# Patient Record
Sex: MALE | Race: WHITE | NOT HISPANIC OR LATINO | Employment: FULL TIME | URBAN - NONMETROPOLITAN AREA
[De-identification: names, ages, dates, MRNs, and addresses within clinical notes are randomized per-mention and may not be internally consistent; named-entity substitution may affect disease eponyms.]

---

## 2017-03-07 ENCOUNTER — OFFICE VISIT (OUTPATIENT)
Dept: PULMONOLOGY | Facility: CLINIC | Age: 57
End: 2017-03-07

## 2017-03-07 ENCOUNTER — APPOINTMENT (OUTPATIENT)
Dept: LAB | Facility: HOSPITAL | Age: 57
End: 2017-03-07

## 2017-03-07 ENCOUNTER — APPOINTMENT (OUTPATIENT)
Dept: CT IMAGING | Facility: HOSPITAL | Age: 57
End: 2017-03-07

## 2017-03-07 ENCOUNTER — HOSPITAL ENCOUNTER (INPATIENT)
Facility: HOSPITAL | Age: 57
LOS: 3 days | Discharge: HOME | End: 2017-03-10
Attending: FAMILY MEDICINE | Admitting: FAMILY MEDICINE

## 2017-03-07 VITALS
WEIGHT: 133.8 LBS | HEART RATE: 87 BPM | OXYGEN SATURATION: 97 % | HEIGHT: 68 IN | DIASTOLIC BLOOD PRESSURE: 66 MMHG | SYSTOLIC BLOOD PRESSURE: 120 MMHG | BODY MASS INDEX: 20.28 KG/M2

## 2017-03-07 DIAGNOSIS — D64.9 SEVERE ANEMIA: ICD-10-CM

## 2017-03-07 DIAGNOSIS — R63.4 WEIGHT LOSS: ICD-10-CM

## 2017-03-07 DIAGNOSIS — R53.83 FATIGUE, UNSPECIFIED TYPE: ICD-10-CM

## 2017-03-07 DIAGNOSIS — F17.200 TOBACCO USE DISORDER: ICD-10-CM

## 2017-03-07 DIAGNOSIS — J18.9 PNEUMONIA OF LEFT UPPER LOBE DUE TO INFECTIOUS ORGANISM: ICD-10-CM

## 2017-03-07 DIAGNOSIS — IMO0002 ALCOHOL USE DISORDER: ICD-10-CM

## 2017-03-07 DIAGNOSIS — D64.9 SEVERE ANEMIA: Primary | ICD-10-CM

## 2017-03-07 DIAGNOSIS — J98.4 CAVITARY LESION OF LUNG: ICD-10-CM

## 2017-03-07 DIAGNOSIS — R07.89 OTHER CHEST PAIN: ICD-10-CM

## 2017-03-07 DIAGNOSIS — B44.81 BRONCHOPULMONARY ASPERGILLOSIS (HCC): ICD-10-CM

## 2017-03-07 DIAGNOSIS — A15.0: ICD-10-CM

## 2017-03-07 DIAGNOSIS — R93.89 ABNORMAL CXR: Primary | ICD-10-CM

## 2017-03-07 PROBLEM — E87.6 HYPOKALEMIA: Status: ACTIVE | Noted: 2017-03-07

## 2017-03-07 LAB
ABO GROUP BLD: NORMAL
BLD GP AB SCN SERPL QL: NEGATIVE
CRP SERPL-MCNC: 5.7 MG/DL (ref 0–1)
HAV IGM SERPL QL IA: NEGATIVE
HBV CORE IGM SERPL QL IA: NEGATIVE
HBV SURFACE AG SERPL QL IA: NEGATIVE
HCV AB SER DONR QL: NEGATIVE
HIV1+2 AB SER QL: NEGATIVE
HOLD SPECIMEN: NORMAL
HOLD SPECIMEN: NORMAL
INR PPP: 1.05 (ref 0.8–1.2)
IRON 24H UR-MRATE: <10 MCG/DL (ref 49–181)
IRON SATN MFR SERPL: ABNORMAL % (ref 20–55)
Lab: NORMAL
PROTHROMBIN TIME: 13.7 SECONDS (ref 11.1–15.3)
RH BLD: POSITIVE
TIBC SERPL-MCNC: 364 MCG/DL (ref 261–462)
WHOLE BLOOD HOLD SPECIMEN: NORMAL
WHOLE BLOOD HOLD SPECIMEN: NORMAL

## 2017-03-07 PROCEDURE — 25010000002 LEVOFLOXACIN PER 250 MG: Performed by: FAMILY MEDICINE

## 2017-03-07 PROCEDURE — 82607 VITAMIN B-12: CPT | Performed by: FAMILY MEDICINE

## 2017-03-07 PROCEDURE — 82728 ASSAY OF FERRITIN: CPT | Performed by: FAMILY MEDICINE

## 2017-03-07 PROCEDURE — 83540 ASSAY OF IRON: CPT | Performed by: FAMILY MEDICINE

## 2017-03-07 PROCEDURE — 86923 COMPATIBILITY TEST ELECTRIC: CPT

## 2017-03-07 PROCEDURE — 86900 BLOOD TYPING SEROLOGIC ABO: CPT | Performed by: FAMILY MEDICINE

## 2017-03-07 PROCEDURE — 36415 COLL VENOUS BLD VENIPUNCTURE: CPT | Performed by: INTERNAL MEDICINE

## 2017-03-07 PROCEDURE — 25010000002 PIPERACILLIN SOD-TAZOBACTAM PER 1 G: Performed by: FAMILY MEDICINE

## 2017-03-07 PROCEDURE — 80053 COMPREHEN METABOLIC PANEL: CPT | Performed by: NURSE PRACTITIONER

## 2017-03-07 PROCEDURE — 86850 RBC ANTIBODY SCREEN: CPT | Performed by: FAMILY MEDICINE

## 2017-03-07 PROCEDURE — 36430 TRANSFUSION BLD/BLD COMPNT: CPT

## 2017-03-07 PROCEDURE — 86900 BLOOD TYPING SEROLOGIC ABO: CPT

## 2017-03-07 PROCEDURE — 99205 OFFICE O/P NEW HI 60 MIN: CPT | Performed by: INTERNAL MEDICINE

## 2017-03-07 PROCEDURE — 0 IOPAMIDOL 61 % SOLUTION: Performed by: FAMILY MEDICINE

## 2017-03-07 PROCEDURE — 25010000002 CEFTRIAXONE: Performed by: FAMILY MEDICINE

## 2017-03-07 PROCEDURE — P9016 RBC LEUKOCYTES REDUCED: HCPCS

## 2017-03-07 PROCEDURE — 25010000002 ONDANSETRON PER 1 MG: Performed by: FAMILY MEDICINE

## 2017-03-07 PROCEDURE — 87040 BLOOD CULTURE FOR BACTERIA: CPT | Performed by: FAMILY MEDICINE

## 2017-03-07 PROCEDURE — 86901 BLOOD TYPING SEROLOGIC RH(D): CPT

## 2017-03-07 PROCEDURE — 85007 BL SMEAR W/DIFF WBC COUNT: CPT | Performed by: NURSE PRACTITIONER

## 2017-03-07 PROCEDURE — 94799 UNLISTED PULMONARY SVC/PX: CPT

## 2017-03-07 PROCEDURE — G0432 EIA HIV-1/HIV-2 SCREEN: HCPCS | Performed by: FAMILY MEDICINE

## 2017-03-07 PROCEDURE — 86901 BLOOD TYPING SEROLOGIC RH(D): CPT | Performed by: FAMILY MEDICINE

## 2017-03-07 PROCEDURE — 71260 CT THORAX DX C+: CPT

## 2017-03-07 PROCEDURE — 82150 ASSAY OF AMYLASE: CPT | Performed by: NURSE PRACTITIONER

## 2017-03-07 PROCEDURE — 25010000002 VORICONAZOLE PER 10 MG: Performed by: FAMILY MEDICINE

## 2017-03-07 PROCEDURE — 85025 COMPLETE CBC W/AUTO DIFF WBC: CPT | Performed by: NURSE PRACTITIONER

## 2017-03-07 PROCEDURE — 83550 IRON BINDING TEST: CPT | Performed by: FAMILY MEDICINE

## 2017-03-07 PROCEDURE — 82746 ASSAY OF FOLIC ACID SERUM: CPT | Performed by: FAMILY MEDICINE

## 2017-03-07 PROCEDURE — 86140 C-REACTIVE PROTEIN: CPT | Performed by: FAMILY MEDICINE

## 2017-03-07 PROCEDURE — 99222 1ST HOSP IP/OBS MODERATE 55: CPT | Performed by: FAMILY MEDICINE

## 2017-03-07 PROCEDURE — 80074 ACUTE HEPATITIS PANEL: CPT | Performed by: FAMILY MEDICINE

## 2017-03-07 PROCEDURE — 85610 PROTHROMBIN TIME: CPT | Performed by: INTERNAL MEDICINE

## 2017-03-07 PROCEDURE — 99285 EMERGENCY DEPT VISIT HI MDM: CPT

## 2017-03-07 RX ORDER — SODIUM CHLORIDE 0.9 % (FLUSH) 0.9 %
1-10 SYRINGE (ML) INJECTION AS NEEDED
Status: DISCONTINUED | OUTPATIENT
Start: 2017-03-07 | End: 2017-03-10 | Stop reason: HOSPADM

## 2017-03-07 RX ORDER — LORAZEPAM 2 MG/1
4 TABLET ORAL
Status: DISCONTINUED | OUTPATIENT
Start: 2017-03-07 | End: 2017-03-10 | Stop reason: HOSPADM

## 2017-03-07 RX ORDER — ISONIAZID 300 MG/1
300 TABLET ORAL
Status: DISCONTINUED | OUTPATIENT
Start: 2017-03-08 | End: 2017-03-08

## 2017-03-07 RX ORDER — NICOTINE 21 MG/24HR
1 PATCH, TRANSDERMAL 24 HOURS TRANSDERMAL EVERY 24 HOURS
Status: DISCONTINUED | OUTPATIENT
Start: 2017-03-07 | End: 2017-03-10 | Stop reason: HOSPADM

## 2017-03-07 RX ORDER — PYRAZINAMIDE TABLET 500 MG/1
1500 TABLET ORAL
Status: DISCONTINUED | OUTPATIENT
Start: 2017-03-08 | End: 2017-03-08

## 2017-03-07 RX ORDER — LEVOFLOXACIN 5 MG/ML
750 INJECTION, SOLUTION INTRAVENOUS ONCE
Status: COMPLETED | OUTPATIENT
Start: 2017-03-07 | End: 2017-03-07

## 2017-03-07 RX ORDER — ETHAMBUTOL HYDROCHLORIDE 400 MG/1
800 TABLET, FILM COATED ORAL
Status: DISCONTINUED | OUTPATIENT
Start: 2017-03-08 | End: 2017-03-08

## 2017-03-07 RX ORDER — LORAZEPAM 2 MG/1
2 TABLET ORAL
Status: DISCONTINUED | OUTPATIENT
Start: 2017-03-07 | End: 2017-03-10 | Stop reason: HOSPADM

## 2017-03-07 RX ORDER — LORAZEPAM 1 MG/1
1 TABLET ORAL EVERY 8 HOURS
Status: COMPLETED | OUTPATIENT
Start: 2017-03-08 | End: 2017-03-09

## 2017-03-07 RX ORDER — SODIUM CHLORIDE 0.9 % (FLUSH) 0.9 %
10 SYRINGE (ML) INJECTION AS NEEDED
Status: DISCONTINUED | OUTPATIENT
Start: 2017-03-07 | End: 2017-03-07

## 2017-03-07 RX ORDER — POTASSIUM CHLORIDE 20 MEQ/1
40 TABLET, EXTENDED RELEASE ORAL ONCE
Status: COMPLETED | OUTPATIENT
Start: 2017-03-07 | End: 2017-03-07

## 2017-03-07 RX ORDER — ALBUTEROL SULFATE 2.5 MG/3ML
2.5 SOLUTION RESPIRATORY (INHALATION) EVERY 4 HOURS PRN
Status: DISCONTINUED | OUTPATIENT
Start: 2017-03-07 | End: 2017-03-10 | Stop reason: HOSPADM

## 2017-03-07 RX ORDER — LORAZEPAM 1 MG/1
1 TABLET ORAL
Status: DISCONTINUED | OUTPATIENT
Start: 2017-03-07 | End: 2017-03-10 | Stop reason: HOSPADM

## 2017-03-07 RX ORDER — LORAZEPAM 2 MG/ML
2 INJECTION INTRAMUSCULAR
Status: DISCONTINUED | OUTPATIENT
Start: 2017-03-07 | End: 2017-03-10 | Stop reason: HOSPADM

## 2017-03-07 RX ORDER — FAMOTIDINE 20 MG/1
20 TABLET, FILM COATED ORAL 2 TIMES DAILY
Status: DISCONTINUED | OUTPATIENT
Start: 2017-03-08 | End: 2017-03-10 | Stop reason: HOSPADM

## 2017-03-07 RX ORDER — LORAZEPAM 2 MG/ML
4 INJECTION INTRAMUSCULAR
Status: DISCONTINUED | OUTPATIENT
Start: 2017-03-07 | End: 2017-03-10 | Stop reason: HOSPADM

## 2017-03-07 RX ORDER — ONDANSETRON 2 MG/ML
4 INJECTION INTRAMUSCULAR; INTRAVENOUS EVERY 6 HOURS PRN
Status: DISCONTINUED | OUTPATIENT
Start: 2017-03-07 | End: 2017-03-10 | Stop reason: HOSPADM

## 2017-03-07 RX ORDER — LORAZEPAM 2 MG/ML
1 INJECTION INTRAMUSCULAR
Status: DISCONTINUED | OUTPATIENT
Start: 2017-03-07 | End: 2017-03-10 | Stop reason: HOSPADM

## 2017-03-07 RX ORDER — SODIUM CHLORIDE 9 MG/ML
100 INJECTION, SOLUTION INTRAVENOUS CONTINUOUS
Status: DISCONTINUED | OUTPATIENT
Start: 2017-03-07 | End: 2017-03-10 | Stop reason: HOSPADM

## 2017-03-07 RX ORDER — FUROSEMIDE 10 MG/ML
20 INJECTION INTRAMUSCULAR; INTRAVENOUS AS NEEDED
Status: DISPENSED | OUTPATIENT
Start: 2017-03-07 | End: 2017-03-08

## 2017-03-07 RX ORDER — LORAZEPAM 1 MG/1
1 TABLET ORAL EVERY 6 HOURS
Status: DISPENSED | OUTPATIENT
Start: 2017-03-07 | End: 2017-03-08

## 2017-03-07 RX ADMIN — LORAZEPAM 1 MG: 1 TABLET ORAL at 22:51

## 2017-03-07 RX ADMIN — Medication 10 ML: at 22:57

## 2017-03-07 RX ADMIN — ONDANSETRON 4 MG: 2 INJECTION, SOLUTION INTRAMUSCULAR; INTRAVENOUS at 23:12

## 2017-03-07 RX ADMIN — NICOTINE 1 PATCH: 21 PATCH TRANSDERMAL at 23:13

## 2017-03-07 RX ADMIN — DEXTROSE MONOHYDRATE 360 MG: 50 INJECTION, SOLUTION INTRAVENOUS at 23:46

## 2017-03-07 RX ADMIN — TAZOBACTAM SODIUM AND PIPERACILLIN SODIUM 3.38 G: 375; 3 INJECTION, SOLUTION INTRAVENOUS at 23:15

## 2017-03-07 RX ADMIN — CEFTRIAXONE 1 G: 1 INJECTION, POWDER, FOR SOLUTION INTRAMUSCULAR; INTRAVENOUS at 17:34

## 2017-03-07 RX ADMIN — POTASSIUM CHLORIDE 40 MEQ: 20 TABLET, EXTENDED RELEASE ORAL at 21:37

## 2017-03-07 RX ADMIN — LEVOFLOXACIN 750 MG: 5 INJECTION, SOLUTION INTRAVENOUS at 16:48

## 2017-03-07 RX ADMIN — SODIUM CHLORIDE 100 ML/HR: 9 INJECTION, SOLUTION INTRAVENOUS at 22:51

## 2017-03-07 RX ADMIN — IOPAMIDOL 75 ML: 612 INJECTION, SOLUTION INTRAVENOUS at 15:56

## 2017-03-07 NOTE — ED NOTES
Assisted with blood administration check off at bedside.      Danielle Aguirre RN  03/07/17 3313

## 2017-03-07 NOTE — PROGRESS NOTES
Pulmonary Consultation    Subjective     Chief Complaint   Patient presents with   • Abnormal Chest X-ray     ref urgent care        History of Present Illness  Tino Peres is a 56 y.o. male with a PMH significant for tobacco and alcohol use who presents as an urgent referral from Urgent Care for an abnormal CXR. Pt states he went to  today at the urging of his father (retired physician) and son, for several weeks of progressive dyspnea, fatigue, and wt loss. He states 3 months ago he was in his usual state of health. His father reports he has been losing wt for the past 6 months (15lbs). He admits to decreased appetite for several years. Pt fevers, chills, night sweats, or illness at symptom onset. He admits to constant left sided chest pain which is worse with lying down and rolling over onto his left side. Pt admits to cough intermittently productive of clear sputum, and he reports hemoptysis 3wks ago. He did not seek medical attention at that time as he was hoping it would stop. He reports he had some fungal type infection in his lungs ~10yrs ago, but he does not know if it was formerly diagnosed. Pt smokes 1-1.5ppd since age 12yo. He drinks up to a 6pk of beer a day, but denies binging. Pt has worked in factories and as a surgical tech. He denies known exposures to asbestos or TB. There is no lung disease or cancer in the family.    Review of Systems: History obtained from chart review and the patient.  Review of Systems   Constitutional: Positive for fatigue and unexpected weight change.   Respiratory: Positive for cough and shortness of breath.    Gastrointestinal:        No hematochezia or melena   Neurological: Positive for dizziness.     As described in the HPI. Otherwise, remainder of ROS (14 systems) were negative.    Patient Active Problem List   Diagnosis   • Tobacco use disorder   • Abnormal CXR   • Alcohol use disorder       No current outpatient prescriptions on file.    Past Medical History  "  Diagnosis Date   • Chronic bronchitis      Past Surgical History   Procedure Laterality Date   • Hernia repair       Social History     Social History   • Marital status: Single     Spouse name: N/A   • Number of children: N/A   • Years of education: N/A     Social History Main Topics   • Smoking status: Current Every Day Smoker   • Smokeless tobacco: Current User   • Alcohol use No   • Drug use: No   • Sexual activity: Not Asked     Other Topics Concern   • None     Social History Narrative     Family History   Problem Relation Age of Onset   • Asthma Father    • Cancer Father    • Diabetes Father    • Hypertension Father           Objective     Blood pressure 120/66, pulse 87, height 68\" (172.7 cm), weight 133 lb 12.8 oz (60.7 kg), SpO2 97 %.  Physical Exam   Constitutional: He is oriented to person, place, and time. Vital signs are normal. He appears well-developed and well-nourished. He appears cachectic.   HENT:   Head: Normocephalic and atraumatic.   Nose: Nose normal.   Mouth/Throat: Uvula is midline, oropharynx is clear and moist and mucous membranes are normal. Dental caries present.   Mallampati 3   Eyes: Conjunctivae, EOM and lids are normal. Pupils are equal, round, and reactive to light.   Pale sclera   Neck: Trachea normal and normal range of motion. No tracheal tenderness present. No thyroid mass present.   Cardiovascular: Regular rhythm and normal heart sounds.  Tachycardia present.  Exam reveals no gallop.    No murmur heard.  Prominent S2   Pulmonary/Chest: Effort normal. No accessory muscle usage. No respiratory distress. He has decreased breath sounds in the left upper field. He has no wheezes. He has no rhonchi. Chest wall is not dull to percussion. He exhibits no tenderness.   Abdominal: Soft. Normal appearance and bowel sounds are normal. There is no tenderness.       Vascular Status -  His exam exhibits no right foot edema. His exam exhibits no left foot edema.  Lymphadenopathy:        Head " (right side): No submandibular adenopathy present.        Head (left side): No submandibular adenopathy present.     He has no cervical adenopathy.        Right: No supraclavicular adenopathy present.        Left: No supraclavicular adenopathy present.   Neurological: He is alert and oriented to person, place, and time.   Skin: Skin is warm and dry. No cyanosis. There is pallor. Nails show no clubbing.   Psychiatric: He has a normal mood and affect. His speech is normal and behavior is normal. Judgment normal.   Nursing note and vitals reviewed.        Radiology (independently reviewed and interpreted by me): CXR 3/7/17 showed SILVANA consolidation with probable large mycetoma.       Assessment/Plan     Tino was seen today for abnormal chest x-ray.    Diagnoses and all orders for this visit:    Abnormal CXR  -     Cancel: CT Chest With Contrast; Future  -     Aspergillus Antibodies; Future  -     Aspergillus Galactomannan Antigen; Future  -     Protime-INR  -     CT Chest With Contrast; Future    Tobacco use disorder    Severe anemia    Fatigue, unspecified type    Other chest pain    Weight loss    Alcohol use disorder         Discussion/ Recommendations:   CXR concerning for mycetoma but cannot exclude underlying malignancy or other indolent infectious cause, such as TB.  He does report history of an atypical indolent infection several years ago which was never formally diagnosed or treated.  Importantly, he was found to be severely anemic (Hgb 4.1, repeat 4.4) on a CBC ordered at urgent care which was resulted to me.  He does not have any symptoms of active bleeding, but he is symptomatic from his anemia.    -Sending pt to ED for severe anemia. Anticipate admission for transfusion and anemia work-up. Discussed with Dr. Newman in the ED.  -CT chest with contrast to evaluate SILVANA process and help distinguish mediastinal structures. Probable bronch for BAL +/- biopsy vs referral to CTS for resection, but will likely wait  "until more stable and perform as an outpatient.   -Needs tobacco and alcohol cessation.  -Needs to establish with PCP.     Patient was very resistant to going to ED, stating he wanted to \"check in tomorrow\". I spent 20mins reviewing the risks of him not presenting for urgent treatment, including death. His father and son were present and encouraged the patient to heed my advice. Pt was agreeable to going directly to the ED. I will plan on seeing him in the morning to review his CT results.              No Follow-up on file.      Thank you for allowing me to participate in the care of Tino Peres. Please do not hesitate to contact me with any questions.         This document has been electronically signed by Candis Lr MD on March 7, 2017 2:24 PM      EMR Dragon/Transcription disclaimer:     Much of this encounter note is an electronic transcription/translation of spoken language to printed text. The electronic translation of spoken language may permit erroneous, or at times, nonsensical words or phrases to be inadvertently transcribed; Although I have reviewed the note for such errors, some may still exist.     "

## 2017-03-08 ENCOUNTER — APPOINTMENT (OUTPATIENT)
Dept: CARDIOLOGY | Facility: HOSPITAL | Age: 57
End: 2017-03-08
Attending: FAMILY MEDICINE

## 2017-03-08 LAB
ALBUMIN SERPL-MCNC: 3.2 G/DL (ref 3.4–4.8)
ALBUMIN/GLOB SERPL: 1 G/DL (ref 1.1–1.8)
ALP SERPL-CCNC: 75 U/L (ref 38–126)
ALT SERPL W P-5'-P-CCNC: 50 U/L (ref 21–72)
ANION GAP SERPL CALCULATED.3IONS-SCNC: 14 MMOL/L (ref 5–15)
AST SERPL-CCNC: 25 U/L (ref 17–59)
BASOPHILS # BLD AUTO: 0.03 10*3/MM3 (ref 0–0.2)
BASOPHILS NFR BLD AUTO: 0.3 % (ref 0–2)
BILIRUB SERPL-MCNC: 0.7 MG/DL (ref 0.2–1.3)
BUN BLD-MCNC: 10 MG/DL (ref 7–21)
BUN/CREAT SERPL: 20.4 (ref 7–25)
CALCIUM SPEC-SCNC: 7.9 MG/DL (ref 8.4–10.2)
CHLORIDE SERPL-SCNC: 108 MMOL/L (ref 95–110)
CO2 SERPL-SCNC: 20 MMOL/L (ref 22–31)
CREAT BLD-MCNC: 0.49 MG/DL (ref 0.7–1.3)
DEPRECATED RDW RBC AUTO: 56.5 FL (ref 35.1–43.9)
EOSINOPHIL # BLD AUTO: 0.16 10*3/MM3 (ref 0–0.7)
EOSINOPHIL NFR BLD AUTO: 1.8 % (ref 0–7)
ERYTHROCYTE [DISTWIDTH] IN BLOOD BY AUTOMATED COUNT: 21 % (ref 11.5–14.5)
FERRITIN SERPL-MCNC: 7.8 NG/ML (ref 17.9–464)
FOLATE SERPL-MCNC: 9.62 NG/ML (ref 2.76–21)
GFR SERPL CREATININE-BSD FRML MDRD: 177 ML/MIN/1.73 (ref 56–130)
GLOBULIN UR ELPH-MCNC: 3.3 GM/DL (ref 2.3–3.5)
GLUCOSE BLD-MCNC: 100 MG/DL (ref 60–100)
HCT VFR BLD AUTO: 23 % (ref 39–49)
HGB BLD-MCNC: 7.3 G/DL (ref 13.7–17.3)
HOLD SPECIMEN: NORMAL
HOLD SPECIMEN: NORMAL
IMM GRANULOCYTES # BLD: 0.03 10*3/MM3 (ref 0–0.02)
IMM GRANULOCYTES NFR BLD: 0.3 % (ref 0–0.5)
KOH PREP NAIL: NORMAL
L PNEUMO1 AG UR QL IA: NEGATIVE
LYMPHOCYTES # BLD AUTO: 1.31 10*3/MM3 (ref 0.6–4.2)
LYMPHOCYTES NFR BLD AUTO: 14.3 % (ref 10–50)
MCH RBC QN AUTO: 23.2 PG (ref 26.5–34)
MCHC RBC AUTO-ENTMCNC: 31.7 G/DL (ref 31.5–36.3)
MCV RBC AUTO: 73.2 FL (ref 80–98)
MONOCYTES # BLD AUTO: 1.34 10*3/MM3 (ref 0–0.9)
MONOCYTES NFR BLD AUTO: 14.7 % (ref 0–12)
MYCOPLASMAE PNEUMONIAE BY PCR: NEGATIVE
NEUTROPHILS # BLD AUTO: 6.26 10*3/MM3 (ref 2–8.6)
NEUTROPHILS NFR BLD AUTO: 68.6 % (ref 37–80)
PLATELET # BLD AUTO: 598 10*3/MM3 (ref 150–450)
PMV BLD AUTO: 8.2 FL (ref 8–12)
POTASSIUM BLD-SCNC: 3.5 MMOL/L (ref 3.5–5.1)
PROT SERPL-MCNC: 6.5 G/DL (ref 6.3–8.6)
RBC # BLD AUTO: 3.14 10*6/MM3 (ref 4.37–5.74)
S PNEUM AG SPEC QL LA: NEGATIVE
SODIUM BLD-SCNC: 142 MMOL/L (ref 137–145)
VIT B12 BLD-MCNC: 433 PG/ML (ref 239–931)
WBC NRBC COR # BLD: 9.13 10*3/MM3 (ref 3.2–9.8)
WHOLE BLOOD HOLD SPECIMEN: NORMAL

## 2017-03-08 PROCEDURE — 87102 FUNGUS ISOLATION CULTURE: CPT | Performed by: INTERNAL MEDICINE

## 2017-03-08 PROCEDURE — 87040 BLOOD CULTURE FOR BACTERIA: CPT | Performed by: FAMILY MEDICINE

## 2017-03-08 PROCEDURE — 85025 COMPLETE CBC W/AUTO DIFF WBC: CPT | Performed by: FAMILY MEDICINE

## 2017-03-08 PROCEDURE — 25010000002 FENTANYL CITRATE (PF) 100 MCG/2ML SOLUTION: Performed by: INTERNAL MEDICINE

## 2017-03-08 PROCEDURE — 87305 ASPERGILLUS AG IA: CPT | Performed by: FAMILY MEDICINE

## 2017-03-08 PROCEDURE — 93306 TTE W/DOPPLER COMPLETE: CPT

## 2017-03-08 PROCEDURE — 25010000002 PIPERACILLIN SOD-TAZOBACTAM PER 1 G: Performed by: FAMILY MEDICINE

## 2017-03-08 PROCEDURE — 87086 URINE CULTURE/COLONY COUNT: CPT | Performed by: FAMILY MEDICINE

## 2017-03-08 PROCEDURE — 87116 MYCOBACTERIA CULTURE: CPT | Performed by: INTERNAL MEDICINE

## 2017-03-08 PROCEDURE — 87449 NOS EACH ORGANISM AG IA: CPT | Performed by: FAMILY MEDICINE

## 2017-03-08 PROCEDURE — 86606 ASPERGILLUS ANTIBODY: CPT | Performed by: FAMILY MEDICINE

## 2017-03-08 PROCEDURE — 99232 SBSQ HOSP IP/OBS MODERATE 35: CPT | Performed by: INTERNAL MEDICINE

## 2017-03-08 PROCEDURE — 86900 BLOOD TYPING SEROLOGIC ABO: CPT

## 2017-03-08 PROCEDURE — 31624 DX BRONCHOSCOPE/LAVAGE: CPT | Performed by: INTERNAL MEDICINE

## 2017-03-08 PROCEDURE — 86480 TB TEST CELL IMMUN MEASURE: CPT | Performed by: FAMILY MEDICINE

## 2017-03-08 PROCEDURE — 25010000002 VORICONAZOLE PER 10 MG: Performed by: FAMILY MEDICINE

## 2017-03-08 PROCEDURE — 87070 CULTURE OTHR SPECIMN AEROBIC: CPT | Performed by: FAMILY MEDICINE

## 2017-03-08 PROCEDURE — 87220 TISSUE EXAM FOR FUNGI: CPT | Performed by: INTERNAL MEDICINE

## 2017-03-08 PROCEDURE — 25010000002 FUROSEMIDE PER 20 MG: Performed by: FAMILY MEDICINE

## 2017-03-08 PROCEDURE — 25010000002 MAGNESIUM SULFATE PER 500 MG OF MAGNESIUM: Performed by: FAMILY MEDICINE

## 2017-03-08 PROCEDURE — 87581 M.PNEUMON DNA AMP PROBE: CPT | Performed by: FAMILY MEDICINE

## 2017-03-08 PROCEDURE — 87070 CULTURE OTHR SPECIMN AEROBIC: CPT | Performed by: INTERNAL MEDICINE

## 2017-03-08 PROCEDURE — 87205 SMEAR GRAM STAIN: CPT | Performed by: FAMILY MEDICINE

## 2017-03-08 PROCEDURE — 80053 COMPREHEN METABOLIC PANEL: CPT | Performed by: FAMILY MEDICINE

## 2017-03-08 PROCEDURE — 25010000002 THIAMINE PER 100 MG: Performed by: FAMILY MEDICINE

## 2017-03-08 PROCEDURE — 87899 AGENT NOS ASSAY W/OPTIC: CPT | Performed by: FAMILY MEDICINE

## 2017-03-08 PROCEDURE — 87305 ASPERGILLUS AG IA: CPT | Performed by: INTERNAL MEDICINE

## 2017-03-08 PROCEDURE — 25010000002 LORAZEPAM PER 2 MG: Performed by: FAMILY MEDICINE

## 2017-03-08 PROCEDURE — 88104 CYTOPATH FL NONGYN SMEARS: CPT | Performed by: PATHOLOGY

## 2017-03-08 PROCEDURE — 25010000002 MIDAZOLAM PER 1 MG: Performed by: INTERNAL MEDICINE

## 2017-03-08 PROCEDURE — 0B9G8ZX DRAINAGE OF LEFT UPPER LUNG LOBE, VIA NATURAL OR ARTIFICIAL OPENING ENDOSCOPIC, DIAGNOSTIC: ICD-10-PCS | Performed by: INTERNAL MEDICINE

## 2017-03-08 PROCEDURE — 88112 CYTOPATH CELL ENHANCE TECH: CPT | Performed by: PATHOLOGY

## 2017-03-08 PROCEDURE — 87205 SMEAR GRAM STAIN: CPT | Performed by: INTERNAL MEDICINE

## 2017-03-08 PROCEDURE — 88112 CYTOPATH CELL ENHANCE TECH: CPT | Performed by: INTERNAL MEDICINE

## 2017-03-08 PROCEDURE — 87206 SMEAR FLUORESCENT/ACID STAI: CPT | Performed by: INTERNAL MEDICINE

## 2017-03-08 PROCEDURE — 87116 MYCOBACTERIA CULTURE: CPT | Performed by: FAMILY MEDICINE

## 2017-03-08 PROCEDURE — 36430 TRANSFUSION BLD/BLD COMPNT: CPT

## 2017-03-08 PROCEDURE — 88305 TISSUE EXAM BY PATHOLOGIST: CPT | Performed by: PATHOLOGY

## 2017-03-08 PROCEDURE — 99233 SBSQ HOSP IP/OBS HIGH 50: CPT | Performed by: FAMILY MEDICINE

## 2017-03-08 PROCEDURE — 25010000002 RIFAMPIN 600 MG RECONSTITUTED SOLUTION 1 EACH VIAL: Performed by: FAMILY MEDICINE

## 2017-03-08 PROCEDURE — 87206 SMEAR FLUORESCENT/ACID STAI: CPT | Performed by: FAMILY MEDICINE

## 2017-03-08 PROCEDURE — P9016 RBC LEUKOCYTES REDUCED: HCPCS

## 2017-03-08 PROCEDURE — 94799 UNLISTED PULMONARY SVC/PX: CPT

## 2017-03-08 RX ORDER — SODIUM CHLORIDE FOR INHALATION 3 %
VIAL, NEBULIZER (ML) INHALATION
Status: DISCONTINUED
Start: 2017-03-08 | End: 2017-03-08 | Stop reason: WASHOUT

## 2017-03-08 RX ORDER — TRAZODONE HYDROCHLORIDE 50 MG/1
50 TABLET ORAL NIGHTLY
Status: DISCONTINUED | OUTPATIENT
Start: 2017-03-08 | End: 2017-03-10 | Stop reason: HOSPADM

## 2017-03-08 RX ORDER — MIDAZOLAM HYDROCHLORIDE 5 MG/ML
5 INJECTION INTRAMUSCULAR; INTRAVENOUS
Status: COMPLETED | OUTPATIENT
Start: 2017-03-08 | End: 2017-03-08

## 2017-03-08 RX ORDER — SODIUM CHLORIDE 9 MG/ML
75 INJECTION, SOLUTION INTRAVENOUS CONTINUOUS
Status: DISCONTINUED | OUTPATIENT
Start: 2017-03-08 | End: 2017-03-10 | Stop reason: HOSPADM

## 2017-03-08 RX ORDER — LIDOCAINE HYDROCHLORIDE 10 MG/ML
0.1 INJECTION, SOLUTION EPIDURAL; INFILTRATION; INTRACAUDAL; PERINEURAL ONCE AS NEEDED
Status: DISCONTINUED | OUTPATIENT
Start: 2017-03-08 | End: 2017-03-10 | Stop reason: HOSPADM

## 2017-03-08 RX ORDER — FENTANYL CITRATE 50 UG/ML
100 INJECTION, SOLUTION INTRAMUSCULAR; INTRAVENOUS
Status: COMPLETED | OUTPATIENT
Start: 2017-03-08 | End: 2017-03-08

## 2017-03-08 RX ORDER — FERROUS SULFATE TAB EC 324 MG (65 MG FE EQUIVALENT) 324 (65 FE) MG
324 TABLET DELAYED RESPONSE ORAL
Status: DISCONTINUED | OUTPATIENT
Start: 2017-03-08 | End: 2017-03-10 | Stop reason: HOSPADM

## 2017-03-08 RX ORDER — LIDOCAINE HYDROCHLORIDE 20 MG/ML
INJECTION, SOLUTION EPIDURAL; INFILTRATION; INTRACAUDAL; PERINEURAL AS NEEDED
Status: DISCONTINUED | OUTPATIENT
Start: 2017-03-08 | End: 2017-03-08 | Stop reason: HOSPADM

## 2017-03-08 RX ADMIN — FAMOTIDINE 20 MG: 20 TABLET ORAL at 10:15

## 2017-03-08 RX ADMIN — TRAZODONE HYDROCHLORIDE 50 MG: 50 TABLET ORAL at 21:44

## 2017-03-08 RX ADMIN — Medication 10 ML: at 06:19

## 2017-03-08 RX ADMIN — DEXTROSE MONOHYDRATE 360 MG: 50 INJECTION, SOLUTION INTRAVENOUS at 13:06

## 2017-03-08 RX ADMIN — NICOTINE 1 PATCH: 21 PATCH TRANSDERMAL at 22:00

## 2017-03-08 RX ADMIN — FERROUS SULFATE TAB EC 324 MG (65 MG FE EQUIVALENT) 324 MG: 324 (65 FE) TABLET DELAYED RESPONSE at 10:15

## 2017-03-08 RX ADMIN — LORAZEPAM 1 MG: 1 TABLET ORAL at 22:01

## 2017-03-08 RX ADMIN — MAGNESIUM SULFATE HEPTAHYDRATE 100 ML/HR: 500 INJECTION, SOLUTION INTRAMUSCULAR; INTRAVENOUS at 10:13

## 2017-03-08 RX ADMIN — LORAZEPAM 2 MG: 2 INJECTION INTRAMUSCULAR; INTRAVENOUS at 13:04

## 2017-03-08 RX ADMIN — TRAZODONE HYDROCHLORIDE 50 MG: 50 TABLET ORAL at 02:16

## 2017-03-08 RX ADMIN — MIDAZOLAM HYDROCHLORIDE 5 MG: 5 INJECTION, SOLUTION INTRAMUSCULAR; INTRAVENOUS at 13:05

## 2017-03-08 RX ADMIN — FUROSEMIDE 20 MG: 10 INJECTION, SOLUTION INTRAVENOUS at 02:11

## 2017-03-08 RX ADMIN — DEXTROSE MONOHYDRATE 240 MG: 50 INJECTION, SOLUTION INTRAVENOUS at 20:21

## 2017-03-08 RX ADMIN — LORAZEPAM 1 MG: 1 TABLET ORAL at 04:15

## 2017-03-08 RX ADMIN — TAZOBACTAM SODIUM AND PIPERACILLIN SODIUM 3.38 G: 375; 3 INJECTION, SOLUTION INTRAVENOUS at 04:15

## 2017-03-08 RX ADMIN — SODIUM CHLORIDE 600 MG: 9 INJECTION, SOLUTION INTRAVENOUS at 02:28

## 2017-03-08 RX ADMIN — FENTANYL CITRATE 50 MCG: 50 INJECTION, SOLUTION INTRAMUSCULAR; INTRAVENOUS at 13:02

## 2017-03-08 NOTE — ED PROVIDER NOTES
Subjective   HPI Comments: 56 year old presents for worsening short of breath and dizziness and fatigue and weight loss.   He had outpt lab work which showed hb of 4 and sent here for further care.       Patient is a 56 y.o. male presenting with shortness of breath.   History provided by:  Patient  Shortness of Breath   Severity:  Moderate  Onset quality:  Gradual  Duration:  1 week  Timing:  Constant  Progression:  Worsening  Chronicity:  New  Context: activity and smoke exposure    Relieved by:  Rest  Worsened by:  Exertion  Ineffective treatments:  Sitting up  Associated symptoms: cough and diaphoresis    Associated symptoms: no abdominal pain, no chest pain, no claudication, no ear pain, no fever, no headaches, no hemoptysis, no neck pain and no PND    Risk factors: alcohol use and tobacco use    Risk factors: no family hx of DVT and no hx of cancer        Review of Systems   Constitutional: Positive for diaphoresis. Negative for fever.   HENT: Negative for ear pain.    Respiratory: Positive for cough and shortness of breath. Negative for hemoptysis.    Cardiovascular: Negative for chest pain, claudication and PND.   Gastrointestinal: Negative for abdominal pain.   Musculoskeletal: Negative for neck pain.   Neurological: Negative for headaches.       Past Medical History   Diagnosis Date   • Chronic bronchitis        No Known Allergies    Past Surgical History   Procedure Laterality Date   • Appendectomy       open   • Inguinal hernia repair     • Exploratory laparotomy  11/17/2014     Exploratory laparotomy, repair of duodenal ulcer, modified Carlos patch       Family History   Problem Relation Age of Onset   • Asthma Father    • Cancer Father    • Diabetes Father    • Hypertension Father    • Alcohol abuse Mother    • Heart failure Brother        Social History     Social History   • Marital status: Single     Spouse name: N/A   • Number of children: N/A   • Years of education: N/A     Social History Main  "Topics   • Smoking status: Current Every Day Smoker     Packs/day: 1.00     Years: 40.00     Types: Cigarettes   • Smokeless tobacco: Former User   • Alcohol use 25.2 oz/week     42 Cans of beer per week   • Drug use: No   • Sexual activity: Defer     Other Topics Concern   • None     Social History Narrative    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  Lives alone in [place].  Drinks a 6 pack daily.           Objective    Visit Vitals   • /65 (BP Location: Left arm, Patient Position: Lying)   • Pulse 94   • Temp 99.7 °F (37.6 °C) (Oral)   • Resp 18   • Ht 68\" (172.7 cm)   • Wt 133 lb (60.3 kg)   • SpO2 100%   • BMI 20.22 kg/m2       Physical Exam   Constitutional: He is oriented to person, place, and time. He appears well-developed and well-nourished.   HENT:   Head: Normocephalic and atraumatic.   Right Ear: External ear normal.   Left Ear: External ear normal.   Nose: Nose normal.   Mouth/Throat: Oropharynx is clear and moist.   Eyes: Conjunctivae and EOM are normal. Pupils are equal, round, and reactive to light.   Neck: Normal range of motion. Neck supple.   Cardiovascular: Normal rate, regular rhythm, normal heart sounds and intact distal pulses.    Pulmonary/Chest: Effort normal. No accessory muscle usage. No respiratory distress. He has decreased breath sounds in the left upper field and the left middle field. He exhibits no tenderness and no deformity.   Abdominal: Soft. Bowel sounds are normal. There is no tenderness.   Musculoskeletal: Normal range of motion.   Neurological: He is alert and oriented to person, place, and time. He has normal reflexes.   Skin: Skin is warm.   Psychiatric: He has a normal mood and affect. His behavior is normal. Judgment and thought content normal.   Nursing note and vitals reviewed.      Procedures         ED Course  ED Course      Labs Reviewed   BLOOD CULTURE   BLOOD CULTURE   RESPIRATORY CULTURE   RAINBOW DRAW    Narrative:     The " following orders were created for panel order [place] Draw.  Procedure                               Abnormality         Status                     ---------                               -----------         ------                     Light Blue Top[[id number]]                                    Final result               Green Top (Gel)[[id number]]                                   Final result               Lavender Top[[id number]]                                      Final result               Gold Top - SST[[id number]]                                    Final result                 Please view results for these tests on the individual orders.   TYPE AND SCREEN   PREVIOUS HISTORY   PREPARE RBC   LIGHT BLUE TOP   GREEN TOP   LAVENDER TOP   GOLD TOP - SST     Xr Chest 2 View    Result Date: 3/7/2017  Narrative: [facility] Patient Name: ELENA RICE ORDERING: RANDALL SWANSON ATTENDING: REFERRING: RANDALL SWANSON ----------------------- PROCEDURE: Chest two view on 3/7/2017 CLINICAL INDICATION:  Shortness of breath, fatigue COMPARISON: None FINDINGS: There is extensive left upper lobe opacity. There is an appearance suggesting cavitary lesion with possible associated mycetoma. Recommend follow-up chest CT with contrast to better evaluate. Cannot exclude malignant process in the left upper lobe especially centrally. Component of pneumonia in the left upper lobe is also favored. Right lung is clear. Heart is within normal limits for size.     Impression: CONCLUSION: Extensive left upper lobe abnormality. Favor cavitary lesion to be present with possible mycetoma which would suggest Aspergillus infection. Malignant process is not excluded. Recommend follow-up chest CT with contrast. Electronically signed by:  Graeme Mijares  3/7/2017 11:32 AM CST Workstation: [id number]    Ct Chest With Contrast    Result Date: 3/7/2017  Narrative: [facility] Patient Name: FARIBADILCIAANNA  ELENA ORDERING: IRVIN GALVEZ ATTENDING: IRVIN GALVEZ REFERRING: IRVIN GALVEZ ----------------------- PROCEDURE: CT SCAN OF THE CHEST WITH INTRAVENOUS CONTRAST. CLINICAL INFORMATION:  abnormal lung mass Dose length product 152.6 This exam was performed using radiation doses that are as low as reasonably achievable (ALARA). COMPARISON: Chest x-ray March 7, 2017, CT abdomen November 7, 2014 CONTRAST: 75 cc intravenous Isovue 300 TECHNIQUE: Axial images were obtained and multiplanar reconstructions were made.  FINDINGS: LUNGS/PLEURA: There is complete consolidation/opacification of the left upper lobe with air bronchograms present. In the apical segment of the left upper lobe, there is a 3.7 x 5 x 8 cm cavity which contains a spongelike lobulated mass measuring 2.7 x 3.3 x 5 cm. There is a crescent of air surrounding the internal more solid appearing mass. This is concerning for a cavity from previous tuberculosis with an internal fungus ball. The remaining portions of the lungs appear clear except for centrilobular emphysema. TRACHEA AND BRONCHI:  The trachea and bronchi are patent. MEDIASTINUM, DOUGLAS AND LYMPH NODES: There are slightly enlarged lymph nodes in the aorticopulmonary window region measuring 1.1 cm in short axis, and one in the right paratracheal region measuring 1.4 cm in short axis. HEART AND PERICARDIUM: The heart and pericardium are normal. VASCULAR: Unremarkable UPPER ABDOMEN: Unremarkable OSSEOUS STRUCTURES: There is partial destruction of the second through sixth ribs in the area of left upper lobe consolidation. There is a healing transverse fracture of the lateral left sixth rib.     Impression: CONCLUSION: 1.  Complete consolidation of the left upper lobe, containing a cavitary mass within internal fungus ball and adjacent rib destruction. 2.  This constellation of findings is suspicious for a pre-existing tuberculosis cavity superinfected by aspergillosis with invasion into the chest wall,  especially in an immunocompromised host. 3.  Chest wall invasion and mycetomas can also be seen with actinomycosis infection. 4.  Healing left lateral sixth rib fracture, likely pathologic. Findings and recommendations  discussed with IRVIN GALVEZ on 3/7/2017 4:34 PM CST Electronically signed by:  Deep Mcpherson MD  3/7/2017 4:36 PM CST Workstation: [id number]                MDM  Number of Diagnoses or Management Options  Bronchopulmonary aspergillosis:   Pneumonia of left upper lobe due to infectious organism:   Severe anemia:   Tuberculosis of lung with cavitation:      Amount and/or Complexity of Data Reviewed  Clinical lab tests: ordered and reviewed  Tests in the radiology section of CPT®: ordered and reviewed  Tests in the medicine section of CPT®: ordered and reviewed  Review and summarize past medical records: yes  Discuss the patient with other providers: yes (Discussed case with Dr Lr )  Independent visualization of images, tracings, or specimens: yes    Risk of Complications, Morbidity, and/or Mortality  Presenting problems: moderate  Diagnostic procedures: moderate  Management options: high  General comments: Severe symptomatic anemia - Hb 4 - transfuse.   Severe left airspace consolidation - fungal infection with possible TB .          Final diagnoses:   Severe anemia   Bronchopulmonary aspergillosis   Pneumonia of left upper lobe due to infectious organism   Tuberculosis of lung with cavitation            Irvin Galvez MD  03/07/17 4142

## 2017-03-08 NOTE — PROGRESS NOTES
PULMONARY PROGRESS NOTE  Candis Lr MD    [facility] CRITICAL CARE  3/8/2017        Tino Peres  [id number]  1960  56 y.o. male             LOS: 1 day   Irineo Oakley MD    Subjective     CC/Reason for visit: F/u cavitary lung lesion    HPI: Tino Peres is a 56 y.o. male with a PMH significant for tobacco and alcohol use who presents as an urgent referral from Urgent Care for an abnormal CXR. Pt states he went to  today at the urging of his father (retired physician) and son, for several weeks of progressive dyspnea, fatigue, and wt loss. He states 3 months ago he was in his usual state of health. His father reports he has been losing wt for the past 6 months (15lbs). He admits to decreased appetite for several years. Pt fevers, chills, night sweats, or illness at symptom onset. He admits to constant left sided chest pain which is worse with lying down and rolling over onto his left side. Pt admits to cough intermittently productive of clear sputum, and he reports hemoptysis 3wks ago. He did not seek medical attention at that time as he was hoping it would stop. He reports he had some fungal type infection in his lungs ~10yrs ago, but he does not know if it was formerly diagnosed. Pt smokes 1-1.5ppd since age 12yo. He drinks up to a 6pk of beer a day, but denies binging. Pt has worked in factories and as a surgical tech. He denies known exposures to asbestos or TB. There is no lung disease or cancer in the family.    He was sent to the ED from my office yesterday due to severe symptomatic anemia. He did have a CT chest which confirmed a sponge like mass within a SILVANA cavity with surrounding consolidation. Pt received 3 units pRBCs with mproved Hgb to 7.3. He was placed on airborne precautions and started on empiric TB tx along with voriconazole and zosyn. Pt remains on RA and is wanting to go home.     Review of Systems:  Respiratory ROS:  Review of Systems   Constitutional:  Positive for appetite change, fatigue and unexpected weight change. Negative for fever.   HENT: Negative for congestion.    Respiratory: Positive for cough. Negative for shortness of breath and wheezing.    Cardiovascular: Positive for chest pain.     All other systems were reviewed and were negative except as above in the HPI.    Objective     Vital Sign Min/Max for last 24 hours:  Temp  Min: 97.5 °F (36.4 °C)  Max: 99.7 °F (37.6 °C)   BP  Min: 120/66  Max: 145/62   Pulse  Min: 75  Max: 104   Resp  Min: 17  Max: 24   SpO2  Min: 97 %  Max: 100 %   No Data Recorded   Weight  Min: 132 lb 12.8 oz (60.2 kg)  Max: 139 lb 5.3 oz (63.2 kg)     Physical Exam:  98.7 °F (37.1 °C) (Oral) 75 133/70 24 100% 139 lb 5.3 oz (63.2 kg) Body mass index is 20.58 kg/(m^2).  Physical Exam   Constitutional: He is oriented to person, place, and time. Vital signs are normal. He appears well-developed and well-nourished. He appears cachectic.   HENT:   Head: Normocephalic and atraumatic.   Nose: Nose normal.   Mouth/Throat: Uvula is midline, oropharynx is clear and moist and mucous membranes are normal. Dental caries present.   Mallampati 3   Eyes: Conjunctivae, EOM and lids are normal. Pupils are equal, round, and reactive to light.   Pale sclera   Neck: Trachea normal and normal range of motion. No tracheal tenderness present. No thyroid mass present.   Cardiovascular: Regular rhythm and normal heart sounds.  Exam reveals no gallop.    No murmur heard.  Prominent S2   Pulmonary/Chest: Effort normal. No accessory muscle usage. No respiratory distress. He has decreased breath sounds in the left upper field. He has no wheezes. He has no rhonchi. Chest wall is not dull to percussion. He exhibits no tenderness.   Abdominal: Soft. Normal appearance and bowel sounds are normal. There is no tenderness.       Vascular Status -  His exam exhibits no right foot edema. His exam exhibits no left foot edema.  Lymphadenopathy:        Head (right side):  No submandibular adenopathy present.        Head (left side): No submandibular adenopathy present.     He has no cervical adenopathy.        Right: No supraclavicular adenopathy present.        Left: No supraclavicular adenopathy present.   Neurological: He is alert and oriented to person, place, and time.   Skin: Skin is warm and dry. No cyanosis. There is pallor. Nails show no clubbing.   Psychiatric: He has a normal mood and affect. His speech is normal and behavior is normal.   Nursing note and vitals reviewed.      Scheduled meds:      famotidine 20 mg Oral BID   LORazepam 1 mg Oral Q6H   Followed by      LORazepam 1 mg Oral Q8H   IV Fluids 1000 mL + additives 100 mL/hr Intravenous Daily   nicotine 1 patch Transdermal Q24H   traZODone 50 mg Oral Nightly   voriconazole (VFEND) IVPB in 250 mL 4 mg/kg Intravenous Q12H   voriconazole (VFEND) IVPB in 250 mL 6 mg/kg Intravenous Q12H       IV meds:                         sodium chloride 100 mL/hr Last Rate: Stopped (03/08/17 0229)       Data Review: I personally reviewed the patient's medications and new clinical results.  Lab Results   Component Value Date    CALCIUM 7.9 (L) 03/08/2017     Results from last 7 days  Lab Units 03/08/17  0418 03/07/17  1345 03/07/17  1201   AST (SGOT) U/L 25  --  36   SODIUM mmol/L 142  --  139   POTASSIUM mmol/L 3.5  --  3.4*   CHLORIDE mmol/L 108  --  105   TOTAL CO2 mmol/L 20.0*  --  23.0   BUN mg/dL 10  --  11   CREATININE mg/dL 0.49*  --  0.54*   GLUCOSE mg/dL 100  --  117*   CALCIUM mg/dL 7.9*  --  8.6   WBC 10*3/mm3 9.13 15.34*  --    HEMOGLOBIN g/dL 7.3* 4.4*  --    PLATELETS 10*3/mm3 598* 863*  --    ALT (SGPT) U/L 50  --  59               Radiology: I independently reviewed the patient's new imaging, including images and reports.  Imaging Results (last 24 hours)     Procedure Component Value Units Date/Time    CT Chest With Contrast [[id number]] Collected:  03/07/17 1609     Updated:  03/07/17 1638    Narrative:       Radiology  [facility]    Patient Name: ELENA RICE    ORDERING: IRVIN GALVEZ    ATTENDING: IRVIN GALVEZ     REFERRING: IRVIN GALVEZ    -----------------------    PROCEDURE: CT SCAN OF THE CHEST WITH INTRAVENOUS CONTRAST.    CLINICAL INFORMATION:  abnormal lung mass     Dose length product 152.6  This exam was performed using radiation doses that are as low as  reasonably achievable (ALARA).    COMPARISON: Chest x-ray March 7, 2017, CT abdomen November 7, 2014    CONTRAST: 75 cc intravenous Isovue 300    TECHNIQUE: Axial images were obtained and multiplanar  reconstructions were made.      FINDINGS:    LUNGS/PLEURA: There is complete consolidation/opacification of  the left upper lobe with air bronchograms present. In the apical  segment of the left upper lobe, there is a 3.7 x 5 x 8 cm cavity  which contains a spongelike lobulated mass measuring 2.7 x 3.3 x  5 cm. There is a crescent of air surrounding the internal more  solid appearing mass. This is concerning for a cavity from  previous tuberculosis with an internal fungus ball.   The remaining portions of the lungs appear clear except for  centrilobular emphysema.  TRACHEA AND BRONCHI:  The trachea and bronchi are patent.  MEDIASTINUM, DOUGLAS AND LYMPH NODES: There are slightly enlarged  lymph nodes in the aorticopulmonary window region measuring 1.1  cm in short axis, and one in the right paratracheal region  measuring 1.4 cm in short axis.  HEART AND PERICARDIUM: The heart and pericardium are normal.  VASCULAR: Unremarkable  UPPER ABDOMEN: Unremarkable  OSSEOUS STRUCTURES: There is partial destruction of the second  through sixth ribs in the area of left upper lobe consolidation.  There is a healing transverse fracture of the lateral left sixth  rib.      Impression:       CONCLUSION:  1.  Complete consolidation of the left upper lobe, containing a  cavitary mass within internal fungus ball and adjacent rib  destruction.  2.  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  3.  Chest wall invasion and mycetomas can also be seen with  actinomycosis infection.  4.  Healing left lateral sixth rib fracture, likely pathologic.     Findings and recommendations  discussed with IRVIN GALVEZ on  3/7/2017 4:34 PM CST    Electronically signed by:  Deep Mcpherson MD  3/7/2017 4:36 PM CST  Workstation: [id number]          Assessment/Plan     ASSESSMENT:   # Symptomatic anemia, unknown etiology- improved  # SILVANA cavitary mass containing possible mycetoma- indolent process  # Fatigue, wt loss, dyspnea  # Tobacco and EtOH abuse      PLAN/ RECOMMENDATIONS:  -Cont TB rule out with 3 serial AFBs  -Send sputum for bacterial culture and cytology  -Stop empiric TB therapy until confirmed given possibility of resistance  -Stop Zosyn pending culture data as pt is not toxic and zosyn interferes with ASP galactomannan results  -Consider stopping empiric Vori until invasive fungal infection confirmed  -Follow Hgb. Transfuse as needed  -Consider GI consult for EGD/ colonoscopy to determine if GI source of blood loss  -Will try to schedule bronch as inpt, but ok to perform as outpt seeing as I planned and outpt work up. My schedule is limited.    Addendum: Trying to add on case at 1200 today. If unable to get done, will likely not be able to do until Friday at earliest, if not next Tuesday as an outpt.   Risks of bronchoscopy, as listed below, were discussed with the patient who agrees to proceed.  -Bleeding, especially while on blood thinners  -Infection  -Pneumothorax  -Mediastinitis  -Medication/ anesthesia reaction  -Arhythmia, hypotension, respiratory failure  -Laryngospasm  -Fever, sore throat  -1 in 8,000 risk of death   In addition, alternatives to bronchoscopy were discussed including, surgery, image guided biopsy, or doing  nothing.    Total time spent: 40 minutes      This document has been electronically signed by Candis Lr MD on March 8, 2017 7:29 AM      [phone]    EMR Dragon/Transcription disclaimer:     Much of this encounter note is an electronic transcription/translation of spoken language to printed text. The electronic translation of spoken language may permit erroneous, or at times, nonsensical words or phrases to be inadvertently transcribed; Although I have reviewed the note for such errors, some may still exist.

## 2017-03-08 NOTE — H&P
Anemia  Subjective:     Tino Peres is a 56 y.o. male who presents for anemia.  Patient was seen in the urgent care today for several weeks of progressive dyspnea, fatigue, and weight loss.  Patient had a chest x-ray that was abnormal and sent for an urgent referral to Dr. Lr.  Patient was found to have a hemoglobin of 4 and was urged to come to the ER for admission.  Patient states he's never had a blood transfusion.  Patient had hemoptysis about 3 weeks ago but did not seek medical attention.  He states he does not like doctors.  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  He took medicine for a long time but does not know what it was or what the infection was.  He denies any exposure to asbestos or tuberculosis.  He reports a 15-20 pound weight loss that was not eating well.  He has smoked a pack a day for 40 years or more.  He denies any night sweats to me.  He was found to have a cavitary lesion on CAT scan with possible extension into the rib.      The following portions of the patient's history were reviewed and updated as appropriate: allergies, current medications, past family history, past medical history, past social history, past surgical history and problem list.    Concurrent Medical Hx:  Past Medical History   Diagnosis Date   • Chronic bronchitis        Past Surgical Hx:  Past Surgical History   Procedure Laterality Date   • Appendectomy       open   • Inguinal hernia repair     • Exploratory laparotomy  11/17/2014     Exploratory laparotomy, repair of duodenal ulcer, modified Carlos patch     Family Hx:  Family History   Problem Relation Age of Onset   • Asthma Father    • Cancer Father    • Diabetes Father    • Hypertension Father    • Alcohol abuse Mother    • Heart failure Brother       Social History:  Social History     Social History   • Marital status: Single     Spouse name: N/A   • Number of children: N/A   • Years of  education: N/A     Occupational History   • Not on file.     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  Lives alone in [place].  Drinks a 6 pack daily.       Home Medications:  No current facility-administered medications on file prior to encounter.      No current outpatient prescriptions on file prior to encounter.       Allergies:  Review of patient's allergies indicates no known allergies.  I reviewed the patient's new clinical results.  Review of Systems  Review of Systems   Constitutional: Positive for appetite change (decreased appetitite), fatigue and unexpected weight change (20-30lbs unintentional weight loss). Negative for chills, diaphoresis and fever.   HENT: Negative for congestion, ear pain, nosebleeds, rhinorrhea, sneezing, sore throat and trouble swallowing.    Respiratory: Positive for cough (Patient attributes it to his smoking. Reports coughing up blood 3 weeks ago.) and shortness of breath.    Cardiovascular: Negative for chest pain, palpitations and leg swelling.   Gastrointestinal: Negative for abdominal pain, anal bleeding, blood in stool, constipation, diarrhea, nausea and vomiting.   Genitourinary: Negative for difficulty urinating, dysuria and hematuria.   Musculoskeletal: Positive for arthralgias (Chronic), back pain and myalgias.   Skin: Positive for pallor. Negative for rash.   Neurological: Positive for weakness. Negative for seizures, syncope, numbness and headaches.   Hematological: Does not bruise/bleed easily.   Psychiatric/Behavioral: Negative for hallucinations, self-injury, sleep disturbance and suicidal ideas. The  "patient is not nervous/anxious and is not hyperactive.        Objective:     Visit Vitals   • /64   • Pulse 94   • Temp 99 °F (37.2 °C)   • Resp 18   • Ht 68\" (172.7 cm)   • Wt 133 lb (60.3 kg)   • SpO2 100%   • BMI 20.22 kg/m2     Physical Exam   Constitutional: He is oriented to person, place, and time. He appears well-developed. No distress.   HENT:   Head: Normocephalic and atraumatic.   Right Ear: External ear normal.   Left Ear: External ear normal.   Nose: Nose normal.   Mouth/Throat: Oropharynx is clear and moist. No oropharyngeal exudate.   Pale conjunctiva   Eyes: EOM are normal. Pupils are equal, round, and reactive to light. No scleral icterus.   Neck: Neck supple.   Cardiovascular: Normal rate, regular rhythm and intact distal pulses.  Exam reveals no gallop and no friction rub.    Murmur heard.  2/6 systolic   Pulmonary/Chest: Effort normal and breath sounds normal. No respiratory distress. He has no wheezes. He has no rales. He exhibits no tenderness.   Abdominal: Soft. Bowel sounds are normal. He exhibits no distension. There is no tenderness. There is no rebound and no guarding.   Noted surgical scar   Musculoskeletal: He exhibits no edema or tenderness.   Lymphadenopathy:     He has no cervical adenopathy.   Neurological: He is alert and oriented to person, place, and time. He has normal reflexes. No cranial nerve deficit.   Skin: Skin is warm and dry. He is not diaphoretic.   Psychiatric: He has a normal mood and affect. His behavior is normal. Judgment and thought content normal.   Nursing note and vitals reviewed.    I reviewed the patient's new clinical results.  Assessment/Plan:     Active Hospital Problems (** Indicates Principal Problem)    Diagnosis Date Noted   • **Anemia [D64.9] 03/07/2017     -Type & cross; discussed risks and benefit of blood transfusion. Patient is agreeable to transfusion.   -Transfuse PRBCs with lasix between units  -Will obtain H&H every 4 hours  -Iron studies " ordered     • Cavitary lesion of lung [J98.4] 03/07/2017     -Pulmonology consulted; appreciate recommendations  -Will obtain 3 acid fast stains  -Will start RIPE therapy pending bronchoscopy cultures  -Will obtain aspergillus antibodies and aspergillus galactomannan antigen for possible aspergillus superimposed infection  -Will obtain aPTT and INR  -Will start patient on voriconazole (superior to amphotericin B)   -Will obtain HIV & hepatitis panel to evaluate for immunocompromise       • Alcohol use disorder [F10.99] 03/07/2017     -IV banana bag  -[facility] protocol  -Will obtain B6 levels      • Tobacco use disorder [F17.200] 03/07/2017   • CAP (community acquired pneumonia) [J18.9] 03/07/2017     -Will start levaquin and zosyn for concerns of aspiration pneumonia with coverage for pseudomonas.      • Hypokalemia [E87.6] 03/07/2017      Resolved Hospital Problems    Diagnosis Date Noted Date Resolved   No resolved problems to display.     On an echocardiogram due to the heart murmur.  Hypokalemia will replace potassium.    I have seen and examined the patient.  I have reviewed the notes, assessments, and/or procedures performed by Dr. Camejo, I concur with her documentation and assessment and plan for Tino Peres.        This document has been electronically signed by Fidelia Keller MD on March 7, 2017 9:09 PM          This document has been electronically signed by Fidelia Keller MD on March 7, 2017 9:09 PM

## 2017-03-08 NOTE — PROGRESS NOTES
FAMILY MEDICINE DAILY PROGRESS NOTE  NAME: Tino Peres  : 1960  MRN: [id number]     LOS: 1 day     PROVIDER OF SERVICE: Corie Camejo MD    Chief Complaint: Anemia    Subjective:     Interval History:  History taken from: patient    Patient complains of a restless night. He receive 3 units of PRBCs over night. His Hb this morning is 7.3. He denies any chest pain or shortness of breath. Units are prepared should his Hb drop or he becomes symptomatic.     Patient is eager to go home.     Review of Systems:   Review of Systems   Constitutional: Positive for fatigue. Negative for chills, diaphoresis and fever.   HENT: Negative for ear pain, rhinorrhea, sinus pressure, sneezing and sore throat.    Eyes: Negative for pain.   Respiratory: Negative for chest tightness and shortness of breath.    Cardiovascular: Negative for chest pain and leg swelling.   Gastrointestinal: Negative for abdominal pain, constipation, diarrhea, nausea and vomiting.   Genitourinary: Positive for hematuria. Negative for dysuria.   Neurological: Negative for seizures, numbness and headaches.   Psychiatric/Behavioral: Positive for sleep disturbance. Negative for hallucinations.   All other systems reviewed and are negative.        Objective:     Vital Signs  Temp:  [97.5 °F (36.4 °C)-99.7 °F (37.6 °C)] 98.7 °F (37.1 °C)  Heart Rate:  [] 75  Resp:  [17-24] 24  BP: (120-145)/(58-79) 133/70      Intake/Output Summary (Last 24 hours) at 17 0644  Last data filed at 17 0600   Gross per 24 hour   Intake   1936 ml   Output   1650 ml   Net    286 ml       Physical Exam  Physical Exam   Constitutional: He is oriented to person, place, and time. He appears well-developed and well-nourished. No distress.   HENT:   Head: Normocephalic and atraumatic.   Nose: Nose normal.   Eyes: EOM are normal. Pupils are equal, round, and reactive to light.   Neck: Normal range of motion. Neck supple. No tracheal deviation present. No  thyromegaly present.   Cardiovascular: Normal rate, regular rhythm, normal heart sounds and intact distal pulses.    Pulmonary/Chest: Effort normal and breath sounds normal.   Abdominal: Soft. Bowel sounds are normal. He exhibits no distension. There is no tenderness.   Musculoskeletal: Normal range of motion. He exhibits no edema.   Lymphadenopathy:     He has no cervical adenopathy.   Neurological: He is alert and oriented to person, place, and time.   Skin: Skin is warm and dry. He is not diaphoretic.   Psychiatric: He has a normal mood and affect. His behavior is normal. Judgment and thought content normal.   Nursing note and vitals reviewed.         Medication Review    Current Facility-Administered Medications:   •  albuterol (PROVENTIL) nebulizer solution 0.083% 2.5 mg/3mL, 2.5 mg, Nebulization, Q4H PRN, Elli Morgan MD  •  ethambutol (MYAMBUTOL) tablet 800 mg, 800 mg, Oral, Q24H, Elli Morgan MD  •  famotidine (PEPCID) tablet 20 mg, 20 mg, Oral, BID, Elli Morgan MD  •  furosemide (LASIX) injection 20 mg, 20 mg, Intravenous, PRN, Elli Morgan MD, 20 mg at 03/08/17 0211  •  isoniazid (NYDRAZID) tablet 300 mg, 300 mg, Oral, Q24H, Elli Morgan MD  •  LORazepam (ATIVAN) tablet 1 mg, 1 mg, Oral, Q2H PRN, 1 mg at 03/07/17 4377 **OR** LORazepam (ATIVAN) injection 1 mg, 1 mg, Intravenous, Q2H PRN **OR** LORazepam (ATIVAN) tablet 2 mg, 2 mg, Oral, Q1H PRN **OR** LORazepam (ATIVAN) injection 2 mg, 2 mg, Intravenous, Q1H PRN **OR** LORazepam (ATIVAN) injection 2 mg, 2 mg, Intravenous, Q15 Min PRN **OR** LORazepam (ATIVAN) injection 1 mg, 1 mg, Intravenous, Q15 Min PRN **OR** LORazepam (ATIVAN) tablet 4 mg, 4 mg, Oral, Q1H PRN **OR** LORazepam (ATIVAN) injection 4 mg, 4 mg, Intravenous, Q1H PRN, Elli Morgan MD  •  LORazepam (ATIVAN) tablet 1 mg, 1 mg, Oral, Q6H, 1 mg at 03/08/17 0415 **FOLLOWED BY** LORazepam (ATIVAN) tablet 1 mg, 1 mg, Oral, Q8H, Elli Morgan MD  •   multiple vitamin (M.V.I. Adult) 10 mL, thiamine (B-1) 100 mg, folic acid 1 mg, magnesium sulfate 2 g in sodium chloride 0.9 % 1,000 mL infusion, 100 mL/hr, Intravenous, Daily, Elli Morgan MD  •  nicotine (NICODERM CQ) 21 MG/24HR patch 1 patch, 1 patch, Transdermal, Q24H, Elli Morgan MD, 1 patch at 03/07/17 2313  •  ondansetron (ZOFRAN) injection 4 mg, 4 mg, Intravenous, Q6H PRN, Elli Morgan MD, 4 mg at 03/07/17 2312  •  piperacillin-tazobactam (ZOSYN) 3.375 g in dextrose 50 mL IVPB (premix), 3.375 g, Intravenous, Q6H, Elli Morgan MD, Last Rate: 0 mL/hr at 03/07/17 2346, 3.375 g at 03/08/17 0415  •  pyrazinamide tablet 1,500 mg, 1,500 mg, Oral, Q24H, Elli Morgan MD  •  pyridoxine (B-6) injection 25 mg, 25 mg, Intravenous, Daily, Elli Morgan MD  •  rifAMPin (RIFADIN) 600 mg in sodium chloride 0.9 % 500 mL IVPB, 600 mg, Intravenous, Q24H, Elli Morgan MD, Last Rate: 166.7 mL/hr at 03/08/17 0228, 600 mg at 03/08/17 0228  •  sodium chloride 0.9 % flush 1-10 mL, 1-10 mL, Intravenous, PRN, Elli Morgan MD, 10 mL at 03/08/17 0619  •  sodium chloride 0.9 % infusion, 100 mL/hr, Intravenous, Continuous, Elli Morgan MD, Stopped at 03/08/17 0229  •  traZODone (DESYREL) tablet 50 mg, 50 mg, Oral, Nightly, Elli Morgan MD, 50 mg at 03/08/17 0216  •  voriconazole (VFEND) 240 mg in dextrose (D5W) 5 % 250 mL IVPB, 4 mg/kg, Intravenous, Q12H, Elli Morgan MD  •  voriconazole (VFEND) 360 mg in dextrose (D5W) 5 % 250 mL IVPB, 6 mg/kg, Intravenous, Q12H, Elli Morgan MD, Last Rate: 125 mL/hr at 03/07/17 2346, 360 mg at 03/07/17 2346     Diagnostic Data    Lab Results (last 24 hours)     Procedure Component Value Units Date/Time    Blood Culture [[id number]] Collected:  03/07/17 1758    Specimen:  Blood from Arm, Left Updated:  03/07/17 1803    Light Blue Top [[id number]] Collected:  03/07/17 1528    Specimen:  Blood Updated:  03/07/17 2001     Extra  Tube hold for add-on       Auto resulted       [place] Draw [[id number]] Collected:  03/07/17 1528    Specimen:  Blood Updated:  03/07/17 2001    Narrative:       The following orders were created for panel order [place] Draw.  Procedure                               Abnormality         Status                     ---------                               -----------         ------                     Light Blue Top[[id number]]                                    Final result               Green Top (Gel)[[id number]]                                   Final result               Lavender Top[[id number]]                                      Final result               Gold Top - SST[[id number]]                                    Final result                 Please view results for these tests on the individual orders.    Green Top (Gel) [[id number]] Collected:  03/07/17 1528    Specimen:  Blood Updated:  03/07/17 2001     Extra Tube Hold for add-ons.       Auto resulted.       Lavender Top [[id number]] Collected:  03/07/17 1528    Specimen:  Blood Updated:  03/07/17 2001     Extra Tube hold for add-on       Auto resulted       Gold Top - SST [[id number]] Collected:  03/07/17 1528    Specimen:  Blood Updated:  03/07/17 2001     Extra Tube Hold for add-ons.       Auto resulted.       C-reactive Protein [[id number]]  (Abnormal) Collected:  03/07/17 1528    Specimen:  Blood Updated:  03/07/17 2301     C-Reactive Protein 5.70 (H) mg/dL     Iron Profile [[id number]]  (Abnormal) Collected:  03/07/17 1528    Specimen:  Blood Updated:  03/07/17 2344     Iron <10 (L) mcg/dL      TIBC 364 mcg/dL      Iron Saturation -- %       Unable to calculate.       HIV-1 & HIV-2 Antibodies [[id number]]  (Normal) Collected:  03/07/17 1528    Specimen:  Blood Updated:  03/07/17 2350     HIV-1/ HIV-2 Negative     Hepatitis Panel, Acute [[id number]]  (Normal) Collected:  03/07/17 1528    Specimen:  Blood Updated:  03/07/17 2350     Hepatitis C Ab Negative      Hep A IgM  Negative      Hep B C IgM Negative      Hepatitis B Surface Ag Negative     Ferritin [[id number]]  (Abnormal) Collected:  03/07/17 1528    Specimen:  Blood Updated:  03/08/17 0107     Ferritin 7.80 (L) ng/mL     Vitamin B12 [[id number]]  (Normal) Collected:  03/07/17 1528    Specimen:  Blood Updated:  03/08/17 0138     Vitamin B-12 433 pg/mL     Folate [[id number]]  (Normal) Collected:  03/07/17 1528    Specimen:  Blood Updated:  03/08/17 0138     Folate 9.62 ng/mL     Mycoplasma Pneumoniae PCR [[id number]] Collected:  03/08/17 0148    Specimen:  Sputum from Nasopharynx Updated:  03/08/17 0148    Urine Culture [[id number]] Collected:  03/08/17 0300    Specimen:  Urine from Urine, Clean Catch Updated:  03/08/17 0300    Legionella Antigen, Urine [[id number]]  (Normal) Collected:  03/08/17 0300    Specimen:  Urine from Urine, Clean Catch Updated:  03/08/17 0334     LEGIONELLA ANTIGEN, URINE Negative     S. Pneumo Ag Urine or CSF [[id number]]  (Normal) Collected:  03/08/17 0300    Specimen:  Urine from Urine, Clean Catch Updated:  03/08/17 0334     Strep Pneumo Ag Negative     Aspergillus Antibodies [[id number]] Collected:  03/08/17 0418    Specimen:  Blood Updated:  03/08/17 0449    Aspergillus Galactomannan Antigen [[id number]] Collected:  03/08/17 0418    Specimen:  Blood Updated:  03/08/17 0449    Comprehensive Metabolic Panel [[id number]]  (Abnormal) Collected:  03/08/17 0418    Specimen:  Blood Updated:  03/08/17 0505     Glucose 100 mg/dL      BUN 10 mg/dL      Creatinine 0.49 (L) mg/dL      Sodium 142 mmol/L      Potassium 3.5 mmol/L      Chloride 108 mmol/L      CO2 20.0 (L) mmol/L      Calcium 7.9 (L) mg/dL      Total Protein 6.5 g/dL      Albumin 3.20 (L) g/dL      ALT (SGPT) 50 U/L      AST (SGOT) 25 U/L      Alkaline Phosphatase 75 U/L      Total Bilirubin 0.7 mg/dL      eGFR Non African Amer 177 (H) mL/min/1.73      Globulin 3.3 gm/dL      A/G Ratio 1.0 (L) g/dL      BUN/Creatinine Ratio 20.4      Anion Gap 14.0 mmol/L      CBC & Differential [[id number]] Collected:  03/08/17 0418    Specimen:  Blood Updated:  03/08/17 0507    Narrative:       The following orders were created for panel order CBC & Differential.  Procedure                               Abnormality         Status                     ---------                               -----------         ------                     Scan Slide[[id number]]                                                                   CBC Auto Differential[[id number]]         Abnormal            Final result                 Please view results for these tests on the individual orders.    CBC Auto Differential [[id number]]  (Abnormal) Collected:  03/08/17 0418    Specimen:  Blood Updated:  03/08/17 0507     WBC 9.13 10*3/mm3      RBC 3.14 (L) 10*6/mm3      Hemoglobin 7.3 (L) g/dL      Hematocrit 23.0 (L) %      MCV 73.2 (L) fL      MCH 23.2 (L) pg      MCHC 31.7 g/dL      RDW 21.0 (H) %      RDW-SD 56.5 (H) fl      MPV 8.2 fL      Platelets 598 (H) 10*3/mm3      Neutrophil % 68.6 %      Lymphocyte % 14.3 %      Monocyte % 14.7 (H) %      Eosinophil % 1.8 %      Basophil % 0.3 %      Immature Grans % 0.3 %      Neutrophils, Absolute 6.26 10*3/mm3      Lymphocytes, Absolute 1.31 10*3/mm3      Monocytes, Absolute 1.34 (H) 10*3/mm3      Eosinophils, Absolute 0.16 10*3/mm3      Basophils, Absolute 0.03 10*3/mm3      Immature Grans, Absolute 0.03 (H) 10*3/mm3     Blood Culture [[id number]]  (Normal) Collected:  03/07/17 1716    Specimen:  Blood from Arm, Right Updated:  03/08/17 0601     Blood Culture No growth at less than 24 hours             I reviewed the patient's new clinical results.    Assessment/Plan:     Active Hospital Problems (** Indicates Principal Problem)    Diagnosis Date Noted   • **Anemia [D64.9] 03/07/2017     -Type & cross; discussed risks and benefit of blood transfusion. Patient is agreeable to transfusion.   -Transfuse PRBCs with lasix between units  -Hb this morning 7.3. Will follow H&H and  transfuse as indicated.   -Iron studies:   Iron - <10 (low)  TIBC - 364  Ferritin - 7.8 (low)     • Cavitary lesion of lung [J98.4] 03/07/2017     -Pulmonology consulted; appreciate recommendations  -Will obtain 3 acid fast stains  -Will start RIPE therapy pending bronchoscopy cultures  -Will obtain aspergillus antibodies and aspergillus galactomannan antigen for possible aspergillus superimposed infection  -Will obtain aPTT and INR  -Will start patient on voriconazole (superior to amphotericin B)   -HIV & hepatitis panels: negative        • Alcohol use disorder [F10.99] 03/07/2017     -IV banana bag  -[facility] protocol  -Will obtain B6 levels      • Tobacco use disorder [F17.200] 03/07/2017   • CAP (community acquired pneumonia) [J18.9] 03/07/2017     -Will start levaquin and zosyn for concerns of aspiration pneumonia with coverage for pseudomonas.   -Strep pneumonia, legionella negative.   -Sputum no growth in 24hr     • Hypokalemia [E87.6] 03/07/2017      Resolved Hospital Problems    Diagnosis Date Noted Date Resolved   No resolved problems to display.     We will continue to monitor the patient's course and adjust our care accordingly     DVT prophylaxis: SCDs/TEDs  Code status is Full Code    Plan for disposition:Where: home and When:  pending bronchosopy and when clinically stable      Time: 35 minutes

## 2017-03-08 NOTE — OP NOTE
Bronchoscopy Procedure Note    Patient Identification:  Tino Peres  56 y.o.  male  1960  [id number]            Procedure:  1. Bronchoscopy, Diagnostic  2. Bronchial wash    Pre-Operative Diagnosis: Left upper lobe cavitary lung mass    Post-Operative Diagnosis: Same    Indication: Left upper lobe cavitary lung mass, concern for infection versus malignancy    Anesthesia: Moderate Sedation    Procedure Details: Patient was consented for the procedure with all risk and benefit of the procedure explained in detail.  Patient was given the opportunity to ask questions and all concerns were answered.  The bronchocope was inserted into the main airway via the oropharynx. An anatomical survey was done of the main airways and the subsegmental bronchus to at least the first subsegmental level of all five lobes of both lungs.  The findings are reported below.  A bronchialalveolar lavage was performed using aliquots of normal saline instilled into the airways then aspirated back.    Findings:  Bronchoscope passed through the oropharynx to the level of the vocal cords.  1% Lidocaine (10cc) used for local anesthetic over vocal cords.  Bronchoscope was passed between the vocal cords into the trachea.  All airways were visualized to at least the first subsegment level of all 5 lobes of both lungs.  Airways were of normal size and caliber.  No endobronchial lesions seen. Significant inflammation of endobronchial mucosa throughout with easy bleeding with maneuvers at the SILVANA orifice. White secretions seen emanating from lingula.    Estimated Blood Loss:  Minimal           Specimens:  SILVANA wash for AFB, culture, cytology, ASP galactomannan                Complications:  Difficulty tolerating due to cough and high amount of sedation required           Disposition: Recover in ICU then stable for transfer      Patient tolerated the procedure well.        This document has been electronically signed by Candis Lr MD on March  8, 2017 12:54 PM

## 2017-03-08 NOTE — PROGRESS NOTES
[address] [address]  T - [id number]         PROGRESS NOTE         SUBJECTIVE:   Patient Care Team:  No Known Provider as PCP - General    Chief Complaint:     Chief Complaint   Patient presents with   • Anemia   • Fatigue   • Weakness - Generalized       Subjective     Patient is 56 y.o. male presents with weight loss and anemia. He is also dyspneic on exertion. He has had weight loss..      ROS/HISTORY/ CURRENT MEDICATIONS/OBJECTIVE/VS/PE:   Review of Systems:   Review of Systems    History:     Past Medical History   Diagnosis Date   • Chronic bronchitis      Past Surgical History   Procedure Laterality Date   • Appendectomy       open   • Inguinal hernia repair     • Exploratory laparotomy  11/17/2014     Exploratory laparotomy, repair of duodenal ulcer, modified Carlos patch     Family History   Problem Relation Age of Onset   • Asthma Father    • Cancer Father    • Diabetes Father    • Hypertension Father    • Alcohol abuse Mother    • Heart failure Brother      Social History   Substance Use Topics   • Smoking status: Current Every Day Smoker     Packs/day: 1.00     Years: 40.00     Types: Cigarettes   • Smokeless tobacco: Former User   • Alcohol use 25.2 oz/week     42 Cans of beer per week     No prescriptions prior to admission.     Allergies:  Review of patient's allergies indicates no known allergies.    Current Medications:     Current Facility-Administered Medications   Medication Dose Route Frequency Provider Last Rate Last Dose   • albuterol (PROVENTIL) nebulizer solution 0.083% 2.5 mg/3mL  2.5 mg Nebulization Q4H PRN Elli Morgan MD       • famotidine (PEPCID) tablet 20 mg  20 mg Oral BID Elli Morgan MD   20 mg at 03/08/17 1015   • ferrous sulfate EC tablet 324 mg  324 mg Oral Daily With Breakfast Irineo Oakley MD   324 mg at 03/08/17 1015   • furosemide (LASIX) injection 20 mg  20 mg Intravenous PRN Elli Morgan MD   20 mg  at 03/08/17 0211   • LORazepam (ATIVAN) tablet 1 mg  1 mg Oral Q2H PRN Elli Morgan MD   1 mg at 03/07/17 2347    Or   • LORazepam (ATIVAN) injection 1 mg  1 mg Intravenous Q2H PRN Elli Morgan MD        Or   • LORazepam (ATIVAN) tablet 2 mg  2 mg Oral Q1H PRN Elli Morgan MD        Or   • LORazepam (ATIVAN) injection 2 mg  2 mg Intravenous Q1H PRN Elli Morgan MD        Or   • LORazepam (ATIVAN) injection 2 mg  2 mg Intravenous Q15 Min PRN Elli Morgan MD        Or   • LORazepam (ATIVAN) injection 1 mg  1 mg Intravenous Q15 Min PRN Elli Morgan MD        Or   • LORazepam (ATIVAN) tablet 4 mg  4 mg Oral Q1H PRN Elli Morgan MD        Or   • LORazepam (ATIVAN) injection 4 mg  4 mg Intravenous Q1H PRN Elli Morgan MD       • LORazepam (ATIVAN) tablet 1 mg  1 mg Oral Q6H Elli Morgan MD   1 mg at 03/08/17 0415    Followed by   • LORazepam (ATIVAN) tablet 1 mg  1 mg Oral Q8H Elli Morgan MD       • multiple vitamin (M.V.I. Adult) 10 mL, thiamine (B-1) 100 mg, folic acid 1 mg, magnesium sulfate 2 g in sodium chloride 0.9 % 1,000 mL infusion  100 mL/hr Intravenous Daily Elli Morgan  mL/hr at 03/08/17 1013 100 mL/hr at 03/08/17 1013   • nicotine (NICODERM CQ) 21 MG/24HR patch 1 patch  1 patch Transdermal Q24H Elli Morgan MD   1 patch at 03/07/17 2313   • ondansetron (ZOFRAN) injection 4 mg  4 mg Intravenous Q6H PRN Elli Morgan MD   4 mg at 03/07/17 2312   • sodium chloride 0.9 % flush 1-10 mL  1-10 mL Intravenous PRN Elil Morgan MD   10 mL at 03/08/17 0619   • sodium chloride 0.9 % infusion  100 mL/hr Intravenous Continuous Elli Morgan MD   Stopped at 03/08/17 0229   • traZODone (DESYREL) tablet 50 mg  50 mg Oral Nightly Elli Morgan MD   50 mg at 03/08/17 0216   • voriconazole (VFEND) 240 mg in dextrose (D5W) 5 % 250 mL IVPB  4 mg/kg Intravenous Q12H Elli Morgan MD       • voriconazole (VFEND)  360 mg in dextrose (D5W) 5 % 250 mL IVPB  6 mg/kg Intravenous Q12H Elli Morgan  mL/hr at 03/07/17 2346 360 mg at 03/07/17 2346       Objective     Physical Exam:   Temp:  [97.5 °F (36.4 °C)-99.7 °F (37.6 °C)] 98.3 °F (36.8 °C)  Heart Rate:  [] 75  Resp:  [17-28] 28  BP: (120-145)/(58-79) 136/73    Physical Exam   Constitutional:   Patient looks pale.   Pulmonary/Chest: No respiratory distress. He has no wheezes. He has no rales.   Rhonchi are present bilaterally.   Abdominal: Soft. Bowel sounds are normal. He exhibits no distension. There is no tenderness.   Skin:   Patient appears pale.   Vitals reviewed.           Results Review:   Lab Results   Component Value Date    GLUCOSE 100 03/08/2017    BUN 10 03/08/2017    CREATININE 0.49 (L) 03/08/2017    EGFRIFNONA 177 (H) 03/08/2017    BCR 20.4 03/08/2017    CO2 20.0 (L) 03/08/2017    CALCIUM 7.9 (L) 03/08/2017    ALBUMIN 3.20 (L) 03/08/2017    LABIL2 1.0 (L) 03/08/2017    AST 25 03/08/2017    ALT 50 03/08/2017         WBC WBC   Date Value Ref Range Status   03/08/2017 9.13 3.20 - 9.80 10*3/mm3 Final   03/07/2017 15.34 (H) 3.20 - 9.80 10*3/mm3 Final      HGB HEMOGLOBIN   Date Value Ref Range Status   03/08/2017 7.3 (L) 13.7 - 17.3 g/dL Final   03/07/2017 4.4 (C) 13.7 - 17.3 g/dL Final     Comment:     Results confirmed by recollection/ repeat analysis      HCT HEMATOCRIT   Date Value Ref Range Status   03/08/2017 23.0 (L) 39.0 - 49.0 % Final   03/07/2017 15.7 (L) 39.0 - 49.0 % Final      Platlets PLATELETS   Date Value Ref Range Status   03/08/2017 598 (H) 150 - 450 10*3/mm3 Final   03/07/2017 863 (H) 150 - 450 10*3/mm3 Final          Imaging Results (last 24 hours)     Procedure Component Value Units Date/Time    CT Chest With Contrast [[id number]] Collected:  03/07/17 1609     Updated:  03/07/17 1638    Narrative:       [facility] [place]    Patient Name: ELENA RICE    ORDERING: IRVIN GALVEZ    ATTENDING: IRVIN GALVEZ      REFERRING: IRVIN GALVEZ    -----------------------    PROCEDURE: CT SCAN OF THE CHEST WITH INTRAVENOUS CONTRAST.    CLINICAL INFORMATION:  abnormal lung mass     Dose length product 152.6  This exam was performed using radiation doses that are as low as  reasonably achievable (ALARA).    COMPARISON: Chest x-ray March 7, 2017, CT abdomen November 7, 2014    CONTRAST: 75 cc intravenous Isovue 300    TECHNIQUE: Axial images were obtained and multiplanar  reconstructions were made.      FINDINGS:    LUNGS/PLEURA: There is complete consolidation/opacification of  the left upper lobe with air bronchograms present. In the apical  segment of the left upper lobe, there is a 3.7 x 5 x 8 cm cavity  which contains a spongelike lobulated mass measuring 2.7 x 3.3 x  5 cm. There is a crescent of air surrounding the internal more  solid appearing mass. This is concerning for a cavity from  previous tuberculosis with an internal fungus ball.   The remaining portions of the lungs appear clear except for  centrilobular emphysema.  TRACHEA AND BRONCHI:  The trachea and bronchi are patent.  MEDIASTINUM, DOUGLAS AND LYMPH NODES: There are slightly enlarged  lymph nodes in the aorticopulmonary window region measuring 1.1  cm in short axis, and one in the right paratracheal region  measuring 1.4 cm in short axis.  HEART AND PERICARDIUM: The heart and pericardium are normal.  VASCULAR: Unremarkable  UPPER ABDOMEN: Unremarkable  OSSEOUS STRUCTURES: There is partial destruction of the second  through sixth ribs in the area of left upper lobe consolidation.  There is a healing transverse fracture of the lateral left sixth  rib.      Impression:       CONCLUSION:  1.  Complete consolidation of the left upper lobe, containing a  cavitary mass within internal fungus ball and adjacent rib  destruction.  2.  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  3.  Chest wall invasion and mycetomas can also be seen with  actinomycosis infection.  4.  Healing left lateral sixth rib fracture, likely pathologic.     Findings and recommendations  discussed with IRVIN GALVEZ on  3/7/2017 4:34 PM CST    Electronically signed by:  Deep Mcpherson MD  3/7/2017 4:36 PM CST  Workstation: [id number]           I reviewed the patient's new clinical results.  I reviewed the patient's new imaging results and agree with the interpretation.     ASSESSMENT/PLAN:   Assessment/Plan   Principal Problem:    Anemia  Active Problems:    Tobacco use disorder    Alcohol use disorder    Cavitary lesion of lung    CAP (community acquired pneumonia)    Hypokalemia      Continue with current treatment.  I have reviewed the notes, assessments, and/or procedures performed by the resident  , I concur with her/his documentation of Tino Peres.    I discussed the patients findings and my recommendations with patient.      Anton Morrison MD  03/08/17  11:31 AM

## 2017-03-08 NOTE — PLAN OF CARE
Problem: Patient Care Overview (Adult)  Goal: Plan of Care Review  Outcome: Ongoing (interventions implemented as appropriate)  Goal: Adult Individualization and Mutuality  Outcome: Ongoing (interventions implemented as appropriate)    Problem: Fall Risk (Adult)  Goal: Identify Related Risk Factors and Signs and Symptoms  Outcome: Ongoing (interventions implemented as appropriate)  Goal: Absence of Falls  Outcome: Ongoing (interventions implemented as appropriate)    Problem: Acute Alcohol Withdrawal Syndrome, Risk For/Actual (Adult)  Goal: Signs and Symptoms of Listed Potential Problems Will be Absent or Manageable (Acute Alcohol Withdrawal Syndrome, Risk For/Actual)  Outcome: Ongoing (interventions implemented as appropriate)

## 2017-03-09 LAB
ABO + RH BLD: NORMAL
ACANTHOCYTES BLD QL SMEAR: NORMAL
ALBUMIN SERPL-MCNC: 2.9 G/DL (ref 3.4–4.8)
ALBUMIN/GLOB SERPL: 1 G/DL (ref 1.1–1.8)
ALP SERPL-CCNC: 74 U/L (ref 38–126)
ALT SERPL W P-5'-P-CCNC: 33 U/L (ref 21–72)
ANION GAP SERPL CALCULATED.3IONS-SCNC: 9 MMOL/L (ref 5–15)
ANISOCYTOSIS BLD QL: NORMAL
AST SERPL-CCNC: 13 U/L (ref 17–59)
BACTERIA SPEC AEROBE CULT: NORMAL
BASOPHILS # BLD AUTO: 0.07 10*3/MM3 (ref 0–0.2)
BASOPHILS NFR BLD AUTO: 0.7 % (ref 0–2)
BH BB BLOOD EXPIRATION DATE: NORMAL
BH BB BLOOD TYPE BARCODE: 5100
BH BB DISPENSE STATUS: NORMAL
BH BB PRODUCT CODE: NORMAL
BH BB UNIT NUMBER: NORMAL
BILIRUB SERPL-MCNC: 0.2 MG/DL (ref 0.2–1.3)
BUN BLD-MCNC: 11 MG/DL (ref 7–21)
BUN/CREAT SERPL: 24.4 (ref 7–25)
CALCIUM SPEC-SCNC: 7.9 MG/DL (ref 8.4–10.2)
CHLORIDE SERPL-SCNC: 108 MMOL/L (ref 95–110)
CO2 SERPL-SCNC: 20 MMOL/L (ref 22–31)
CREAT BLD-MCNC: 0.45 MG/DL (ref 0.7–1.3)
DACRYOCYTES BLD QL SMEAR: NORMAL
DEPRECATED RDW RBC AUTO: 58.4 FL (ref 35.1–43.9)
EOSINOPHIL # BLD AUTO: 0.49 10*3/MM3 (ref 0–0.7)
EOSINOPHIL NFR BLD AUTO: 4.7 % (ref 0–7)
ERYTHROCYTE [DISTWIDTH] IN BLOOD BY AUTOMATED COUNT: 22.1 % (ref 11.5–14.5)
GASTROCULT GAST QL: NEGATIVE
GFR SERPL CREATININE-BSD FRML MDRD: 195 ML/MIN/1.73 (ref 56–130)
GLOBULIN UR ELPH-MCNC: 3 GM/DL (ref 2.3–3.5)
GLUCOSE BLD-MCNC: 120 MG/DL (ref 60–100)
HCT VFR BLD AUTO: 24.1 % (ref 39–49)
HGB BLD-MCNC: 7.6 G/DL (ref 13.7–17.3)
HYPOCHROMIA BLD QL: NORMAL
IMM GRANULOCYTES # BLD: 0.05 10*3/MM3 (ref 0–0.02)
IMM GRANULOCYTES NFR BLD: 0.5 % (ref 0–0.5)
LAB AP CASE REPORT: NORMAL
LAB AP DIAGNOSIS COMMENT: NORMAL
LYMPHOCYTES # BLD AUTO: 1.74 10*3/MM3 (ref 0.6–4.2)
LYMPHOCYTES NFR BLD AUTO: 16.5 % (ref 10–50)
Lab: NORMAL
MCH RBC QN AUTO: 22.7 PG (ref 26.5–34)
MCHC RBC AUTO-ENTMCNC: 31.5 G/DL (ref 31.5–36.3)
MCV RBC AUTO: 71.9 FL (ref 80–98)
MONOCYTES # BLD AUTO: 1.25 10*3/MM3 (ref 0–0.9)
MONOCYTES NFR BLD AUTO: 11.9 % (ref 0–12)
NEUTROPHILS # BLD AUTO: 6.93 10*3/MM3 (ref 2–8.6)
NEUTROPHILS NFR BLD AUTO: 65.7 % (ref 37–80)
NRBC BLD MANUAL-RTO: 0 /100 WBC (ref 0–0)
PATH REPORT.FINAL DX SPEC: NORMAL
PLATELET # BLD AUTO: 590 10*3/MM3 (ref 150–450)
PMV BLD AUTO: 7.6 FL (ref 8–12)
POTASSIUM BLD-SCNC: 3.8 MMOL/L (ref 3.5–5.1)
PROT SERPL-MCNC: 5.9 G/DL (ref 6.3–8.6)
RBC # BLD AUTO: 3.35 10*6/MM3 (ref 4.37–5.74)
SMALL PLATELETS BLD QL SMEAR: NORMAL
SODIUM BLD-SCNC: 137 MMOL/L (ref 137–145)
STAT OF ADQ CVX/VAG CYTO-IMP: NORMAL
TARGETS BLD QL SMEAR: NORMAL
UNIT  ABO: NORMAL
UNIT  RH: NORMAL
WBC MORPH BLD: NORMAL
WBC NRBC COR # BLD: 10.53 10*3/MM3 (ref 3.2–9.8)

## 2017-03-09 PROCEDURE — 25010000002 THIAMINE PER 100 MG: Performed by: FAMILY MEDICINE

## 2017-03-09 PROCEDURE — 25010000002 MAGNESIUM SULFATE PER 500 MG OF MAGNESIUM: Performed by: FAMILY MEDICINE

## 2017-03-09 PROCEDURE — 85007 BL SMEAR W/DIFF WBC COUNT: CPT | Performed by: FAMILY MEDICINE

## 2017-03-09 PROCEDURE — 87205 SMEAR GRAM STAIN: CPT | Performed by: INTERNAL MEDICINE

## 2017-03-09 PROCEDURE — 85025 COMPLETE CBC W/AUTO DIFF WBC: CPT | Performed by: FAMILY MEDICINE

## 2017-03-09 PROCEDURE — 80053 COMPREHEN METABOLIC PANEL: CPT | Performed by: FAMILY MEDICINE

## 2017-03-09 PROCEDURE — 99232 SBSQ HOSP IP/OBS MODERATE 35: CPT | Performed by: FAMILY MEDICINE

## 2017-03-09 PROCEDURE — 87116 MYCOBACTERIA CULTURE: CPT | Performed by: INTERNAL MEDICINE

## 2017-03-09 PROCEDURE — 87070 CULTURE OTHR SPECIMN AEROBIC: CPT | Performed by: INTERNAL MEDICINE

## 2017-03-09 PROCEDURE — 82270 OCCULT BLOOD FECES: CPT | Performed by: FAMILY MEDICINE

## 2017-03-09 PROCEDURE — 87206 SMEAR FLUORESCENT/ACID STAI: CPT | Performed by: INTERNAL MEDICINE

## 2017-03-09 PROCEDURE — 99232 SBSQ HOSP IP/OBS MODERATE 35: CPT | Performed by: INTERNAL MEDICINE

## 2017-03-09 PROCEDURE — 99253 IP/OBS CNSLTJ NEW/EST LOW 45: CPT | Performed by: NURSE PRACTITIONER

## 2017-03-09 PROCEDURE — 87206 SMEAR FLUORESCENT/ACID STAI: CPT | Performed by: FAMILY MEDICINE

## 2017-03-09 RX ORDER — SODIUM CHLORIDE FOR INHALATION 3 %
VIAL, NEBULIZER (ML) INHALATION
Status: DISPENSED
Start: 2017-03-09 | End: 2017-03-09

## 2017-03-09 RX ADMIN — NICOTINE 1 PATCH: 21 PATCH TRANSDERMAL at 22:28

## 2017-03-09 RX ADMIN — LORAZEPAM 1 MG: 1 TABLET ORAL at 20:02

## 2017-03-09 RX ADMIN — FERROUS SULFATE TAB EC 324 MG (65 MG FE EQUIVALENT) 324 MG: 324 (65 FE) TABLET DELAYED RESPONSE at 07:42

## 2017-03-09 RX ADMIN — MAGNESIUM SULFATE HEPTAHYDRATE 100 ML/HR: 500 INJECTION, SOLUTION INTRAMUSCULAR; INTRAVENOUS at 09:00

## 2017-03-09 RX ADMIN — LORAZEPAM 1 MG: 1 TABLET ORAL at 15:00

## 2017-03-09 RX ADMIN — SODIUM CHLORIDE 75 ML/HR: 9 INJECTION, SOLUTION INTRAVENOUS at 05:22

## 2017-03-09 RX ADMIN — LORAZEPAM 1 MG: 1 TABLET ORAL at 07:42

## 2017-03-09 RX ADMIN — FAMOTIDINE 20 MG: 20 TABLET ORAL at 08:59

## 2017-03-09 RX ADMIN — TRAZODONE HYDROCHLORIDE 50 MG: 50 TABLET ORAL at 20:02

## 2017-03-09 RX ADMIN — FAMOTIDINE 20 MG: 20 TABLET ORAL at 18:40

## 2017-03-09 NOTE — PROGRESS NOTES
FAMILY MEDICINE PROGRESS NOTE  NAME: Tino Peres  : 1960  MRN: [id number]     LOS: 2 days   Full Code  PROVIDER OF SERVICE:     Chief Complaint:  Anemia    Subjective     Interval History:  History taken from: patient  Subjective: Patient feels good. Wants to go home. Intermittent coughing.  No hemoptysis.  No fevers.     Review of Systems:   Review of Systems   Constitutional: Negative.  Negative for fatigue and fever.   HENT: Negative.  Negative for ear pain and sore throat.    Eyes: Negative.  Negative for pain and visual disturbance.   Respiratory: Positive for cough. Negative for shortness of breath.    Cardiovascular: Negative.  Negative for chest pain and palpitations.   Gastrointestinal: Negative for abdominal pain and nausea.   Endocrine: Negative.    Genitourinary: Negative for dysuria.   Musculoskeletal: Negative for arthralgias.   Skin: Negative for rash.   Allergic/Immunologic: Negative.    Neurological: Negative for dizziness, weakness and headaches.   Psychiatric/Behavioral: Negative for sleep disturbance.       Objective     Vital Signs  Temp:  [98.2 °F (36.8 °C)-99 °F (37.2 °C)] 99 °F (37.2 °C)  Heart Rate:  [71-93] 82  Resp:  [23-27] 24  BP: (112-133)/(57-73) 116/67    Physical Exam  Physical Exam   Constitutional: He is oriented to person, place, and time. He appears well-developed and well-nourished.   HENT:   Head: Normocephalic.   Eyes: Conjunctivae and EOM are normal. Pupils are equal, round, and reactive to light.   Neck: Normal range of motion.   Cardiovascular: Normal rate, regular rhythm and normal heart sounds.    Pulmonary/Chest: Effort normal and breath sounds normal.   Abdominal: Soft. Bowel sounds are normal.   Musculoskeletal: Normal range of motion.   Neurological: He is alert and oriented to person, place, and time.   Skin: Skin is warm and dry.   Psychiatric: He has a normal mood and affect. His behavior is normal.       Medication Review    Current  Facility-Administered Medications:   •  albuterol (PROVENTIL) nebulizer solution 0.083% 2.5 mg/3mL, 2.5 mg, Nebulization, Q4H PRN, Elli Morgan MD  •  famotidine (PEPCID) tablet 20 mg, 20 mg, Oral, BID, Elli Morgan MD, 20 mg at 17 1015  •  ferrous sulfate EC tablet 324 mg, 324 mg, Oral, Daily With Breakfast, Irineo Oakley MD, 324 mg at 17 0742  •  lidocaine PF 1% (XYLOCAINE) injection 0.1 mL, 0.1 mL, Intradermal, Once PRN, Candis Lr MD  •  LORazepam (ATIVAN) tablet 1 mg, 1 mg, Oral, Q2H PRN, 1 mg at 17 2347 **OR** LORazepam (ATIVAN) injection 1 mg, 1 mg, Intravenous, Q2H PRN **OR** LORazepam (ATIVAN) tablet 2 mg, 2 mg, Oral, Q1H PRN **OR** LORazepam (ATIVAN) injection 2 mg, 2 mg, Intravenous, Q1H PRN **OR** LORazepam (ATIVAN) injection 2 mg, 2 mg, Intravenous, Q15 Min PRN, 2 mg at 17 1304 **OR** LORazepam (ATIVAN) injection 1 mg, 1 mg, Intravenous, Q15 Min PRN **OR** LORazepam (ATIVAN) tablet 4 mg, 4 mg, Oral, Q1H PRN **OR** LORazepam (ATIVAN) injection 4 mg, 4 mg, Intravenous, Q1H PRN, Elli Morgan MD  •  [] LORazepam (ATIVAN) tablet 1 mg, 1 mg, Oral, Q6H, 1 mg at 17 0415 **FOLLOWED BY** LORazepam (ATIVAN) tablet 1 mg, 1 mg, Oral, Q8H, Elli Morgan MD, 1 mg at 17 0742  •  multiple vitamin (M.V.I. Adult) 10 mL, thiamine (B-1) 100 mg, folic acid 1 mg, magnesium sulfate 2 g in sodium chloride 0.9 % 1,000 mL infusion, 100 mL/hr, Intravenous, Daily, Elli Morgan MD, Last Rate: 100 mL/hr at 17 1745, 100 mL/hr at 17 1745  •  nicotine (NICODERM CQ) 21 MG/24HR patch 1 patch, 1 patch, Transdermal, Q24H, Elli Morgan MD, 1 patch at 17 2200  •  ondansetron (ZOFRAN) injection 4 mg, 4 mg, Intravenous, Q6H PRN, Elli Morgan MD, 4 mg at 17 2312  •  sodium chloride 0.9 % flush 1-10 mL, 1-10 mL, Intravenous, PRN, Elli Morgan MD, 10 mL at 17 0619  •  sodium chloride 0.9 % infusion, 100 mL/hr,  Intravenous, Continuous, Elli Morgan MD, Stopped at 03/08/17 0229  •  sodium chloride 0.9 % infusion, 75 mL/hr, Intravenous, Continuous, Candis Lr MD, Last Rate: 75 mL/hr at 03/09/17 0522, 75 mL/hr at 03/09/17 0522  •  sodium chloride 3 % nebulizer solution  - ADS Override Pull, , , ,   •  traZODone (DESYREL) tablet 50 mg, 50 mg, Oral, Nightly, Elli Morgan MD, 50 mg at 03/08/17 7796     Diagnostic Data      I reviewed the patient's new clinical results and imaging.      Assessment/Plan     Active Hospital Problems (** Indicates Principal Problem)    Diagnosis Date Noted   • **Anemia [D64.9] 03/07/2017     -Hemoccult (-) in ED  -Type & cross; discussed risks and benefit of blood transfusion. Patient is agreeable to transfusion.   -Transfuse PRBCs with lasix between units  -Hb this morning 7.3. Will follow H&H and transfuse as indicated.   -Iron studies: started ferrous sulfate 324mg  Iron - <10 (low)  TIBC - 364  Ferritin - 7.8 (low)     • Cavitary lesion of lung [J98.4] 03/07/2017     -Pulmonology consulted; appreciate recommendations  -Will obtain 3 acid fast stains  -Will hold RIPE therapy pending bronchoscopy cultures  -Will obtain aspergillus antibodies and aspergillus galactomannan antigen for possible aspergillus superimposed infection  -Will hold voriconazole (superior to amphotericin B) pending cultures  -HIV & hepatitis panels: negative        • Alcohol use disorder [F10.99] 03/07/2017     -IV banana bag  -[facility] protocol: score 4 at 0400  -Will obtain B6     • Tobacco use disorder [F17.200] 03/07/2017   • CAP (community acquired pneumonia) [J18.9] 03/07/2017     -Will start levaquin and zosyn for concerns of aspiration pneumonia with coverage for pseudomonas.   -Strep pneumonia, legionella negative.   -Sputum no growth in 24hr     • Hypokalemia [E87.6] 03/07/2017      Resolved Hospital Problems    Diagnosis Date Noted Date Resolved   No resolved problems to display.         DVT  prophylaxis: SCDs/TEDs      Disposition:Home when stable    I have seen the patient.  I have reviewed the notes, assessments, and/or procedures performed by Dr. Camejo, I concur with her/his documentation and assessment and plan for Tino Peres.          This document has been electronically signed by Sophy Bhagat MD on March 9, 2017 8:40 AM

## 2017-03-09 NOTE — CONSULTS
CVTS CONSULTATION          Patient Care Team:  No Known Provider as PCP - General  Requesting Provider: Dr. Lr -Pulmonology/Critical Care Medicine    Chief complaint: Persistent cough, productive. Cavitary Lung Lesion      SUBJECTIVE       History of Present Illness:  Tino Peres is a 56 y.o. male with a PMH significant for tobacco and alcohol use who presents to Dr. Lr as an urgent referral from Urgent Care for an abnormal CXR. Pt states he went to  today at the urging of his father (retired physician) and son, for several weeks of progressive dyspnea, fatigue, and wt loss. He reports he has been losing wt for the past 6 months (15lbs). He admits to decreased appetite for several years. Denies fevers, chills, night sweats, or illness at symptom onset. He admits to constant left sided chest pain which is worse with lying down and rolling over onto his left side. Pt admits to cough intermittently productive of clear sputum, and he reports hemoptysis 3wks ago. He did not seek medical attention at that time as he was hoping it would stop. He reports he had some fungal type infection in his lungs ~10yrs ago, but he does not know if it was formerly diagnosed. Pt smokes 1-1.5ppd since age 14yo. He drinks up to a 6pk of beer a day, but denies binging. Pt has worked in factories and as a perfusionist. He denies known exposures to asbestos or TB. There is no lung disease or cancer in the family. He was sent to the ED due to severe symptomatic anemia. He did have a CT chest which confirmed a sponge like mass within a SILVANA cavity with surrounding consolidation. Pt received 3 units pRBCs with improved Hgb to 7.3. He was placed on airborne precautions and started on empiric TB tx along with voriconazole and zosyn. Dr. Hernandez was consulted for evaluation of cavitary mass.    The following portions of the patient's history were reviewed and updated as appropriate: allergies, current medications, past family history,  "past medical history, past social history, past surgical history and problem list.  Recent images independently reviewed.  Available laboratory values reviewed.    Review of Systems   Constitutional: Positive for appetite change and fatigue. Negative for chills, diaphoresis and fever.   Respiratory: Positive for cough (productive) and shortness of breath.    Cardiovascular: Negative for palpitations and leg swelling.   Gastrointestinal: Negative for abdominal pain.   Neurological: Negative for dizziness.   All other systems reviewed and are negative.       Past Medical History   Diagnosis Date   • Chronic bronchitis      Past Surgical History   Procedure Laterality Date   • Appendectomy       open   • Inguinal hernia repair     • Exploratory laparotomy  11/17/2014     Exploratory laparotomy, repair of duodenal ulcer, modified Carlos patch     Family History   Problem Relation Age of Onset   • Asthma Father    • Cancer Father    • Diabetes Father    • Hypertension Father    • Alcohol abuse Mother    • Heart failure Brother      Social History   Substance Use Topics   • Smoking status: Current Every Day Smoker     Packs/day: 1.00     Years: 40.00     Types: Cigarettes   • Smokeless tobacco: Former User   • Alcohol use 25.2 oz/week     42 Cans of beer per week     No prescriptions prior to admission.       famotidine 20 mg Oral BID   ferrous sulfate 324 mg Oral Daily With Breakfast   LORazepam 1 mg Oral Q8H   IV Fluids 1000 mL + additives 100 mL/hr Intravenous Daily   nicotine 1 patch Transdermal Q24H   sodium chloride      traZODone 50 mg Oral Nightly     Allergies:  Review of patient's allergies indicates no known allergies.    OBJECTIVE        Vital Signs  Temp:  [98.5 °F (36.9 °C)-99 °F (37.2 °C)] 98.5 °F (36.9 °C)  Heart Rate:  [] 83  Resp:  [24-26] 24  BP: (109-132)/(57-79) 116/79    Flowsheet Rows         First Filed Value    Admission Height  68\" (172.7 cm) Documented at 03/07/2017 1438    Admission " "Weight  133 lb (60.3 kg) Documented at 03/07/2017 1438        69\" (175.3 cm)    Physical Exam   Constitutional: He is oriented to person, place, and time. He appears well-developed.   HENT:   Head: Normocephalic.   Eyes: Pupils are equal, round, and reactive to light.   Neck: Carotid bruit is not present.   Cardiovascular: Normal rate, regular rhythm, normal heart sounds and intact distal pulses.    Pulmonary/Chest: Effort normal. No respiratory distress. He has decreased breath sounds in the left middle field and the left lower field.   Abdominal: Soft. Bowel sounds are normal.   Genitourinary:   Genitourinary Comments: Voids clear/yellow   Musculoskeletal: Normal range of motion. He exhibits no edema.   Neurological: He is alert and oriented to person, place, and time.   Skin: Skin is warm and dry.   Psychiatric: He has a normal mood and affect.   Vitals reviewed.      Results Review:   Lab Results (last 24 hours)     Procedure Component Value Units Date/Time    Fungus Culture [[id number]] Collected:  03/08/17 1310    Specimen:  Wash from Lung, Left Upper Lobe Updated:  03/08/17 1319    Non-gynecologic Cytology [[id number]] Collected:  03/08/17 1309    Specimen:  Wash from Lung, Left Upper Lobe Updated:  03/08/17 1328    BALTA Prep [[id number]]  (Normal) Collected:  03/08/17 1310    Specimen:  Wash from Lung, Left Upper Lobe Updated:  03/08/17 1348     KOH Prep No yeast or hyphal elements seen     Extra Tubes [[id number]] Collected:  03/08/17 1020    Specimen:  Blood from Blood, Venous Line Updated:  03/08/17 1501    Narrative:       The following orders were created for panel order Extra Tubes.  Procedure                               Abnormality         Status                     ---------                               -----------         ------                     Lavender Top[[id number]]                                      Final result               Gold Top - [facility][[id number]]                                    Final result "               Green Top (Gel)[[id number]]                                   Final result                 Please view results for these tests on the individual orders.    Lavender Top [[id number]] Collected:  03/08/17 1020    Specimen:  Blood Updated:  03/08/17 1501     Extra Tube hold for add-on       Auto resulted       Gold Top - [facility] [[id number]] Collected:  03/08/17 1020    Specimen:  Blood Updated:  03/08/17 1501     Extra Tube Hold for add-ons.       Auto resulted.       Green Top (Gel) [[id number]] Collected:  03/08/17 1020    Specimen:  Blood Updated:  03/08/17 1501     Extra Tube Hold for add-ons.       Auto resulted.       Aspergillus Galactomannan Antigen [[id number]] Collected:  03/08/17 1613    Specimen:  Blood from Bronchus Updated:  03/08/17 1614    Blood Culture [[id number]]  (Normal) Collected:  03/07/17 1716    Specimen:  Blood from Arm, Right Updated:  03/08/17 1801     Blood Culture No growth at 24 hours     Blood Culture [[id number]]  (Normal) Collected:  03/07/17 1758    Specimen:  Blood from Arm, Left Updated:  03/08/17 1901     Blood Culture No growth at 24 hours     AFB Culture [[id number]] Collected:  03/08/17 1800    Specimen:  Sputum from Nasopharynx Updated:  03/08/17 1946    Urine Culture [[id number]]  (Normal) Collected:  03/08/17 0300    Specimen:  Urine from Urine, Clean Catch Updated:  03/09/17 0610     Urine Culture No growth at 24 hours     Respiratory Culture [[id number]] Collected:  03/08/17 1310    Specimen:  Wash from Lung, Left Upper Lobe Updated:  03/09/17 0627     Respiratory Culture No growth at 24 hours      Gram Stain Result Moderate (3+) WBCs per low power field              Rare (1+) Epithelial cells per low power field      No organisms seen     CBC Auto Differential [[id number]]  (Abnormal) Collected:  03/09/17 0616    Specimen:  Blood Updated:  03/09/17 0629     WBC 10.53 (H) 10*3/mm3      RBC 3.35 (L) 10*6/mm3      Hemoglobin 7.6 (L) g/dL      Hematocrit 24.1 (L) %      MCV 71.9 (L) fL       MCH 22.7 (L) pg      MCHC 31.5 g/dL      RDW 22.1 (H) %      RDW-SD 58.4 (H) fl      MPV 7.6 (L) fL      Platelets 590 (H) 10*3/mm3      Neutrophil % 65.7 %      Lymphocyte % 16.5 %      Monocyte % 11.9 %      Eosinophil % 4.7 %      Basophil % 0.7 %      Immature Grans % 0.5 %      Neutrophils, Absolute 6.93 10*3/mm3      Lymphocytes, Absolute 1.74 10*3/mm3      Monocytes, Absolute 1.25 (H) 10*3/mm3      Eosinophils, Absolute 0.49 10*3/mm3      Basophils, Absolute 0.07 10*3/mm3      Immature Grans, Absolute 0.05 (H) 10*3/mm3      nRBC 0.0 /100 WBC     Comprehensive Metabolic Panel [[id number]]  (Abnormal) Collected:  03/09/17 0616    Specimen:  Blood Updated:  03/09/17 0632     Glucose 120 (H) mg/dL      BUN 11 mg/dL      Creatinine 0.45 (L) mg/dL      Sodium 137 mmol/L      Potassium 3.8 mmol/L      Chloride 108 mmol/L      CO2 20.0 (L) mmol/L      Calcium 7.9 (L) mg/dL      Total Protein 5.9 (L) g/dL      Albumin 2.90 (L) g/dL      ALT (SGPT) 33 U/L      AST (SGOT) 13 (L) U/L      Alkaline Phosphatase 74 U/L      Total Bilirubin 0.2 mg/dL      eGFR Non African Amer 195 (H) mL/min/1.73      Globulin 3.0 gm/dL      A/G Ratio 1.0 (L) g/dL      BUN/Creatinine Ratio 24.4      Anion Gap 9.0 mmol/L     Respiratory Culture [[id number]] Collected:  03/08/17 1938    Specimen:  Sputum from Cough Updated:  03/09/17 0640     Respiratory Culture Culture in progress      Gram Stain Result Few (2+) WBCs per low power field              Rare (1+) Epithelial cells per low power field      No organisms seen     AFB Culture [[id number]] Collected:  03/08/17 1310    Specimen:  Wash from Lung, Left Upper Lobe Updated:  03/09/17 0654     AFB Stain No acid fast bacilli seen     AFB Culture [[id number]] Collected:  03/09/17 0811    Specimen:  Wash from Lung, Left Upper Lobe Updated:  03/09/17 0811    CBC & Differential [[id number]] Collected:  03/09/17 0616    Specimen:  Blood Updated:  03/09/17 0826    Narrative:       The following orders  were created for panel order CBC & Differential.  Procedure                               Abnormality         Status                     ---------                               -----------         ------                     Scan Slide[[id number]]                                        Final result               CBC Auto Differential[[id number]]         Abnormal            Final result                 Please view results for these tests on the individual orders.    Scan Slide [[id number]] Collected:  03/09/17 0616    Specimen:  Blood Updated:  03/09/17 0826     Acanthocytes Slight/1+      Anisocytosis Large/3+      Dacrocytes Slight/1+      Hypochromia Slight/1+      Target Cells Slight/1+      WBC Morphology Normal      Platelet Estimate Increased     Respiratory Culture [[id number]] Collected:  03/09/17 0811    Specimen:  Wash from Lung, Left Upper Lobe Updated:  03/09/17 0921     Gram Stain Result Moderate (3+) WBCs seen              Rare (1+) Epithelial cells per low power field      No organisms seen     Blood Culture [[id number]]  (Normal) Collected:  03/08/17 1056    Specimen:  Blood from Arm, Right Updated:  03/09/17 1101     Blood Culture No growth at 24 hours         CT Chest With Contrast [[id number]] Not Reviewed        Order Status: Completed Collected: 03/07/17 1609        Updated: 03/07/17 1638       Narrative:         [facility] [place]    Patient Name: ELENA RICE    ORDERING: IRVIN GALVEZ    ATTENDING: IRVIN GALVEZ     REFERRING: IRVIN GALVEZ    -----------------------    PROCEDURE: CT SCAN OF THE CHEST WITH INTRAVENOUS CONTRAST.    CLINICAL INFORMATION:  abnormal lung mass     Dose length product 152.6  This exam was performed using radiation doses that are as low as  reasonably achievable (ALARA).    COMPARISON: Chest x-ray March 7, 2017, CT abdomen November 7, 2014    CONTRAST: 75 cc intravenous Isovue 300    TECHNIQUE: Axial images were obtained and multiplanar  reconstructions  were made.      FINDINGS:    LUNGS/PLEURA: There is complete consolidation/opacification of  the left upper lobe with air bronchograms present. In the apical  segment of the left upper lobe, there is a 3.7 x 5 x 8 cm cavity  which contains a spongelike lobulated mass measuring 2.7 x 3.3 x  5 cm. There is a crescent of air surrounding the internal more  solid appearing mass. This is concerning for a cavity from  previous tuberculosis with an internal fungus ball.   The remaining portions of the lungs appear clear except for  centrilobular emphysema.  TRACHEA AND BRONCHI:  The trachea and bronchi are patent.  MEDIASTINUM, DOUGLAS AND LYMPH NODES: There are slightly enlarged  lymph nodes in the aorticopulmonary window region measuring 1.1  cm in short axis, and one in the right paratracheal region  measuring 1.4 cm in short axis.  HEART AND PERICARDIUM: The heart and pericardium are normal.  VASCULAR: Unremarkable  UPPER ABDOMEN: Unremarkable  OSSEOUS STRUCTURES: There is partial destruction of the second  through sixth ribs in the area of left upper lobe consolidation.  There is a healing transverse fracture of the lateral left sixth  rib.         Impression:         CONCLUSION:  1.  Complete consolidation of the left upper lobe, containing a  cavitary mass within internal fungus ball and adjacent rib  destruction.  2.  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  3.  Chest wall invasion and mycetomas can also be seen with  actinomycosis infection.  4.  Healing left lateral sixth rib fracture, likely pathologic.     Findings and recommendations  discussed with IRVIN GALVEZ on  3/7/2017 4:34 PM CST    Electronically signed by:  Deep Mcpherson MD  3/7/2017 4:36 PM CST  Workstation: [id number]                 ASSESSMENT/PLAN       Principal Problem:    Anemia  Active  Problems:    Tobacco use disorder    Alcohol use disorder    Cavitary lesion of lung    CAP (community acquired pneumonia)    Hypokalemia      Assessment:    Condition: In stable condition.   (CT Chest demonstrates a 3.7 x 5 x 8 cm lobulated mass with ongoing symptoms of persistent cough, weight loss, anemia.  Workup currently negative for TB organisms. ).     Plan:   (Dr. Hernandez to independently view the CT images and discuss plan for possible resection options. Patient will need further preoperative workup before scheduling.).       Thank you for the opportunity to participate in this patient's care.          I discussed the patients findings and my recommendations with Dr. Hernandez.              This document has been electronically signed by CIPRIANO Peterson on March 9, 2017 11:45 AM

## 2017-03-09 NOTE — PROGRESS NOTES
FAMILY MEDICINE DAILY PROGRESS NOTE  NAME: Tino Peres  : 1960  MRN: [id number]     LOS: 2 days     PROVIDER OF SERVICE: Corie Camejo MD    Chief Complaint: Anemia    Subjective:     Interval History:  History taken from: patient     Patient reports that he is feeling better and well rested. Patient is reports a dry cough. He is anticipating the results of his bronchoscopy.     Quantoferon test obtained this morning. Morning labs pending at 0530.     Review of Systems:   Review of Systems   Constitutional: Negative for chills, diaphoresis, fatigue and fever.   HENT: Negative for ear pain, rhinorrhea, sneezing and sore throat.    Eyes: Negative for pain.   Respiratory: Positive for cough. Negative for shortness of breath.    Cardiovascular: Negative for chest pain and leg swelling.   Gastrointestinal: Negative for abdominal pain, constipation, diarrhea, nausea and vomiting.   Genitourinary: Negative for dysuria and hematuria.   Neurological: Negative for seizures, syncope, light-headedness and headaches.   Psychiatric/Behavioral: Negative for agitation, hallucinations and sleep disturbance.       Objective:     Vital Signs  Temp:  [98.2 °F (36.8 °C)-99 °F (37.2 °C)] 99 °F (37.2 °C)  Heart Rate:  [71-93] 79  Resp:  [23-28] 24  BP: (112-136)/(57-73) 120/67      Intake/Output Summary (Last 24 hours) at 17 0536  Last data filed at 17 0522   Gross per 24 hour   Intake   3736 ml   Output   1850 ml   Net   1886 ml       Physical Exam  Physical Exam   Constitutional: He is oriented to person, place, and time. He appears well-developed and well-nourished. No distress.   HENT:   Head: Normocephalic and atraumatic.   Nose: Nose normal.   Mouth/Throat: Oropharynx is clear and moist.   Eyes: EOM are normal. Pupils are equal, round, and reactive to light.   Neck: Normal range of motion. Neck supple. No tracheal deviation present.   Cardiovascular: Normal rate, regular rhythm, normal heart sounds and  intact distal pulses.    Pulmonary/Chest: Effort normal and breath sounds normal.   Abdominal: Soft. Bowel sounds are normal. He exhibits no distension. There is no tenderness.   Musculoskeletal: He exhibits no edema.   Lymphadenopathy:     He has no cervical adenopathy.   Neurological: He is alert and oriented to person, place, and time.   Skin: Skin is warm and dry. He is not diaphoretic.   Psychiatric: He has a normal mood and affect. His behavior is normal. Judgment and thought content normal.   Nursing note and vitals reviewed.      Medication Review    Current Facility-Administered Medications:   •  albuterol (PROVENTIL) nebulizer solution 0.083% 2.5 mg/3mL, 2.5 mg, Nebulization, Q4H PRN, Elli Morgan MD  •  famotidine (PEPCID) tablet 20 mg, 20 mg, Oral, BID, Elli Morgan MD, 20 mg at 17 1015  •  ferrous sulfate EC tablet 324 mg, 324 mg, Oral, Daily With Breakfast, Irineo Oakley MD, 324 mg at 17 1015  •  lidocaine PF 1% (XYLOCAINE) injection 0.1 mL, 0.1 mL, Intradermal, Once PRN, Candis Lr MD  •  LORazepam (ATIVAN) tablet 1 mg, 1 mg, Oral, Q2H PRN, 1 mg at 17 2347 **OR** LORazepam (ATIVAN) injection 1 mg, 1 mg, Intravenous, Q2H PRN **OR** LORazepam (ATIVAN) tablet 2 mg, 2 mg, Oral, Q1H PRN **OR** LORazepam (ATIVAN) injection 2 mg, 2 mg, Intravenous, Q1H PRN **OR** LORazepam (ATIVAN) injection 2 mg, 2 mg, Intravenous, Q15 Min PRN, 2 mg at 17 1304 **OR** LORazepam (ATIVAN) injection 1 mg, 1 mg, Intravenous, Q15 Min PRN **OR** LORazepam (ATIVAN) tablet 4 mg, 4 mg, Oral, Q1H PRN **OR** LORazepam (ATIVAN) injection 4 mg, 4 mg, Intravenous, Q1H PRN, Elli Morgan MD  •  [] LORazepam (ATIVAN) tablet 1 mg, 1 mg, Oral, Q6H, 1 mg at 17 3875 **FOLLOWED BY** LORazepam (ATIVAN) tablet 1 mg, 1 mg, Oral, Q8H, Elli Morgan MD, 1 mg at 17 1462  •  multiple vitamin (M.V.I. Adult) 10 mL, thiamine (B-1) 100 mg, folic acid 1 mg, magnesium sulfate 2 g in  sodium chloride 0.9 % 1,000 mL infusion, 100 mL/hr, Intravenous, Daily, Elli Morgan MD, Last Rate: 100 mL/hr at 03/08/17 1745, 100 mL/hr at 03/08/17 1745  •  nicotine (NICODERM CQ) 21 MG/24HR patch 1 patch, 1 patch, Transdermal, Q24H, Elli Morgan MD, 1 patch at 03/08/17 2200  •  ondansetron (ZOFRAN) injection 4 mg, 4 mg, Intravenous, Q6H PRN, Elli Morgan MD, 4 mg at 03/07/17 2312  •  sodium chloride 0.9 % flush 1-10 mL, 1-10 mL, Intravenous, PRN, Elli Morgan MD, 10 mL at 03/08/17 0619  •  sodium chloride 0.9 % infusion, 100 mL/hr, Intravenous, Continuous, Elli Morgan MD, Stopped at 03/08/17 0229  •  sodium chloride 0.9 % infusion, 75 mL/hr, Intravenous, Continuous, Candis Lr MD, Last Rate: 75 mL/hr at 03/09/17 0522, 75 mL/hr at 03/09/17 0522  •  traZODone (DESYREL) tablet 50 mg, 50 mg, Oral, Nightly, Elli Morgan MD, 50 mg at 03/08/17 2144  •  voriconazole (VFEND) 240 mg in dextrose (D5W) 5 % 250 mL IVPB, 4 mg/kg, Intravenous, Q12H, Elli Morgan MD, Last Rate: 125 mL/hr at 03/08/17 2021, 240 mg at 03/08/17 2021     Diagnostic Data    Lab Results (last 24 hours)     Procedure Component Value Units Date/Time    Urine Culture [[id number]]  (Normal) Collected:  03/08/17 0300    Specimen:  Urine from Urine, Clean Catch Updated:  03/08/17 0651     Urine Culture Culture in progress     SCANNED - LABS [[id number]] Resulted:  03/07/17      Updated:  03/08/17 0859    Aspergillus Galactomannan Antigen [[id number]] Collected:  03/08/17 0856    Specimen:  Blood Updated:  03/08/17 0859    Mycoplasma Pneumoniae PCR [[id number]] Collected:  03/08/17 0148    Specimen:  Sputum from Nasopharynx Updated:  03/08/17 1000     MYCOPLASMAE PNEUMONIAE BY PCR Negative     Narrative:       This test is performed by utilizing real time polymerace chain recation (PCR).     QuantiFERON TB Gold [[id number]] Collected:  03/08/17 1018    Specimen:  Blood Updated:  03/08/17 1019    Blood Culture  [[id number]] Collected:  03/08/17 1047    Specimen:  Blood from Arm, Right Updated:  03/08/17 1059    Fungus Culture [[id number]] Collected:  03/08/17 1310    Specimen:  Wash from Lung, Left Upper Lobe Updated:  03/08/17 1319    AFB Culture [[id number]] Collected:  03/08/17 1310    Specimen:  Wash from Lung, Left Upper Lobe Updated:  03/08/17 1319    Non-gynecologic Cytology [[id number]] Collected:  03/08/17 1309    Specimen:  Wash from Lung, Left Upper Lobe Updated:  03/08/17 1328    BALTA Prep [[id number]]  (Normal) Collected:  03/08/17 1310    Specimen:  Wash from Lung, Left Upper Lobe Updated:  03/08/17 1348     KOH Prep No yeast or hyphal elements seen     Respiratory Culture [[id number]] Collected:  03/08/17 1310    Specimen:  Wash from Lung, Left Upper Lobe Updated:  03/08/17 1414     Gram Stain Result Moderate (3+) WBCs per low power field              Rare (1+) Epithelial cells per low power field      No organisms seen     Extra Tubes [[id number]] Collected:  03/08/17 1020    Specimen:  Blood from Blood, Venous Line Updated:  03/08/17 1501    Narrative:       The following orders were created for panel order Extra Tubes.  Procedure                               Abnormality         Status                     ---------                               -----------         ------                     Lavender Top[[id number]]                                      Final result               Gold Top - SST[[id number]]                                    Final result               Green Top (Gel)[[id number]]                                   Final result                 Please view results for these tests on the individual orders.    Lavender Top [[id number]] Collected:  03/08/17 1020    Specimen:  Blood Updated:  03/08/17 1501     Extra Tube hold for add-on       Auto resulted       Gold Top - SST [[id number]] Collected:  03/08/17 1020    Specimen:  Blood Updated:  03/08/17 1501     Extra Tube Hold for add-ons.       Auto resulted.       Green  Top (Gel) [[id number]] Collected:  03/08/17 1020    Specimen:  Blood Updated:  03/08/17 1501     Extra Tube Hold for add-ons.       Auto resulted.       Aspergillus Galactomannan Antigen [[id number]] Collected:  03/08/17 1613    Specimen:  Blood from Bronchus Updated:  03/08/17 1614    Blood Culture [[id number]]  (Normal) Collected:  03/07/17 1716    Specimen:  Blood from Arm, Right Updated:  03/08/17 1801     Blood Culture No growth at 24 hours     Blood Culture [[id number]]  (Normal) Collected:  03/07/17 1758    Specimen:  Blood from Arm, Left Updated:  03/08/17 1901     Blood Culture No growth at 24 hours     AFB Culture [[id number]] Collected:  03/08/17 1800    Specimen:  Sputum from Nasopharynx Updated:  03/08/17 1946    Respiratory Culture [[id number]] Collected:  03/08/17 1938    Specimen:  Sputum from Cough Updated:  03/08/17 2008     Gram Stain Result Few (2+) WBCs per low power field              Rare (1+) Epithelial cells per low power field      No organisms seen     Blood Culture [[id number]]  (Normal) Collected:  03/08/17 1056    Specimen:  Blood from Arm, Right Updated:  03/08/17 2302     Blood Culture No growth at less than 24 hours             I reviewed the patient's new clinical results.    Assessment/Plan:     Active Hospital Problems (** Indicates Principal Problem)    Diagnosis Date Noted   • **Anemia [D64.9] 03/07/2017     -Hemoccult (-) in ED  -Type & cross; discussed risks and benefit of blood transfusion. Patient is agreeable to transfusion.   -Transfuse PRBCs with lasix between units  -Hb this morning 7.3. Will follow H&H and transfuse as indicated.   -Iron studies: started ferrous sulfate 324mg  Iron - <10 (low)  TIBC - 364  Ferritin - 7.8 (low)     • Cavitary lesion of lung [J98.4] 03/07/2017     -Pulmonology consulted; appreciate recommendations  -Will obtain 3 acid fast stains  -Will hold RIPE therapy pending bronchoscopy cultures  -Will obtain aspergillus antibodies and aspergillus galactomannan  antigen for possible aspergillus superimposed infection  -Will hold voriconazole (superior to amphotericin B) pending cultures  -HIV & hepatitis panels: negative        • Alcohol use disorder [F10.99] 03/07/2017     -IV banana bag  -WA protocol: score 4 at 0400  -Will obtain B6     • Tobacco use disorder [F17.200] 03/07/2017   • CAP (community acquired pneumonia) [J18.9] 03/07/2017     -Will start levaquin and zosyn for concerns of aspiration pneumonia with coverage for pseudomonas.   -Strep pneumonia, legionella negative.   -Sputum no growth in 24hr     • Hypokalemia [E87.6] 03/07/2017      Resolved Hospital Problems    Diagnosis Date Noted Date Resolved   No resolved problems to display.     We will continue to monitor the patient's course and adjust our care accordingly.     DVT prophylaxis: SCDs/TEDs  Code status is Full Code    Plan for disposition:Where: home and When:  pending bronchoscopy results and clinical stability       Time: More than 50% of time spent in counseling and coordination of care:  Total face-to-face/floor time 15 min.  Time spent in counseling 15 min. Counseling included the following topics: diagnosis and treatment plan and course           This document has been electronically signed by Corie Camejo MD on March 9, 2017 5:42 AM

## 2017-03-09 NOTE — PROGRESS NOTES
PULMONARY PROGRESS NOTE  Candis Lr MD    [facility] CRITICAL CARE  3/9/2017        Tino Peres  [id number]  1960  56 y.o. male             LOS: 2 days   Irineo Oakley MD    Subjective     CC/Reason for visit: F/u cavitary lung lesion    HPI: Tino Peres is a 56 y.o. male with a PMH significant for tobacco and alcohol use who presents as an urgent referral from Urgent Care for an abnormal CXR. Pt states he went to  today at the urging of his father (retired physician) and son, for several weeks of progressive dyspnea, fatigue, and wt loss. He states 3 months ago he was in his usual state of health. His father reports he has been losing wt for the past 6 months (15lbs). He admits to decreased appetite for several years. Pt fevers, chills, night sweats, or illness at symptom onset. He admits to constant left sided chest pain which is worse with lying down and rolling over onto his left side. Pt admits to cough intermittently productive of clear sputum, and he reports hemoptysis 3wks ago. He did not seek medical attention at that time as he was hoping it would stop. He reports he had some fungal type infection in his lungs ~10yrs ago, but he does not know if it was formerly diagnosed. Pt smokes 1-1.5ppd since age 12yo. He drinks up to a 6pk of beer a day, but denies binging. Pt has worked in factories and as a surgical tech. He denies known exposures to asbestos or TB. There is no lung disease or cancer in the family.    He was sent to the ED from my office yesterday due to severe symptomatic anemia. He did have a CT chest which confirmed a sponge like mass within a SILVANA cavity with surrounding consolidation. Pt received 3 units pRBCs with mproved Hgb to 7.3. He was placed on airborne precautions and started on empiric TB tx along with voriconazole and zosyn. Pt remains on RA and is wanting to go home.     Review of Systems:  Respiratory ROS:  Review of Systems   Constitutional:  Positive for unexpected weight change. Negative for appetite change, fatigue and fever.   HENT: Negative for congestion.    Respiratory: Positive for cough. Negative for shortness of breath and wheezing.    Cardiovascular: Positive for chest pain.     All other systems were reviewed and were negative except as above in the HPI.    Objective     Vital Sign Min/Max for last 24 hours:  Temp  Min: 98.2 °F (36.8 °C)  Max: 99 °F (37.2 °C)   BP  Min: 112/67  Max: 136/73   Pulse  Min: 71  Max: 93   Resp  Min: 23  Max: 28   SpO2  Min: 96 %  Max: 100 %   No Data Recorded   Weight  Min: 138 lb 0.1 oz (62.6 kg)  Max: 138 lb 0.1 oz (62.6 kg)     Physical Exam:  99 °F (37.2 °C) (Oral) 78 116/67 24 96% 138 lb 0.1 oz (62.6 kg) Body mass index is 20.38 kg/(m^2).  Physical Exam   Constitutional: He is oriented to person, place, and time. Vital signs are normal. He appears well-developed and well-nourished. He appears cachectic.   HENT:   Head: Normocephalic and atraumatic.   Nose: Nose normal.   Mallampati 3   Eyes: Conjunctivae, EOM and lids are normal. Pupils are equal, round, and reactive to light.   Pale sclera   Neck: Normal range of motion.   Cardiovascular: Regular rhythm and normal heart sounds.  Exam reveals no gallop.    No murmur heard.  Prominent S2   Pulmonary/Chest: Effort normal. No accessory muscle usage. No respiratory distress. He has decreased breath sounds (bronchial BS anterior SILVANA) in the left upper field. He has no wheezes. He has no rhonchi. Chest wall is not dull to percussion. He exhibits no tenderness.   Abdominal: Soft. Normal appearance and bowel sounds are normal. There is no tenderness.       Vascular Status -  His exam exhibits no right foot edema. His exam exhibits no left foot edema.  Neurological: He is alert and oriented to person, place, and time.   Skin: Skin is warm and dry. No cyanosis. There is pallor. Nails show no clubbing.   Psychiatric: He has a normal mood and affect. His speech is normal  and behavior is normal.   Nursing note and vitals reviewed.      Scheduled meds:      famotidine 20 mg Oral BID   ferrous sulfate 324 mg Oral Daily With Breakfast   LORazepam 1 mg Oral Q8H   IV Fluids 1000 mL + additives 100 mL/hr Intravenous Daily   nicotine 1 patch Transdermal Q24H   traZODone 50 mg Oral Nightly   voriconazole (VFEND) IVPB in 250 mL 4 mg/kg Intravenous Q12H       IV meds:                         sodium chloride 100 mL/hr Last Rate: Stopped (03/08/17 0229)   sodium chloride 75 mL/hr Last Rate: 75 mL/hr (03/09/17 0522)       Data Review: I personally reviewed the patient's medications and new clinical results.  Lab Results   Component Value Date    CALCIUM 7.9 (L) 03/09/2017       Results from last 7 days  Lab Units 03/09/17  0616 03/08/17  0418 03/07/17  1345 03/07/17  1201   AST (SGOT) U/L 13* 25  --  36   SODIUM mmol/L 137 142  --  139   POTASSIUM mmol/L 3.8 3.5  --  3.4*   CHLORIDE mmol/L 108 108  --  105   TOTAL CO2 mmol/L 20.0* 20.0*  --  23.0   BUN mg/dL 11 10  --  11   CREATININE mg/dL 0.45* 0.49*  --  0.54*   GLUCOSE mg/dL 120* 100  --  117*   CALCIUM mg/dL 7.9* 7.9*  --  8.6   WBC 10*3/mm3 10.53* 9.13 15.34*  --    HEMOGLOBIN g/dL 7.6* 7.3* 4.4*  --    PLATELETS 10*3/mm3 590* 598* 863*  --    ALT (SGPT) U/L 33 50  --  59               Radiology: I independently reviewed the patient's new imaging, including images and reports.  Imaging Results (last 24 hours)     Procedure Component Value Units Date/Time    CT Chest With Contrast [[id number]] Collected:  03/07/17 1609     Updated:  03/07/17 1638    Narrative:       [facility] [place]    Patient Name: ELENA RICE    ORDERING: IRVIN GALVEZ    ATTENDING: IRVIN GALVEZ     REFERRING: IRVIN GALVEZ    -----------------------    PROCEDURE: CT SCAN OF THE CHEST WITH INTRAVENOUS CONTRAST.    CLINICAL INFORMATION:  abnormal lung mass     Dose length product 152.6  This exam was performed using radiation doses that are as low  as  reasonably achievable (ALARA).    COMPARISON: Chest x-ray March 7, 2017, CT abdomen November 7, 2014    CONTRAST: 75 cc intravenous Isovue 300    TECHNIQUE: Axial images were obtained and multiplanar  reconstructions were made.      FINDINGS:    LUNGS/PLEURA: There is complete consolidation/opacification of  the left upper lobe with air bronchograms present. In the apical  segment of the left upper lobe, there is a 3.7 x 5 x 8 cm cavity  which contains a spongelike lobulated mass measuring 2.7 x 3.3 x  5 cm. There is a crescent of air surrounding the internal more  solid appearing mass. This is concerning for a cavity from  previous tuberculosis with an internal fungus ball.   The remaining portions of the lungs appear clear except for  centrilobular emphysema.  TRACHEA AND BRONCHI:  The trachea and bronchi are patent.  MEDIASTINUM, DOUGLAS AND LYMPH NODES: There are slightly enlarged  lymph nodes in the aorticopulmonary window region measuring 1.1  cm in short axis, and one in the right paratracheal region  measuring 1.4 cm in short axis.  HEART AND PERICARDIUM: The heart and pericardium are normal.  VASCULAR: Unremarkable  UPPER ABDOMEN: Unremarkable  OSSEOUS STRUCTURES: There is partial destruction of the second  through sixth ribs in the area of left upper lobe consolidation.  There is a healing transverse fracture of the lateral left sixth  rib.      Impression:       CONCLUSION:  1.  Complete consolidation of the left upper lobe, containing a  cavitary mass within internal fungus ball and adjacent rib  destruction.  2.  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  3.  Chest wall invasion and mycetomas can also be seen with  actinomycosis infection.  4.  Healing left lateral sixth rib fracture, likely pathologic.     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 Transfuse as needed  -Consider GI consult for EGD/ colonoscopy to determine if GI source of blood loss  -Will ask CT surgery to see pt while here for consideration of resection of probable mycetoma    OK for d/c today after CT surgery evaluation with planned f/u (Pulm, new PCP, and CT surgery)    Addendum: Notified that state requires 3 AFB smears at least 8hrs apart to rule out TB.     Total time spent: 32 minutes      This document has been electronically signed by Candis Lr MD on March 9, 2017 7:27 AM      [phone]    EMR Dragon/Transcription disclaimer:     Much of this encounter note is an electronic transcription/translation of spoken language to printed text. The electronic translation of spoken language may permit erroneous, or at times, nonsensical words or phrases to be inadvertently transcribed; Although I have reviewed the note for such errors, some may still exist.

## 2017-03-09 NOTE — CONSULTS
Nutrition Services    Patient Name:  Tino Peres  YOB: 1960  MRN: [id number]  Admit Date:  3/7/2017        Pt is s/p Bronch and is now on a Regular diet.  No Gi distress or eating difficulties per staff.  RD will add Ensure to trays and monitor po and tolerance.       Electronically signed by:  Tracie Boateng RD  03/09/17 10:30 AM

## 2017-03-09 NOTE — PLAN OF CARE
Problem: Patient Care Overview (Adult)  Goal: Plan of Care Review  Outcome: Ongoing (interventions implemented as appropriate)    03/08/17 1800   Patient Care Overview   Progress no change   Outcome Evaluation   Outcome Summary/Follow up Plan patient had bronch completed today, echo done today. Sputum cultures collected for AFB, no withdrawals symptoms present.       Goal: Adult Individualization and Mutuality  Outcome: Ongoing (interventions implemented as appropriate)  Goal: Discharge Needs Assessment  Outcome: Ongoing (interventions implemented as appropriate)    Problem: Fall Risk (Adult)  Goal: Identify Related Risk Factors and Signs and Symptoms  Outcome: Ongoing (interventions implemented as appropriate)  Goal: Absence of Falls  Outcome: Ongoing (interventions implemented as appropriate)    Problem: Acute Alcohol Withdrawal Syndrome, Risk For/Actual (Adult)  Goal: Signs and Symptoms of Listed Potential Problems Will be Absent or Manageable (Acute Alcohol Withdrawal Syndrome, Risk For/Actual)  Outcome: Ongoing (interventions implemented as appropriate)

## 2017-03-09 NOTE — CONSULTS
Nutrition Services    Patient Name:  Tino Peres  YOB: 1960  MRN: [id number]  Admit Date:  3/7/2017        Pt admitted to CCU with Anemia;  SILVANA Cavitary Mass; Pneumonia; Fatigue and wt loss; and a hx of Tobacco and ETOH use.  Pt is currently being tested for ? TB.  He is NPO for Bronch.  Rd will follow tx plans and make recommendations prn      Electronically signed by:  Tracie Boateng RD  03/09/17 10:16 AM

## 2017-03-09 NOTE — PLAN OF CARE
Problem: Patient Care Overview (Adult)  Goal: Plan of Care Review  Outcome: Ongoing (interventions implemented as appropriate)    03/09/17 0557   Coping/Psychosocial Response Interventions   Plan Of Care Reviewed With patient   Patient Care Overview   Progress improving   Outcome Evaluation   Outcome Summary/Follow up Plan states feeling much better       Goal: Adult Individualization and Mutuality  Outcome: Ongoing (interventions implemented as appropriate)    Problem: Fall Risk (Adult)  Goal: Identify Related Risk Factors and Signs and Symptoms  Outcome: Ongoing (interventions implemented as appropriate)  Goal: Absence of Falls  Outcome: Ongoing (interventions implemented as appropriate)    Problem: Acute Alcohol Withdrawal Syndrome, Risk For/Actual (Adult)  Goal: Signs and Symptoms of Listed Potential Problems Will be Absent or Manageable (Acute Alcohol Withdrawal Syndrome, Risk For/Actual)  Outcome: Ongoing (interventions implemented as appropriate)

## 2017-03-10 VITALS
SYSTOLIC BLOOD PRESSURE: 120 MMHG | TEMPERATURE: 98.5 F | DIASTOLIC BLOOD PRESSURE: 64 MMHG | OXYGEN SATURATION: 99 % | BODY MASS INDEX: 20.44 KG/M2 | RESPIRATION RATE: 26 BRPM | HEART RATE: 93 BPM | HEIGHT: 69 IN | WEIGHT: 138.01 LBS

## 2017-03-10 PROBLEM — E87.6 HYPOKALEMIA: Status: RESOLVED | Noted: 2017-03-07 | Resolved: 2017-03-10

## 2017-03-10 PROBLEM — D50.9 IRON DEFICIENCY ANEMIA: Status: ACTIVE | Noted: 2017-03-07

## 2017-03-10 LAB
ALBUMIN SERPL-MCNC: 2.9 G/DL (ref 3.4–4.8)
ALBUMIN/GLOB SERPL: 0.9 G/DL (ref 1.1–1.8)
ALP SERPL-CCNC: 75 U/L (ref 38–126)
ALT SERPL W P-5'-P-CCNC: 38 U/L (ref 21–72)
ANION GAP SERPL CALCULATED.3IONS-SCNC: 9 MMOL/L (ref 5–15)
AST SERPL-CCNC: 12 U/L (ref 17–59)
BACTERIA SPEC RESP CULT: NORMAL
BASOPHILS # BLD AUTO: 0.03 10*3/MM3 (ref 0–0.2)
BASOPHILS NFR BLD AUTO: 0.3 % (ref 0–2)
BH CV ECHO MEAS - ACS: 2.3 CM
BH CV ECHO MEAS - AO MAX PG (FULL): 5.7 MMHG
BH CV ECHO MEAS - AO MAX PG: 11.7 MMHG
BH CV ECHO MEAS - AO MEAN PG (FULL): 2.5 MMHG
BH CV ECHO MEAS - AO MEAN PG: 6 MMHG
BH CV ECHO MEAS - AO ROOT AREA: 8.7 CM^2
BH CV ECHO MEAS - AO ROOT DIAM: 3.3 CM
BH CV ECHO MEAS - AO V2 MAX: 171 CM/SEC
BH CV ECHO MEAS - AO V2 MEAN: 115.4 CM/SEC
BH CV ECHO MEAS - AO V2 VTI: 34.3 CM
BH CV ECHO MEAS - AVA(I,A): 2.7 CM^2
BH CV ECHO MEAS - AVA(I,D): 2.7 CM^2
BH CV ECHO MEAS - AVA(V,A): 2.7 CM^2
BH CV ECHO MEAS - AVA(V,D): 2.7 CM^2
BH CV ECHO MEAS - EDV(CUBED): 175.4 ML
BH CV ECHO MEAS - EDV(TEICH): 153.5 ML
BH CV ECHO MEAS - EF(CUBED): 54.3 %
BH CV ECHO MEAS - EF(TEICH): 45.6 %
BH CV ECHO MEAS - ESV(CUBED): 80.1 ML
BH CV ECHO MEAS - ESV(TEICH): 83.6 ML
BH CV ECHO MEAS - FS: 23 %
BH CV ECHO MEAS - IVS/LVPW: 1.1
BH CV ECHO MEAS - IVSD: 1.2 CM
BH CV ECHO MEAS - LA DIMENSION: 4.2 CM
BH CV ECHO MEAS - LA/AO: 1.3
BH CV ECHO MEAS - LV MASS(C)D: 259.2 GRAMS
BH CV ECHO MEAS - LV MAX PG: 6 MMHG
BH CV ECHO MEAS - LV MEAN PG: 3.5 MMHG
BH CV ECHO MEAS - LV V1 MAX: 122 CM/SEC
BH CV ECHO MEAS - LV V1 MEAN: 87 CM/SEC
BH CV ECHO MEAS - LV V1 VTI: 24.3 CM
BH CV ECHO MEAS - LVIDD: 5.6 CM
BH CV ECHO MEAS - LVIDS: 4.3 CM
BH CV ECHO MEAS - LVOT AREA: 3.8 CM^2
BH CV ECHO MEAS - LVOT DIAM: 2.2 CM
BH CV ECHO MEAS - LVPWD: 1.1 CM
BH CV ECHO MEAS - MV A MAX VEL: 99.2 CM/SEC
BH CV ECHO MEAS - MV E MAX VEL: 132.2 CM/SEC
BH CV ECHO MEAS - MV E/A: 1.3
BH CV ECHO MEAS - MV MAX PG: 8.9 MMHG
BH CV ECHO MEAS - MV MEAN PG: 4.1 MMHG
BH CV ECHO MEAS - MV P1/2T MAX VEL: 140 CM/SEC
BH CV ECHO MEAS - MV V2 MAX: 149 CM/SEC
BH CV ECHO MEAS - MV V2 MEAN: 95.3 CM/SEC
BH CV ECHO MEAS - MV V2 VTI: 45 CM
BH CV ECHO MEAS - MVA P1/2T LCG: 1.6 CM^2
BH CV ECHO MEAS - MVA(VTI): 2.1 CM^2
BH CV ECHO MEAS - PA MAX PG: 4.4 MMHG
BH CV ECHO MEAS - PA V2 MAX: 104.7 CM/SEC
BH CV ECHO MEAS - PI END-D VEL: 117.8 CM/SEC
BH CV ECHO MEAS - RAP SYSTOLE: 5 MMHG
BH CV ECHO MEAS - RVDD: 2.7 CM
BH CV ECHO MEAS - SV(AO): 296.9 ML
BH CV ECHO MEAS - SV(CUBED): 95.3 ML
BH CV ECHO MEAS - SV(LVOT): 92.8 ML
BH CV ECHO MEAS - SV(TEICH): 69.9 ML
BILIRUB SERPL-MCNC: 0.1 MG/DL (ref 0.2–1.3)
BUN BLD-MCNC: 10 MG/DL (ref 7–21)
BUN/CREAT SERPL: 21.3 (ref 7–25)
CALCIUM SPEC-SCNC: 8.1 MG/DL (ref 8.4–10.2)
CHLORIDE SERPL-SCNC: 105 MMOL/L (ref 95–110)
CO2 SERPL-SCNC: 22 MMOL/L (ref 22–31)
CREAT BLD-MCNC: 0.47 MG/DL (ref 0.7–1.3)
CRP SERPL-MCNC: 5.8 MG/DL (ref 0–1)
DEPRECATED RDW RBC AUTO: 60.9 FL (ref 35.1–43.9)
EOSINOPHIL # BLD AUTO: 0.53 10*3/MM3 (ref 0–0.7)
EOSINOPHIL NFR BLD AUTO: 4.7 % (ref 0–7)
ERYTHROCYTE [DISTWIDTH] IN BLOOD BY AUTOMATED COUNT: 22.9 % (ref 11.5–14.5)
GFR SERPL CREATININE-BSD FRML MDRD: 185 ML/MIN/1.73 (ref 56–130)
GLOBULIN UR ELPH-MCNC: 3.1 GM/DL (ref 2.3–3.5)
GLUCOSE BLD-MCNC: 132 MG/DL (ref 60–100)
GRAM STN SPEC: NORMAL
HCT VFR BLD AUTO: 23.7 % (ref 39–49)
HGB BLD-MCNC: 7.4 G/DL (ref 13.7–17.3)
HYPOCHROMIA BLD QL: NORMAL
IMM GRANULOCYTES # BLD: 0.03 10*3/MM3 (ref 0–0.02)
IMM GRANULOCYTES NFR BLD: 0.3 % (ref 0–0.5)
LV EF 2D ECHO EST: 60 %
LYMPHOCYTES # BLD AUTO: 1.46 10*3/MM3 (ref 0.6–4.2)
LYMPHOCYTES NFR BLD AUTO: 13 % (ref 10–50)
MCH RBC QN AUTO: 22.8 PG (ref 26.5–34)
MCHC RBC AUTO-ENTMCNC: 31.2 G/DL (ref 31.5–36.3)
MCV RBC AUTO: 73.1 FL (ref 80–98)
MONOCYTES # BLD AUTO: 1.53 10*3/MM3 (ref 0–0.9)
MONOCYTES NFR BLD AUTO: 13.7 % (ref 0–12)
NEUTROPHILS # BLD AUTO: 7.62 10*3/MM3 (ref 2–8.6)
NEUTROPHILS NFR BLD AUTO: 68 % (ref 37–80)
OVALOCYTES BLD QL SMEAR: NORMAL
PLATELET # BLD AUTO: 593 10*3/MM3 (ref 150–450)
PMV BLD AUTO: 8.2 FL (ref 8–12)
POTASSIUM BLD-SCNC: 4.1 MMOL/L (ref 3.5–5.1)
PROT SERPL-MCNC: 6 G/DL (ref 6.3–8.6)
RBC # BLD AUTO: 3.24 10*6/MM3 (ref 4.37–5.74)
SMALL PLATELETS BLD QL SMEAR: NORMAL
SODIUM BLD-SCNC: 136 MMOL/L (ref 137–145)
TARGETS BLD QL SMEAR: NORMAL
WBC MORPH BLD: NORMAL
WBC NRBC COR # BLD: 11.2 10*3/MM3 (ref 3.2–9.8)

## 2017-03-10 PROCEDURE — 85007 BL SMEAR W/DIFF WBC COUNT: CPT | Performed by: FAMILY MEDICINE

## 2017-03-10 PROCEDURE — 25010000002 MAGNESIUM SULFATE PER 500 MG OF MAGNESIUM: Performed by: FAMILY MEDICINE

## 2017-03-10 PROCEDURE — 25010000002 THIAMINE PER 100 MG: Performed by: FAMILY MEDICINE

## 2017-03-10 PROCEDURE — 99232 SBSQ HOSP IP/OBS MODERATE 35: CPT | Performed by: INTERNAL MEDICINE

## 2017-03-10 PROCEDURE — 80053 COMPREHEN METABOLIC PANEL: CPT | Performed by: FAMILY MEDICINE

## 2017-03-10 PROCEDURE — 86140 C-REACTIVE PROTEIN: CPT | Performed by: FAMILY MEDICINE

## 2017-03-10 PROCEDURE — 85025 COMPLETE CBC W/AUTO DIFF WBC: CPT | Performed by: FAMILY MEDICINE

## 2017-03-10 PROCEDURE — 99239 HOSP IP/OBS DSCHRG MGMT >30: CPT | Performed by: FAMILY MEDICINE

## 2017-03-10 PROCEDURE — 87116 MYCOBACTERIA CULTURE: CPT | Performed by: FAMILY MEDICINE

## 2017-03-10 RX ORDER — FERROUS SULFATE TAB EC 324 MG (65 MG FE EQUIVALENT) 324 (65 FE) MG
324 TABLET DELAYED RESPONSE ORAL
Qty: 30 TABLET | Refills: 1 | Status: SHIPPED | OUTPATIENT
Start: 2017-03-10 | End: 2018-01-01 | Stop reason: SDUPTHER

## 2017-03-10 RX ADMIN — FERROUS SULFATE TAB EC 324 MG (65 MG FE EQUIVALENT) 324 MG: 324 (65 FE) TABLET DELAYED RESPONSE at 09:00

## 2017-03-10 RX ADMIN — MAGNESIUM SULFATE HEPTAHYDRATE 100 ML/HR: 500 INJECTION, SOLUTION INTRAMUSCULAR; INTRAVENOUS at 09:53

## 2017-03-10 RX ADMIN — LORAZEPAM 2 MG: 2 TABLET ORAL at 03:47

## 2017-03-10 RX ADMIN — LORAZEPAM 2 MG: 2 TABLET ORAL at 09:05

## 2017-03-10 RX ADMIN — FAMOTIDINE 20 MG: 20 TABLET ORAL at 09:00

## 2017-03-10 NOTE — PLAN OF CARE
Problem: Patient Care Overview (Adult)  Goal: Plan of Care Review  Outcome: Ongoing (interventions implemented as appropriate)    03/09/17 0557 03/09/17 0800 03/09/17 1823   Coping/Psychosocial Response Interventions   Plan Of Care Reviewed With --  patient --    Patient Care Overview   Progress improving --  --    Outcome Evaluation   Outcome Summary/Follow up Plan --  --  Patient states that he is ready to go home, feeling much better. Awaiting AFB results. MD to come by to speak with patient this evening regarding discharge plans.        Goal: Adult Individualization and Mutuality  Outcome: Ongoing (interventions implemented as appropriate)  Goal: Discharge Needs Assessment  Outcome: Ongoing (interventions implemented as appropriate)    Problem: Fall Risk (Adult)  Goal: Identify Related Risk Factors and Signs and Symptoms  Outcome: Outcome(s) achieved Date Met:  03/09/17  Goal: Absence of Falls  Outcome: Ongoing (interventions implemented as appropriate)    Problem: Acute Alcohol Withdrawal Syndrome, Risk For/Actual (Adult)  Goal: Signs and Symptoms of Listed Potential Problems Will be Absent or Manageable (Acute Alcohol Withdrawal Syndrome, Risk For/Actual)  Outcome: Ongoing (interventions implemented as appropriate)

## 2017-03-10 NOTE — PROGRESS NOTES
PULMONARY PROGRESS NOTE  Candis Lr MD    [facility] CRITICAL CARE  3/10/2017        Tino Peres  [id number]  1960  56 y.o. male             LOS: 3 days   Irineo Oakley MD    Subjective     CC/Reason for visit: F/u cavitary lung lesion    HPI: No complaints other than requesting discharge. Still with intermittent productive cough. Denies hemoptysis.  consulted yesterday; planning outpt follow-up. No fevers, hypoxemia or respiratory distress.    Review of Systems:  Respiratory ROS:  Review of Systems   Constitutional: Positive for unexpected weight change. Negative for appetite change, fatigue and fever.   HENT: Negative for congestion.    Respiratory: Positive for cough. Negative for shortness of breath and wheezing.    Cardiovascular: Positive for chest pain.     All other systems were reviewed and were negative except as above in the HPI.    Objective     Vital Sign Min/Max for last 24 hours:  Temp  Min: 98.5 °F (36.9 °C)  Max: 99.6 °F (37.6 °C)   BP  Min: 97/57  Max: 143/63   Pulse  Min: 77  Max: 107   Resp  Min: 18  Max: 28   SpO2  Min: 95 %  Max: 100 %   No Data Recorded   No Data Recorded     Physical Exam:  98.5 °F (36.9 °C) (Oral) 90 137/65 26 99% 138 lb 0.1 oz (62.6 kg) Body mass index is 20.38 kg/(m^2).  Physical Exam   Constitutional: He is oriented to person, place, and time. Vital signs are normal. He appears well-developed and well-nourished. He appears cachectic.   HENT:   Head: Normocephalic and atraumatic.   Nose: Nose normal.   Mallampati 3   Eyes: Conjunctivae, EOM and lids are normal. Pupils are equal, round, and reactive to light.   Pale sclera   Neck: Normal range of motion.   Cardiovascular: Regular rhythm and normal heart sounds.  Exam reveals no gallop.    No murmur heard.  Prominent S2   Pulmonary/Chest: Effort normal. No accessory muscle usage. No respiratory distress. He has decreased breath sounds (bronchial BS anterior SILVANA) in the left upper  field. He has no wheezes. He has no rhonchi. Chest wall is not dull to percussion. He exhibits no tenderness.   Abdominal: Soft. Normal appearance and bowel sounds are normal. There is no tenderness.       Vascular Status -  His exam exhibits no right foot edema. His exam exhibits no left foot edema.  Neurological: He is alert and oriented to person, place, and time.   Skin: Skin is warm and dry. No cyanosis. There is pallor. Nails show no clubbing.   Psychiatric: He has a normal mood and affect. His speech is normal and behavior is normal.   Nursing note and vitals reviewed.      Scheduled meds:      famotidine 20 mg Oral BID   ferrous sulfate 324 mg Oral Daily With Breakfast   IV Fluids 1000 mL + additives 100 mL/hr Intravenous Daily   nicotine 1 patch Transdermal Q24H   traZODone 50 mg Oral Nightly       IV meds:                         sodium chloride 100 mL/hr Last Rate: Stopped (03/08/17 0229)   sodium chloride 75 mL/hr Last Rate: 75 mL/hr (03/09/17 0522)       Data Review: I personally reviewed the patient's medications and new clinical results.  Lab Results   Component Value Date    CALCIUM 8.1 (L) 03/10/2017       Results from last 7 days  Lab Units 03/10/17  0531 03/09/17  0616 03/08/17  0418   AST (SGOT) U/L 12* 13* 25   SODIUM mmol/L 136* 137 142   POTASSIUM mmol/L 4.1 3.8 3.5   CHLORIDE mmol/L 105 108 108   TOTAL CO2 mmol/L 22.0 20.0* 20.0*   BUN mg/dL 10 11 10   CREATININE mg/dL 0.47* 0.45* 0.49*   GLUCOSE mg/dL 132* 120* 100   CALCIUM mg/dL 8.1* 7.9* 7.9*   WBC 10*3/mm3 11.20* 10.53* 9.13   HEMOGLOBIN g/dL 7.4* 7.6* 7.3*   PLATELETS 10*3/mm3 593* 590* 598*   ALT (SGPT) U/L 38 33 50               Radiology: I independently reviewed the patient's new imaging, including images and reports.  Imaging Results (last 24 hours)     Procedure Component Value Units Date/Time    CT Chest With Contrast [[id number]] Collected:  03/07/17 1609     Updated:  03/07/17 1638    Narrative:       Radiology Imaging  [facility] [place]    Patient Name: ELENA RICE    ORDERING: IRVIN GALVEZ    ATTENDING: IRVIN GALVEZ     REFERRING: IRVIN GALVEZ    -----------------------    PROCEDURE: CT SCAN OF THE CHEST WITH INTRAVENOUS CONTRAST.    CLINICAL INFORMATION:  abnormal lung mass     Dose length product 152.6  This exam was performed using radiation doses that are as low as  reasonably achievable (ALARA).    COMPARISON: Chest x-ray March 7, 2017, CT abdomen November 7, 2014    CONTRAST: 75 cc intravenous Isovue 300    TECHNIQUE: Axial images were obtained and multiplanar  reconstructions were made.      FINDINGS:    LUNGS/PLEURA: There is complete consolidation/opacification of  the left upper lobe with air bronchograms present. In the apical  segment of the left upper lobe, there is a 3.7 x 5 x 8 cm cavity  which contains a spongelike lobulated mass measuring 2.7 x 3.3 x  5 cm. There is a crescent of air surrounding the internal more  solid appearing mass. This is concerning for a cavity from  previous tuberculosis with an internal fungus ball.   The remaining portions of the lungs appear clear except for  centrilobular emphysema.  TRACHEA AND BRONCHI:  The trachea and bronchi are patent.  MEDIASTINUM, DOUGLAS AND LYMPH NODES: There are slightly enlarged  lymph nodes in the aorticopulmonary window region measuring 1.1  cm in short axis, and one in the right paratracheal region  measuring 1.4 cm in short axis.  HEART AND PERICARDIUM: The heart and pericardium are normal.  VASCULAR: Unremarkable  UPPER ABDOMEN: Unremarkable  OSSEOUS STRUCTURES: There is partial destruction of the second  through sixth ribs in the area of left upper lobe consolidation.  There is a healing transverse fracture of the lateral left sixth  rib.      Impression:       CONCLUSION:  1.  Complete consolidation of the left upper lobe, containing a  cavitary mass within internal fungus ball and adjacent rib  destruction.  2.  This constellation of findings  is suspicious for a  pre-existing tuberculosis cavity superinfected by aspergillosis  with invasion into the chest wall, especially in an  immunocompromised host.  3.  Chest wall invasion and mycetomas can also be seen with  actinomycosis infection.  4.  Healing left lateral sixth rib fracture, likely pathologic.     Findings and recommendations  discussed with IRVIN GALVEZ on  3/7/2017 4:34 PM CST    Electronically signed by:  Deep Mcpherson MD  3/7/2017 4:36 PM CST  Workstation: [id number]          Assessment/Plan     ASSESSMENT:   # Symptomatic anemia, unknown etiology- improved following 3 units  # SILVANA cavitary mass containing possible mycetoma- indolent process; AFB and KOH negative, cultures pending; cytology negative  # Fatigue, wt loss, dyspnea  # Tobacco and EtOH abuse      PLAN/ RECOMMENDATIONS:  -AFB smears negative  -F/u AFB, fungal and bacterial cultures  -F/u ASP galactomannan, Aspergillus IgG  -Hold abx, antifungals, anti-TB drugs until something to target  -Follow Hgb. Iron supplement and MVI.  -Appreciate CT surgery input  -Recommend f/u with GI vs Dr. Salas for screening EGD/colonoscopy re: anemia    Ok for d/c home from my standpoint. F/u within 2wks. Offered referral to tertiary care center for 2nd opinion.    Total time spent: 24 minutes      This document has been electronically signed by Candis Lr MD on March 10, 2017 7:48 AM      [phone]    EMR Dragon/Transcription disclaimer:     Much of this encounter note is an electronic transcription/translation of spoken language to printed text. The electronic translation of spoken language may permit erroneous, or at times, nonsensical words or phrases to be inadvertently transcribed; Although I have reviewed the note for such errors, some may still exist.

## 2017-03-10 NOTE — DISCHARGE SUMMARY
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 Attending Dr. ELIA Bhagat, Resident Corie Camejo MD    CONSULTS:   Consult Orders (all)     Start     Ordered    17 07  Inpatient Consult to Cardiothoracic Surgery  Once     Comments:  LVM with Ryann Rothman   Specialty:  Cardiothoracic Surgery  Provider:  Angel Hernandez MD    17 0725    17 2226  Inpatient Consult to Pulmonology  Once,   Status:  Canceled     Specialty:  Pulmonary Disease  Provider:  Candis Lr MD    175    17 1644  Family Practice - Resident (on-call MD unless specified)  Once     Specialty:  Family Medicine  Provider:  Irineo Oakley MD    17 1644          PROCEDURES:   Xr Chest 2 View    Result Date: 3/7/2017  Narrative: [facility] Patient Name: TINO RICE ORDERING: RANDALL SWANSNO ATTENDING: REFERRING: RANDALL SWANSON ----------------------- PROCEDURE: Chest two view on 3/7/2017 CLINICAL INDICATION:  Shortness of breath, fatigue COMPARISON: None FINDINGS: There is extensive left upper lobe opacity. There is an appearance suggesting cavitary lesion with possible associated mycetoma. Recommend follow-up chest CT with contrast to better evaluate. Cannot exclude malignant process in the left upper lobe especially centrally. Component of pneumonia in the left upper lobe is also favored. Right lung is clear. Heart is  within normal limits for size.     Impression: CONCLUSION: Extensive left upper lobe abnormality. Favor cavitary lesion to be present with possible mycetoma which would suggest Aspergillus infection. Malignant process is not excluded. Recommend follow-up chest CT with contrast. Electronically signed by:  Graeme Mijares  3/7/2017 11:32 AM CST Workstation: [id number]    Ct Chest With Contrast    Result Date: 3/7/2017  Narrative: [facility] Patient Name: ELENA RICE ORDERING: IRVIN GALVEZ ATTENDING: IRVIN GALVEZ REFERRING: IRVIN GALVEZ ----------------------- PROCEDURE: CT SCAN OF THE CHEST WITH INTRAVENOUS CONTRAST. CLINICAL INFORMATION:  abnormal lung mass Dose length product 152.6 This exam was performed using radiation doses that are as low as reasonably achievable (ALARA). COMPARISON: Chest x-ray March 7, 2017, CT abdomen November 7, 2014 CONTRAST: 75 cc intravenous Isovue 300 TECHNIQUE: Axial images were obtained and multiplanar reconstructions were made.  FINDINGS: LUNGS/PLEURA: There is complete consolidation/opacification of the left upper lobe with air bronchograms present. In the apical segment of the left upper lobe, there is a 3.7 x 5 x 8 cm cavity which contains a spongelike lobulated mass measuring 2.7 x 3.3 x 5 cm. There is a crescent of air surrounding the internal more solid appearing mass. This is concerning for a cavity from previous tuberculosis with an internal fungus ball. The remaining portions of the lungs appear clear except for centrilobular emphysema. TRACHEA AND BRONCHI:  The trachea and bronchi are patent. MEDIASTINUM, DOUGLAS AND LYMPH NODES: There are slightly enlarged lymph nodes in the aorticopulmonary window region measuring 1.1 cm in short axis, and one in the right paratracheal region measuring 1.4 cm in short axis. HEART AND PERICARDIUM: The heart and pericardium are normal. VASCULAR: Unremarkable UPPER ABDOMEN: Unremarkable OSSEOUS STRUCTURES:  "There is partial destruction of the second through sixth ribs in the area of left upper lobe consolidation. There is a healing transverse fracture of the lateral left sixth rib.     Impression: CONCLUSION: 1.  Complete consolidation of the left upper lobe, containing a cavitary mass within internal fungus ball and adjacent rib destruction. 2.  This constellation of findings is suspicious for a pre-existing tuberculosis cavity superinfected by aspergillosis with invasion into the chest wall, especially in an immunocompromised host. 3.  Chest wall invasion and mycetomas can also be seen with actinomycosis infection. 4.  Healing left lateral sixth rib fracture, likely pathologic. Findings and recommendations  discussed with IRVIN GALVEZ on 3/7/2017 4:34 PM CST Electronically signed by:  Deep Mcpherson MD  3/7/2017 4:36 PM CST Workstation: [id number]    Order Name Source Comment Collection Info Order Time   FUNGUS CULTURE Lung, Left Upper Lobe  Collected By: Candis Lr MD 3/8/2017  1:13 PM   BALTA PREP Lung, Left Upper Lobe  Collected By: Candis Lr MD 3/8/2017  1:13 PM   AFB CULTURE Lung, Left Upper Lobe  Collected By: Candis Lr MD 3/8/2017  1:13 PM   RESPIRATORY CULTURE Lung, Left Upper Lobe  Collected By: Candis Lr MD 3/8/2017  1:13 PM   NON-GYNECOLOGIC CYTOLOGY Lung, Left Upper Lobe  Collected By: Candis Lr MD 3/8/2017  1:13 PM     HISTORY OF PRESENT ILLNESS: Copied from H&P  Patient is a 56 y.o. male presented with who presents with a 3-4 month history of generalized fatigue, unexpected weight loss, and dyspnea on exertion. Patient reports that he's been \"feeling like shit\". He reports a weight loss of 20-30lbs during this time but believes his lack of appetite and strenuous job may have contributed to his weight loss. His symptoms have gotten progressively worse and approximately 3 weeks ago, the patient reports an incidence of hemoptysis. He reports coughing up about a cup of " blood. He never sought medical attention for his hemoptysis and does not report any other incidents of hemoptysis.      Patient is a smoker, smokes 1PPD, and reports a possible fungal infection 10 years ago. Patient presented to the pulmonologist office at the request of an UC and was found to have a hemoglobin of 4.4. Patient was directed to the ED for an emergent blood transfusion. .    DIAGNOSTIC DATA:   Lab Results (last 24 hours)     Procedure Component Value Units Date/Time    Occult Blood X 1, Stool [[id number]]  (Normal) Collected:  03/09/17 1518    Specimen:  Stool from Per Rectum Updated:  03/09/17 1622     Fecal Occult Blood Negative     AFB Culture [[id number]] Collected:  03/09/17 1615    Specimen:  Sputum from Nasopharynx Updated:  03/10/17 1043     AFB Stain No acid fast bacilli seen     CBC & Differential [[id number]] Collected:  03/10/17 0531    Specimen:  Blood Updated:  03/10/17 0722    Narrative:       The following orders were created for panel order CBC & Differential.  Procedure                               Abnormality         Status                     ---------                               -----------         ------                     Scan Slide[[id number]]                                        Final result               CBC Auto Differential[[id number]]         Abnormal            Final result                 Please view results for these tests on the individual orders.    Comprehensive Metabolic Panel [[id number]]  (Abnormal) Collected:  03/10/17 0531    Specimen:  Blood Updated:  03/10/17 0557     Glucose 132 (H) mg/dL      BUN 10 mg/dL      Creatinine 0.47 (L) mg/dL      Sodium 136 (L) mmol/L      Potassium 4.1 mmol/L      Chloride 105 mmol/L      CO2 22.0 mmol/L      Calcium 8.1 (L) mg/dL      Total Protein 6.0 (L) g/dL      Albumin 2.90 (L) g/dL      ALT (SGPT) 38 U/L      AST (SGOT) 12 (L) U/L      Alkaline Phosphatase 75 U/L      Total Bilirubin 0.1 (L) mg/dL      eGFR Non  Amer 185  (H) mL/min/1.73      Globulin 3.1 gm/dL      A/G Ratio 0.9 (L) g/dL      BUN/Creatinine Ratio 21.3      Anion Gap 9.0 mmol/L     C-reactive Protein [[id number]]  (Abnormal) Collected:  03/10/17 0531    Specimen:  Blood Updated:  03/10/17 0557     C-Reactive Protein 5.80 (H) mg/dL     CBC Auto Differential [[id number]]  (Abnormal) Collected:  03/10/17 0531    Specimen:  Blood Updated:  03/10/17 0542     WBC 11.20 (H) 10*3/mm3      RBC 3.24 (L) 10*6/mm3      Hemoglobin 7.4 (L) g/dL      Hematocrit 23.7 (L) %      MCV 73.1 (L) fL      MCH 22.8 (L) pg      MCHC 31.2 (L) g/dL      RDW 22.9 (H) %      RDW-SD 60.9 (H) fl      MPV 8.2 fL      Platelets 593 (H) 10*3/mm3      Neutrophil % 68.0 %      Lymphocyte % 13.0 %      Monocyte % 13.7 (H) %      Eosinophil % 4.7 %      Basophil % 0.3 %      Immature Grans % 0.3 %      Neutrophils, Absolute 7.62 10*3/mm3      Lymphocytes, Absolute 1.46 10*3/mm3      Monocytes, Absolute 1.53 (H) 10*3/mm3      Eosinophils, Absolute 0.53 10*3/mm3      Basophils, Absolute 0.03 10*3/mm3      Immature Grans, Absolute 0.03 (H) 10*3/mm3     Scan Slide [[id number]] Collected:  03/10/17 0531    Specimen:  Blood Updated:  03/10/17 0722     Hypochromia Slight/1+      Ovalocytes Slight/1+      Target Cells Slight/1+      WBC Morphology Normal      Platelet Estimate Increased          HOSPITAL COURSE:  Patient was admitted for symptomatic anemia with a hemoglobin of 4.4. Patient was transfused with 3 units of PRBCs; repeat hemoglobin and hematocrit were 7.6/24.1 and 7.4/23.7 on discharge. Patient was started on ferrous sulfate for iron deficiency anemia. ED work up per the recommendation of referring pulmonologist, Dr. Candis Lr, included a chest CT which revealed consolidation of the left upper lobe, containing a cavitary mass within internal fungus ball. This constellation of findings is suspicious for a pre-existing tuberculosis cavity superinfected by aspergillosis with invasion into the chest wall,  especially in an immunocompromised host. Patient found to be HIV negative following testing for immunocompromise.     Patient was immediately placed in a negative pressure room with airborne and contact precautions. Three acid fast bacilli stains, collected 8 hours apart, and quantiferon TB gold test were obtained to rule out TB. AFB stains were negative x3. Quantiferon was still pending at the time of discharge. Pulmonology, Dr. HERMELINDA Lr, was consulted and a bronchoscopy was performed in the patient's room. KOH preparation of bronchial wash were negative for yeast or hyphae. Fungal culture of bronchial wash was still pending at the time of discharge. Additionally aspergillus antibodies and aspergillus galactomannan antigen were ordered to evaluate the possibility of fungal superimposed infection. Blood cultures and respiratory cultures had no growth in 48 hours. Tests for strep pneumonia, legionella, and mycoplasma pneumonia returned negative. Dr. HERMLEINDA Lr will follow up with the patient in the office.     CT surgery, Dr. MONSTER Hernandez, was consulted regarding findings of mycetomas on chest CT. Dr. Hernandez and patient were in agreement with scheduling surgical intervention at a later time. Dr. Hernandez will follow up with the patient in the office to schedule an elective surgery.     Patient was prepared for discharge following 3 negative AFB stains. The health department was informed of the patient's potential infection with TB and his pending labs. The health department required the patient to sign documentation attesting to self quarantining at his place of residence pending his quantiferon results.     DISCHARGE CONDITION:   Review of Systems  General: negative for - chills, fatigue, fever, hot flashes, malaise, night sweats  ENT: negative for - hearing change, nasal congestion or sore throat  Hematological and Lymphatic: negative for - jaundice  Endocrine: negative for - hair pattern changes, skin changes  or temperature intolerance  Respiratory: no cough, shortness of breath, or wheezing  Cardiovascular: no chest pain, edema or dyspnea on exertion  Gastrointestinal: no nausea/vomiting, abdominal pain, or black or bloody stools  Genito-Urinary: no dysuria, trouble voiding, or hematuria  Musculoskeletal: negative for - joint pain or muscle pain  Neurological: negative for - dizziness, headaches, numbness/tingling or seizures  Dermatological: negative for rash and skin lesion changes    Vitals:    03/10/17 0200 03/10/17 0300 03/10/17 0400 03/10/17 0500   BP: 100/55 119/65  122/69   BP Location:       Patient Position:       Pulse: 86 83  87   Resp: 21 22  28   Temp:   98.5 °F (36.9 °C)    TempSrc:   Oral    SpO2: 98% 98%  97%   Weight:       Height:         General Appearance:    Alert, cooperative, no distress, appears stated age   Head:    Normocephalic, without obvious abnormality, atraumatic   Eyes:    PERRL, conjunctiva/corneas clear, EOM's intact   Nose:   Nares normal, septum midline, mucosa normal, no drainage     or sinus tenderness   Throat:   Lips, mucosa, and tongue normal; teeth and gums normal   Neck:   Supple, symmetrical, trachea midline, no adenopathy;     thyroid: no enlargement/tenderness/nodules   Back:     Symmetric, no curvature, ROM normal, no CVA tenderness   Lungs:     Clear to auscultation bilaterally, respirations unlabored    Heart:    Regular rate and rhythm, S1 and S2 normal, no murmur, rub    or gallop   Abdomen:     Soft, non-tender, bowel sounds active all four quadrants,     no masses, no organomegaly   Extremities:   Extremities normal, atraumatic, no cyanosis or edema   Pulses:   2+ and symmetric all extremities   Skin:   Skin color, texture, turgor normal, no rashes or lesions   Neurologic:   CNII-XII grossly intact   DISPOSITION:  Home with notification to the [facility] to follow up on quantiferon results.     DISCHARGE MEDICATIONS   Tino Peres   Home Medication Instructions  DINA:[id number]    Printed on:03/10/17 1328   Medication Information                      ferrous sulfate 324 (65 FE) MG tablet delayed-release EC tablet  Take 1 tablet by mouth Daily With Breakfast.                 INSTRUCTIONS:  Activity: As previously tolerated    Diet: Regular diet    Special instructions: Patient instructed to call MD or return to ED with worsening shortness of breath, chest pain, fever greater than 100.4 degrees F or any other medical concerns..    FOLLOW UP:   1. Follow up with Dr. Julien Camejo in one week  2. Follow up with Dr. Candis Lr, Pulmonology in 2-4 weeks  3. Follow up with Dr. Angel Hernandez, CT surgery in 2-4 weeks    PENDING TEST RESULTS AT DISCHARGE   Order Current Status    AFB Culture Collected (03/09/17 0676)    Aspergillus Antibodies In process    Aspergillus Galactomannan Antigen In process    Aspergillus Galactomannan Antigen In process    Fungus Culture In process    QuantiFERON TB Gold In process    AFB Culture Preliminary result    AFB Culture Preliminary result    AFB Culture Preliminary result    AFB Culture Preliminary result    Blood Culture Preliminary result    Blood Culture Preliminary result    Blood Culture Preliminary result    Blood Culture Preliminary result    Respiratory Culture Preliminary result    Respiratory Culture Preliminary result          Time: Discharge 35 min    Dr. ELIA Bhagat is the attending at time of discharge, she is aware of the patient's status and agrees with the above discharge summary.          This document has been electronically signed by Corie Camejo MD on March 10, 2017 5:33 AM

## 2017-03-10 NOTE — PROGRESS NOTES
FAMILY MEDICINE PROGRESS NOTE  NAME: Tino Peres  : 1960  MRN: [id number]     LOS: 3 days   Full Code  PROVIDER OF SERVICE:     Chief Complaint:  Iron deficiency anemia    Subjective     Interval History:  History taken from: patient  Subjective: Patient had no adverse events overnight. Has had 2 negative AFB smears. Wants to go home. Waiting for last smear and Health department to come.     Review of Systems:   Review of Systems   Constitutional: Negative.  Negative for fatigue and fever.   HENT: Negative.  Negative for ear pain and sore throat.    Eyes: Negative.  Negative for pain and visual disturbance.   Respiratory: Positive for cough. Negative for shortness of breath.    Cardiovascular: Negative.  Negative for chest pain and palpitations.   Gastrointestinal: Negative for abdominal pain and nausea.   Endocrine: Negative.    Genitourinary: Negative for dysuria.   Musculoskeletal: Negative for arthralgias.   Skin: Negative for rash.   Allergic/Immunologic: Negative.    Neurological: Negative for dizziness, weakness and headaches.   Psychiatric/Behavioral: Negative for sleep disturbance.       Objective     Vital Signs  Temp:  [98.5 °F (36.9 °C)-99.6 °F (37.6 °C)] 98.5 °F (36.9 °C)  Heart Rate:  [] 80  Resp:  [18-28] 26  BP: ()/(55-80) 125/72    Physical Exam  Physical Exam   Constitutional: He is oriented to person, place, and time. He appears well-developed and well-nourished.   HENT:   Head: Normocephalic.   Eyes: Conjunctivae and EOM are normal. Pupils are equal, round, and reactive to light.   Neck: Normal range of motion.   Cardiovascular: Normal rate, regular rhythm and normal heart sounds.    Pulmonary/Chest: Effort normal and breath sounds normal.   Abdominal: Soft. Bowel sounds are normal.   Musculoskeletal: Normal range of motion.   Neurological: He is alert and oriented to person, place, and time.   Skin: Skin is warm and dry.   Psychiatric: He has a normal mood and affect. His  behavior is normal.       Medication Review    Current Facility-Administered Medications:   •  albuterol (PROVENTIL) nebulizer solution 0.083% 2.5 mg/3mL, 2.5 mg, Nebulization, Q4H PRN, Elli Morgan MD  •  famotidine (PEPCID) tablet 20 mg, 20 mg, Oral, BID, Elli Morgan MD, 20 mg at 03/09/17 1840  •  ferrous sulfate EC tablet 324 mg, 324 mg, Oral, Daily With Breakfast, Irineo Oakley MD, 324 mg at 03/09/17 0742  •  lidocaine PF 1% (XYLOCAINE) injection 0.1 mL, 0.1 mL, Intradermal, Once PRN, Candis Lr MD  •  LORazepam (ATIVAN) tablet 1 mg, 1 mg, Oral, Q2H PRN, 1 mg at 03/09/17 2002 **OR** LORazepam (ATIVAN) injection 1 mg, 1 mg, Intravenous, Q2H PRN **OR** LORazepam (ATIVAN) tablet 2 mg, 2 mg, Oral, Q1H PRN, 2 mg at 03/10/17 0347 **OR** LORazepam (ATIVAN) injection 2 mg, 2 mg, Intravenous, Q1H PRN **OR** LORazepam (ATIVAN) injection 2 mg, 2 mg, Intravenous, Q15 Min PRN, 2 mg at 03/08/17 1304 **OR** LORazepam (ATIVAN) injection 1 mg, 1 mg, Intravenous, Q15 Min PRN **OR** LORazepam (ATIVAN) tablet 4 mg, 4 mg, Oral, Q1H PRN **OR** LORazepam (ATIVAN) injection 4 mg, 4 mg, Intravenous, Q1H PRN, Elli Morgan MD  •  multiple vitamin (M.V.I. Adult) 10 mL, thiamine (B-1) 100 mg, folic acid 1 mg, magnesium sulfate 2 g in sodium chloride 0.9 % 1,000 mL infusion, 100 mL/hr, Intravenous, Daily, Elli Morgan MD, Last Rate: 100 mL/hr at 03/09/17 0900, 100 mL/hr at 03/09/17 0900  •  nicotine (NICODERM CQ) 21 MG/24HR patch 1 patch, 1 patch, Transdermal, Q24H, Elli Morgan MD, 1 patch at 03/09/17 2228  •  ondansetron (ZOFRAN) injection 4 mg, 4 mg, Intravenous, Q6H PRN, Elli Morgan MD, 4 mg at 03/07/17 2312  •  sodium chloride 0.9 % flush 1-10 mL, 1-10 mL, Intravenous, PRN, Elli Morgan MD, 10 mL at 03/08/17 0619  •  sodium chloride 0.9 % infusion, 100 mL/hr, Intravenous, Continuous, Elli Morgan MD, Stopped at 03/08/17 0229  •  sodium chloride 0.9 % infusion, 75 mL/hr,  Intravenous, Continuous, Candis Lr MD, Last Rate: 75 mL/hr at 03/09/17 0522, 75 mL/hr at 03/09/17 0522  •  traZODone (DESYREL) tablet 50 mg, 50 mg, Oral, Nightly, Elli Morgan MD, 50 mg at 03/09/17 2002     Diagnostic Data      I reviewed the patient's new clinical results and imaging.      Assessment/Plan     Active Hospital Problems (** Indicates Principal Problem)    Diagnosis Date Noted   • **Iron deficiency anemia [D50.9] 03/07/2017     -Hemoccult (-) in ED  -Type & cross; discussed risks and benefit of blood transfusion. Patient is agreeable to transfusion.   -Transfuse PRBCs with lasix between units  -Will follow H&H and transfuse as indicated.   -Iron studies: started ferrous sulfate 324mg  Iron - <10 (low)  TIBC - 364  Ferritin - 7.8 (low)     • Cavitary lesion of lung [J98.4] 03/07/2017     -Pulmonology consulted; appreciate recommendations  -Acid fast stains negative x3; quantaferon results pending  -No indication for RIPE therapy at this time  -Will obtain aspergillus antibodies and aspergillus galactomannan antigen for possible aspergillus superimposed infection  -Cytology revealed no malignant cells and rare reactive epithelium  -HIV & hepatitis panels: negative   -CT surgery consult for surgical intervention of mycetomas; appreciate recommendations       • Alcohol use disorder [F10.99] 03/07/2017     -IV banana bag  -[facility] protocol: score 2 at 0400     • Tobacco use disorder [F17.200] 03/07/2017   • CAP (community acquired pneumonia) [J18.9] 03/07/2017     -Strep pneumonia, legionella, and mycoplasma negative.   -Sputum no growth in 48hr        Resolved Hospital Problems    Diagnosis Date Noted Date Resolved   • Hypokalemia [E87.6] 03/07/2017 03/10/2017     -Follow with daily CMP.           DVT prophylaxis: SCDs/TEDs      Disposition:Home     I have seen the patient.  I have reviewed the notes, assessments, and/or procedures performed by Dr. Camejo, I concur with her/his documentation and  assessment and plan for Tino Peres.          This document has been electronically signed by Sophy Bhagat MD on March 10, 2017 8:37 AM

## 2017-03-10 NOTE — PLAN OF CARE
Problem: Patient Care Overview (Adult)  Goal: Plan of Care Review  Outcome: Ongoing (interventions implemented as appropriate)    03/10/17 0612   Coping/Psychosocial Response Interventions   Plan Of Care Reviewed With patient   Patient Care Overview   Progress improving   Outcome Evaluation   Outcome Summary/Follow up Plan pt states he is ready to go home and will go home today even if he has to sign out AMA. discharge orders have been wrote but awaiting approval from Dr. Bhagat and pt is aware.         Problem: Fall Risk (Adult)  Goal: Absence of Falls  Outcome: Ongoing (interventions implemented as appropriate)    Problem: Acute Alcohol Withdrawal Syndrome, Risk For/Actual (Adult)  Goal: Signs and Symptoms of Listed Potential Problems Will be Absent or Manageable (Acute Alcohol Withdrawal Syndrome, Risk For/Actual)  Outcome: Ongoing (interventions implemented as appropriate)

## 2017-03-11 LAB
BACTERIA SPEC RESP CULT: NO GROWTH
BACTERIA SPEC RESP CULT: NORMAL
GRAM STN SPEC: NORMAL
INTERPRETATION: ABNORMAL
M TB TUBERC IFN-G BLD QL: ABNORMAL
QFT TB AG MINUS NIL VALUE: 0.02 IU/ML
QUANTIFERON CRITERIA: ABNORMAL
QUANTIFERON INCUBATION: NORMAL
QUANTIFERON MITOGEN VALUE: 0.07 IU/ML
QUANTIFERON NIL VALUE: 0.06 IU/ML
QUANTIFERON TB AG VALUE: 0.08 IU/ML

## 2017-03-12 LAB
BACTERIA SPEC AEROBE CULT: NORMAL
BACTERIA SPEC AEROBE CULT: NORMAL

## 2017-03-13 LAB
BACTERIA SPEC AEROBE CULT: NORMAL
BACTERIA SPEC AEROBE CULT: NORMAL

## 2017-03-13 NOTE — PAYOR COMM NOTE
"Tino Peres (56 y.o. Male)     Date of Birth Social Security Number Address Home Phone MRN    1960  [id number]  [facility] [id number] [phone] [id number]    Christianity Marital Status          None Single       Admission Date Admission Type Admitting Provider Attending Provider Department, Room/Bed    3/7/17 Emergency Irineo Oakley MD  [facility], 20/A    Discharge Date Discharge Disposition Discharge Destination        3/10/2017 Home or Self Care Home            Attending Provider: (none)    Allergies:  No Known Allergies    Isolation:  Airborne   Infection:  None   Code Status:  Prior    Ht:  69\" (175.3 cm)   Wt:  138 lb 0.1 oz (62.6 kg)    Admission Cmt:  None   Principal Problem:  Iron deficiency anemia [D50.9] More...                 Active Insurance as of 3/7/2017     Primary Coverage     Payor Plan Insurance Group Employer/Plan Group    [facility] [id number]     Payor Plan Address Payor Plan Phone Number Effective From Effective To    [address] [address] [phone] 1/1/2017     [place] [address]       Subscriber Name Subscriber Birth Date Member ID       TINO PERES 1960 [id number]                 Emergency Contacts      (Rel.) Home Phone Work Phone Mobile Phone    Jessie Peres (Son) [phone] -- --              Physician Progress Notes (last 72 hours) (Notes from 3/10/2017  1:09 PM through 3/13/2017  1:09 PM)     No notes of this type exist for this encounter.        Consult Notes (last 72 hours) (Notes from 3/10/2017  1:09 PM through 3/13/2017  1:09 PM)     No notes of this type exist for this encounter.          "

## 2017-03-13 NOTE — PAYOR COMM NOTE
"Tino Peres (56 y.o. Male)     Date of Birth Social Security Number Address Home Phone MRN    [id number]  [id number]  [facility] [phone] [id number]    Religious Marital Status          None Single       Admission Date Admission Type Admitting Provider Attending Provider Department, Room/Bed    3/7/17 Emergency Irineo Oakley MD  [facility] CRITICAL CARE, 20/A    Discharge Date Discharge Disposition Discharge Destination        3/10/2017 Home or Self Care Home            Attending Provider: (none)    Allergies:  No Known Allergies    Isolation:  Airborne   Infection:  None   Code Status:  Prior    Ht:  69\" (175.3 cm)   Wt:  138 lb 0.1 oz (62.6 kg)    Admission Cmt:  None   Principal Problem:  Iron deficiency anemia [D50.9] More...                 Active Insurance as of 3/7/2017     Primary Coverage     Payor Plan Insurance Group Employer/Plan Group    [facility] [id number]     Payor Plan Address Payor Plan Phone Number Effective From Effective To    [address] [address] [phone] 1/1/2017     [place] [address]       Subscriber Name Subscriber Birth Date Member ID       TINO PERES 1960 [id number]                 Emergency Contacts      (Rel.) Home Phone Work Phone Mobile Phone    Jessie Peres (Son) [phone] -- --              Physician Progress Notes (last 24 hours) (Notes from 3/12/2017  1:07 PM through 3/13/2017  1:07 PM)     No notes of this type exist for this encounter.        Consult Notes (last 24 hours) (Notes from 3/12/2017  1:07 PM through 3/13/2017  1:07 PM)     No notes of this type exist for this encounter.          "

## 2017-03-16 LAB
GALACTOMANNAN AG SPEC IA-ACNC: 0.05 INDEX (ref 0–0.49)
GALACTOMANNAN AG SPEC IA-ACNC: 0.08 INDEX (ref 0–0.49)

## 2017-03-17 DIAGNOSIS — R91.8 LUNG MASS: Primary | ICD-10-CM

## 2017-03-20 DIAGNOSIS — B47.9 MYCETOMA: Primary | ICD-10-CM

## 2017-03-20 LAB
A FLAVUS AB SER QL ID: NEGATIVE
A FUMIGATUS AB SER QL ID: ABNORMAL
A NIGER AB SER QL ID: NEGATIVE

## 2017-03-20 RX ORDER — SODIUM CHLORIDE 9 MG/ML
100 INJECTION, SOLUTION INTRAVENOUS CONTINUOUS
Status: CANCELLED | OUTPATIENT
Start: 2017-04-19

## 2017-03-20 RX ORDER — SODIUM CHLORIDE 0.9 % (FLUSH) 0.9 %
1-10 SYRINGE (ML) INJECTION AS NEEDED
Status: CANCELLED | OUTPATIENT
Start: 2017-04-19

## 2017-04-05 LAB — FUNGUS WND CULT: NORMAL

## 2017-04-06 ENCOUNTER — APPOINTMENT (OUTPATIENT)
Dept: PREADMISSION TESTING | Facility: HOSPITAL | Age: 57
End: 2017-04-06

## 2017-04-06 VITALS
OXYGEN SATURATION: 98 % | HEIGHT: 68 IN | WEIGHT: 136 LBS | SYSTOLIC BLOOD PRESSURE: 150 MMHG | BODY MASS INDEX: 20.61 KG/M2 | HEART RATE: 88 BPM | DIASTOLIC BLOOD PRESSURE: 72 MMHG | RESPIRATION RATE: 18 BRPM

## 2017-04-06 DIAGNOSIS — B47.9 MYCETOMA: ICD-10-CM

## 2017-04-06 LAB
ABO GROUP BLD: NORMAL
ANISOCYTOSIS BLD QL: NORMAL
BLD GP AB SCN SERPL QL: NEGATIVE
DEPRECATED RDW RBC AUTO: 60.9 FL (ref 35.1–43.9)
ERYTHROCYTE [DISTWIDTH] IN BLOOD BY AUTOMATED COUNT: 22.4 % (ref 11.5–14.5)
HCT VFR BLD AUTO: 27.4 % (ref 39–49)
HGB BLD-MCNC: 8.4 G/DL (ref 13.7–17.3)
HYPOCHROMIA BLD QL: NORMAL
Lab: NORMAL
MCH RBC QN AUTO: 23.1 PG (ref 26.5–34)
MCHC RBC AUTO-ENTMCNC: 30.7 G/DL (ref 31.5–36.3)
MCV RBC AUTO: 75.3 FL (ref 80–98)
PLATELET # BLD AUTO: 550 10*3/MM3 (ref 150–450)
PMV BLD AUTO: 8.1 FL (ref 8–12)
RBC # BLD AUTO: 3.64 10*6/MM3 (ref 4.37–5.74)
RH BLD: POSITIVE
SMALL PLATELETS BLD QL SMEAR: NORMAL
WBC MORPH BLD: NORMAL
WBC NRBC COR # BLD: 11.34 10*3/MM3 (ref 3.2–9.8)

## 2017-04-06 PROCEDURE — 86900 BLOOD TYPING SEROLOGIC ABO: CPT | Performed by: THORACIC SURGERY (CARDIOTHORACIC VASCULAR SURGERY)

## 2017-04-06 PROCEDURE — 36415 COLL VENOUS BLD VENIPUNCTURE: CPT

## 2017-04-06 PROCEDURE — 86901 BLOOD TYPING SEROLOGIC RH(D): CPT | Performed by: THORACIC SURGERY (CARDIOTHORACIC VASCULAR SURGERY)

## 2017-04-06 PROCEDURE — 85007 BL SMEAR W/DIFF WBC COUNT: CPT | Performed by: ANESTHESIOLOGY

## 2017-04-06 PROCEDURE — 86850 RBC ANTIBODY SCREEN: CPT | Performed by: THORACIC SURGERY (CARDIOTHORACIC VASCULAR SURGERY)

## 2017-04-06 PROCEDURE — 85027 COMPLETE CBC AUTOMATED: CPT | Performed by: ANESTHESIOLOGY

## 2017-04-06 RX ORDER — IBUPROFEN 200 MG
200 TABLET ORAL EVERY 6 HOURS PRN
Status: ON HOLD | COMMUNITY
End: 2017-04-26

## 2017-04-06 NOTE — DISCHARGE INSTRUCTIONS
[facility]  Pre-op Information and Guidelines    You will be called after 2 p.m. the day before your surgery (Friday for Monday surgery) and notified of your time for arrival and approximate surgery time.  If you have not received a call by 4P.M., please contact Same Day Surgery at [phone] of if outside [facility] call [phone].    Please Follow these Important Safety Guidelines:    • The morning of your procedure, take only the medications listed below with   A sip of water:_____________________________________________       ______________________________________________    • DO NOT eat or drink anything after 12:00 midnight the night before surgery  Specific instructions concerning drinking clear liquids will be discussed during  the pre-surgery instruction call the day before your surgery.    • If you take a blood thinner (ex. Plavix, Coumadin, aspirin), ask your doctor when to stop it before surgery  STOP DATE: _________________    • Only 2 visitors are allowed in patient rooms at a time  Your visitors will be asked to wait in the lobby until the admission process is complete with the exception of a parent with a child and patients in need of special assistance.    • YOU CANNOT DRIVE YOURSELF HOME  You must be accompanied by someone who will be responsible for driving you home after surgery and for your care at home.    • DO NOT chew gum, use breath mints, hard candy, or smoke the day of surgery  • DO NOT drink alcohol for at least 24 hours before your surgery  • DO NOT wear any jewelry and remove all body piercing before coming to the hospital  • DO NOT wear make-up to the hospital  • If you are having surgery on an extremity (arm/leg/foot) remove nail polish/artificial nails on the surgical side  • Clothing, glasses, contacts, dentures, and hairpieces must be removed before surgery  • Bathe the night before or the morning of your surgery and do not use powders/lotions on  skin.

## 2017-04-18 ENCOUNTER — ANESTHESIA EVENT (OUTPATIENT)
Dept: PERIOP | Facility: HOSPITAL | Age: 57
End: 2017-04-18

## 2017-04-19 ENCOUNTER — HOSPITAL ENCOUNTER (INPATIENT)
Facility: HOSPITAL | Age: 57
LOS: 7 days | Discharge: HOME | End: 2017-04-26
Attending: THORACIC SURGERY (CARDIOTHORACIC VASCULAR SURGERY) | Admitting: THORACIC SURGERY (CARDIOTHORACIC VASCULAR SURGERY)

## 2017-04-19 ENCOUNTER — APPOINTMENT (OUTPATIENT)
Dept: GENERAL RADIOLOGY | Facility: HOSPITAL | Age: 57
End: 2017-04-19

## 2017-04-19 ENCOUNTER — ANESTHESIA (OUTPATIENT)
Dept: PERIOP | Facility: HOSPITAL | Age: 57
End: 2017-04-19

## 2017-04-19 DIAGNOSIS — R26.89 IMPAIRED GAIT AND MOBILITY: ICD-10-CM

## 2017-04-19 DIAGNOSIS — J98.4 CAVITARY LESION OF LUNG: Primary | ICD-10-CM

## 2017-04-19 DIAGNOSIS — B47.9 MYCETOMA: ICD-10-CM

## 2017-04-19 DIAGNOSIS — Z74.09 IMPAIRED PHYSICAL MOBILITY: ICD-10-CM

## 2017-04-19 DIAGNOSIS — R91.8 LUNG MASS: ICD-10-CM

## 2017-04-19 LAB
ABO GROUP BLD: NORMAL
ARTERIAL PATENCY WRIST A: ABNORMAL
ATMOSPHERIC PRESS: ABNORMAL MMHG
BASE EXCESS BLDA CALC-SCNC: -4 MMOL/L (ref -2.4–2.4)
BDY SITE: ABNORMAL
BLD GP AB SCN SERPL QL: NEGATIVE
CA-I BLD-MCNC: 5 MG/DL (ref 4.5–4.9)
CO2 BLDA-SCNC: 23.3 MMOL/L (ref 23–27)
GLUCOSE BLDA-MCNC: 176 MMOL/L
HCO3 BLDA-SCNC: 22 MMOL/L (ref 22–26)
HCT VFR BLD CALC: 35 % (ref 40–54)
HGB BLDA-MCNC: 12 G/DL (ref 14–18)
KOH PREP NAIL: NORMAL
Lab: NORMAL
MODALITY: ABNORMAL
MYCOBACTERIUM SPEC CULT: NORMAL
MYCOBACTERIUM SPEC CULT: NORMAL
NIGHT BLUE STAIN TISS: NORMAL
NIGHT BLUE STAIN TISS: NORMAL
PCO2 BLDA: 43.5 MM HG (ref 35–45)
PH BLDA: 7.32 PH UNITS (ref 7.35–7.45)
PO2 BLDA: 104.1 MM HG (ref 80–105)
POTASSIUM BLDA-SCNC: 4.75 MMOL/L (ref 3.6–4.9)
RH BLD: POSITIVE
SAO2 % BLDCOA: 97.7 %
SODIUM BLDA-SCNC: 137.7 MMOL/L (ref 138–146)

## 2017-04-19 PROCEDURE — 71010 HC CHEST PA OR AP: CPT

## 2017-04-19 PROCEDURE — 88305 TISSUE EXAM BY PATHOLOGIST: CPT | Performed by: PATHOLOGY

## 2017-04-19 PROCEDURE — 99223 1ST HOSP IP/OBS HIGH 75: CPT | Performed by: THORACIC SURGERY (CARDIOTHORACIC VASCULAR SURGERY)

## 2017-04-19 PROCEDURE — 88305 TISSUE EXAM BY PATHOLOGIST: CPT | Performed by: THORACIC SURGERY (CARDIOTHORACIC VASCULAR SURGERY)

## 2017-04-19 PROCEDURE — 25010000002 PROPOFOL 10 MG/ML EMULSION: Performed by: NURSE ANESTHETIST, CERTIFIED REGISTERED

## 2017-04-19 PROCEDURE — 87102 FUNGUS ISOLATION CULTURE: CPT | Performed by: THORACIC SURGERY (CARDIOTHORACIC VASCULAR SURGERY)

## 2017-04-19 PROCEDURE — C1729 CATH, DRAINAGE: HCPCS | Performed by: THORACIC SURGERY (CARDIOTHORACIC VASCULAR SURGERY)

## 2017-04-19 PROCEDURE — 25010000002 ONDANSETRON PER 1 MG: Performed by: NURSE ANESTHETIST, CERTIFIED REGISTERED

## 2017-04-19 PROCEDURE — C1763 CONN TISS, NON-HUMAN: HCPCS | Performed by: THORACIC SURGERY (CARDIOTHORACIC VASCULAR SURGERY)

## 2017-04-19 PROCEDURE — 88309 TISSUE EXAM BY PATHOLOGIST: CPT | Performed by: PATHOLOGY

## 2017-04-19 PROCEDURE — 25010000002 FENTANYL CITRATE (PF) 100 MCG/2ML SOLUTION: Performed by: NURSE ANESTHETIST, CERTIFIED REGISTERED

## 2017-04-19 PROCEDURE — 86901 BLOOD TYPING SEROLOGIC RH(D): CPT | Performed by: THORACIC SURGERY (CARDIOTHORACIC VASCULAR SURGERY)

## 2017-04-19 PROCEDURE — 25010000003 CEFAZOLIN PER 500 MG: Performed by: THORACIC SURGERY (CARDIOTHORACIC VASCULAR SURGERY)

## 2017-04-19 PROCEDURE — 87070 CULTURE OTHR SPECIMN AEROBIC: CPT | Performed by: THORACIC SURGERY (CARDIOTHORACIC VASCULAR SURGERY)

## 2017-04-19 PROCEDURE — 88309 TISSUE EXAM BY PATHOLOGIST: CPT | Performed by: THORACIC SURGERY (CARDIOTHORACIC VASCULAR SURGERY)

## 2017-04-19 PROCEDURE — 0BTG0ZZ RESECTION OF LEFT UPPER LUNG LOBE, OPEN APPROACH: ICD-10-PCS | Performed by: THORACIC SURGERY (CARDIOTHORACIC VASCULAR SURGERY)

## 2017-04-19 PROCEDURE — 88331 PATH CONSLTJ SURG 1 BLK 1SPC: CPT | Performed by: PATHOLOGY

## 2017-04-19 PROCEDURE — 88312 SPECIAL STAINS GROUP 1: CPT | Performed by: THORACIC SURGERY (CARDIOTHORACIC VASCULAR SURGERY)

## 2017-04-19 PROCEDURE — 87205 SMEAR GRAM STAIN: CPT | Performed by: THORACIC SURGERY (CARDIOTHORACIC VASCULAR SURGERY)

## 2017-04-19 PROCEDURE — 25010000002 HYDROMORPHONE PER 4 MG: Performed by: NURSE ANESTHETIST, CERTIFIED REGISTERED

## 2017-04-19 PROCEDURE — 25010000002 MIDAZOLAM PER 1 MG: Performed by: NURSE ANESTHETIST, CERTIFIED REGISTERED

## 2017-04-19 PROCEDURE — 25010000002 DEXAMETHASONE PER 1 MG: Performed by: NURSE ANESTHETIST, CERTIFIED REGISTERED

## 2017-04-19 PROCEDURE — 32480 PARTIAL REMOVAL OF LUNG: CPT | Performed by: THORACIC SURGERY (CARDIOTHORACIC VASCULAR SURGERY)

## 2017-04-19 PROCEDURE — 87107 FUNGI IDENTIFICATION MOLD: CPT | Performed by: THORACIC SURGERY (CARDIOTHORACIC VASCULAR SURGERY)

## 2017-04-19 PROCEDURE — 25010000003 MORPHINE PER 10 MG: Performed by: THORACIC SURGERY (CARDIOTHORACIC VASCULAR SURGERY)

## 2017-04-19 PROCEDURE — 86850 RBC ANTIBODY SCREEN: CPT | Performed by: THORACIC SURGERY (CARDIOTHORACIC VASCULAR SURGERY)

## 2017-04-19 PROCEDURE — 87220 TISSUE EXAM FOR FUNGI: CPT | Performed by: THORACIC SURGERY (CARDIOTHORACIC VASCULAR SURGERY)

## 2017-04-19 PROCEDURE — 87176 TISSUE HOMOGENIZATION CULTR: CPT | Performed by: THORACIC SURGERY (CARDIOTHORACIC VASCULAR SURGERY)

## 2017-04-19 PROCEDURE — 86923 COMPATIBILITY TEST ELECTRIC: CPT

## 2017-04-19 PROCEDURE — 25010000002 PHENYLEPHRINE PER 1 ML: Performed by: NURSE ANESTHETIST, CERTIFIED REGISTERED

## 2017-04-19 PROCEDURE — 87206 SMEAR FLUORESCENT/ACID STAI: CPT | Performed by: THORACIC SURGERY (CARDIOTHORACIC VASCULAR SURGERY)

## 2017-04-19 PROCEDURE — 36430 TRANSFUSION BLD/BLD COMPNT: CPT

## 2017-04-19 PROCEDURE — 86900 BLOOD TYPING SEROLOGIC ABO: CPT

## 2017-04-19 PROCEDURE — 88312 SPECIAL STAINS GROUP 1: CPT | Performed by: PATHOLOGY

## 2017-04-19 PROCEDURE — 38746 REMOVE THORACIC LYMPH NODES: CPT | Performed by: THORACIC SURGERY (CARDIOTHORACIC VASCULAR SURGERY)

## 2017-04-19 PROCEDURE — 25010000002 MORPHINE PER 10 MG: Performed by: THORACIC SURGERY (CARDIOTHORACIC VASCULAR SURGERY)

## 2017-04-19 PROCEDURE — 87116 MYCOBACTERIA CULTURE: CPT | Performed by: THORACIC SURGERY (CARDIOTHORACIC VASCULAR SURGERY)

## 2017-04-19 PROCEDURE — 07T70ZZ RESECTION OF THORAX LYMPHATIC, OPEN APPROACH: ICD-10-PCS | Performed by: THORACIC SURGERY (CARDIOTHORACIC VASCULAR SURGERY)

## 2017-04-19 PROCEDURE — 86900 BLOOD TYPING SEROLOGIC ABO: CPT | Performed by: THORACIC SURGERY (CARDIOTHORACIC VASCULAR SURGERY)

## 2017-04-19 PROCEDURE — 88331 PATH CONSLTJ SURG 1 BLK 1SPC: CPT | Performed by: THORACIC SURGERY (CARDIOTHORACIC VASCULAR SURGERY)

## 2017-04-19 PROCEDURE — 82803 BLOOD GASES ANY COMBINATION: CPT | Performed by: NURSE ANESTHETIST, CERTIFIED REGISTERED

## 2017-04-19 PROCEDURE — P9021 RED BLOOD CELLS UNIT: HCPCS

## 2017-04-19 DEVICE — PERI-STRIPS DRY WITH VERITAS COLLAGEN MATRIX (PSD-V) IS PREPARED FROM DEHYDRATED BOVINE PERICARDIUM PROCURED FROM CATTLE UNDER 30 MONTHS OF AGE IN THE UNITED STATES. ONE (1) TUBE OF PSD GEL (GEL) IS PROVIDED FOR EVERY TWO (2) POUCHES OF PSD-V. THE GEL IS USED TO CREATE A TEMPORARY BOND BETWEEN THE PSD-V BUTTRESS AND THE SURGICAL STAPLER JAWS UNTIL THE STAPLER IS POSITIONED AND FIRED.
Type: IMPLANTABLE DEVICE | Status: FUNCTIONAL
Brand: PERI-STRIPS DRY WITH VERITAS COLLAGEN MATRIX

## 2017-04-19 RX ORDER — ROCURONIUM BROMIDE 10 MG/ML
INJECTION, SOLUTION INTRAVENOUS AS NEEDED
Status: DISCONTINUED | OUTPATIENT
Start: 2017-04-19 | End: 2017-04-19 | Stop reason: SURG

## 2017-04-19 RX ORDER — SODIUM CHLORIDE 0.9 % (FLUSH) 0.9 %
1-10 SYRINGE (ML) INJECTION AS NEEDED
Status: DISCONTINUED | OUTPATIENT
Start: 2017-04-19 | End: 2017-04-19 | Stop reason: HOSPADM

## 2017-04-19 RX ORDER — MIDAZOLAM HYDROCHLORIDE 1 MG/ML
INJECTION INTRAMUSCULAR; INTRAVENOUS AS NEEDED
Status: DISCONTINUED | OUTPATIENT
Start: 2017-04-19 | End: 2017-04-19 | Stop reason: SURG

## 2017-04-19 RX ORDER — SENNA AND DOCUSATE SODIUM 50; 8.6 MG/1; MG/1
2 TABLET, FILM COATED ORAL NIGHTLY
Status: DISCONTINUED | OUTPATIENT
Start: 2017-04-19 | End: 2017-04-25

## 2017-04-19 RX ORDER — BISACODYL 10 MG
10 SUPPOSITORY, RECTAL RECTAL DAILY PRN
Status: DISCONTINUED | OUTPATIENT
Start: 2017-04-19 | End: 2017-04-26 | Stop reason: HOSPADM

## 2017-04-19 RX ORDER — LIDOCAINE HYDROCHLORIDE 20 MG/ML
INJECTION, SOLUTION INFILTRATION; PERINEURAL AS NEEDED
Status: DISCONTINUED | OUTPATIENT
Start: 2017-04-19 | End: 2017-04-19 | Stop reason: SURG

## 2017-04-19 RX ORDER — NITROGLYCERIN 0.4 MG/1
0.4 TABLET SUBLINGUAL
Status: DISCONTINUED | OUTPATIENT
Start: 2017-04-19 | End: 2017-04-26 | Stop reason: HOSPADM

## 2017-04-19 RX ORDER — BACITRACIN 50000 [IU]/1
INJECTION, POWDER, FOR SOLUTION INTRAMUSCULAR AS NEEDED
Status: DISCONTINUED | OUTPATIENT
Start: 2017-04-19 | End: 2017-04-26 | Stop reason: HOSPADM

## 2017-04-19 RX ORDER — NITROGLYCERIN 20 MG/100ML
5-200 INJECTION INTRAVENOUS
Status: DISCONTINUED | OUTPATIENT
Start: 2017-04-19 | End: 2017-04-20

## 2017-04-19 RX ORDER — LABETALOL HYDROCHLORIDE 5 MG/ML
5 INJECTION, SOLUTION INTRAVENOUS
Status: DISCONTINUED | OUTPATIENT
Start: 2017-04-19 | End: 2017-04-19 | Stop reason: HOSPADM

## 2017-04-19 RX ORDER — BUPIVACAINE HYDROCHLORIDE 5 MG/ML
INJECTION, SOLUTION EPIDURAL; INTRACAUDAL AS NEEDED
Status: DISCONTINUED | OUTPATIENT
Start: 2017-04-19 | End: 2017-04-26 | Stop reason: HOSPADM

## 2017-04-19 RX ORDER — ONDANSETRON 4 MG/1
4 TABLET, FILM COATED ORAL EVERY 6 HOURS PRN
Status: DISCONTINUED | OUTPATIENT
Start: 2017-04-19 | End: 2017-04-26 | Stop reason: HOSPADM

## 2017-04-19 RX ORDER — ACETAMINOPHEN 325 MG/1
650 TABLET ORAL EVERY 4 HOURS PRN
Status: DISCONTINUED | OUTPATIENT
Start: 2017-04-19 | End: 2017-04-26 | Stop reason: HOSPADM

## 2017-04-19 RX ORDER — BACTERIOSTATIC SODIUM CHLORIDE 0.9 %
VIAL (ML) INJECTION AS NEEDED
Status: DISCONTINUED | OUTPATIENT
Start: 2017-04-19 | End: 2017-04-26 | Stop reason: HOSPADM

## 2017-04-19 RX ORDER — FERROUS SULFATE TAB EC 324 MG (65 MG FE EQUIVALENT) 324 (65 FE) MG
324 TABLET DELAYED RESPONSE ORAL
Status: DISCONTINUED | OUTPATIENT
Start: 2017-04-20 | End: 2017-04-26 | Stop reason: HOSPADM

## 2017-04-19 RX ORDER — DEXAMETHASONE SODIUM PHOSPHATE 4 MG/ML
INJECTION, SOLUTION INTRA-ARTICULAR; INTRALESIONAL; INTRAMUSCULAR; INTRAVENOUS; SOFT TISSUE AS NEEDED
Status: DISCONTINUED | OUTPATIENT
Start: 2017-04-19 | End: 2017-04-19 | Stop reason: SURG

## 2017-04-19 RX ORDER — ONDANSETRON 2 MG/ML
4 INJECTION INTRAMUSCULAR; INTRAVENOUS ONCE AS NEEDED
Status: DISCONTINUED | OUTPATIENT
Start: 2017-04-19 | End: 2017-04-19 | Stop reason: HOSPADM

## 2017-04-19 RX ORDER — ONDANSETRON 4 MG/1
4 TABLET, ORALLY DISINTEGRATING ORAL EVERY 6 HOURS PRN
Status: DISCONTINUED | OUTPATIENT
Start: 2017-04-19 | End: 2017-04-26 | Stop reason: HOSPADM

## 2017-04-19 RX ORDER — FENTANYL CITRATE 50 UG/ML
INJECTION, SOLUTION INTRAMUSCULAR; INTRAVENOUS AS NEEDED
Status: DISCONTINUED | OUTPATIENT
Start: 2017-04-19 | End: 2017-04-19 | Stop reason: SURG

## 2017-04-19 RX ORDER — FLUMAZENIL 0.1 MG/ML
0.2 INJECTION INTRAVENOUS AS NEEDED
Status: DISCONTINUED | OUTPATIENT
Start: 2017-04-19 | End: 2017-04-19 | Stop reason: HOSPADM

## 2017-04-19 RX ORDER — DIPHENHYDRAMINE HYDROCHLORIDE 50 MG/ML
25 INJECTION INTRAMUSCULAR; INTRAVENOUS EVERY 6 HOURS PRN
Status: DISCONTINUED | OUTPATIENT
Start: 2017-04-19 | End: 2017-04-26 | Stop reason: HOSPADM

## 2017-04-19 RX ORDER — CHLORHEXIDINE GLUCONATE 0.12 MG/ML
15 RINSE ORAL EVERY 12 HOURS
Status: DISCONTINUED | OUTPATIENT
Start: 2017-04-19 | End: 2017-04-19

## 2017-04-19 RX ORDER — ALBUTEROL SULFATE 2.5 MG/3ML
2.5 SOLUTION RESPIRATORY (INHALATION)
Status: DISPENSED | OUTPATIENT
Start: 2017-04-19 | End: 2017-04-21

## 2017-04-19 RX ORDER — HYDROMORPHONE HCL 110MG/55ML
PATIENT CONTROLLED ANALGESIA SYRINGE INTRAVENOUS AS NEEDED
Status: DISCONTINUED | OUTPATIENT
Start: 2017-04-19 | End: 2017-04-19 | Stop reason: SURG

## 2017-04-19 RX ORDER — SODIUM CHLORIDE 0.9 % (FLUSH) 0.9 %
1-10 SYRINGE (ML) INJECTION AS NEEDED
Status: DISCONTINUED | OUTPATIENT
Start: 2017-04-19 | End: 2017-04-26 | Stop reason: HOSPADM

## 2017-04-19 RX ORDER — NALOXONE HCL 0.4 MG/ML
0.2 VIAL (ML) INJECTION AS NEEDED
Status: DISCONTINUED | OUTPATIENT
Start: 2017-04-19 | End: 2017-04-19 | Stop reason: HOSPADM

## 2017-04-19 RX ORDER — NALOXONE HCL 0.4 MG/ML
0.1 VIAL (ML) INJECTION
Status: DISCONTINUED | OUTPATIENT
Start: 2017-04-19 | End: 2017-04-26 | Stop reason: HOSPADM

## 2017-04-19 RX ORDER — DIPHENHYDRAMINE HYDROCHLORIDE 50 MG/ML
12.5 INJECTION INTRAMUSCULAR; INTRAVENOUS
Status: DISCONTINUED | OUTPATIENT
Start: 2017-04-19 | End: 2017-04-19 | Stop reason: HOSPADM

## 2017-04-19 RX ORDER — ONDANSETRON 2 MG/ML
INJECTION INTRAMUSCULAR; INTRAVENOUS AS NEEDED
Status: DISCONTINUED | OUTPATIENT
Start: 2017-04-19 | End: 2017-04-19 | Stop reason: SURG

## 2017-04-19 RX ORDER — SODIUM CHLORIDE 9 MG/ML
100 INJECTION, SOLUTION INTRAVENOUS CONTINUOUS
Status: DISCONTINUED | OUTPATIENT
Start: 2017-04-19 | End: 2017-04-19

## 2017-04-19 RX ORDER — ACETAMINOPHEN 325 MG/1
650 TABLET ORAL ONCE AS NEEDED
Status: DISCONTINUED | OUTPATIENT
Start: 2017-04-19 | End: 2017-04-19 | Stop reason: HOSPADM

## 2017-04-19 RX ORDER — SODIUM CHLORIDE, SODIUM GLUCONATE, SODIUM ACETATE, POTASSIUM CHLORIDE, AND MAGNESIUM CHLORIDE 526; 502; 368; 37; 30 MG/100ML; MG/100ML; MG/100ML; MG/100ML; MG/100ML
INJECTION, SOLUTION INTRAVENOUS CONTINUOUS PRN
Status: DISCONTINUED | OUTPATIENT
Start: 2017-04-19 | End: 2017-04-19 | Stop reason: SURG

## 2017-04-19 RX ORDER — ACETAMINOPHEN 650 MG/1
650 SUPPOSITORY RECTAL ONCE AS NEEDED
Status: DISCONTINUED | OUTPATIENT
Start: 2017-04-19 | End: 2017-04-19 | Stop reason: HOSPADM

## 2017-04-19 RX ORDER — ONDANSETRON 2 MG/ML
4 INJECTION INTRAMUSCULAR; INTRAVENOUS EVERY 6 HOURS PRN
Status: DISCONTINUED | OUTPATIENT
Start: 2017-04-19 | End: 2017-04-26 | Stop reason: HOSPADM

## 2017-04-19 RX ORDER — MORPHINE SULFATE 1 MG/ML
INJECTION INTRAVENOUS CONTINUOUS
Status: DISCONTINUED | OUTPATIENT
Start: 2017-04-19 | End: 2017-04-21

## 2017-04-19 RX ORDER — PROPOFOL 10 MG/ML
VIAL (ML) INTRAVENOUS AS NEEDED
Status: DISCONTINUED | OUTPATIENT
Start: 2017-04-19 | End: 2017-04-19 | Stop reason: SURG

## 2017-04-19 RX ORDER — SODIUM CHLORIDE 9 MG/ML
100 INJECTION, SOLUTION INTRAVENOUS CONTINUOUS
Status: DISCONTINUED | OUTPATIENT
Start: 2017-04-19 | End: 2017-04-25

## 2017-04-19 RX ADMIN — PHENYLEPHRINE HYDROCHLORIDE 50 MCG: 10 INJECTION INTRAVENOUS at 14:06

## 2017-04-19 RX ADMIN — LIDOCAINE HYDROCHLORIDE 50 MG: 20 INJECTION, SOLUTION INFILTRATION; PERINEURAL at 09:58

## 2017-04-19 RX ADMIN — MIDAZOLAM 2 MG: 1 INJECTION INTRAMUSCULAR; INTRAVENOUS at 09:52

## 2017-04-19 RX ADMIN — CEFAZOLIN SODIUM 2 G: 1 INJECTION, POWDER, FOR SOLUTION INTRAMUSCULAR; INTRAVENOUS at 18:40

## 2017-04-19 RX ADMIN — MORPHINE SULFATE: 1 INJECTION INTRAVENOUS at 16:57

## 2017-04-19 RX ADMIN — FENTANYL CITRATE 50 MCG: 50 INJECTION, SOLUTION INTRAMUSCULAR; INTRAVENOUS at 14:46

## 2017-04-19 RX ADMIN — ROCURONIUM BROMIDE 20 MG: 10 INJECTION INTRAVENOUS at 11:00

## 2017-04-19 RX ADMIN — ROCURONIUM BROMIDE 50 MG: 10 INJECTION INTRAVENOUS at 09:58

## 2017-04-19 RX ADMIN — FENTANYL CITRATE 50 MCG: 50 INJECTION, SOLUTION INTRAMUSCULAR; INTRAVENOUS at 15:00

## 2017-04-19 RX ADMIN — DEXAMETHASONE SODIUM PHOSPHATE 4 MG: 4 INJECTION, SOLUTION INTRAMUSCULAR; INTRAVENOUS at 14:45

## 2017-04-19 RX ADMIN — SODIUM CHLORIDE 100 ML/HR: 900 INJECTION, SOLUTION INTRAVENOUS at 18:44

## 2017-04-19 RX ADMIN — Medication 1 TABLET: at 18:40

## 2017-04-19 RX ADMIN — SENNOSIDES AND DOCUSATE SODIUM 2 TABLET: 8.6; 5 TABLET ORAL at 20:24

## 2017-04-19 RX ADMIN — HYDROMORPHONE HYDROCHLORIDE 0.5 MG: 1 INJECTION, SOLUTION INTRAMUSCULAR; INTRAVENOUS; SUBCUTANEOUS at 16:40

## 2017-04-19 RX ADMIN — FENTANYL CITRATE 50 MCG: 50 INJECTION, SOLUTION INTRAMUSCULAR; INTRAVENOUS at 13:59

## 2017-04-19 RX ADMIN — FENTANYL CITRATE 100 MCG: 50 INJECTION, SOLUTION INTRAMUSCULAR; INTRAVENOUS at 09:58

## 2017-04-19 RX ADMIN — MORPHINE SULFATE: 1 INJECTION INTRAVENOUS at 22:40

## 2017-04-19 RX ADMIN — ROCURONIUM BROMIDE 10 MG: 10 INJECTION INTRAVENOUS at 11:43

## 2017-04-19 RX ADMIN — SODIUM CHLORIDE 100 ML/HR: 9 INJECTION, SOLUTION INTRAVENOUS at 08:45

## 2017-04-19 RX ADMIN — FENTANYL CITRATE 50 MCG: 50 INJECTION, SOLUTION INTRAMUSCULAR; INTRAVENOUS at 14:02

## 2017-04-19 RX ADMIN — SODIUM CHLORIDE 100 ML/HR: 900 INJECTION, SOLUTION INTRAVENOUS at 23:08

## 2017-04-19 RX ADMIN — HYDROMORPHONE HYDROCHLORIDE 0.5 MG: 1 INJECTION, SOLUTION INTRAMUSCULAR; INTRAVENOUS; SUBCUTANEOUS at 17:30

## 2017-04-19 RX ADMIN — SODIUM CHLORIDE, SODIUM GLUCONATE, SODIUM ACETATE, POTASSIUM CHLORIDE, AND MAGNESIUM CHLORIDE: 526; 502; 368; 37; 30 INJECTION, SOLUTION INTRAVENOUS at 10:16

## 2017-04-19 RX ADMIN — FENTANYL CITRATE 100 MCG: 50 INJECTION, SOLUTION INTRAMUSCULAR; INTRAVENOUS at 12:10

## 2017-04-19 RX ADMIN — ROCURONIUM BROMIDE 20 MG: 10 INJECTION INTRAVENOUS at 10:26

## 2017-04-19 RX ADMIN — FENTANYL CITRATE 50 MCG: 50 INJECTION, SOLUTION INTRAMUSCULAR; INTRAVENOUS at 11:14

## 2017-04-19 RX ADMIN — HYDROMORPHONE HYDROCHLORIDE 0.5 MG: 1 INJECTION, SOLUTION INTRAMUSCULAR; INTRAVENOUS; SUBCUTANEOUS at 17:15

## 2017-04-19 RX ADMIN — PROPOFOL 40 MG: 10 INJECTION, EMULSION INTRAVENOUS at 14:05

## 2017-04-19 RX ADMIN — CEFAZOLIN SODIUM 2 G: 1 INJECTION, POWDER, FOR SOLUTION INTRAMUSCULAR; INTRAVENOUS at 10:32

## 2017-04-19 RX ADMIN — HYDROMORPHONE HYDROCHLORIDE 0.5 MG: 2 INJECTION, SOLUTION INTRAMUSCULAR; INTRAVENOUS; SUBCUTANEOUS at 16:02

## 2017-04-19 RX ADMIN — HYDROMORPHONE HYDROCHLORIDE 0.5 MG: 2 INJECTION, SOLUTION INTRAMUSCULAR; INTRAVENOUS; SUBCUTANEOUS at 16:05

## 2017-04-19 RX ADMIN — PROPOFOL 150 MG: 10 INJECTION, EMULSION INTRAVENOUS at 09:58

## 2017-04-19 RX ADMIN — FENTANYL CITRATE 50 MCG: 50 INJECTION, SOLUTION INTRAMUSCULAR; INTRAVENOUS at 11:00

## 2017-04-19 RX ADMIN — HYDROMORPHONE HYDROCHLORIDE 0.5 MG: 1 INJECTION, SOLUTION INTRAMUSCULAR; INTRAVENOUS; SUBCUTANEOUS at 16:20

## 2017-04-19 RX ADMIN — FENTANYL CITRATE 100 MCG: 50 INJECTION, SOLUTION INTRAMUSCULAR; INTRAVENOUS at 10:25

## 2017-04-19 RX ADMIN — ONDANSETRON 4 MG: 2 INJECTION INTRAMUSCULAR; INTRAVENOUS at 14:45

## 2017-04-19 NOTE — INTERVAL H&P NOTE
H&P reviewed. The patient was examined and there are no changes to the H&P.  Detailed discussion with Mr Ellis regarding situation, options and plans.  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  Pulmonary resection is advisable.      Risks:  Mortality/Major morbidity 5-8%  including but not limited to infection, bleeding, transfusion, pulmonary dysfunction (prolonged air leak, prolonged mechanical ventilation, need for long term oxygen therapy), renal dysfunction.  Benefits: diagnosis and treatment, improved quality of life, survival.  Options: observation, transthoracic needle biopsy discussed.  Understands and wishes to proceed.      LEFT  thoracotomy, pulmonary resection, bronchoscopy (endo), ON-Q pain pump.  GEN.  KILLIAN.  SDS.  4/19/2017.

## 2017-04-19 NOTE — INTERVAL H&P NOTE
H&P reviewed. The patient was examined and there are no changes to the H&P.  Detailed discussion with Mr Ellis regarding situation, options and plans.  Pulmonary resection is advisable.      Risks:  Mortality/Major morbidity 5-8%  including but not limited to infection, bleeding, transfusion, pulmonary dysfunction (prolonged air leak, prolonged mechanical ventilation, need for long term oxygen therapy), renal dysfunction.  Benefits: diagnosis and treatment, improved quality of life, survival.  Options: observation, medical therapy discussed.  Understands and wishes to proceed.      RIGHT thoracotomy, pulmonary resection, bronchoscopy, ON-Q pain pump.  GEN.  KILLIAN.  SDS.  4/19/2017.

## 2017-04-19 NOTE — ANESTHESIA PROCEDURE NOTES
Arterial Line    Patient location during procedure: OR   Performed By   Anesthesiologist: JAYMIE CESPEDES  Arterial Line Prep   Sterile Tech: cap, gloves and mask  Prep: DuraPrep  Patient monitoring: blood pressure monitoring, continuous pulse oximetry and EKG  Arterial Line Procedure   Laterality:right  Location:  radial artery  Catheter size: 20 G   Guidance: palpation technique  Number of attempts: 1  Successful placement: yes          Post Assessment   Dressing Type: occlusive dressing applied and wrist guard applied.   Complications no  Circ/Move/Sens Assessment: normal and unchanged.   Patient Tolerance: patient tolerated the procedure well with no apparent complications

## 2017-04-19 NOTE — ANESTHESIA POSTPROCEDURE EVALUATION
Patient: Tino Peres    Procedure Summary     Date Anesthesia Start Anesthesia Stop Room / Location    04/19/17 0954 1614  [facility] OR 05 / [facility] OR       Procedure Diagnosis Surgeon Provider    THORACOTOMY LEFT WITH PULMONARY RESECTION AND BRONCHOSCOPY WITH ENDO  (Left Chest) Lung mass  (Lung mass [R91.8]) MD Sarthak Patton CRNA          Anesthesia Type: general  Last vitals  BP      Temp      Pulse     Resp      SpO2        Post Anesthesia Care and Evaluation    Patient location during evaluation: PACU  Patient participation: complete - patient participated  Level of consciousness: awake and alert  Pain management: adequate  Airway patency: patent  Anesthetic complications: No anesthetic complications    Cardiovascular status: acceptable  Respiratory status: acceptable  Hydration status: acceptable

## 2017-04-19 NOTE — H&P
4/19/2017    Chief complaint: Persistent cough, productive. Cavitary Lung Lesion        SUBJECTIVE         History of Present Illness: Tino Peres is a 56 y.o. male with a PMH significant for tobacco and alcohol use who presents to Dr. rL as an urgent referral from Urgent Care for an abnormal CXR. Pt states he went to  today at the urging of his father (retired physician) and son, for several weeks of progressive dyspnea, fatigue, and wt loss. He reports he has been losing wt for the past 6 months (15lbs). He admits to decreased appetite for several years. Denies fevers, chills, night sweats, or illness at symptom onset. He admits to constant left sided chest pain which is worse with lying down and rolling over onto his left side. Pt admits to cough intermittently productive of clear sputum, and he reports hemoptysis 3wks ago. He did not seek medical attention at that time as he was hoping it would stop. He reports he had some fungal type infection in his lungs ~10yrs ago, but he does not know if it was formerly diagnosed. Pt smokes 1-1.5ppd since age 14yo. He drinks up to a 6pk of beer a day, but denies binging. Pt has worked in factories and as a perfusionist. He denies known exposures to asbestos or TB. There is no lung disease or cancer in the family. He was sent to the ED due to severe symptomatic anemia. He did have a CT chest which confirmed a sponge like mass within a SILVANA cavity with surrounding consolidation. Pt received 3 units pRBCs with improved Hgb to 7.3. He was placed on airborne precautions and started on empiric TB tx along with voriconazole and zosyn. Dr. Hernandez was consulted for evaluation of cavitary mass.     The following portions of the patient's history were reviewed and updated as appropriate: allergies, current medications, past family history, past medical history, past social history, past surgical history and problem list. Recent images independently reviewed. Available  "laboratory values reviewed.     Review of Systems   Constitutional: Positive for appetite change and fatigue. Negative for chills, diaphoresis and fever.   Respiratory: Positive for cough (productive) and shortness of breath.   Cardiovascular: Negative for palpitations and leg swelling.   Gastrointestinal: Negative for abdominal pain.   Neurological: Negative for dizziness.   All other systems reviewed and are negative.         Medical History         Past Medical History   Diagnosis Date   • Chronic bronchitis            Surgical History           Past Surgical History   Procedure Laterality Date   • Appendectomy           open   • Inguinal hernia repair       • Exploratory laparotomy   11/17/2014       Exploratory laparotomy, repair of duodenal ulcer, modified Carlos patch               Family History   Problem Relation Age of Onset   • Asthma Father     • Cancer Father     • Diabetes Father     • Hypertension Father     • Alcohol abuse Mother     • Heart failure Brother               Social History   Substance Use Topics   • Smoking status: Current Every Day Smoker       Packs/day: 1.00       Years: 40.00       Types: Cigarettes   • Smokeless tobacco: Former User   • Alcohol use 25.2 oz/week        42 Cans of beer per week        Prescriptions Prior to Admission    No prescriptions prior to admission.            famotidine 20 mg Oral BID   ferrous sulfate 324 mg Oral Daily With Breakfast   LORazepam 1 mg Oral Q8H   IV Fluids 1000 mL + additives 100 mL/hr Intravenous Daily   nicotine 1 patch Transdermal Q24H   sodium chloride         traZODone 50 mg Oral Nightly      Allergies:  Review of patient's allergies indicates no known allergies.     OBJECTIVE         Vital Signs  Temp: [98.5 °F (36.9 °C)-99 °F (37.2 °C)] 98.5 °F (36.9 °C)  Heart Rate: [] 83  Resp: [24-26] 24  BP: (109-132)/(57-79) 116/79     Flowsheet Rows           First Filed Value     Admission Height   68\" (172.7 cm) Documented at 03/07/2017 " "1438     Admission Weight   133 lb (60.3 kg) Documented at 03/07/2017 1438         69\" (175.3 cm)     Physical Exam   Constitutional: He is oriented to person, place, and time. He appears well-developed.   HENT:   Head: Normocephalic.   Eyes: Pupils are equal, round, and reactive to light.   Neck: Carotid bruit is not present.   Cardiovascular: Normal rate, regular rhythm, normal heart sounds and intact distal pulses.   Pulmonary/Chest: Effort normal. No respiratory distress. He has decreased breath sounds in the left middle field and the left lower field.   Abdominal: Soft. Bowel sounds are normal.   Genitourinary:   Genitourinary Comments: Voids clear/yellow   Musculoskeletal: Normal range of motion. He exhibits no edema.   Neurological: He is alert and oriented to person, place, and time.   Skin: Skin is warm and dry.   Psychiatric: He has a normal mood and affect.   Vitals reviewed.        Results Review:   Lab Results (last 24 hours)     Procedure Component Value Units Date/Time             Fungus Culture [[id number]] Collected: 03/08/17 1310     Specimen: Wash from Lung, Left Upper Lobe Updated: 03/08/17 1319     Non-gynecologic Cytology [[id number]] Collected: 03/08/17 1309     Specimen: Wash from Lung, Left Upper Lobe Updated: 03/08/17 1328     BALTA Prep [[id number]] (Normal) Collected: 03/08/17 1310     Specimen: Wash from Lung, Left Upper Lobe Updated: 03/08/17 1348       KOH Prep No yeast or hyphal elements seen       Extra Tubes [[id number]] Collected: 03/08/17 1020     Specimen: Blood from Blood, Venous Line Updated: 03/08/17 1501     Narrative:       The following orders were created for panel order Extra Tubes.  Procedure Abnormality Status   --------- ----------- ------   Lavender Top[[id number]] Final result   Gold Top - SST[[id number]] Final result   Green Top (Gel)[[id number]] Final result      Please view results for these tests on the individual orders.     Lavender Top [[id number]] Collected: 03/08/17 1020     " Specimen: Blood Updated: 03/08/17 1501       Extra Tube hold for add-on           Auto resulted         Gold Top - SST [[id number]] Collected: 03/08/17 1020     Specimen: Blood Updated: 03/08/17 1501       Extra Tube Hold for add-ons.           Auto resulted.         Green Top (Gel) [[id number]] Collected: 03/08/17 1020     Specimen: Blood Updated: 03/08/17 1501       Extra Tube Hold for add-ons.           Auto resulted.         Aspergillus Galactomannan Antigen [[id number]] Collected: 03/08/17 1613     Specimen: Blood from Bronchus Updated: 03/08/17 1614     Blood Culture [[id number]] (Normal) Collected: 03/07/17 1716     Specimen: Blood from Arm, Right Updated: 03/08/17 1801       Blood Culture No growth at 24 hours       Blood Culture [[id number]] (Normal) Collected: 03/07/17 1758     Specimen: Blood from Arm, Left Updated: 03/08/17 1901       Blood Culture No growth at 24 hours       AFB Culture [[id number]] Collected: 03/08/17 1800     Specimen: Sputum from Nasopharynx Updated: 03/08/17 1946     Urine Culture [[id number]] (Normal) Collected: 03/08/17 0300     Specimen: Urine from Urine, Clean Catch Updated: 03/09/17 0610       Urine Culture No growth at 24 hours       Respiratory Culture [[id number]] Collected: 03/08/17 1310     Specimen: Wash from Lung, Left Upper Lobe Updated: 03/09/17 0627       Respiratory Culture No growth at 24 hours         Gram Stain Result Moderate (3+) WBCs per low power field                       Rare (1+) Epithelial cells per low power field         No organisms seen       CBC Auto Differential [[id number]] (Abnormal) Collected: 03/09/17 0616     Specimen: Blood Updated: 03/09/17 0629       WBC 10.53 (H) 10*3/mm3         RBC 3.35 (L) 10*6/mm3         Hemoglobin 7.6 (L) g/dL         Hematocrit 24.1 (L) %         MCV 71.9 (L) fL         MCH 22.7 (L) pg         MCHC 31.5 g/dL         RDW 22.1 (H) %         RDW-SD 58.4 (H) fl         MPV 7.6 (L) fL         Platelets 590 (H) 10*3/mm3         Neutrophil %  65.7 %         Lymphocyte % 16.5 %         Monocyte % 11.9 %         Eosinophil % 4.7 %         Basophil % 0.7 %         Immature Grans % 0.5 %         Neutrophils, Absolute 6.93 10*3/mm3         Lymphocytes, Absolute 1.74 10*3/mm3         Monocytes, Absolute 1.25 (H) 10*3/mm3         Eosinophils, Absolute 0.49 10*3/mm3         Basophils, Absolute 0.07 10*3/mm3         Immature Grans, Absolute 0.05 (H) 10*3/mm3         nRBC 0.0 /100 WBC       Comprehensive Metabolic Panel [[id number]] (Abnormal) Collected: 03/09/17 0616     Specimen: Blood Updated: 03/09/17 0632       Glucose 120 (H) mg/dL         BUN 11 mg/dL         Creatinine 0.45 (L) mg/dL         Sodium 137 mmol/L         Potassium 3.8 mmol/L         Chloride 108 mmol/L         CO2 20.0 (L) mmol/L         Calcium 7.9 (L) mg/dL         Total Protein 5.9 (L) g/dL         Albumin 2.90 (L) g/dL         ALT (SGPT) 33 U/L         AST (SGOT) 13 (L) U/L         Alkaline Phosphatase 74 U/L         Total Bilirubin 0.2 mg/dL         eGFR Non African Amer 195 (H) mL/min/1.73         Globulin 3.0 gm/dL         A/G Ratio 1.0 (L) g/dL         BUN/Creatinine Ratio 24.4         Anion Gap 9.0 mmol/L       Respiratory Culture [[id number]] Collected: 03/08/17 1938     Specimen: Sputum from Cough Updated: 03/09/17 0640       Respiratory Culture Culture in progress         Gram Stain Result Few (2+) WBCs per low power field                       Rare (1+) Epithelial cells per low power field         No organisms seen       AFB Culture [[id number]] Collected: 03/08/17 1310     Specimen: Wash from Lung, Left Upper Lobe Updated: 03/09/17 0654       AFB Stain No acid fast bacilli seen       AFB Culture [[id number]] Collected: 03/09/17 0811     Specimen: Wash from Lung, Left Upper Lobe Updated: 03/09/17 0811     CBC & Differential [[id number]] Collected: 03/09/17 0616     Specimen: Blood Updated: 03/09/17 0826     Narrative:       The following orders were created for panel order CBC &  Differential.  Procedure Abnormality Status   --------- ----------- ------   Scan Slide[[id number]] Final result   CBC Auto Differential[[id number]] Abnormal Final result      Please view results for these tests on the individual orders.     Scan Slide [[id number]] Collected: 03/09/17 0616     Specimen: Blood Updated: 03/09/17 0826       Acanthocytes Slight/1+         Anisocytosis Large/3+         Dacrocytes Slight/1+         Hypochromia Slight/1+         Target Cells Slight/1+         WBC Morphology Normal         Platelet Estimate Increased       Respiratory Culture [[id number]] Collected: 03/09/17 0811     Specimen: Wash from Lung, Left Upper Lobe Updated: 03/09/17 0921       Gram Stain Result Moderate (3+) WBCs seen                       Rare (1+) Epithelial cells per low power field         No organisms seen       Blood Culture [[id number]] (Normal) Collected: 03/08/17 1056     Specimen: Blood from Arm, Right Updated: 03/09/17 1101       Blood Culture No growth at 24 hours                  CT Chest With Contrast [[id number]] Not Reviewed           Order Status: Completed Collected: 03/07/17 1609           Updated: 03/07/17 1638         Narrative:           [facility]    Patient Name: ELENA RICE    ORDERING: IRVIN GALVEZ    ATTENDING: IRVIN GALVEZ     REFERRING: IRVIN GLAVEZ    -----------------------    PROCEDURE: CT SCAN OF THE CHEST WITH INTRAVENOUS CONTRAST.    CLINICAL INFORMATION:  abnormal lung mass     Dose length product 152.6  This exam was performed using radiation doses that are as low as  reasonably achievable (ALARA).    COMPARISON: Chest x-ray March 7, 2017, CT abdomen November 7, 2014    CONTRAST: 75 cc intravenous Isovue 300    TECHNIQUE: Axial images were obtained and multiplanar  reconstructions were made.      FINDINGS:    LUNGS/PLEURA: There is complete consolidation/opacification of  the left upper lobe with air bronchograms present. In the apical  segment of the left  upper lobe, there is a 3.7 x 5 x 8 cm cavity  which contains a spongelike lobulated mass measuring 2.7 x 3.3 x  5 cm. There is a crescent of air surrounding the internal more  solid appearing mass. This is concerning for a cavity from  previous tuberculosis with an internal fungus ball.   The remaining portions of the lungs appear clear except for  centrilobular emphysema.  TRACHEA AND BRONCHI:  The trachea and bronchi are patent.  MEDIASTINUM, DOUGLAS AND LYMPH NODES: There are slightly enlarged  lymph nodes in the aorticopulmonary window region measuring 1.1  cm in short axis, and one in the right paratracheal region  measuring 1.4 cm in short axis.  HEART AND PERICARDIUM: The heart and pericardium are normal.  VASCULAR: Unremarkable  UPPER ABDOMEN: Unremarkable  OSSEOUS STRUCTURES: There is partial destruction of the second  through sixth ribs in the area of left upper lobe consolidation.  There is a healing transverse fracture of the lateral left sixth  rib.          Impression:           CONCLUSION:  1.  Complete consolidation of the left upper lobe, containing a  cavitary mass within internal fungus ball and adjacent rib  destruction.  2.  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  3.  Chest wall invasion and mycetomas can also be seen with  actinomycosis infection.  4.  Healing left lateral sixth rib fracture, likely pathologic.     Findings and recommendations  discussed with IRVIN GALVEZ on  3/7/2017 4:34 PM CST    Electronically signed by:  Deep Mcpherson MD  3/7/2017 4:36 PM CST  Workstation: [id number]                     ASSESSMENT/PLAN         Principal Problem:  Anemia  Active Problems:  Tobacco use disorder  Alcohol use disorder  Cavitary lesion of lung  CAP (community acquired pneumonia)  Hypokalemia        Assessment:    Condition: In stable  condition. (CT Chest demonstrates a 3.7 x 5 x 8 cm lobulated mass with ongoing symptoms of persistent cough, weight loss, anemia. Workup currently negative for TB organisms. ).      Plan:   (Dr. Hernandez to independently view the CT images and discuss plan for possible resection options. Patient will need further preoperative workup before scheduling.).         Thank you for the opportunity to participate in this patient's care.            I discussed the patients findings and my recommendations with Dr. Hernandez.                   This document has been electronically signed by CIPRIANO Peterson on March 9, 2017 11:45 AM

## 2017-04-19 NOTE — ANESTHESIA PROCEDURE NOTES
Airway  Airway not difficult    General Information and Staff    Patient location during procedure: OR    Indications and Patient Condition  Indications for airway management: airway protection    Preoxygenated: yes  Mask difficulty assessment: 1 - vent by mask    Final Airway Details  Final airway type: endotracheal airway      Successful airway: ETT - double lumen left  Cuffed: yes   Successful intubation technique: direct laryngoscopy  Blade: Jamir  Blade size: #4  ETT DL size: 37 fr  Cormack-Lehane Classification: grade I - full view of glottis  Placement verified by: chest auscultation, bronchoscopy, capnometry and single lung ventilation   Measured from: lips  Number of attempts at approach: 1

## 2017-04-19 NOTE — ANESTHESIA PROCEDURE NOTES
Peripheral IV    Patient location during procedure: OR  Line placed for Fluids/Medication Admin.  Performed By   CRNA: YOLA GRIFFIN  Peripheral IV Prep   Patient position: supine   Prep: DuraPrep  Patient monitoring: heart rate, cardiac monitor and continuous pulse ox  Peripheral IV Procedure   Laterality:left  Location:  Arm  Catheter size: 16 G         Post Assessment   Dressing Type: tape and transparent.    IV Dressing/Site: clean, dry and intact

## 2017-04-19 NOTE — ANESTHESIA PREPROCEDURE EVALUATION
Anesthesia Evaluation     Patient summary reviewed and Nursing notes reviewed   NPO Status: > 8 hours   Airway   Mallampati: II  TM distance: >3 FB  Neck ROM: full  possible difficult intubation  Dental    (+) poor dentition    Comment: He has very bad teeth.  Missing many above and most back teeth below. remaining teeth are severely carious and many rotting with severe periodontal disease.    Pulmonary    (+) pneumonia , a smoker Current, COPD ( h/o bronchitis) mild, decreased breath sounds ( somewhat decreased breath sounds in SILVANA area, other breath sounds= normal),     ROS comment: Has complete consolidation/cavitation of his SILVANA with negative TB cultures.  High suspicion this is aspergillosis (fungus) with mycetomas in chest wall. Has had weight loss + fatigue + severe anemia.  To have bronchoscopy and SILVANA lung resection today.  Cardiovascular - normal exam    (+) valvular problems/murmurs ( states he has very mild valvular abnormalities that do not give him any symptoms and he is able to easiily walk hundreds of yards),       Neuro/Psych- negative ROS  GI/Hepatic/Renal/Endo - negative ROS     Musculoskeletal (-) negative ROS    Abdominal    Substance History   (+) alcohol use ( heavy alcohol usasge),      OB/GYN negative ob/gyn ROS         Other   (+) blood dyscrasia ( very anemic. Has two units p-cells crossmatched for surgery  H/H= 8.4/27.4 on 4/6/17 with high platelet count of 550, 000)                               Anesthesia Plan    ASA 3     general   (We will plan to place an arterial line. There is some possibility that the patient will need to be on a post-op ventilator, which he is aware about.  We are hoping to extubate the patient at the end of the procedure if possible. He is ware and that he is anemic and may need to be transfused, which he accepts.)  intravenous induction   Anesthetic plan and risks discussed with patient, father and child.  Use of blood products discussed with patient, father  and child  Consented to blood products.

## 2017-04-19 NOTE — PLAN OF CARE
Problem: Patient Care Overview (Adult)  Goal: Plan of Care Review  Outcome: Ongoing (interventions implemented as appropriate)    04/19/17 1390   Coping/Psychosocial Response Interventions   Plan Of Care Reviewed With patient   Patient Care Overview   Progress improving   Outcome Evaluation   Outcome Summary/Follow up Plan pain more tolerable. vss. ct remains to -10cm suction. pca in use. ready for dc to icu       Goal: Adult Individualization and Mutuality  Outcome: Ongoing (interventions implemented as appropriate)    Problem: Perioperative Period (Adult)  Goal: Signs and Symptoms of Listed Potential Problems Will be Absent or Manageable (Perioperative Period)  Outcome: Ongoing (interventions implemented as appropriate)

## 2017-04-19 NOTE — BRIEF OP NOTE
OPERATIVE NOTE  Tino Peres  1960  4/19/2017    PREOP DIAGNOSES:  LEFT upper lobe cavitary mass with mycetoma  hemoptysis  Lung mass [R91.8]    POSTOP DIAGNOSES:  Lung mass [R91.8]  Advanced Squamous cell carcinoma lung.    PROCEDURE:   LEFT THORACOTOMY, UPPER LOBECTOMY  THORACIC, MEDIASTINAL LYMPHADENCTOMY  BRONCHOSCOPY    SURGEON: MONSTER Hernandez MD FACS [facility]    ASSISTANT: Timmy Galvan CFA    ANESTHESIA: General ET     ESTIMATED BLOOD LOSS: 500 ml     COMPLICATIONS: none    DESCRIPTION OF OPERATION: Paitent taken to operating room, placed in supine position.  General anesthesia induced.  Radial arterial line placed by anesthesia.  Prepped draped in sterile fashion.  1cm port site placed 8th interspace anterior axillary line.  Thoracoscope was introduced demonstrating normal-appearing lung diaphragm with moderate adhesions. thoracoscope was removed. LEFT lateral thoracotomy was performed thru the fifth intercostal space.  upper lobe was densely adherent to the apex and chest wall.  The lower lobe was dissected free inferior pulmonary ligament was divided. upper lobe was then carefully and tediously dissected, upper lobe was taken off the chest wall subpleurally using sharp and blunt dissection.  upper lobe was dissected off the aortic arch and left subclavian artery,  left internal mammary artery was involved in the inflammatory mass was ligated and divided.  The roof of the cavitary lesion was divided from the remainder of the lung upper lobe to be able to proceed with upper lobectomy.  apical pulmonary artery branches were ligated and divided with vascular load stapler.  branches to the upper lobe were ligated and divided with vascular load stapler.  pulmonary veins to the upper lobe were divided with vascular load stapler. the upper lobe bronchus was identified and stapled with a TA-30 stapler, divided with the 15 blade. major fissure was completed using multiple fires of Endo LUCIA black load  stapler with seamguard support.  2 large level 5 lymph nodes were identified and sent separately.  multiple internal mammary chain lymph nodes, level 10 lymph nodes were sent with the specimen, no level 2, 4 lymph nodes were identified.  The residual inflammatory mass at the apex continued to be dissected off the apex. Subclavian artery and brachial plexus were intact.  Hemostasis was obtained. 2 28French multi hole chest tubes were placed, posteriorly to the apex, one anteriorly to the apex.  chest was irrigated with 3L of warm sterile water.  Mid-shaft fractures of the fifth and sixth rib were repaired using #2 Vicryl suture. lower lobe inflated to fill 70%of the space. Incision closed layers of #2 Vicryl pericostal sutures around the fifth rib thru the 6th rib. serratus was reapproximated using 2-0 PDS suture, 2-0 PDS in the latissimus fascia, 2-0 PDS and subcutaneous tissue and 4-0 Monocryl skin with Dermabond tape. frozen section was obtained by pathology and call back as positive for squamous cell carcinoma.  patient has a residual tumor adherent to the chest wall and apex and will require postoperative adjuvant therapy.  Tolerated procedure well transferred to PACU stable condition.        This document has been electronically signed by Angel Hernandez MD on April 19, 2017 3:50 PM

## 2017-04-20 PROBLEM — J18.9 CAP (COMMUNITY ACQUIRED PNEUMONIA): Status: RESOLVED | Noted: 2017-03-07 | Resolved: 2017-04-20

## 2017-04-20 PROBLEM — R93.89 ABNORMAL CXR: Status: RESOLVED | Noted: 2017-03-07 | Resolved: 2017-04-20

## 2017-04-20 LAB
ABO + RH BLD: NORMAL
ABO + RH BLD: NORMAL
ANION GAP SERPL CALCULATED.3IONS-SCNC: 12 MMOL/L (ref 5–15)
BASOPHILS # BLD AUTO: 0.02 10*3/MM3 (ref 0–0.2)
BASOPHILS NFR BLD AUTO: 0.1 % (ref 0–2)
BH BB BLOOD EXPIRATION DATE: NORMAL
BH BB BLOOD EXPIRATION DATE: NORMAL
BH BB BLOOD TYPE BARCODE: 5100
BH BB BLOOD TYPE BARCODE: 5100
BH BB DISPENSE STATUS: NORMAL
BH BB DISPENSE STATUS: NORMAL
BH BB PRODUCT CODE: NORMAL
BH BB PRODUCT CODE: NORMAL
BH BB UNIT NUMBER: NORMAL
BH BB UNIT NUMBER: NORMAL
BUN BLD-MCNC: 23 MG/DL (ref 7–21)
BUN/CREAT SERPL: 34.8 (ref 7–25)
CALCIUM SPEC-SCNC: 8.3 MG/DL (ref 8.4–10.2)
CHLORIDE SERPL-SCNC: 105 MMOL/L (ref 95–110)
CO2 SERPL-SCNC: 21 MMOL/L (ref 22–31)
CREAT BLD-MCNC: 0.66 MG/DL (ref 0.7–1.3)
DEPRECATED RDW RBC AUTO: 57.3 FL (ref 35.1–43.9)
EOSINOPHIL # BLD AUTO: 0 10*3/MM3 (ref 0–0.7)
EOSINOPHIL NFR BLD AUTO: 0 % (ref 0–7)
ERYTHROCYTE [DISTWIDTH] IN BLOOD BY AUTOMATED COUNT: 20.3 % (ref 11.5–14.5)
GFR SERPL CREATININE-BSD FRML MDRD: 125 ML/MIN/1.73 (ref 56–130)
GLUCOSE BLD-MCNC: 144 MG/DL (ref 60–100)
GLUCOSE BLDC GLUCOMTR-MCNC: 65 MG/DL (ref 70–130)
HCT VFR BLD AUTO: 31.1 % (ref 39–49)
HGB BLD-MCNC: 10 G/DL (ref 13.7–17.3)
IMM GRANULOCYTES # BLD: 0.04 10*3/MM3 (ref 0–0.02)
IMM GRANULOCYTES NFR BLD: 0.3 % (ref 0–0.5)
LYMPHOCYTES # BLD AUTO: 1.25 10*3/MM3 (ref 0.6–4.2)
LYMPHOCYTES NFR BLD AUTO: 8.1 % (ref 10–50)
MCH RBC QN AUTO: 24.6 PG (ref 26.5–34)
MCHC RBC AUTO-ENTMCNC: 32.2 G/DL (ref 31.5–36.3)
MCV RBC AUTO: 76.6 FL (ref 80–98)
MONOCYTES # BLD AUTO: 1.59 10*3/MM3 (ref 0–0.9)
MONOCYTES NFR BLD AUTO: 10.3 % (ref 0–12)
MYCOBACTERIUM SPEC CULT: NORMAL
NEUTROPHILS # BLD AUTO: 12.52 10*3/MM3 (ref 2–8.6)
NEUTROPHILS NFR BLD AUTO: 81.2 % (ref 37–80)
NIGHT BLUE STAIN TISS: NORMAL
PLATELET # BLD AUTO: 447 10*3/MM3 (ref 150–450)
PMV BLD AUTO: 8.3 FL (ref 8–12)
POTASSIUM BLD-SCNC: 4.3 MMOL/L (ref 3.5–5.1)
RBC # BLD AUTO: 4.06 10*6/MM3 (ref 4.37–5.74)
SODIUM BLD-SCNC: 138 MMOL/L (ref 137–145)
UNIT  ABO: NORMAL
UNIT  ABO: NORMAL
UNIT  RH: NORMAL
UNIT  RH: NORMAL
WBC NRBC COR # BLD: 15.42 10*3/MM3 (ref 3.2–9.8)

## 2017-04-20 PROCEDURE — 82962 GLUCOSE BLOOD TEST: CPT

## 2017-04-20 PROCEDURE — 94760 N-INVAS EAR/PLS OXIMETRY 1: CPT

## 2017-04-20 PROCEDURE — 94799 UNLISTED PULMONARY SVC/PX: CPT

## 2017-04-20 PROCEDURE — 25010000003 MORPHINE PER 10 MG: Performed by: THORACIC SURGERY (CARDIOTHORACIC VASCULAR SURGERY)

## 2017-04-20 PROCEDURE — 85025 COMPLETE CBC W/AUTO DIFF WBC: CPT | Performed by: THORACIC SURGERY (CARDIOTHORACIC VASCULAR SURGERY)

## 2017-04-20 PROCEDURE — 25010000003 CEFAZOLIN PER 500 MG: Performed by: THORACIC SURGERY (CARDIOTHORACIC VASCULAR SURGERY)

## 2017-04-20 PROCEDURE — 80048 BASIC METABOLIC PNL TOTAL CA: CPT | Performed by: THORACIC SURGERY (CARDIOTHORACIC VASCULAR SURGERY)

## 2017-04-20 PROCEDURE — 99024 POSTOP FOLLOW-UP VISIT: CPT | Performed by: NURSE PRACTITIONER

## 2017-04-20 PROCEDURE — 94640 AIRWAY INHALATION TREATMENT: CPT

## 2017-04-20 RX ORDER — SENNA AND DOCUSATE SODIUM 50; 8.6 MG/1; MG/1
2 TABLET, FILM COATED ORAL NIGHTLY
Status: DISCONTINUED | OUTPATIENT
Start: 2017-04-20 | End: 2017-04-20 | Stop reason: SDUPTHER

## 2017-04-20 RX ORDER — ASCORBIC ACID 500 MG
500 TABLET ORAL 2 TIMES DAILY
Status: DISCONTINUED | OUTPATIENT
Start: 2017-04-20 | End: 2017-04-26 | Stop reason: HOSPADM

## 2017-04-20 RX ADMIN — FERROUS SULFATE TAB EC 324 MG (65 MG FE EQUIVALENT) 324 MG: 324 (65 FE) TABLET DELAYED RESPONSE at 08:34

## 2017-04-20 RX ADMIN — SODIUM CHLORIDE 100 ML/HR: 900 INJECTION, SOLUTION INTRAVENOUS at 19:36

## 2017-04-20 RX ADMIN — ALBUTEROL SULFATE 2.5 MG: 2.5 SOLUTION RESPIRATORY (INHALATION) at 15:58

## 2017-04-20 RX ADMIN — OXYCODONE HYDROCHLORIDE AND ACETAMINOPHEN 500 MG: 500 TABLET ORAL at 11:27

## 2017-04-20 RX ADMIN — SODIUM CHLORIDE 100 ML/HR: 900 INJECTION, SOLUTION INTRAVENOUS at 09:56

## 2017-04-20 RX ADMIN — CEFAZOLIN SODIUM 2 G: 1 INJECTION, POWDER, FOR SOLUTION INTRAMUSCULAR; INTRAVENOUS at 02:39

## 2017-04-20 RX ADMIN — MORPHINE SULFATE: 1 INJECTION INTRAVENOUS at 19:36

## 2017-04-20 RX ADMIN — Medication 1 TABLET: at 08:33

## 2017-04-20 RX ADMIN — OXYCODONE HYDROCHLORIDE AND ACETAMINOPHEN 500 MG: 500 TABLET ORAL at 17:43

## 2017-04-20 RX ADMIN — ALBUTEROL SULFATE 2.5 MG: 2.5 SOLUTION RESPIRATORY (INHALATION) at 08:51

## 2017-04-20 RX ADMIN — MORPHINE SULFATE: 1 INJECTION INTRAVENOUS at 11:18

## 2017-04-20 RX ADMIN — SENNOSIDES AND DOCUSATE SODIUM 2 TABLET: 8.6; 5 TABLET ORAL at 20:41

## 2017-04-20 RX ADMIN — MORPHINE SULFATE: 1 INJECTION INTRAVENOUS at 05:40

## 2017-04-20 NOTE — PROGRESS NOTES
"  Cardiothoracic - Vascular Surgery Daily Note        LOS: 1 day   POD# 1  LEFT THORACOTOMY, UPPER LOBECTOMY  THORACIC, MEDIASTINAL LYMPHADENCTOMY  BRONCHOSCOPY      Chief Complaint: Lung Mass. Pain controlled.       Subjective         Subjective:  Symptoms:  Stable.  He reports cough and weakness.  No shortness of breath or chest pain.    Diet:  Adequate intake.  No nausea or vomiting.    Activity level: Normal with assistance.    Pain:  He complains of pain that is moderate.  Pain is well controlled and requiring pain medication.          Objective     Vital Signs  Temp:  [98 °F (36.7 °C)-99.4 °F (37.4 °C)] 98.5 °F (36.9 °C)  Heart Rate:  [] 100  Resp:  [17-26] 18  BP: (109-135)/(60-80) 109/60  Arterial Line BP: (105-163)/() 163/110  Body mass index is 20.47 kg/(m^2).    Intake/Output Summary (Last 24 hours) at 04/20/17 0843  Last data filed at 04/20/17 0600   Gross per 24 hour   Intake             3208 ml   Output             1795 ml   Net             1413 ml        CT 300cc. serosanginous +air leak -10sx  Wt Readings from Last 3 Encounters:   04/20/17 134 lb 9.6 oz (61.1 kg)   04/06/17 136 lb (61.7 kg)   03/09/17 138 lb 0.1 oz (62.6 kg)         Physical Exam   Objective:  General Appearance:  Comfortable and in no acute distress.    Vital signs: (most recent): Blood pressure 109/60, pulse 100, temperature 98.5 °F (36.9 °C), temperature source Temporal Artery , resp. rate 18, height 68\" (172.7 cm), weight 134 lb 9.6 oz (61.1 kg), SpO2 94 %.  No fever.    Output: Producing urine and no stool output.    HEENT: Normal HEENT exam.    Lungs:  Normal respiratory rate and normal effort.  He is not in respiratory distress.  There are decreased breath sounds (left).  (LEFT CT -10sx. +Air Leak. Serosanginous)  Heart: Tachycardia.    Chest: Symmetric chest wall expansion.   Abdomen: Abdomen is soft and non-distended.  Hypoactive bowel sounds.     Extremities: Normal range of motion.  There is no dependent edema. "    Pulses: Distal pulses are intact.  There are no decreased pulses.    Neurological: Patient is alert and oriented to person, place and time.  Normal strength.    Pupils:  Pupils are equal, round, and reactive to light.    Skin:  Warm and dry.  (LEFT thoracotomy incision: CDI, Dry dressing removed. Mesh dressing intact. )          Incisions:  Clean and dry.    Results Review:      Lab Results   Component Value Date    WBC 15.42 (H) 04/20/2017    HGB 10.0 (L) 04/20/2017    HCT 31.1 (L) 04/20/2017    MCV 76.6 (L) 04/20/2017     04/20/2017     Lab Results   Component Value Date    GLUCOSE 144 (H) 04/20/2017    BUN 23 (H) 04/20/2017    CREATININE 0.66 (L) 04/20/2017    EGFRIFNONA 125 04/20/2017    BCR 34.8 (H) 04/20/2017    K 4.3 04/20/2017    CO2 21.0 (L) 04/20/2017    CALCIUM 8.3 (L) 04/20/2017    ALBUMIN 2.90 (L) 03/10/2017    LABIL2 0.9 (L) 03/10/2017    AST 12 (L) 03/10/2017    ALT 38 03/10/2017     Imaging Results (last 24 hours)     Procedure Component Value Units Date/Time    XR Chest 1 View [[id number]] Collected:  04/19/17 1638     Updated:  04/19/17 1643    Narrative:         EXAM:         Radiograph(s), Chest   VIEWS:   Portable ; 1       DATE/TIME: 4/19/2017 4:38 PM CDT               INDICATION:     Status post procedure, presumed resection of  mycetoma      COMPARISON:   CXR: 3/7/17    FINDINGS:           - lines/tubes:     Dual left-sided pleural chest tubes     - cardiac:          size within normal limits         - mediastinum:  contour within normal limits         - lungs:          Previously noted left upper lobe mass is not  visualized. Surgical clips are noted in the medial aspect of the  left apex.  The right lung is clear.           - pleura:          No clinically significant left-sided  pneumothorax.                  - osseous:          unremarkable for age                  - misc.:        Impression:       CONCLUSION:        1. Status post dual left chest tubes, distal tips directed to  the  apex, no clinically significant pneumothorax visualized.                 Results phoned to the office of the referring healthcare  provider, Dr. Angel Hernandez with results given to Chantel  immediately following interpretation the examination of 4/19/17  at 17:40 hours.     Electronically signed by:  NOREEN Meng MD  4/19/2017 4:42  PM CDT Workstation: [id number]                                albuterol 2.5 mg Nebulization Q8H - RT   ferrous sulfate 324 mg Oral Daily With Breakfast   multivitamin with minerals 1 tablet Oral Daily   sennosides-docusate sodium 2 tablet Oral Nightly       Morphine     nitroglycerin 5-200 mcg/min Last Rate: Stopped (04/19/17 2018)   sodium chloride 100 mL/hr Last Rate: 100 mL/hr (04/19/17 4152)       Patient Active Problem List   Diagnosis Code   • Tobacco use disorder F17.200   • Alcohol use disorder F10.99   • Iron deficiency anemia D50.9   • Cavitary lesion of lung J98.4   • 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   (Stable post op LEFT thoracotomy, upper lobectomy: Progressing well.     Lung mass /Advanced Squamous cell carcinoma lung: Residual tumor adhered to chest. Will require postoperative adjuvant therapy. Await final pathology.    Resp: Stable. On RA. Nebs. Pulmonary Toilet. Maintain CT -10sx    Pain: Controlled with PCA.      Rehab: OOB, PT    Acute on Chronic Anemia: Continue home Iron supplement    Alcohol Use disorder: Discussed DT's, Beer offered with meals. Declines at this time.   ).     Plan:   Transfer to floor.  Out of bed and up to chair.  Regular diet.  (Stable transfer 3W telemetry. Maintain CT to -10sx).             This document has been electronically signed by CIPRIANO Peterson on April 20, 2017 8:43 AM

## 2017-04-20 NOTE — CONSULTS
"Adult Nutrition  Assessment    Patient Name:  Tino Peres  YOB: 1960  MRN: [id number]  Admit Date:  4/19/2017    Assessment Date:  4/20/2017          Reason for Assessment       04/20/17 1106    Reason for Assessment    Reason For Assessment/Visit (P)  nurse/nurse practitioner consult;admission assessment;identified at risk by screening criteria    Identified At Risk By Screening Criteria (P)  MST SCORE 2+    Time Spent (min) (P)  5    Diagnosis (P)  Diagnosis    Oncology (P)  Lung cancer    Factors Affecting Nutrition (P)  Factors    Appetite (P)  Fair                    Labs/Tests/Procedures/Meds       04/20/17 1108    Labs/Tests/Procedures/Meds    Diagnostic Test/Procedure Review (P)  reviewed, pertinent    Labs/Tests Review (P)  Reviewed;Glucose;BUN;Creat    Medication Review (P)  Reviewed, pertinent    Significant Vitals (P)  reviewed, pertinent              Estimated/Assessed Needs       04/20/17 1109    Calculation Measurements    Weight Used For Calculations (P)  61.1 kg (134 lb 11.2 oz)    Height Used for Calculations (P)  1.727 m (5' 8\")    Estimated/Assessed Energy Needs    Energy Need Method (P)  Clarence-St Jeor    Age (P)  57    RMR (Clarence-St. Jeor Equation) (P)  1415.5    Activity Factors (Clarence St Jeor)  (P)  Out of bed, ambulatory  1.3    Total estimated needs (Clarence St. Jeor) (P)  1840 kcal/day    Estimated Kcal Range  (P)  8626-0269 kcal/day    Estimated/Assessed Protein Needs    Weight Used for Protein Calculation (P)  61.6 kg (135 lb 12.9 oz)    Protein (gm/kg) (P)  1.0    1.0 Gm Protein (gm) (P)  61.6    Estimated Protein Range (P)  49-62 g/day    Estimated/Assessed Fluid Needs    Fluid Need Method (P)  RDA method   5548-8895 cc/day            Nutrition Prescription Ordered       04/20/17 1111    Nutrition Prescription PO    Current PO Diet (P)  Regular              Comments:  RDN staff encouraged pt to consume the majority of his hospital meals for energy and protein " "r/t inc. Metabolic demands from new cancer dx. RN reports pt will receive radiation and chemo comb.  Pt stated they had a \"fine\" appetite. Pt reports unintentional wt loss of 12# within the last 6 mo. He says his UBW is 140-145#; states appetite has been low prior to hospital visit and that his job is also physically demanding. RDN staff advised pt on alternative menu options/snack options. Pt also stated he loves ensure and would not mind receiving it on all of his trays. RDN staff noted preferences and will order ensure enlive TID. RDN staff will continue to monitor pt and provide education as necessary.        Electronically signed by:  Cole Calero  04/20/17 11:14 AM  "

## 2017-04-20 NOTE — PLAN OF CARE
Problem: Patient Care Overview (Adult)  Goal: Plan of Care Review  Outcome: Ongoing (interventions implemented as appropriate)    04/20/17 0614   Coping/Psychosocial Response Interventions   Plan Of Care Reviewed With patient   Patient Care Overview   Progress improving   Outcome Evaluation   Outcome Summary/Follow up Plan Pain remains controlled. A-line D/C'd and patient is up in chair with minimal assistance. Continues to improve acitivity goals and postoperative recovery. Vital signs stable on room air       Goal: Adult Individualization and Mutuality  Outcome: Ongoing (interventions implemented as appropriate)  Goal: Discharge Needs Assessment  Outcome: Ongoing (interventions implemented as appropriate)    Problem: Perioperative Period (Adult)  Goal: Signs and Symptoms of Listed Potential Problems Will be Absent or Manageable (Perioperative Period)  Outcome: Ongoing (interventions implemented as appropriate)    Problem: Lung Surgery (via Thoracotomy) (Adult)  Goal: Signs and Symptoms of Listed Potential Problems Will be Absent or Manageable (Lung Surgery)  Outcome: Ongoing (interventions implemented as appropriate)    Problem: Pain, Acute (Adult)  Goal: Identify Related Risk Factors and Signs and Symptoms  Outcome: Ongoing (interventions implemented as appropriate)  Goal: Acceptable Pain Control/Comfort Level  Outcome: Ongoing (interventions implemented as appropriate)

## 2017-04-21 LAB
MYCOBACTERIUM SPEC CULT: NORMAL
NIGHT BLUE STAIN TISS: NORMAL

## 2017-04-21 PROCEDURE — 94760 N-INVAS EAR/PLS OXIMETRY 1: CPT

## 2017-04-21 PROCEDURE — 25010000003 MORPHINE PER 10 MG: Performed by: THORACIC SURGERY (CARDIOTHORACIC VASCULAR SURGERY)

## 2017-04-21 PROCEDURE — 94799 UNLISTED PULMONARY SVC/PX: CPT

## 2017-04-21 PROCEDURE — 25010000002 KETOROLAC TROMETHAMINE PER 15 MG: Performed by: NURSE PRACTITIONER

## 2017-04-21 PROCEDURE — 25010000003 MORPHINE PER 10 MG: Performed by: NURSE PRACTITIONER

## 2017-04-21 PROCEDURE — 99024 POSTOP FOLLOW-UP VISIT: CPT | Performed by: NURSE PRACTITIONER

## 2017-04-21 PROCEDURE — 82962 GLUCOSE BLOOD TEST: CPT

## 2017-04-21 RX ORDER — KETOROLAC TROMETHAMINE 30 MG/ML
30 INJECTION, SOLUTION INTRAMUSCULAR; INTRAVENOUS ONCE
Status: COMPLETED | OUTPATIENT
Start: 2017-04-21 | End: 2017-04-21

## 2017-04-21 RX ORDER — KETOROLAC TROMETHAMINE 10 MG/1
10 TABLET, FILM COATED ORAL EVERY 8 HOURS SCHEDULED
Status: DISPENSED | OUTPATIENT
Start: 2017-04-21 | End: 2017-04-24

## 2017-04-21 RX ORDER — OXYCODONE AND ACETAMINOPHEN 10; 325 MG/1; MG/1
1 TABLET ORAL EVERY 4 HOURS PRN
Status: DISCONTINUED | OUTPATIENT
Start: 2017-04-21 | End: 2017-04-26 | Stop reason: HOSPADM

## 2017-04-21 RX ORDER — BISACODYL 10 MG
10 SUPPOSITORY, RECTAL RECTAL ONCE
Status: DISCONTINUED | OUTPATIENT
Start: 2017-04-21 | End: 2017-04-26 | Stop reason: HOSPADM

## 2017-04-21 RX ORDER — MORPHINE SULFATE 1 MG/ML
INJECTION INTRAVENOUS CONTINUOUS
Status: DISCONTINUED | OUTPATIENT
Start: 2017-04-21 | End: 2017-04-25

## 2017-04-21 RX ADMIN — OXYCODONE HYDROCHLORIDE AND ACETAMINOPHEN 1 TABLET: 10; 325 TABLET ORAL at 11:26

## 2017-04-21 RX ADMIN — KETOROLAC TROMETHAMINE 30 MG: 30 INJECTION, SOLUTION INTRAMUSCULAR; INTRAVENOUS at 13:21

## 2017-04-21 RX ADMIN — OXYCODONE HYDROCHLORIDE AND ACETAMINOPHEN 500 MG: 500 TABLET ORAL at 09:24

## 2017-04-21 RX ADMIN — OXYCODONE HYDROCHLORIDE AND ACETAMINOPHEN 500 MG: 500 TABLET ORAL at 17:34

## 2017-04-21 RX ADMIN — OXYCODONE HYDROCHLORIDE AND ACETAMINOPHEN 1 TABLET: 10; 325 TABLET ORAL at 22:02

## 2017-04-21 RX ADMIN — KETOROLAC TROMETHAMINE 10 MG: 10 TABLET, FILM COATED ORAL at 21:03

## 2017-04-21 RX ADMIN — Medication 1 TABLET: at 09:24

## 2017-04-21 RX ADMIN — OXYCODONE HYDROCHLORIDE AND ACETAMINOPHEN 1 TABLET: 10; 325 TABLET ORAL at 15:52

## 2017-04-21 RX ADMIN — SENNOSIDES AND DOCUSATE SODIUM 2 TABLET: 8.6; 5 TABLET ORAL at 21:03

## 2017-04-21 RX ADMIN — SODIUM CHLORIDE 100 ML/HR: 900 INJECTION, SOLUTION INTRAVENOUS at 14:31

## 2017-04-21 RX ADMIN — MORPHINE SULFATE: 1 INJECTION INTRAVENOUS at 11:14

## 2017-04-21 RX ADMIN — MORPHINE SULFATE: 1 INJECTION INTRAVENOUS at 05:53

## 2017-04-21 RX ADMIN — SODIUM CHLORIDE 100 ML/HR: 900 INJECTION, SOLUTION INTRAVENOUS at 05:26

## 2017-04-21 RX ADMIN — MORPHINE SULFATE: 1 INJECTION INTRAVENOUS at 22:02

## 2017-04-21 RX ADMIN — ALBUTEROL SULFATE 2.5 MG: 2.5 SOLUTION RESPIRATORY (INHALATION) at 08:19

## 2017-04-21 RX ADMIN — FERROUS SULFATE TAB EC 324 MG (65 MG FE EQUIVALENT) 324 MG: 324 (65 FE) TABLET DELAYED RESPONSE at 09:23

## 2017-04-21 RX ADMIN — SODIUM CHLORIDE 100 ML/HR: 900 INJECTION, SOLUTION INTRAVENOUS at 23:57

## 2017-04-21 NOTE — PLAN OF CARE
Problem: Patient Care Overview (Adult)  Goal: Plan of Care Review  Outcome: Ongoing (interventions implemented as appropriate)    04/21/17 0355   Coping/Psychosocial Response Interventions   Plan Of Care Reviewed With patient   Patient Care Overview   Progress improving   Outcome Evaluation   Outcome Summary/Follow up Plan PCA used for pain, lungs sound clear, no air leak noticable, activity encouraged         Problem: Perioperative Period (Adult)  Goal: Signs and Symptoms of Listed Potential Problems Will be Absent or Manageable (Perioperative Period)  Outcome: Ongoing (interventions implemented as appropriate)    Problem: Lung Surgery (via Thoracotomy) (Adult)  Goal: Signs and Symptoms of Listed Potential Problems Will be Absent or Manageable (Lung Surgery)  Outcome: Ongoing (interventions implemented as appropriate)    Problem: Pain, Acute (Adult)  Goal: Identify Related Risk Factors and Signs and Symptoms  Outcome: Outcome(s) achieved Date Met:  04/21/17  Goal: Acceptable Pain Control/Comfort Level  Outcome: Ongoing (interventions implemented as appropriate)

## 2017-04-21 NOTE — PLAN OF CARE
Problem: Patient Care Overview (Adult)  Goal: Plan of Care Review  Outcome: Ongoing (interventions implemented as appropriate)    04/20/17 0163   Coping/Psychosocial Response Interventions   Plan Of Care Reviewed With caregiver   Patient Care Overview   Progress improving   Outcome Evaluation   Outcome Summary/Follow up Plan Pt. pain controlled by PCA; chest tubes to -10 suction       Goal: Adult Individualization and Mutuality  Outcome: Ongoing (interventions implemented as appropriate)  Goal: Discharge Needs Assessment  Outcome: Ongoing (interventions implemented as appropriate)    Problem: Perioperative Period (Adult)  Goal: Signs and Symptoms of Listed Potential Problems Will be Absent or Manageable (Perioperative Period)  Outcome: Ongoing (interventions implemented as appropriate)    Problem: Lung Surgery (via Thoracotomy) (Adult)  Goal: Signs and Symptoms of Listed Potential Problems Will be Absent or Manageable (Lung Surgery)  Outcome: Ongoing (interventions implemented as appropriate)    Problem: Pain, Acute (Adult)  Goal: Identify Related Risk Factors and Signs and Symptoms  Outcome: Ongoing (interventions implemented as appropriate)  Goal: Acceptable Pain Control/Comfort Level  Outcome: Ongoing (interventions implemented as appropriate)

## 2017-04-21 NOTE — PROGRESS NOTES
"  Cardiothoracic - Vascular Surgery Daily Note        LOS: 2 days   POD# 2  LEFT THORACOTOMY, UPPER LOBECTOMY  THORACIC, MEDIASTINAL LYMPHADENCTOMY  BRONCHOSCOPY      Chief Complaint: Lung Mass. Pain partially controlled      Subjective         Subjective:  Symptoms:  Stable.  He reports cough, chest pain (surgical) and weakness.  No shortness of breath.    Diet:  Adequate intake.  No nausea or vomiting.    Activity level: Normal with assistance.    Pain:  He complains of pain that is moderate.  Pain is requiring pain medication and partially controlled.          Objective     Vital Signs  Temp:  [97.5 °F (36.4 °C)-100.8 °F (38.2 °C)] 97.5 °F (36.4 °C)  Heart Rate:  [] 98  Resp:  [16-20] 20  BP: (112-145)/(63-88) 112/63  Body mass index is 21.17 kg/(m^2).    Intake/Output Summary (Last 24 hours) at 04/21/17 1036  Last data filed at 04/21/17 0715   Gross per 24 hour   Intake           841.74 ml   Output             2270 ml   Net         -1428.26 ml     I/O this shift:  In: -   Out: 350 [Urine:350]  CT 320cc. serosanginous No air leak -10sx  Wt Readings from Last 3 Encounters:   04/21/17 139 lb 3.2 oz (63.1 kg)   04/06/17 136 lb (61.7 kg)   03/09/17 138 lb 0.1 oz (62.6 kg)         Physical Exam   Objective:  General Appearance:  Comfortable and in no acute distress.    Vital signs: (most recent): Blood pressure 112/63, pulse 98, temperature 97.5 °F (36.4 °C), temperature source Temporal Artery , resp. rate 20, height 68\" (172.7 cm), weight 139 lb 3.2 oz (63.1 kg), SpO2 92 %.  No fever.    Output: Producing urine and no stool output.    HEENT: Normal HEENT exam.    Lungs:  Normal respiratory rate and normal effort.  He is not in respiratory distress.  There are decreased breath sounds (left).  (LEFT CT -10sx. No Air Leak. Serosanginous)  Heart: Tachycardia.    Chest: Symmetric chest wall expansion.   Abdomen: Abdomen is soft and non-distended.  Hypoactive bowel sounds.     Extremities: Normal range of motion.  " There is no dependent edema.    Pulses: Distal pulses are intact.  There are no decreased pulses.    Neurological: Patient is alert and oriented to person, place and time.  Normal strength.    Pupils:  Pupils are equal, round, and reactive to light.    Skin:  Warm and dry.  (LEFT thoracotomy incision: CDI, Dry dressing removed. Mesh dressing intact. )          Incisions:  Clean and dry.    Results Review:      Lab Results   Component Value Date    WBC 15.42 (H) 04/20/2017    HGB 10.0 (L) 04/20/2017    HCT 31.1 (L) 04/20/2017    MCV 76.6 (L) 04/20/2017     04/20/2017     Lab Results   Component Value Date    GLUCOSE 144 (H) 04/20/2017    BUN 23 (H) 04/20/2017    CREATININE 0.66 (L) 04/20/2017    EGFRIFNONA 125 04/20/2017    BCR 34.8 (H) 04/20/2017    K 4.3 04/20/2017    CO2 21.0 (L) 04/20/2017    CALCIUM 8.3 (L) 04/20/2017    ALBUMIN 2.90 (L) 03/10/2017    LABIL2 0.9 (L) 03/10/2017    AST 12 (L) 03/10/2017    ALT 38 03/10/2017     Imaging Results (last 24 hours)     ** No results found for the last 24 hours. 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   (Stable post op LEFT thoracotomy, upper lobectomy: Progressing well.     Lung mass /Advanced Squamous cell carcinoma lung: Residual tumor adhered to chest. Lymph negative. Will require postoperative adjuvant therapy.Staging pT3N0    Resp: Stable. On RA. Nebs. Pulmonary Toilet. CT H2O seal    Pain:  Decrease PCA demand only. Start Percocet q4h. Toradol    Rehab: OOB, Ambulate today    Acute on Chronic Anemia: Continue home Iron supplement. CBC am.    Alcohol Use disorder: Discussed DT's, Beer offered with meals. Declines at this time.   ).     Plan:   Out of bed and up to chair and encourage ambulation.  Continue respiratory treatments and start/continue incentive spirometry.  Regular diet.  Chest x-ray.  Increase analgesics.   (Continue 3W care. CT H20 seal. CXR am. ).             This document has been electronically signed by CIPRIANO Peterson on April 21, 2017 10:36 AM

## 2017-04-22 ENCOUNTER — APPOINTMENT (OUTPATIENT)
Dept: GENERAL RADIOLOGY | Facility: HOSPITAL | Age: 57
End: 2017-04-22

## 2017-04-22 LAB
ALBUMIN SERPL-MCNC: 2.6 G/DL (ref 3.4–4.8)
ALBUMIN/GLOB SERPL: 0.8 G/DL (ref 1.1–1.8)
ALP SERPL-CCNC: 540 U/L (ref 38–126)
ALT SERPL W P-5'-P-CCNC: 59 U/L (ref 21–72)
ANION GAP SERPL CALCULATED.3IONS-SCNC: 6 MMOL/L (ref 5–15)
AST SERPL-CCNC: 167 U/L (ref 17–59)
BILIRUB SERPL-MCNC: 0.4 MG/DL (ref 0.2–1.3)
BUN BLD-MCNC: 12 MG/DL (ref 7–21)
BUN/CREAT SERPL: 23.5 (ref 7–25)
CALCIUM SPEC-SCNC: 8.6 MG/DL (ref 8.4–10.2)
CHLORIDE SERPL-SCNC: 103 MMOL/L (ref 95–110)
CO2 SERPL-SCNC: 26 MMOL/L (ref 22–31)
CREAT BLD-MCNC: 0.51 MG/DL (ref 0.7–1.3)
DEPRECATED RDW RBC AUTO: 59.9 FL (ref 35.1–43.9)
ERYTHROCYTE [DISTWIDTH] IN BLOOD BY AUTOMATED COUNT: 21.2 % (ref 11.5–14.5)
GFR SERPL CREATININE-BSD FRML MDRD: >150 ML/MIN/1.73 (ref 60–130)
GLOBULIN UR ELPH-MCNC: 3.2 GM/DL (ref 2.3–3.5)
GLUCOSE BLD-MCNC: 104 MG/DL (ref 60–100)
HCT VFR BLD AUTO: 28.6 % (ref 39–49)
HGB BLD-MCNC: 9.1 G/DL (ref 13.7–17.3)
MCH RBC QN AUTO: 24.3 PG (ref 26.5–34)
MCHC RBC AUTO-ENTMCNC: 31.8 G/DL (ref 31.5–36.3)
MCV RBC AUTO: 76.3 FL (ref 80–98)
PLATELET # BLD AUTO: 450 10*3/MM3 (ref 150–450)
PMV BLD AUTO: 8.4 FL (ref 8–12)
POTASSIUM BLD-SCNC: 3.8 MMOL/L (ref 3.5–5.1)
PROT SERPL-MCNC: 5.8 G/DL (ref 6.3–8.6)
RBC # BLD AUTO: 3.75 10*6/MM3 (ref 4.37–5.74)
SODIUM BLD-SCNC: 135 MMOL/L (ref 137–145)
WBC NRBC COR # BLD: 15.31 10*3/MM3 (ref 3.2–9.8)

## 2017-04-22 PROCEDURE — 25010000003 MORPHINE PER 10 MG: Performed by: NURSE PRACTITIONER

## 2017-04-22 PROCEDURE — 99024 POSTOP FOLLOW-UP VISIT: CPT | Performed by: NURSE PRACTITIONER

## 2017-04-22 PROCEDURE — 85027 COMPLETE CBC AUTOMATED: CPT | Performed by: NURSE PRACTITIONER

## 2017-04-22 PROCEDURE — 94640 AIRWAY INHALATION TREATMENT: CPT

## 2017-04-22 PROCEDURE — 94799 UNLISTED PULMONARY SVC/PX: CPT

## 2017-04-22 PROCEDURE — 71010 HC CHEST PA OR AP: CPT

## 2017-04-22 PROCEDURE — 80053 COMPREHEN METABOLIC PANEL: CPT | Performed by: NURSE PRACTITIONER

## 2017-04-22 RX ORDER — SODIUM CHLORIDE FOR INHALATION 0.9 %
VIAL, NEBULIZER (ML) INHALATION
Status: DISPENSED
Start: 2017-04-22 | End: 2017-04-22

## 2017-04-22 RX ORDER — FAMOTIDINE 20 MG/1
20 TABLET, FILM COATED ORAL 2 TIMES DAILY
Status: DISCONTINUED | OUTPATIENT
Start: 2017-04-22 | End: 2017-04-26 | Stop reason: HOSPADM

## 2017-04-22 RX ORDER — ACETYLCYSTEINE 100 MG/ML
4 SOLUTION ORAL; RESPIRATORY (INHALATION)
Status: DISCONTINUED | OUTPATIENT
Start: 2017-04-22 | End: 2017-04-22

## 2017-04-22 RX ORDER — ACETYLCYSTEINE 100 MG/ML
2 SOLUTION ORAL; RESPIRATORY (INHALATION) 4 TIMES DAILY
Status: COMPLETED | OUTPATIENT
Start: 2017-04-22 | End: 2017-04-25

## 2017-04-22 RX ORDER — ACETYLCYSTEINE 100 MG/ML
SOLUTION ORAL; RESPIRATORY (INHALATION)
Status: COMPLETED
Start: 2017-04-22 | End: 2017-04-22

## 2017-04-22 RX ADMIN — ALBUTEROL SULFATE 2.5 MG: 2.5 SOLUTION RESPIRATORY (INHALATION) at 14:59

## 2017-04-22 RX ADMIN — KETOROLAC TROMETHAMINE 10 MG: 10 TABLET, FILM COATED ORAL at 14:10

## 2017-04-22 RX ADMIN — ACETYLCYSTEINE 4 ML: 100 SOLUTION ORAL; RESPIRATORY (INHALATION) at 10:21

## 2017-04-22 RX ADMIN — FAMOTIDINE 20 MG: 20 TABLET ORAL at 17:39

## 2017-04-22 RX ADMIN — KETOROLAC TROMETHAMINE 10 MG: 10 TABLET, FILM COATED ORAL at 05:02

## 2017-04-22 RX ADMIN — OXYCODONE HYDROCHLORIDE AND ACETAMINOPHEN 500 MG: 500 TABLET ORAL at 09:09

## 2017-04-22 RX ADMIN — OXYCODONE HYDROCHLORIDE AND ACETAMINOPHEN 1 TABLET: 10; 325 TABLET ORAL at 14:10

## 2017-04-22 RX ADMIN — SENNOSIDES AND DOCUSATE SODIUM 2 TABLET: 8.6; 5 TABLET ORAL at 21:07

## 2017-04-22 RX ADMIN — FAMOTIDINE 20 MG: 20 TABLET ORAL at 11:52

## 2017-04-22 RX ADMIN — FERROUS SULFATE TAB EC 324 MG (65 MG FE EQUIVALENT) 324 MG: 324 (65 FE) TABLET DELAYED RESPONSE at 09:09

## 2017-04-22 RX ADMIN — ALBUTEROL SULFATE 2.5 MG: 2.5 SOLUTION RESPIRATORY (INHALATION) at 16:18

## 2017-04-22 RX ADMIN — OXYCODONE HYDROCHLORIDE AND ACETAMINOPHEN 1 TABLET: 10; 325 TABLET ORAL at 18:34

## 2017-04-22 RX ADMIN — OXYCODONE HYDROCHLORIDE AND ACETAMINOPHEN 500 MG: 500 TABLET ORAL at 17:39

## 2017-04-22 RX ADMIN — Medication 1 TABLET: at 09:10

## 2017-04-22 RX ADMIN — ACETYLCYSTEINE 4 ML: 100 INHALANT RESPIRATORY (INHALATION) at 10:21

## 2017-04-22 RX ADMIN — ACETYLCYSTEINE 2 ML: 100 INHALANT RESPIRATORY (INHALATION) at 16:17

## 2017-04-22 RX ADMIN — OXYCODONE HYDROCHLORIDE AND ACETAMINOPHEN 1 TABLET: 10; 325 TABLET ORAL at 09:09

## 2017-04-22 RX ADMIN — SODIUM CHLORIDE 100 ML/HR: 900 INJECTION, SOLUTION INTRAVENOUS at 18:46

## 2017-04-22 RX ADMIN — MORPHINE SULFATE: 1 INJECTION INTRAVENOUS at 09:24

## 2017-04-22 RX ADMIN — KETOROLAC TROMETHAMINE 10 MG: 10 TABLET, FILM COATED ORAL at 21:07

## 2017-04-22 RX ADMIN — ACETYLCYSTEINE 2 ML: 100 INHALANT RESPIRATORY (INHALATION) at 20:20

## 2017-04-22 RX ADMIN — Medication 2.5 MG: at 14:59

## 2017-04-22 RX ADMIN — MORPHINE SULFATE: 1 INJECTION INTRAVENOUS at 20:23

## 2017-04-22 RX ADMIN — SODIUM CHLORIDE 100 ML/HR: 900 INJECTION, SOLUTION INTRAVENOUS at 09:14

## 2017-04-22 RX ADMIN — ALBUTEROL SULFATE 2.5 MG: 2.5 SOLUTION RESPIRATORY (INHALATION) at 20:19

## 2017-04-22 NOTE — PLAN OF CARE
Problem: Patient Care Overview (Adult)  Goal: Plan of Care Review  Outcome: Ongoing (interventions implemented as appropriate)    04/22/17 0414   Coping/Psychosocial Response Interventions   Plan Of Care Reviewed With patient   Patient Care Overview   Progress improving   Outcome Evaluation   Outcome Summary/Follow up Plan pain better under control, air leak slightly present, lungs diminished but clear         Problem: Perioperative Period (Adult)  Goal: Signs and Symptoms of Listed Potential Problems Will be Absent or Manageable (Perioperative Period)  Outcome: Ongoing (interventions implemented as appropriate)    Problem: Lung Surgery (via Thoracotomy) (Adult)  Goal: Signs and Symptoms of Listed Potential Problems Will be Absent or Manageable (Lung Surgery)  Outcome: Ongoing (interventions implemented as appropriate)    Problem: Pain, Acute (Adult)  Goal: Acceptable Pain Control/Comfort Level  Outcome: Ongoing (interventions implemented as appropriate)

## 2017-04-22 NOTE — PLAN OF CARE
Problem: Patient Care Overview (Adult)  Goal: Plan of Care Review  Outcome: Ongoing (interventions implemented as appropriate)    04/22/17 9336   Coping/Psychosocial Response Interventions   Plan Of Care Reviewed With patient   Patient Care Overview   Progress progress toward functional goals as expected         Problem: Perioperative Period (Adult)  Goal: Signs and Symptoms of Listed Potential Problems Will be Absent or Manageable (Perioperative Period)  Outcome: Outcome(s) achieved Date Met:  04/22/17    Problem: Lung Surgery (via Thoracotomy) (Adult)  Goal: Signs and Symptoms of Listed Potential Problems Will be Absent or Manageable (Lung Surgery)  Outcome: Ongoing (interventions implemented as appropriate)    Problem: Pain, Acute (Adult)  Goal: Acceptable Pain Control/Comfort Level  Outcome: Outcome(s) achieved Date Met:  04/22/17

## 2017-04-22 NOTE — PROGRESS NOTES
"  Cardiothoracic - Vascular Surgery Daily Note        LOS: 3 days   POD# 2  LEFT THORACOTOMY, UPPER LOBECTOMY  THORACIC, MEDIASTINAL LYMPHADENCTOMY  BRONCHOSCOPY      Chief Complaint: Lung Mass. Pain partially controlled      Subjective         Subjective:  Symptoms:  Stable.  He reports cough, chest pain (surgical) and weakness.  No shortness of breath.    Diet:  Adequate intake.  No nausea or vomiting.    Activity level: Normal with assistance.    Pain:  He complains of pain that is moderate.  Pain is requiring pain medication and partially controlled.          Objective     Vital Signs  Temp:  [96.4 °F (35.8 °C)-98.2 °F (36.8 °C)] 97.3 °F (36.3 °C)  Heart Rate:  [] 101  Resp:  [18-20] 20  BP: (115-134)/(57-74) 124/69  Body mass index is 21.83 kg/(m^2).    Intake/Output Summary (Last 24 hours) at 04/22/17 0904  Last data filed at 04/22/17 0600   Gross per 24 hour   Intake             1262 ml   Output             1155 ml   Net              107 ml        CT 280cc. serosanginous Minimal air leak with cough H2O seal  Wt Readings from Last 3 Encounters:   04/22/17 143 lb 9.6 oz (65.1 kg)   04/06/17 136 lb (61.7 kg)   03/09/17 138 lb 0.1 oz (62.6 kg)         Physical Exam   Objective:  General Appearance:  Comfortable and in no acute distress.    Vital signs: (most recent): Blood pressure 124/69, pulse 101, temperature 97.3 °F (36.3 °C), temperature source Temporal Artery , resp. rate 20, height 68\" (172.7 cm), weight 143 lb 9.6 oz (65.1 kg), SpO2 92 %.  No fever.    Output: Producing urine and no stool output.    HEENT: Normal HEENT exam.    Lungs:  Normal respiratory rate and normal effort.  He is not in respiratory distress.  There are decreased breath sounds (left).  (LEFT CT H2O seal. Sm Air Leak with cough. Serous)  Heart: Tachycardia.    Chest: Symmetric chest wall expansion.   Abdomen: Abdomen is soft and non-distended.  Bowel sounds are normal.     Extremities: Normal range of motion.  There is no " dependent edema.    Pulses: Distal pulses are intact.  There are no decreased pulses.    Neurological: Patient is alert and oriented to person, place and time.  Normal strength.    Pupils:  Pupils are equal, round, and reactive to light.    Skin:  Warm and dry.  (LEFT thoracotomy incision: CDI, Dry dressing removed. Mesh dressing intact. )          Incisions:  Clean and dry.    Results Review:      Lab Results   Component Value Date    WBC 15.31 (H) 04/22/2017    HGB 9.1 (L) 04/22/2017    HCT 28.6 (L) 04/22/2017    MCV 76.3 (L) 04/22/2017     04/22/2017     Lab Results   Component Value Date    GLUCOSE 104 (H) 04/22/2017    BUN 12 04/22/2017    CREATININE 0.51 (L) 04/22/2017    EGFRIFNONA >150 04/22/2017    BCR 23.5 04/22/2017    K 3.8 04/22/2017    CO2 26.0 04/22/2017    CALCIUM 8.6 04/22/2017    ALBUMIN 2.60 (L) 04/22/2017    LABIL2 0.8 (L) 04/22/2017     (H) 04/22/2017    ALT 59 04/22/2017     Imaging Results (last 24 hours)     Procedure Component Value Units Date/Time    XR Chest 1 View [[id number]] Collected:  04/22/17 0443     Updated:  04/22/17 0529    Narrative:       Exam: AP portable chest    INDICATION: Postop left upper lobe lobectomy    COMPARISON: 4/19/2017    FINDINGS: AP portable chest. There are two left chest tube in  place. The the mediastinal shifted to the left compatible volume  loss. There is increasing opacity in the left lung compatible  increasing pleural fluid with atelectasis and/or infiltrate. The  right lung remains clear.       Impression:       Impression: Increasing opacity left lung compatible increasing  pleural fluid with  atelectasis  and/or infiltrate    Electronically signed by:  Peter De La Torre MD  4/22/2017 5:28 AM  CDT Workstation: [id number]                                  bisacodyl 10 mg Rectal Once   ferrous sulfate 324 mg Oral Daily With Breakfast   ketorolac 10 mg Oral Q8H   multivitamin with minerals 1 tablet Oral Daily   sennosides-docusate sodium 2  tablet Oral Nightly   vitamin C 500 mg Oral BID       Morphine     sodium chloride 100 mL/hr Last Rate: 100 mL/hr (04/21/17 8832)       Patient Active Problem List   Diagnosis Code   • Tobacco use disorder F17.200   • Alcohol use disorder F10.99   • Iron deficiency anemia D50.9   • Cavitary lesion of lung J98.4   • 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   (Stable post op LEFT thoracotomy, upper lobectomy: Progressing well.     Lung mass /Advanced Squamous cell carcinoma lung: Residual tumor adhered to chest. Lymph negative. Will require postoperative adjuvant therapy.Staging pT3N0    Resp: Stable. On RA. Positive pressure Nebs. Aggressive Pulmonary Toilet. CT H2O seal. CXR am.    Pain: PCA demand only. Start Percocet q4h. Toradol    Rehab: OOB, Ambulate today    Acute on Chronic Anemia: Continue home Iron supplement.     Alcohol Use disorder: Discussed DT's, Beer offered with meals. Declines at this time.   ).     Plan:   Out of bed and up to chair and encourage ambulation.  Continue respiratory treatments and start/continue incentive spirometry.  Regular diet.  Chest x-ray.  Increase analgesics.   (Continue 3W care. CT H20 seal. CXR am. Aggressive pulmonary toilet.).             This document has been electronically signed by CIPRIANO Peterson on April 22, 2017 9:04 AM

## 2017-04-23 ENCOUNTER — APPOINTMENT (OUTPATIENT)
Dept: GENERAL RADIOLOGY | Facility: HOSPITAL | Age: 57
End: 2017-04-23

## 2017-04-23 LAB — GLUCOSE BLDC GLUCOMTR-MCNC: 136 MG/DL (ref 70–130)

## 2017-04-23 PROCEDURE — 94760 N-INVAS EAR/PLS OXIMETRY 1: CPT

## 2017-04-23 PROCEDURE — 94799 UNLISTED PULMONARY SVC/PX: CPT

## 2017-04-23 PROCEDURE — 99024 POSTOP FOLLOW-UP VISIT: CPT | Performed by: THORACIC SURGERY (CARDIOTHORACIC VASCULAR SURGERY)

## 2017-04-23 PROCEDURE — 25010000003 MORPHINE PER 10 MG: Performed by: NURSE PRACTITIONER

## 2017-04-23 PROCEDURE — 71010 HC CHEST PA OR AP: CPT

## 2017-04-23 RX ADMIN — SODIUM CHLORIDE 100 ML/HR: 900 INJECTION, SOLUTION INTRAVENOUS at 14:30

## 2017-04-23 RX ADMIN — ALBUTEROL SULFATE 2.5 MG: 2.5 SOLUTION RESPIRATORY (INHALATION) at 20:07

## 2017-04-23 RX ADMIN — OXYCODONE HYDROCHLORIDE AND ACETAMINOPHEN 1 TABLET: 10; 325 TABLET ORAL at 13:06

## 2017-04-23 RX ADMIN — OXYCODONE HYDROCHLORIDE AND ACETAMINOPHEN 500 MG: 500 TABLET ORAL at 17:43

## 2017-04-23 RX ADMIN — MORPHINE SULFATE: 1 INJECTION INTRAVENOUS at 08:34

## 2017-04-23 RX ADMIN — OXYCODONE HYDROCHLORIDE AND ACETAMINOPHEN 500 MG: 500 TABLET ORAL at 08:19

## 2017-04-23 RX ADMIN — KETOROLAC TROMETHAMINE 10 MG: 10 TABLET, FILM COATED ORAL at 21:34

## 2017-04-23 RX ADMIN — ACETYLCYSTEINE 2 ML: 100 INHALANT RESPIRATORY (INHALATION) at 15:02

## 2017-04-23 RX ADMIN — ALBUTEROL SULFATE 2.5 MG: 2.5 SOLUTION RESPIRATORY (INHALATION) at 06:55

## 2017-04-23 RX ADMIN — ALBUTEROL SULFATE 2.5 MG: 2.5 SOLUTION RESPIRATORY (INHALATION) at 15:02

## 2017-04-23 RX ADMIN — FAMOTIDINE 20 MG: 20 TABLET ORAL at 08:19

## 2017-04-23 RX ADMIN — ALBUTEROL SULFATE 2.5 MG: 2.5 SOLUTION RESPIRATORY (INHALATION) at 10:06

## 2017-04-23 RX ADMIN — FAMOTIDINE 20 MG: 20 TABLET ORAL at 17:42

## 2017-04-23 RX ADMIN — MORPHINE SULFATE: 1 INJECTION INTRAVENOUS at 16:29

## 2017-04-23 RX ADMIN — OXYCODONE HYDROCHLORIDE AND ACETAMINOPHEN 1 TABLET: 10; 325 TABLET ORAL at 21:34

## 2017-04-23 RX ADMIN — KETOROLAC TROMETHAMINE 10 MG: 10 TABLET, FILM COATED ORAL at 13:13

## 2017-04-23 RX ADMIN — Medication 1 TABLET: at 08:19

## 2017-04-23 RX ADMIN — ACETYLCYSTEINE 2 ML: 100 INHALANT RESPIRATORY (INHALATION) at 10:06

## 2017-04-23 RX ADMIN — KETOROLAC TROMETHAMINE 10 MG: 10 TABLET, FILM COATED ORAL at 06:38

## 2017-04-23 RX ADMIN — ACETYLCYSTEINE 2 ML: 100 INHALANT RESPIRATORY (INHALATION) at 20:07

## 2017-04-23 RX ADMIN — FERROUS SULFATE TAB EC 324 MG (65 MG FE EQUIVALENT) 324 MG: 324 (65 FE) TABLET DELAYED RESPONSE at 08:19

## 2017-04-23 RX ADMIN — ACETYLCYSTEINE 2 ML: 100 INHALANT RESPIRATORY (INHALATION) at 06:56

## 2017-04-23 RX ADMIN — OXYCODONE HYDROCHLORIDE AND ACETAMINOPHEN 1 TABLET: 10; 325 TABLET ORAL at 18:26

## 2017-04-23 RX ADMIN — SODIUM CHLORIDE 100 ML/HR: 900 INJECTION, SOLUTION INTRAVENOUS at 23:00

## 2017-04-23 RX ADMIN — OXYCODONE HYDROCHLORIDE AND ACETAMINOPHEN 1 TABLET: 10; 325 TABLET ORAL at 08:24

## 2017-04-23 NOTE — SIGNIFICANT NOTE
04/23/17 1505   Rehab Treatment   Discipline physical therapist   Rehab Evaluation   Evaluation Not Performed patient/family declined evaluation  (Patient states he just walked and returned to bed.  Asked for PT to come back tomorrow - therefore will check back.)

## 2017-04-23 NOTE — OP NOTE
OPERATIVE NOTE  Tino Peres  1960  4/19/2017     PREOP DIAGNOSES:  LEFT upper lobe cavitary mass with mycetoma  hemoptysis  Lung mass [R91.8]     POSTOP DIAGNOSES:  Lung mass [R91.8]  Advanced Squamous cell carcinoma lung.     PROCEDURE:   LEFT THORACOTOMY, UPPER LOBECTOMY  THORACIC, MEDIASTINAL LYMPHADENCTOMY  BRONCHOSCOPY     SURGEON: MONSTER Hernandez MD FACS RPVI     ASSISTANT: Timmy Galvan CFA     ANESTHESIA: General ET      ESTIMATED BLOOD LOSS: 500 ml      COMPLICATIONS: none     DESCRIPTION OF OPERATION: Paitent taken to operating room, placed in supine position. General anesthesia induced. Radial arterial line placed by anesthesia. Prepped draped in sterile fashion. 1cm port site placed 8th interspace anterior axillary line. Thoracoscope was introduced demonstrating normal-appearing lung diaphragm with moderate adhesions. thoracoscope was removed. LEFT lateral thoracotomy was performed thru the fifth intercostal space. upper lobe was densely adherent to the apex and chest wall. The lower lobe was dissected free inferior pulmonary ligament was divided. upper lobe was then carefully and tediously dissected, upper lobe was taken off the chest wall subpleurally using sharp and blunt dissection. upper lobe was dissected off the aortic arch and left subclavian artery, left internal mammary artery was involved in the inflammatory mass was ligated and divided. The roof of the cavitary lesion was divided from the remainder of the lung upper lobe to be able to proceed with upper lobectomy. apical pulmonary artery branches were ligated and divided with vascular load stapler. branches to the upper lobe were ligated and divided with vascular load stapler. pulmonary veins to the upper lobe were divided with vascular load stapler. the upper lobe bronchus was identified and stapled with a TA-30 stapler, divided with the 15 blade. major fissure was completed using multiple fires of Endo LUCIA black load stapler with  [facility] support. 2 large level 5 lymph nodes were identified and sent separately. multiple internal mammary chain lymph nodes, level 10 lymph nodes were sent with the specimen, no level 2, 4 lymph nodes were identified. The residual inflammatory mass at the apex continued to be dissected off the apex. Subclavian artery and brachial plexus were intact. Hemostasis was obtained. 2 28French multi hole chest tubes were placed, posteriorly to the apex, one anteriorly to the apex. chest was irrigated with 3L of warm sterile water. Mid-shaft fractures of the fifth and sixth rib were repaired using #2 Vicryl suture. lower lobe inflated to fill 70%of the space. Incision closed layers of #2 Vicryl pericostal sutures around the fifth rib thru the 6th rib. serratus was reapproximated using 2-0 PDS suture, 2-0 PDS in the latissimus fascia, 2-0 PDS and subcutaneous tissue and 4-0 Monocryl skin with Dermabond tape. frozen section was obtained by pathology and call back as positive for squamous cell carcinoma. patient has a residual tumor adherent to the chest wall and apex and will require postoperative adjuvant therapy. Tolerated procedure well transferred to PACU stable condition.          This document has been electronically signed by Angel Hernandez MD on April 19, 2017 3:50 PM

## 2017-04-23 NOTE — PROGRESS NOTES
"  Cardiothoracic - Vascular Surgery Daily Note        LOS: 4 days   POD# 4  LEFT THORACOTOMY, UPPER LOBECTOMY  THORACIC, MEDIASTINAL LYMPHADENCTOMY  BRONCHOSCOPY      Chief Complaint: Lung Mass. Pain partially controlled      Subjective         Subjective:  Symptoms:  Stable.  He reports cough, chest pain (surgical) and weakness.  No shortness of breath.    Diet:  Adequate intake.  No nausea or vomiting.    Activity level: Normal with assistance.    Pain:  He complains of pain that is moderate.  Pain is requiring pain medication and partially controlled.          Objective     Vital Signs  Temp:  [96.2 °F (35.7 °C)-98.4 °F (36.9 °C)] 96.2 °F (35.7 °C)  Heart Rate:  [] 96  Resp:  [18-20] 18  BP: (129-148)/(75-85) 129/79  Body mass index is 22.53 kg/(m^2).    Intake/Output Summary (Last 24 hours) at 04/23/17 0947  Last data filed at 04/23/17 0834   Gross per 24 hour   Intake             1941 ml   Output             1390 ml   Net              551 ml     I/O this shift:  In: 249 [P.O.:240; I.V.:9]  Out: -   CT 280cc. serosanginous Minimal air leak with cough H2O seal  Wt Readings from Last 3 Encounters:   04/23/17 148 lb 3.2 oz (67.2 kg)   04/06/17 136 lb (61.7 kg)   03/09/17 138 lb 0.1 oz (62.6 kg)         Physical Exam   Objective:  General Appearance:  Comfortable and in no acute distress.    Vital signs: (most recent): Blood pressure 129/79, pulse 96, temperature 96.2 °F (35.7 °C), temperature source Temporal Artery , resp. rate 18, height 68\" (172.7 cm), weight 148 lb 3.2 oz (67.2 kg), SpO2 93 %.  No fever.    Output: Producing urine and no stool output.    HEENT: Normal HEENT exam.    Lungs:  Normal respiratory rate and normal effort.  He is not in respiratory distress.  There are decreased breath sounds (left).  (LEFT CT H2O seal. Sm Air Leak with cough. Serous)  Heart: Tachycardia.    Chest: Symmetric chest wall expansion.   Abdomen: Abdomen is soft and non-distended.  Bowel sounds are normal.   "   Extremities: Normal range of motion.  There is no dependent edema.    Pulses: Distal pulses are intact.  There are no decreased pulses.    Neurological: Patient is alert and oriented to person, place and time.  Normal strength.    Pupils:  Pupils are equal, round, and reactive to light.    Skin:  Warm and dry.  (LEFT thoracotomy incision: CDI, Dry dressing removed. Mesh dressing intact. )          Incisions:  Clean and dry.    Results Review:      Lab Results   Component Value Date    WBC 15.31 (H) 04/22/2017    HGB 9.1 (L) 04/22/2017    HCT 28.6 (L) 04/22/2017    MCV 76.3 (L) 04/22/2017     04/22/2017     Lab Results   Component Value Date    GLUCOSE 104 (H) 04/22/2017    BUN 12 04/22/2017    CREATININE 0.51 (L) 04/22/2017    EGFRIFNONA >150 04/22/2017    BCR 23.5 04/22/2017    K 3.8 04/22/2017    CO2 26.0 04/22/2017    CALCIUM 8.6 04/22/2017    ALBUMIN 2.60 (L) 04/22/2017    LABIL2 0.8 (L) 04/22/2017     (H) 04/22/2017    ALT 59 04/22/2017     Imaging Results (last 24 hours)     Procedure Component Value Units Date/Time    XR Chest 1 View [[id number]] Collected:  04/23/17 0510     Updated:  04/23/17 0534    Narrative:       Exam: AP portable chest    INDICATION: Post op left thoracotomy    COMPARISON: 4/22/2017 at 4:43 AM    EYES: AP portable chest. Status post left thoracotomy. Two left  chest tubes remain in place. No change in the opacity left lung  compatible with a large pleural effusion with atelectasis and/or  infiltrate. Right lung remains clear.      Impression:       No significant change.    Electronically signed by:  Peter De La Torre MD  4/23/2017 5:33 AM  CDT Workstation: [id number]                                  acetylcysteine 2 mL Nebulization 4x Daily   albuterol 2.5 mg Nebulization 4x Daily - RT   bisacodyl 10 mg Rectal Once   famotidine 20 mg Oral BID   ferrous sulfate 324 mg Oral Daily With Breakfast   ketorolac 10 mg Oral Q8H   multivitamin with minerals 1 tablet Oral Daily    sennosides-docusate sodium 2 tablet Oral Nightly   vitamin C 500 mg Oral BID       Morphine     sodium chloride 100 mL/hr Last Rate: 100 mL/hr (04/22/17 6912)       Patient Active Problem List   Diagnosis Code   • Tobacco use disorder F17.200   • Alcohol use disorder F10.99   • Iron deficiency anemia D50.9   • Cavitary lesion of lung J98.4   • 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   (Stable post op LEFT thoracotomy, upper lobectomy: Progressing well.     Lung mass /Advanced Squamous cell carcinoma lung: Residual tumor adhered to chest. Lymph negative. Will require postoperative adjuvant therapy.Staging pT3N0    Resp: Stable. On RA. Positive pressure Nebs. Aggressive Pulmonary Toilet. CT H2O seal. CXR am.    Pain: PCA demand only. Start Percocet q4h. Toradol    Rehab: OOB, Ambulate today    Acute on Chronic Anemia: Continue home Iron supplement.     Alcohol Use disorder: Discussed DT's, Beer offered with meals. Declines at this time.   ).     Plan:   Out of bed and up to chair and encourage ambulation.  Continue respiratory treatments and start/continue incentive spirometry.  Regular diet.  Chest x-ray.  Increase analgesics.   (Continue 3W care. CT H20 seal. CXR am. Aggressive pulmonary toilet.).   Resume -20cm suction  Chest PT q4H  Pain control          This document has been electronically signed by Silvio Santiago MD on April 23, 2017 9:47 AM

## 2017-04-24 ENCOUNTER — APPOINTMENT (OUTPATIENT)
Dept: GENERAL RADIOLOGY | Facility: HOSPITAL | Age: 57
End: 2017-04-24

## 2017-04-24 LAB
BACTERIA SPEC AEROBE CULT: NORMAL
GRAM STN SPEC: NORMAL
GRAM STN SPEC: NORMAL

## 2017-04-24 PROCEDURE — 25010000003 MORPHINE PER 10 MG: Performed by: NURSE PRACTITIONER

## 2017-04-24 PROCEDURE — 36415 COLL VENOUS BLD VENIPUNCTURE: CPT

## 2017-04-24 PROCEDURE — 88360 TUMOR IMMUNOHISTOCHEM/MANUAL: CPT

## 2017-04-24 PROCEDURE — 94799 UNLISTED PULMONARY SVC/PX: CPT

## 2017-04-24 PROCEDURE — 25010000002 MORPHINE PER 10 MG: Performed by: NURSE PRACTITIONER

## 2017-04-24 PROCEDURE — 71010 HC CHEST PA OR AP: CPT

## 2017-04-24 PROCEDURE — 97116 GAIT TRAINING THERAPY: CPT

## 2017-04-24 PROCEDURE — G8978 MOBILITY CURRENT STATUS: HCPCS

## 2017-04-24 PROCEDURE — 97162 PT EVAL MOD COMPLEX 30 MIN: CPT

## 2017-04-24 PROCEDURE — 99024 POSTOP FOLLOW-UP VISIT: CPT | Performed by: NURSE PRACTITIONER

## 2017-04-24 PROCEDURE — G8979 MOBILITY GOAL STATUS: HCPCS

## 2017-04-24 PROCEDURE — 94760 N-INVAS EAR/PLS OXIMETRY 1: CPT

## 2017-04-24 RX ADMIN — OXYCODONE HYDROCHLORIDE AND ACETAMINOPHEN 1 TABLET: 10; 325 TABLET ORAL at 13:19

## 2017-04-24 RX ADMIN — FAMOTIDINE 20 MG: 20 TABLET ORAL at 16:51

## 2017-04-24 RX ADMIN — ALBUTEROL SULFATE 2.5 MG: 2.5 SOLUTION RESPIRATORY (INHALATION) at 09:57

## 2017-04-24 RX ADMIN — ACETYLCYSTEINE 2 ML: 100 INHALANT RESPIRATORY (INHALATION) at 06:40

## 2017-04-24 RX ADMIN — OXYCODONE HYDROCHLORIDE AND ACETAMINOPHEN 1 TABLET: 10; 325 TABLET ORAL at 04:00

## 2017-04-24 RX ADMIN — MORPHINE SULFATE: 1 INJECTION INTRAVENOUS at 11:32

## 2017-04-24 RX ADMIN — FERROUS SULFATE TAB EC 324 MG (65 MG FE EQUIVALENT) 324 MG: 324 (65 FE) TABLET DELAYED RESPONSE at 08:22

## 2017-04-24 RX ADMIN — SODIUM CHLORIDE 100 ML/HR: 900 INJECTION, SOLUTION INTRAVENOUS at 09:43

## 2017-04-24 RX ADMIN — OXYCODONE HYDROCHLORIDE AND ACETAMINOPHEN 1 TABLET: 10; 325 TABLET ORAL at 16:50

## 2017-04-24 RX ADMIN — ALBUTEROL SULFATE 2.5 MG: 2.5 SOLUTION RESPIRATORY (INHALATION) at 13:56

## 2017-04-24 RX ADMIN — MORPHINE SULFATE: 1 INJECTION INTRAVENOUS at 04:00

## 2017-04-24 RX ADMIN — SENNOSIDES AND DOCUSATE SODIUM 2 TABLET: 8.6; 5 TABLET ORAL at 20:25

## 2017-04-24 RX ADMIN — ALBUTEROL SULFATE 2.5 MG: 2.5 SOLUTION RESPIRATORY (INHALATION) at 06:40

## 2017-04-24 RX ADMIN — OXYCODONE HYDROCHLORIDE AND ACETAMINOPHEN 500 MG: 500 TABLET ORAL at 08:22

## 2017-04-24 RX ADMIN — MORPHINE SULFATE: 1 INJECTION INTRAVENOUS at 19:19

## 2017-04-24 RX ADMIN — FAMOTIDINE 20 MG: 20 TABLET ORAL at 08:22

## 2017-04-24 RX ADMIN — ACETYLCYSTEINE 2 ML: 100 INHALANT RESPIRATORY (INHALATION) at 19:06

## 2017-04-24 RX ADMIN — SODIUM CHLORIDE 100 ML/HR: 900 INJECTION, SOLUTION INTRAVENOUS at 19:09

## 2017-04-24 RX ADMIN — ACETYLCYSTEINE 2 ML: 100 INHALANT RESPIRATORY (INHALATION) at 09:58

## 2017-04-24 RX ADMIN — Medication 1 TABLET: at 08:22

## 2017-04-24 RX ADMIN — ACETYLCYSTEINE 2 ML: 100 INHALANT RESPIRATORY (INHALATION) at 13:56

## 2017-04-24 RX ADMIN — ALBUTEROL SULFATE 2.5 MG: 2.5 SOLUTION RESPIRATORY (INHALATION) at 19:07

## 2017-04-24 RX ADMIN — OXYCODONE HYDROCHLORIDE AND ACETAMINOPHEN 1 TABLET: 10; 325 TABLET ORAL at 08:26

## 2017-04-24 RX ADMIN — OXYCODONE HYDROCHLORIDE AND ACETAMINOPHEN 1 TABLET: 10; 325 TABLET ORAL at 23:23

## 2017-04-24 RX ADMIN — KETOROLAC TROMETHAMINE 10 MG: 10 TABLET, FILM COATED ORAL at 06:31

## 2017-04-24 RX ADMIN — OXYCODONE HYDROCHLORIDE AND ACETAMINOPHEN 500 MG: 500 TABLET ORAL at 16:51

## 2017-04-24 NOTE — PLAN OF CARE
Problem: Lung Surgery (via Thoracotomy) (Adult)  Goal: Signs and Symptoms of Listed Potential Problems Will be Absent or Manageable (Lung Surgery)  Outcome: Ongoing (interventions implemented as appropriate)    04/24/17 0421   Lung Surgery (via Thoracotomy)   Problems Assessed (Lung Surgery) all   Problems Present (Lung Surgery) pain;situational response         Problem: Pain, Acute (Adult)  Goal: Acceptable Pain Control/Comfort Level  Outcome: Ongoing (interventions implemented as appropriate)  Pt using PO pain medication in order to decrease use of PCA    04/24/17 0421   Pain, Acute (Adult)   Acceptable Pain Control/Comfort Level making progress toward outcome

## 2017-04-24 NOTE — PROGRESS NOTES
Discharge Planning Assessment  [facility]     Patient Name: Tino Peres  MRN: [id number]  Today's Date: 4/24/2017    Admit Date: 4/19/2017          Discharge Needs Assessment       04/24/17 1522    Living Environment    Lives With alone    Living Arrangements apartment    Provides Primary Care For no one    Quality Of Family Relationships supportive    Discharge Needs Assessment    Concerns To Be Addressed no discharge needs identified    Equipment Currently Used at Home none    Equipment Needed After Discharge none    Transportation Available car;family or friend will provide            Discharge Plan       04/24/17 1523    Case Management/Social Work Plan    Plan Home with no services.  Refused home health services.         Discharge Placement     No information found        Expected Discharge Date and Time     Expected Discharge Date Expected Discharge Time    Apr 26, 2017               Demographic Summary       04/24/17 1519    Referral Information    Admission Type inpatient    Primary Care Physician Information    Name Dr. Stein            Functional Status       04/24/17 1521    Functional Status Prior    Ambulation 0-->independent    Transferring 0-->independent    Toileting 0-->independent    Bathing 0-->independent    Dressing 0-->independent    Eating 0-->independent    Communication 0-->understands/communicates without difficulty    Swallowing 0-->swallows foods/liquids without difficulty    IADL    Medications independent    Meal Preparation independent    Housekeeping independent    Laundry independent    Shopping independent    Oral Care independent    Cognitive/Perceptual/Developmental    Current Mental Status/Cognitive Functioning no deficits noted    Recent Changes in Mental Status/Cognitive Functioning no changes            Psychosocial     None            Abuse/Neglect     None            Legal     None            Substance Abuse     None            Patient Forms     None          Irene  ALEX Pantoja

## 2017-04-24 NOTE — PROGRESS NOTES
"  Cardiothoracic - Vascular Surgery Daily Note        LOS: 5 days   POD# 5  LEFT THORACOTOMY, UPPER LOBECTOMY  THORACIC, MEDIASTINAL LYMPHADENCTOMY  BRONCHOSCOPY      Chief Complaint: Lung Mass. Pain partially controlled      Subjective         Subjective:  Symptoms:  Stable.  He reports cough, chest pain (surgical) and weakness.  No shortness of breath.    Diet:  Adequate intake.  No nausea or vomiting.    Activity level: Normal with assistance.    Pain:  He complains of pain that is moderate.  Pain is requiring pain medication and partially controlled.          Objective     Vital Signs  Temp:  [96.5 °F (35.8 °C)-98.3 °F (36.8 °C)] 98.3 °F (36.8 °C)  Heart Rate:  [81-88] 85  Resp:  [16-18] 18  BP: (132-175)/(69-91) 162/80  Body mass index is 22.53 kg/(m^2).    Intake/Output Summary (Last 24 hours) at 04/24/17 0950  Last data filed at 04/24/17 0943   Gross per 24 hour   Intake          3115.67 ml   Output             1845 ml   Net          1270.67 ml     I/O this shift:  In: 1921.7 [I.V.:1921.7]  Out: 325 [Urine:325]  CT 70cc. Serous No air leak  Wt Readings from Last 3 Encounters:   04/24/17 148 lb 3.2 oz (67.2 kg)   04/06/17 136 lb (61.7 kg)   03/09/17 138 lb 0.1 oz (62.6 kg)         Physical Exam   Objective:  General Appearance:  Comfortable and in no acute distress.    Vital signs: (most recent): Blood pressure 162/80, pulse 85, temperature 98.3 °F (36.8 °C), temperature source Temporal Artery , resp. rate 18, height 68\" (172.7 cm), weight 148 lb 3.2 oz (67.2 kg), SpO2 91 %.  No fever.    Output: Producing urine and no stool output.    HEENT: Normal HEENT exam.    Lungs:  Normal respiratory rate and normal effort.  He is not in respiratory distress.  There are decreased breath sounds (left).  (LEFT CT H2O seal. No air leak. Serous)  Heart: Tachycardia.    Chest: Symmetric chest wall expansion.   Abdomen: Abdomen is soft and non-distended.  Bowel sounds are normal.     Extremities: Normal range of motion.  " There is no dependent edema.    Pulses: Distal pulses are intact.  There are no decreased pulses.    Neurological: Patient is alert and oriented to person, place and time.  Normal strength.    Pupils:  Pupils are equal, round, and reactive to light.    Skin:  Warm and dry.  (LEFT thoracotomy incision: CDI, Dry dressing removed. Mesh dressing intact. )          Incisions:  Clean and dry.    Results Review:      Lab Results   Component Value Date    WBC 15.31 (H) 04/22/2017    HGB 9.1 (L) 04/22/2017    HCT 28.6 (L) 04/22/2017    MCV 76.3 (L) 04/22/2017     04/22/2017     Lab Results   Component Value Date    GLUCOSE 104 (H) 04/22/2017    BUN 12 04/22/2017    CREATININE 0.51 (L) 04/22/2017    EGFRIFNONA >150 04/22/2017    BCR 23.5 04/22/2017    K 3.8 04/22/2017    CO2 26.0 04/22/2017    CALCIUM 8.6 04/22/2017    ALBUMIN 2.60 (L) 04/22/2017    LABIL2 0.8 (L) 04/22/2017     (H) 04/22/2017    ALT 59 04/22/2017     Imaging Results (last 24 hours)     Procedure Component Value Units Date/Time    XR Chest 1 View [[id number]] Collected:  04/24/17 0850     Updated:  04/24/17 0908    Narrative:       Chest single view       CLINICAL INDICATION: Shortness of breath. Postoperative chest.    COMPARISON: None    FINDINGS: Cardiac silhouette is normal in size. Postoperative  changes. Shift of the heart and mediastinum to the left.    Infiltrative changes and effusion left hemithorax. Two left-sided  chest tubes with tips in the left apex. The right lung remains  clear. Allowing for difference in technique no significant change  since prior examination April 23, 2017.      Impression:       CONCLUSION: As above.    Electronically signed by:  Nathaniel Vences MD  4/24/2017 9:07 AM CDT  Workstation: [id number]                                  acetylcysteine 2 mL Nebulization 4x Daily   albuterol 2.5 mg Nebulization 4x Daily - RT   bisacodyl 10 mg Rectal Once   famotidine 20 mg Oral BID   ferrous sulfate 324 mg Oral Daily With  Breakfast   ketorolac 10 mg Oral Q8H   multivitamin with minerals 1 tablet Oral Daily   sennosides-docusate sodium 2 tablet Oral Nightly   vitamin C 500 mg Oral BID       Morphine     sodium chloride 100 mL/hr Last Rate: 100 mL/hr (04/23/17 2300)       Patient Active Problem List   Diagnosis Code   • Tobacco use disorder F17.200   • Alcohol use disorder F10.99   • Iron deficiency anemia D50.9   • Cavitary lesion of lung J98.4   • 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   (Stable post op LEFT thoracotomy, upper lobectomy: Progressing well.     Lung mass /Advanced Squamous cell carcinoma lung: Residual tumor adhered to chest. Lymph negative. Will require postoperative adjuvant therapy.Staging pT3N0    Resp: Stable. On RA. Positive pressure Nebs. Aggressive Pulmonary Toilet. CT H2O seal. CXR am.    Pain: PCA demand only. Percocet q4h. Toradol    Rehab: OOB, Ambulate today    Acute on Chronic Anemia: Continue home Iron supplement.     Alcohol Use disorder: Discussed DT's, Beer offered with meals. Declines at this time.   ).     Plan:   Out of bed and up to chair and encourage ambulation.  Continue respiratory treatments and start/continue incentive spirometry.  Regular diet.  Chest x-ray.  (Continue 3W care. CT H20 seal. CXR am. Aggressive pulmonary toilet.).           This document has been electronically signed by CIPRIANO Peterson on April 24, 2017 9:50 AM

## 2017-04-24 NOTE — PAYOR COMM NOTE
"Tino Peres (57 y.o. Male)     Date of Birth Social Security Number Address Home Phone MRN    [id number]  [id number]  [facility] [address] [phone] [id number]    [place] Marital Status          None Single       Admission Date Admission Type Admitting Provider Attending Provider Department, Room/Bed    4/19/17 Elective Angel Hernandez MD [place], Thomas Mark, MD [address], 302/1    Discharge Date Discharge Disposition Discharge Destination                      Attending Provider: Angel Hernandez MD     Allergies:  No Known Allergies    Isolation:  None   Infection:  None   Code Status:  FULL    Ht:  68\" (172.7 cm)   Wt:  148 lb 3.2 oz (67.2 kg)    Admission Cmt:  None   Principal Problem:  Cavitary lesion of lung [J98.4] More...                 Active Insurance as of 4/19/2017     Primary Coverage     Payor Plan Insurance Group Employer/Plan Group    [facility] [id number]     Payor Plan Address Payor Plan Phone Number Effective From Effective To    [address] [address] [phone] 1/1/2017     [place] [address]       Subscriber Name Subscriber Birth Date Member ID       TINO PERES [id number]                 Emergency Contacts      (Rel.) Home Phone Work Phone Mobile Phone    Jessie Peres (Son) [phone] -- --    Yan Peers (Father) -- -- [phone]            Vital Signs (last 72 hrs)       04/21 0700  -  04/22 0659 04/22 0700  -  04/23 0659 04/23 0700  -  04/24 0659 04/24 0700  -  04/24 1306   Most Recent    Temp (°F) 96.4 -  98.2    97.3 -  98.4    96.2 -  98    97.6 -  98.3     97.6 (36.4)    Heart Rate 83 -  102    81 -  106    81 -  96    83 -  88     88    Resp 18 -  20    18 -  20    16 -  18    16 -  18     16    /63 -  134/74    124/69 -  148/76    129/79 -  175/85    155/76 -  162/80     155/76    SpO2 (%) 91 -  94    90 -  93    (!)88 -  96    (!)88 -  92     92          Hospital Medications (active)      "  Dose Frequency Start End    acetaminophen (TYLENOL) tablet 650 mg 650 mg Every 4 Hours PRN 4/19/2017     Sig - Route: Take 2 tablets by mouth Every 4 (Four) Hours As Needed for Mild Pain (1-3) or Fever (temperature greater than 101F). - Oral    acetylcysteine (MUCOMYST) 10 % solution 2 mL 2 mL 4 Times Daily 4/22/2017 4/25/2017    Sig - Route: Take 2 mL by nebulization 4 (Four) Times a Day. - Nebulization    albuterol (PROVENTIL) nebulizer solution 0.5% 2.5 mg/0.5mL 2.5 mg 4 Times Daily - RT 4/22/2017     Sig - Route: Take 0.5 mL by nebulization 4 (Four) Times a Day. - Nebulization    bacitracin injection  As Needed 4/19/2017     Sig: As Needed.    bisacodyl (DULCOLAX) suppository 10 mg 10 mg Daily PRN 4/19/2017     Sig - Route: Insert 1 suppository into the rectum Daily As Needed for Constipation. - Rectal    bisacodyl (DULCOLAX) suppository 10 mg 10 mg Once 4/21/2017     Sig - Route: Insert 1 suppository into the rectum 1 (One) Time. - Rectal    bupivacaine (PF) (MARCAINE) 0.5 % injection  As Needed 4/19/2017     Sig: As Needed.    diphenhydrAMINE (BENADRYL) injection 25 mg 25 mg Every 6 Hours PRN 4/19/2017     Sig - Route: Infuse 0.5 mL into a venous catheter Every 6 (Six) Hours As Needed for Itching. - Intravenous    famotidine (PEPCID) tablet 20 mg 20 mg 2 Times Daily 4/22/2017     Sig - Route: Take 1 tablet by mouth 2 (Two) Times a Day. - Oral    ferrous sulfate EC tablet 324 mg 324 mg Daily With Breakfast 4/20/2017     Sig - Route: Take 1 tablet by mouth Daily With Breakfast. - Oral    ketorolac (TORADOL) tablet 10 mg 10 mg Every 8 Hours Scheduled 4/21/2017 4/24/2017    Sig - Route: Take 1 tablet by mouth Every 8 (Eight) Hours. - Oral    Morphine sulfate PCA 1 mg/mL 30 mL syringe  Continuous 4/21/2017 5/3/2017    Sig - Route: Infuse  into a venous catheter Continuous. - Intravenous    multivitamin with minerals tablet 1 tablet 1 tablet Daily 4/19/2017     Sig - Route: Take 1 tablet by mouth Daily. - Oral     "naloxone (NARCAN) injection 0.1 mg 0.1 mg Every 5 Minutes PRN 4/19/2017     Sig - Route: Infuse 0.25 mL into a venous catheter Every 5 (Five) Minutes As Needed for Opioid Reversal or Respiratory Depression (see administration instructions). - Intravenous    nitroglycerin (NITROSTAT) SL tablet 0.4 mg 0.4 mg Every 5 Minutes PRN 4/19/2017     Sig - Route: Place 1 tablet under the tongue Every 5 (Five) Minutes As Needed for Chest Pain. - Sublingual    ondansetron (ZOFRAN) injection 4 mg 4 mg Every 6 Hours PRN 4/19/2017     Sig - Route: Infuse 2 mL into a venous catheter Every 6 (Six) Hours As Needed for Nausea or Vomiting. - Intravenous    Linked Group 1:  \"Or\" Linked Group Details        ondansetron (ZOFRAN) tablet 4 mg 4 mg Every 6 Hours PRN 4/19/2017     Sig - Route: Take 1 tablet by mouth Every 6 (Six) Hours As Needed for Nausea or Vomiting. - Oral    Linked Group 1:  \"Or\" Linked Group Details        ondansetron ODT (ZOFRAN-ODT) disintegrating tablet 4 mg 4 mg Every 6 Hours PRN 4/19/2017     Sig - Route: Take 1 tablet by mouth Every 6 (Six) Hours As Needed for Nausea or Vomiting. - Oral    Linked Group 1:  \"Or\" Linked Group Details        oxyCODONE-acetaminophen (PERCOCET)  MG per tablet 1 tablet 1 tablet Every 4 Hours PRN 4/21/2017 4/27/2017    Sig - Route: Take 1 tablet by mouth Every 4 (Four) Hours As Needed for Moderate Pain (4-6). - Oral    sennosides-docusate sodium (SENOKOT-S) 8.6-50 MG tablet 2 tablet 2 tablet Nightly 4/19/2017     Sig - Route: Take 2 tablets by mouth Every Night. - Oral    sodium chloride (BACTERIOSTATIC) injection  As Needed 4/19/2017     Sig: As Needed.    sodium chloride 0.9 % flush 1-10 mL 1-10 mL As Needed 4/19/2017     Sig - Route: Infuse 1-10 mL into a venous catheter As Needed for Line Care. - Intravenous    sodium chloride 0.9 % infusion 100 mL/hr Continuous 4/19/2017     Sig - Route: Infuse 100 mL/hr into a venous catheter Continuous. - Intravenous    thrombin spray  As " Needed 4/19/2017     Sig: As Needed.    vitamin C (ASCORBIC ACID) tablet 500 mg 500 mg 2 Times Daily 4/20/2017     Sig - Route: Take 1 tablet by mouth 2 (Two) Times a Day. - Oral          Lab Results (last 72 hours)     Procedure Component Value Units Date/Time    CBC (No Diff) [[id number]]  (Abnormal) Collected:  04/22/17 0505    Specimen:  Blood Updated:  04/22/17 0549     WBC 15.31 (H) 10*3/mm3      RBC 3.75 (L) 10*6/mm3      Hemoglobin 9.1 (L) g/dL      Hematocrit 28.6 (L) %      MCV 76.3 (L) fL      MCH 24.3 (L) pg      MCHC 31.8 g/dL      RDW 21.2 (H) %      RDW-SD 59.9 (H) fl      MPV 8.4 fL      Platelets 450 10*3/mm3     Comprehensive Metabolic Panel [[id number]]  (Abnormal) Collected:  04/22/17 0505    Specimen:  Blood Updated:  04/22/17 0605     Glucose 104 (H) mg/dL      BUN 12 mg/dL      Creatinine 0.51 (L) mg/dL      Sodium 135 (L) mmol/L      Potassium 3.8 mmol/L      Chloride 103 mmol/L      CO2 26.0 mmol/L      Calcium 8.6 mg/dL      Total Protein 5.8 (L) g/dL      Albumin 2.60 (L) g/dL      ALT (SGPT) 59 U/L      AST (SGOT) 167 (H) U/L      Alkaline Phosphatase 540 (H) U/L      Total Bilirubin 0.4 mg/dL      eGFR Non African Amer >150 mL/min/1.73      Globulin 3.2 gm/dL      A/G Ratio 0.8 (L) g/dL      BUN/Creatinine Ratio 23.5     Anion Gap 6.0 mmol/L     POC Glucose Fingerstick [[id number]]  (Abnormal) Collected:  04/21/17 1449    Specimen:  Blood Updated:  04/23/17 0609     Glucose 136 (H) mg/dL       RN NotifiedMeter: [id number]: [id number] EJ MOORE       Tissue/Bone Culture [[id number]]  (Normal) Collected:  04/19/17 1538    Specimen:  Tissue from Lung, Left Upper Lobe Updated:  04/24/17 0637     Culture No growth at 5 days     Gram Stain Result Few (2+) WBCs seen      No organisms seen    Narrative:       No anaerobes isolated          Imaging Results (last 72 hours)     Procedure Component Value Units Date/Time    XR Chest 1 View [[id number]] Collected:  04/22/17 0443     Updated:   04/22/17 0529    Narrative:       Exam: AP portable chest    INDICATION: Postop left upper lobe lobectomy    COMPARISON: 4/19/2017    FINDINGS: AP portable chest. There are two left chest tube in  place. The the mediastinal shifted to the left compatible volume  loss. There is increasing opacity in the left lung compatible  increasing pleural fluid with atelectasis and/or infiltrate. The  right lung remains clear.       Impression:       Impression: Increasing opacity left lung compatible increasing  pleural fluid with  atelectasis  and/or infiltrate    Electronically signed by:  Peter De La Torre MD  4/22/2017 5:28 AM  CDT Workstation: [id number]    XR Chest 1 View [[id number]] Collected:  04/23/17 0510     Updated:  04/23/17 0534    Narrative:       Exam: AP portable chest    INDICATION: Post op left thoracotomy    COMPARISON: 4/22/2017 at 4:43 AM    EYES: AP portable chest. Status post left thoracotomy. Two left  chest tubes remain in place. No change in the opacity left lung  compatible with a large pleural effusion with atelectasis and/or  infiltrate. Right lung remains clear.      Impression:       No significant change.    Electronically signed by:  Peter De La Torre MD  4/23/2017 5:33 AM  CDT Workstation: [id number]    XR Chest 1 View [[id number]] Collected:  04/24/17 0850     Updated:  04/24/17 0908    Narrative:       Chest single view       CLINICAL INDICATION: Shortness of breath. Postoperative chest.    COMPARISON: None    FINDINGS: Cardiac silhouette is normal in size. Postoperative  changes. Shift of the heart and mediastinum to the left.    Infiltrative changes and effusion left hemithorax. Two left-sided  chest tubes with tips in the left apex. The right lung remains  clear. Allowing for difference in technique no significant change  since prior examination April 23, 2017.      Impression:       CONCLUSION: As above.    Electronically signed by:  Nathaniel Vences MD  4/24/2017 9:07 AM CDT  Workstation:  "[id number]             Physician Progress Notes (last 72 hours) (Notes from 4/21/2017  1:07 PM through 4/24/2017  1:07 PM)      Ryann Rose CIPRIANO Rothman at 4/22/2017  9:04 AM  Version 1 of 1           Cardiothoracic - Vascular Surgery Daily Note        LOS: 3 days   POD# 2  LEFT THORACOTOMY, UPPER LOBECTOMY  THORACIC, MEDIASTINAL LYMPHADENCTOMY  BRONCHOSCOPY      Chief Complaint: Lung Mass. Pain partially controlled      Subjective         Subjective:  Symptoms:  Stable.  He reports cough, chest pain (surgical) and weakness.  No shortness of breath.    Diet:  Adequate intake.  No nausea or vomiting.    Activity level: Normal with assistance.    Pain:  He complains of pain that is moderate.  Pain is requiring pain medication and partially controlled.          Objective     Vital Signs  Temp:  [96.4 °F (35.8 °C)-98.2 °F (36.8 °C)] 97.3 °F (36.3 °C)  Heart Rate:  [] 101  Resp:  [18-20] 20  BP: (115-134)/(57-74) 124/69  Body mass index is 21.83 kg/(m^2).    Intake/Output Summary (Last 24 hours) at 04/22/17 0904  Last data filed at 04/22/17 0600   Gross per 24 hour   Intake             1262 ml   Output             1155 ml   Net              107 ml        CT 280cc. serosanginous Minimal air leak with cough H2O seal  Wt Readings from Last 3 Encounters:   04/22/17 143 lb 9.6 oz (65.1 kg)   04/06/17 136 lb (61.7 kg)   03/09/17 138 lb 0.1 oz (62.6 kg)         Physical Exam   Objective:  General Appearance:  Comfortable and in no acute distress.    Vital signs: (most recent): Blood pressure 124/69, pulse 101, temperature 97.3 °F (36.3 °C), temperature source Temporal Artery , resp. rate 20, height 68\" (172.7 cm), weight 143 lb 9.6 oz (65.1 kg), SpO2 92 %.  No fever.    Output: Producing urine and no stool output.    HEENT: Normal HEENT exam.    Lungs:  Normal respiratory rate and normal effort.  He is not in respiratory distress.  There are decreased breath sounds (left).  (LEFT CT H2O seal. Sm Air Leak with cough. " Serous)  Heart: Tachycardia.    Chest: Symmetric chest wall expansion.   Abdomen: Abdomen is soft and non-distended.  Bowel sounds are normal.     Extremities: Normal range of motion.  There is no dependent edema.    Pulses: Distal pulses are intact.  There are no decreased pulses.    Neurological: Patient is alert and oriented to person, place and time.  Normal strength.    Pupils:  Pupils are equal, round, and reactive to light.    Skin:  Warm and dry.  (LEFT thoracotomy incision: CDI, Dry dressing removed. Mesh dressing intact. )          Incisions:  Clean and dry.    Results Review:      Lab Results   Component Value Date    WBC 15.31 (H) 04/22/2017    HGB 9.1 (L) 04/22/2017    HCT 28.6 (L) 04/22/2017    MCV 76.3 (L) 04/22/2017     04/22/2017     Lab Results   Component Value Date    GLUCOSE 104 (H) 04/22/2017    BUN 12 04/22/2017    CREATININE 0.51 (L) 04/22/2017    EGFRIFNONA >150 04/22/2017    BCR 23.5 04/22/2017    K 3.8 04/22/2017    CO2 26.0 04/22/2017    CALCIUM 8.6 04/22/2017    ALBUMIN 2.60 (L) 04/22/2017    LABIL2 0.8 (L) 04/22/2017     (H) 04/22/2017    ALT 59 04/22/2017     Imaging Results (last 24 hours)     Procedure Component Value Units Date/Time    XR Chest 1 View [[id number]] Collected:  04/22/17 0443     Updated:  04/22/17 0529    Narrative:       Exam: AP portable chest    INDICATION: Postop left upper lobe lobectomy    COMPARISON: 4/19/2017    FINDINGS: AP portable chest. There are two left chest tube in  place. The the mediastinal shifted to the left compatible volume  loss. There is increasing opacity in the left lung compatible  increasing pleural fluid with atelectasis and/or infiltrate. The  right lung remains clear.       Impression:       Impression: Increasing opacity left lung compatible increasing  pleural fluid with  atelectasis  and/or infiltrate    Electronically signed by:  Peter De La Torre MD  4/22/2017 5:28 AM  CDT Workstation: [id number]                                   bisacodyl 10 mg Rectal Once   ferrous sulfate 324 mg Oral Daily With Breakfast   ketorolac 10 mg Oral Q8H   multivitamin with minerals 1 tablet Oral Daily   sennosides-docusate sodium 2 tablet Oral Nightly   vitamin C 500 mg Oral BID       Morphine     sodium chloride 100 mL/hr Last Rate: 100 mL/hr (04/21/17 4949)       Patient Active Problem List   Diagnosis Code   • Tobacco use disorder F17.200   • Alcohol use disorder F10.99   • Iron deficiency anemia D50.9   • Cavitary lesion of lung J98.4   • 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   (Stable post op LEFT thoracotomy, upper lobectomy: Progressing well.     Lung mass /Advanced Squamous cell carcinoma lung: Residual tumor adhered to chest. Lymph negative. Will require postoperative adjuvant therapy.Staging pT3N0    Resp: Stable. On RA. Positive pressure Nebs. Aggressive Pulmonary Toilet. CT H2O seal. CXR am.    Pain: PCA demand only. Start Percocet q4h. Toradol    Rehab: OOB, Ambulate today    Acute on Chronic Anemia: Continue home Iron supplement.     Alcohol Use disorder: Discussed DT's, Beer offered with meals. Declines at this time.   ).     Plan:   Out of bed and up to chair and encourage ambulation.  Continue respiratory treatments and start/continue incentive spirometry.  Regular diet.  Chest x-ray.  Increase analgesics.   (Continue 3W care. CT H20 seal. CXR am. Aggressive pulmonary toilet.).             This document has been electronically signed by CIRPIANO Peterson on April 22, 2017 9:04 AM                     Electronically signed by CIPRIANO Peterson at 4/22/2017  9:10 AM      Silvio Santiago MD at 4/23/2017  9:47 AM  Version 1 of 1           Cardiothoracic - Vascular Surgery Daily Note        LOS: 4 days   POD# 4  LEFT THORACOTOMY, UPPER LOBECTOMY  THORACIC, MEDIASTINAL LYMPHADENCTOMY  BRONCHOSCOPY      Chief Complaint: Lung Mass. Pain partially controlled      Subjective  "        Subjective:  Symptoms:  Stable.  He reports cough, chest pain (surgical) and weakness.  No shortness of breath.    Diet:  Adequate intake.  No nausea or vomiting.    Activity level: Normal with assistance.    Pain:  He complains of pain that is moderate.  Pain is requiring pain medication and partially controlled.          Objective     Vital Signs  Temp:  [96.2 °F (35.7 °C)-98.4 °F (36.9 °C)] 96.2 °F (35.7 °C)  Heart Rate:  [] 96  Resp:  [18-20] 18  BP: (129-148)/(75-85) 129/79  Body mass index is 22.53 kg/(m^2).    Intake/Output Summary (Last 24 hours) at 04/23/17 0947  Last data filed at 04/23/17 0834   Gross per 24 hour   Intake             1941 ml   Output             1390 ml   Net              551 ml     I/O this shift:  In: 249 [P.O.:240; I.V.:9]  Out: -   CT 280cc. serosanginous Minimal air leak with cough H2O seal  Wt Readings from Last 3 Encounters:   04/23/17 148 lb 3.2 oz (67.2 kg)   04/06/17 136 lb (61.7 kg)   03/09/17 138 lb 0.1 oz (62.6 kg)         Physical Exam   Objective:  General Appearance:  Comfortable and in no acute distress.    Vital signs: (most recent): Blood pressure 129/79, pulse 96, temperature 96.2 °F (35.7 °C), temperature source Temporal Artery , resp. rate 18, height 68\" (172.7 cm), weight 148 lb 3.2 oz (67.2 kg), SpO2 93 %.  No fever.    Output: Producing urine and no stool output.    HEENT: Normal HEENT exam.    Lungs:  Normal respiratory rate and normal effort.  He is not in respiratory distress.  There are decreased breath sounds (left).  (LEFT CT H2O seal. Sm Air Leak with cough. Serous)  Heart: Tachycardia.    Chest: Symmetric chest wall expansion.   Abdomen: Abdomen is soft and non-distended.  Bowel sounds are normal.     Extremities: Normal range of motion.  There is no dependent edema.    Pulses: Distal pulses are intact.  There are no decreased pulses.    Neurological: Patient is alert and oriented to person, place and time.  Normal strength.    Pupils:  " Pupils are equal, round, and reactive to light.    Skin:  Warm and dry.  (LEFT thoracotomy incision: CDI, Dry dressing removed. Mesh dressing intact. )          Incisions:  Clean and dry.    Results Review:      Lab Results   Component Value Date    WBC 15.31 (H) 04/22/2017    HGB 9.1 (L) 04/22/2017    HCT 28.6 (L) 04/22/2017    MCV 76.3 (L) 04/22/2017     04/22/2017     Lab Results   Component Value Date    GLUCOSE 104 (H) 04/22/2017    BUN 12 04/22/2017    CREATININE 0.51 (L) 04/22/2017    EGFRIFNONA >150 04/22/2017    BCR 23.5 04/22/2017    K 3.8 04/22/2017    CO2 26.0 04/22/2017    CALCIUM 8.6 04/22/2017    ALBUMIN 2.60 (L) 04/22/2017    LABIL2 0.8 (L) 04/22/2017     (H) 04/22/2017    ALT 59 04/22/2017     Imaging Results (last 24 hours)     Procedure Component Value Units Date/Time    XR Chest 1 View [[id number]] Collected:  04/23/17 0510     Updated:  04/23/17 0534    Narrative:       Exam: AP portable chest    INDICATION: Post op left thoracotomy    COMPARISON: 4/22/2017 at 4:43 AM    EYES: AP portable chest. Status post left thoracotomy. Two left  chest tubes remain in place. No change in the opacity left lung  compatible with a large pleural effusion with atelectasis and/or  infiltrate. Right lung remains clear.      Impression:       No significant change.    Electronically signed by:  Peter De La Torre MD  4/23/2017 5:33 AM  CDT Workstation: [id number]                                  acetylcysteine 2 mL Nebulization 4x Daily   albuterol 2.5 mg Nebulization 4x Daily - RT   bisacodyl 10 mg Rectal Once   famotidine 20 mg Oral BID   ferrous sulfate 324 mg Oral Daily With Breakfast   ketorolac 10 mg Oral Q8H   multivitamin with minerals 1 tablet Oral Daily   sennosides-docusate sodium 2 tablet Oral Nightly   vitamin C 500 mg Oral BID       Morphine     sodium chloride 100 mL/hr Last Rate: 100 mL/hr (04/22/17 0034)       Patient Active Problem List   Diagnosis Code   • Tobacco use disorder F17.200    • Alcohol use disorder F10.99   • Iron deficiency anemia D50.9   • Cavitary lesion of lung J98.4   • 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   (Stable post op LEFT thoracotomy, upper lobectomy: Progressing well.     Lung mass /Advanced Squamous cell carcinoma lung: Residual tumor adhered to chest. Lymph negative. Will require postoperative adjuvant therapy.Staging pT3N0    Resp: Stable. On RA. Positive pressure Nebs. Aggressive Pulmonary Toilet. CT H2O seal. CXR am.    Pain: PCA demand only. Start Percocet q4h. Toradol    Rehab: OOB, Ambulate today    Acute on Chronic Anemia: Continue home Iron supplement.     Alcohol Use disorder: Discussed DT's, Beer offered with meals. Declines at this time.   ).     Plan:   Out of bed and up to chair and encourage ambulation.  Continue respiratory treatments and start/continue incentive spirometry.  Regular diet.  Chest x-ray.  Increase analgesics.   (Continue 3W care. CT H20 seal. CXR am. Aggressive pulmonary toilet.).   Resume -20cm suction  Chest PT q4H  Pain control          This document has been electronically signed by Silvio Santiago MD on April 23, 2017 9:47 AM                     Electronically signed by Silvio Santiago MD at 4/23/2017  9:56 AM      CIPRIANO Peterson at 4/24/2017  9:50 AM  Version 1 of 1           Cardiothoracic - Vascular Surgery Daily Note        LOS: 5 days   POD# 5  LEFT THORACOTOMY, UPPER LOBECTOMY  THORACIC, MEDIASTINAL LYMPHADENCTOMY  BRONCHOSCOPY      Chief Complaint: Lung Mass. Pain partially controlled      Subjective         Subjective:  Symptoms:  Stable.  He reports cough, chest pain (surgical) and weakness.  No shortness of breath.    Diet:  Adequate intake.  No nausea or vomiting.    Activity level: Normal with assistance.    Pain:  He complains of pain that is moderate.  Pain is requiring pain medication and partially controlled.          Objective     Vital Signs  Temp:  [96.5  "°F (35.8 °C)-98.3 °F (36.8 °C)] 98.3 °F (36.8 °C)  Heart Rate:  [81-88] 85  Resp:  [16-18] 18  BP: (132-175)/(69-91) 162/80  Body mass index is 22.53 kg/(m^2).    Intake/Output Summary (Last 24 hours) at 04/24/17 0950  Last data filed at 04/24/17 0943   Gross per 24 hour   Intake          3115.67 ml   Output             1845 ml   Net          1270.67 ml     I/O this shift:  In: 1921.7 [I.V.:1921.7]  Out: 325 [Urine:325]  CT 70cc. Serous No air leak  Wt Readings from Last 3 Encounters:   04/24/17 148 lb 3.2 oz (67.2 kg)   04/06/17 136 lb (61.7 kg)   03/09/17 138 lb 0.1 oz (62.6 kg)         Physical Exam   Objective:  General Appearance:  Comfortable and in no acute distress.    Vital signs: (most recent): Blood pressure 162/80, pulse 85, temperature 98.3 °F (36.8 °C), temperature source Temporal Artery , resp. rate 18, height 68\" (172.7 cm), weight 148 lb 3.2 oz (67.2 kg), SpO2 91 %.  No fever.    Output: Producing urine and no stool output.    HEENT: Normal HEENT exam.    Lungs:  Normal respiratory rate and normal effort.  He is not in respiratory distress.  There are decreased breath sounds (left).  (LEFT CT H2O seal. No air leak. Serous)  Heart: Tachycardia.    Chest: Symmetric chest wall expansion.   Abdomen: Abdomen is soft and non-distended.  Bowel sounds are normal.     Extremities: Normal range of motion.  There is no dependent edema.    Pulses: Distal pulses are intact.  There are no decreased pulses.    Neurological: Patient is alert and oriented to person, place and time.  Normal strength.    Pupils:  Pupils are equal, round, and reactive to light.    Skin:  Warm and dry.  (LEFT thoracotomy incision: CDI, Dry dressing removed. Mesh dressing intact. )          Incisions:  Clean and dry.    Results Review:      Lab Results   Component Value Date    WBC 15.31 (H) 04/22/2017    HGB 9.1 (L) 04/22/2017    HCT 28.6 (L) 04/22/2017    MCV 76.3 (L) 04/22/2017     04/22/2017     Lab Results   Component Value " Date    GLUCOSE 104 (H) 04/22/2017    BUN 12 04/22/2017    CREATININE 0.51 (L) 04/22/2017    EGFRIFNONA >150 04/22/2017    BCR 23.5 04/22/2017    K 3.8 04/22/2017    CO2 26.0 04/22/2017    CALCIUM 8.6 04/22/2017    ALBUMIN 2.60 (L) 04/22/2017    LABIL2 0.8 (L) 04/22/2017     (H) 04/22/2017    ALT 59 04/22/2017     Imaging Results (last 24 hours)     Procedure Component Value Units Date/Time    XR Chest 1 View [[id number]] Collected:  04/24/17 0850     Updated:  04/24/17 0908    Narrative:       Chest single view       CLINICAL INDICATION: Shortness of breath. Postoperative chest.    COMPARISON: None    FINDINGS: Cardiac silhouette is normal in size. Postoperative  changes. Shift of the heart and mediastinum to the left.    Infiltrative changes and effusion left hemithorax. Two left-sided  chest tubes with tips in the left apex. The right lung remains  clear. Allowing for difference in technique no significant change  since prior examination April 23, 2017.      Impression:       CONCLUSION: As above.    Electronically signed by:  Nathaniel Vences MD  4/24/2017 9:07 AM CDT  Workstation: [id number]                                  acetylcysteine 2 mL Nebulization 4x Daily   albuterol 2.5 mg Nebulization 4x Daily - RT   bisacodyl 10 mg Rectal Once   famotidine 20 mg Oral BID   ferrous sulfate 324 mg Oral Daily With Breakfast   ketorolac 10 mg Oral Q8H   multivitamin with minerals 1 tablet Oral Daily   sennosides-docusate sodium 2 tablet Oral Nightly   vitamin C 500 mg Oral BID       Morphine     sodium chloride 100 mL/hr Last Rate: 100 mL/hr (04/23/17 2300)       Patient Active Problem List   Diagnosis Code   • Tobacco use disorder F17.200   • Alcohol use disorder F10.99   • Iron deficiency anemia D50.9   • Cavitary lesion of lung J98.4   • 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   (Stable post op LEFT thoracotomy, upper lobectomy: Progressing well.     Lung mass  /Advanced Squamous cell carcinoma lung: Residual tumor adhered to chest. Lymph negative. Will require postoperative adjuvant therapy.Staging pT3N0    Resp: Stable. On RA. Positive pressure Nebs. Aggressive Pulmonary Toilet. CT H2O seal. CXR am.    Pain: PCA demand only. Percocet q4h. Toradol    Rehab: OOB, Ambulate today    Acute on Chronic Anemia: Continue home Iron supplement.     Alcohol Use disorder: Discussed DT's, Beer offered with meals. Declines at this time.   ).     Plan:   Out of bed and up to chair and encourage ambulation.  Continue respiratory treatments and start/continue incentive spirometry.  Regular diet.  Chest x-ray.  (Continue 3W care. CT H20 seal. CXR am. Aggressive pulmonary toilet.).           This document has been electronically signed by CIPRIANO Peterson on April 24, 2017 9:50 AM                     Electronically signed by CIPRIANO Peterson at 4/24/2017 10:06 AM          Continued Stay Review  Auth # [id number]  [id number]  [phone]  Consult Notes (last 72 hours) (Notes from 4/21/2017  1:07 PM through 4/24/2017  1:07 PM)     No notes of this type exist for this encounter.

## 2017-04-24 NOTE — PROGRESS NOTES
Acute Care - Physical Therapy Initial Evaluation  [facility]     Patient Name: Tino Peres  : 1960  MRN: [id number]  Today's Date: 2017      Date of Referral to PT: 17  Referring Physician: Dr. Silvio Santiago      Admit Date: 2017     Visit Dx:    ICD-10-CM ICD-9-CM   1. Cavitary lesion of lung J98.4 518.89   2. 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 Lung mass R91.8 786.6   4. Impaired physical mobility Z74.09 781.99     Patient Active Problem List   Diagnosis   • Tobacco use disorder   • Alcohol use disorder   • Iron deficiency anemia   • Cavitary lesion of lung   • Mycetoma     Past Medical History:   Diagnosis Date   • Anemia    • Aortic stenosis, mild    • Chronic bronchitis    • Mitral valve regurgitation      Past Surgical History:   Procedure Laterality Date   • APPENDECTOMY      open   • BRONCHOSCOPY N/A 3/8/2017    Procedure: BRONCHOSCOPY with possible bronchoalveolar lavage, +/- brush, +/- needle biopsy, +/- endobronchial biopsy;  Surgeon: Candis Lr MD;  Location: [facility] ENDOSCOPY;  Service:    • EXPLORATORY LAPAROTOMY  2014    Exploratory laparotomy, repair of duodenal ulcer, modified Carlos patch   • INGUINAL HERNIA REPAIR     • THORACOTOMY Left 2017    Procedure: THORACOTOMY LEFT WITH PULMONARY RESECTION AND BRONCHOSCOPY WITH ENDO ;  Surgeon: Angel Hernandez MD;  Location: [facility] OR;  Service:           PT ASSESSMENT (last 72 hours)      PT Evaluation       17 1522 17 1456    Rehab Evaluation    Document Type  evaluation  -JAY    Subjective Information  agree to therapy;pain  -JAY    Patient Effort, Rehab Treatment  good  -JAY    General Information    Patient Profile Review  yes  -JAY    Referring Physician  Dr. Silvio Santiago  -JAY    General Observations  supine, awake, alert;noted: IV, PCA, chest tube  -JAY    Pertinent History Of Current Problem  Pt admitted to [facility] 2017 with L lung mass/cavitary lesion with mycetoma. Pt underwent L thoracotomy with  upper lobectomy, thoracic mediastinal lymphadenectomy bronchoscopy 4/22/2017 with chest tube placement.  -JAY    Precautions/Limitations  fall precautions;other (see comments)   vital signs;watch gait belt placement  -JAY    Prior Level of Function  independent:  -JAY    Equipment Currently Used at Home none  -MR none  -JAY    Barriers to Rehab  none identified  -JAY    Living Environment    Lives With alone  -MR alone  -JAY    Living Arrangements apartment  -MR apartment  -JAY    Home Accessibility  no concerns  -JAY    Transportation Available car;family or friend will provide  -MR car;family or friend will provide  -JAY    Clinical Impression    Date of Referral to PT  04/23/17  -JAY    PT Diagnosis  impaired physical mobility, s/p L thoracotomy/upper lobectomy for lung mass  -JAY    Prognosis  per MD  -JAY    Criteria for Skilled Therapeutic Interventions Met  yes  -JAY    Rehab Potential  good, to achieve stated therapy goals  -JAY    Vital Signs    Post Systolic BP Rehab  138  -JAY    Post Treatment Diastolic BP  80  -JAY    Pretreatment Heart Rate (beats/min)  84  -JAY    Intratreatment Heart Rate (beats/min)  91  -JAY    Posttreatment Heart Rate (beats/min)  89  -JAY    Pre SpO2 (%)  92  -JAY    O2 Delivery Pre Treatment  room air  -JAY    Intra SpO2 (%)  86  -JAY    O2 Delivery Intra Treatment  room air  -JAY    Post SpO2 (%)  92  -JAY    O2 Delivery Post Treatment  room air  -JAY    Pre Patient Position  Supine  -JAY    Intra Patient Position  Sitting  -JAY    Post Patient Position  Sitting    with pt  -JYA    Recovery Time  1 minute  -JAY    Pain Assessment    Pain Assessment  0-10  -JAY    Pain Score  3  -JAY    Post Pain Score  4  -JAY    Pain Type  Acute pain  -JAY    Pain Location  Incision   L side incision, chest tube site  -JAY    Pain Intervention(s)  Medication (See MAR)   pt has PCA  -JAY    Vision Assessment/Intervention    Visual Impairment  WFL  -    Cognitive Assessment/Intervention    Current  Cognitive/Communication Assessment  functional  -    Orientation Status  oriented x 4  -    Follows Commands/Answers Questions  100% of the time;able to follow multi-step instructions  -    Personal Safety  mild impairment  -    Personal Safety Interventions  nonskid shoes/slippers when out of bed;supervised activity  -    ROM (Range of Motion)    General ROM  upper extremity range of motion deficits identified  -    General ROM Detail  AROM WFL BUE's except endrange shoulder flexion limited due to pain from incision/chest tube site  -    MMT (Manual Muscle Testing)    General MMT Assessment  no strength deficits identified  -    Bed Mobility, Assessment/Treatment    Bed Mobility, Assistive Device  head of bed elevated  -    Bed Mobility, Scoot/Bridge, Eva  independent  -    Bed Mob, Supine to Sit, Eva  independent  -    Transfer Assessment/Treatment    Transfers, Sit-Stand Eva  supervision required  -    Transfers, Stand-Sit Eva  supervision required  -    Gait Assessment/Treatment    Gait, Eva Level  supervision required  -    Gait, Distance (Feet)  300  -    Gait, Comment  O2 sats dropped t 86%;however, pt denied lightheadedness or dizziness  -    Sensory Assessment/Intervention    Light Touch  LUE;RUE;LLE;RLE  -    LUE Light Touch  WNL  -JAY    RUE Light Touch  WNL  -JAY    LLE Light Touch  WNL  -JAY    RLE Light Touch  WNL  -JAY    Edema Management    Edema Amount  none  -    Positioning and Restraints    Pre-Treatment Position  in bed  -    Post Treatment Position  bed  -    In Bed  sitting EOB;call light within reach;encouraged to call for assist   pt speaking with casemanager  -      04/23/17 1505       Rehab Evaluation    Evaluation Not Performed patient/family declined evaluation   Patient states he just walked and returned to bed.  Asked for PT to come back tomorrow - therefore will check back.  -LM       User Key  (r) =  Recorded By, (t) = Taken By, (c) = Cosigned By    Initials Name Provider Type    ANGEL LUIS Reaves, PT Physical Therapist    JAY Mathew, PT Physical Therapist    MR Irene JOHNSON [place]           Physical Therapy Education     Title: PT OT SLP Therapies (Done)     Topic: Physical Therapy (Done)     Point: Mobility training (Done)    Learning Progress Summary    Learner Readiness Method Response Comment Documented by Status   Patient Acceptance E JENNIFERANG  JAY 04/24/17 1725 Done               Point: Precautions (Done)    Learning Progress Summary    Learner Readiness Method Response Comment Documented by Status   Patient Acceptance E JENNIFERANG  JAY 04/24/17 1725 Done                      User Key     Initials Effective Dates Name Provider Type Discipline    JAY 10/17/16 -  Nidia Mathew PT Physical Therapist PT                PT Recommendation and Plan  Anticipated Discharge Disposition: home (may benefit from pulmonary rehab when MD deems appropriate)  Planned Therapy Interventions: balance training, gait training, stair training, transfer training  PT Frequency: other (see comments) (5-14 times per week)  Plan of Care Review  Plan Of Care Reviewed With: patient  Outcome Summary/Follow up Plan: PT evaluation completed. Pt ambulated 300 ft with SBA of 1.O2 sats dropped to 86% on roomair; and recovered to 92% in 1 minute. Function limited primarily by decreased tolerance for functional mobility and activities. Pt will benefit from skilled PT to regain lost function.          IP PT Goals       04/24/17 1726          Transfer Training PT LTG    Transfer Training PT LTG, Date Established 04/24/17  -      Transfer Training PT LTG, Time to Achieve by discharge  -      Transfer Training PT LTG, Activity Type bed to chair /chair to bed;sit to stand/stand to sit  -      Transfer Training PT LTG, [place] Level independent  -      Gait Training PT LTG    Gait Training Goal PT LTG, Date Established 04/24/17  -       Gait Training Goal PT LTG, Time to Achieve by discharge  -      Gait Training Goal PT LTG, [place] Level independent  -      Gait Training Goal PT LTG, Distance to Achieve 400ft; O2 sats will not drop below 92%  -      Gait Training Goal PT LTG, Additional Goal 500ft, O2 sats will not drop below 92%  -      Gait Training Goal PT LTG, Outcome goal ongoing  -      Stair Training PT LTG    Stair Training Goal PT LTG, Date Established 04/24/17  -      Stair Training Goal PT LTG, Time to Achieve by discharge  -      Stair Training Goal PT LTG, Number of Steps 4  -      Stair Training Goal PT LTG, [place] Level conditional independence  -      Stair Training Goal PT LTG, Assist Device 1 handrail  -      Stair Training Goal PT LTG, Additional Goal O2 sats will not drop below 92%  -      Stair Training Goal PT LTG, Outcome goal ongoing  -        User Key  (r) = Recorded By, (t) = Taken By, (c) = Cosigned By    Initials Name Provider Type    JAY Mathew, PT Physical Therapist                Outcome Measures       04/24/17 1519          How much help from another person do you currently need...    Turning from your back to your side while in flat bed without using bedrails? 4  -JAY      Moving from lying on back to sitting on the side of a flat bed without bedrails? 4  -JAY      Moving to and from a bed to a chair (including a wheelchair)? 3  -JAY      Standing up from a chair using your arms (e.g., wheelchair, bedside chair)? 3  -JAY      Climbing 3-5 steps with a railing? 3  -JAY      To walk in hospital room? 3  -JAY      AM-[facility] 6 Clicks Score 20  -      Functional Assessment    Outcome Measure Options AM-PAC 6 Clicks Basic Mobility (PT)  -        User Key  (r) = Recorded By, (t) = Taken By, (c) = Cosigned By    Initials Name Provider Type    JAY Mathew PT Physical Therapist           Time Calculation:         PT Charges       04/24/17 9466          Time Calculation    Start  Time 1456  -      Stop Time 1520  -      Time Calculation (min) 24 min  -JAY      PT Received On 04/24/17  -      PT Goal Re-Cert Due Date 05/07/17  -        User Key  (r) = Recorded By, (t) = Taken By, (c) = Cosigned By    Initials Name Provider Type    JAY Mathew PT Physical Therapist          Therapy Charges for Today     Code Description Service Date Service Provider Modifiers Qty    91645785079 HC PT MOBILITY CURRENT 4/24/2017 Nidia Mathew, PT GP,  1    41378996713 HC PT MOBILITY PROJECTED 4/24/2017 Nidia Mathew, PT GP,  1    56192259830 HC PT EVAL MOD COMPLEXITY 1 4/24/2017 Nidia Mathew, PT GP 1    67015818450 HC GAIT TRAINING EA 15 MIN 4/24/2017 Nidia Mathew, PT GP 1          PT G-Codes  PT Professional Judgement Used?: Yes  Outcome Measure Options: AM-PAC 6 Clicks Basic Mobility (PT)  Score: 20  Functional Limitation: Mobility: Walking and moving around  Mobility: Walking and Moving Around Current Status (): At least 20 percent but less than 40 percent impaired, limited or restricted  Mobility: Walking and Moving Around Goal Status (): 0 percent impaired, limited or restricted      Nidia Mathew PT  4/24/2017

## 2017-04-24 NOTE — PLAN OF CARE
Problem: Patient Care Overview (Adult)  Goal: Plan of Care Review  Outcome: Ongoing (interventions implemented as appropriate)    04/24/17 0587   Coping/Psychosocial Response Interventions   Plan Of Care Reviewed With patient   Patient Care Overview   Progress no change       Goal: Adult Individualization and Mutuality  Outcome: Ongoing (interventions implemented as appropriate)    Problem: Lung Surgery (via Thoracotomy) (Adult)  Goal: Signs and Symptoms of Listed Potential Problems Will be Absent or Manageable (Lung Surgery)  Outcome: Ongoing (interventions implemented as appropriate)    Problem: Pain, Acute (Adult)  Goal: Acceptable Pain Control/Comfort Level  Outcome: Ongoing (interventions implemented as appropriate)

## 2017-04-24 NOTE — PAYOR COMM NOTE
"Tino Peres (57 y.o. Male)     Date of Birth Social Security Number Address Home Phone MRN    [id number]  [id number]  [facility] [address] [phone] [id number]    Islam Marital Status          None Single       Admission Date Admission Type Admitting Provider Attending Provider Department, Room/Bed    4/19/17 Elective Angel Hernandez MD [place], Thomas Mark, MD [facility] 3 [place], 302/1    Discharge Date Discharge Disposition Discharge Destination                      Attending Provider: Angel Hernandez MD     Allergies:  No Known Allergies    Isolation:  None   Infection:  None   Code Status:  FULL    Ht:  68\" (172.7 cm)   Wt:  148 lb 3.2 oz (67.2 kg)    Admission Cmt:  None   Principal Problem:  Cavitary lesion of lung [J98.4] More...                 Active Insurance as of 4/19/2017     Primary Coverage     Payor Plan Insurance Group Employer/Plan Group    [facility] [id number]     Payor Plan Address Payor Plan Phone Number Effective From Effective To    [address] [address] [phone] 1/1/2017     [place] [address]       Subscriber Name Subscriber Birth Date Member ID       TINO PERES 1960 [id number]                 Emergency Contacts      (Rel.) Home Phone Work Phone Mobile Phone    Jessie Peres (Son) [phone] -- --    Yan Peres (Father) -- -- [phone]        MAKEDA Acosta  [phone]   Phone  [phone]   Fax  [facility]                 Physician Progress Notes (last 72 hours) (Notes from 4/21/2017 10:19 AM through 4/24/2017 10:19 AM)      CIPRIANO Peterson at 4/21/2017 10:36 AM  Version 1 of 1           Cardiothoracic - Vascular Surgery Daily Note        LOS: 2 days   POD# 2  LEFT THORACOTOMY, UPPER LOBECTOMY  THORACIC, MEDIASTINAL LYMPHADENCTOMY  BRONCHOSCOPY      Chief Complaint: Lung Mass. Pain partially controlled      Subjective         Subjective:  Symptoms:  Stable.  He reports " "cough, chest pain (surgical) and weakness.  No shortness of breath.    Diet:  Adequate intake.  No nausea or vomiting.    Activity level: Normal with assistance.    Pain:  He complains of pain that is moderate.  Pain is requiring pain medication and partially controlled.          Objective     Vital Signs  Temp:  [97.5 °F (36.4 °C)-100.8 °F (38.2 °C)] 97.5 °F (36.4 °C)  Heart Rate:  [] 98  Resp:  [16-20] 20  BP: (112-145)/(63-88) 112/63  Body mass index is 21.17 kg/(m^2).    Intake/Output Summary (Last 24 hours) at 04/21/17 1036  Last data filed at 04/21/17 0715   Gross per 24 hour   Intake           841.74 ml   Output             2270 ml   Net         -1428.26 ml     I/O this shift:  In: -   Out: 350 [Urine:350]  CT 320cc. serosanginous No air leak -10sx  Wt Readings from Last 3 Encounters:   04/21/17 139 lb 3.2 oz (63.1 kg)   04/06/17 136 lb (61.7 kg)   03/09/17 138 lb 0.1 oz (62.6 kg)         Physical Exam   Objective:  General Appearance:  Comfortable and in no acute distress.    Vital signs: (most recent): Blood pressure 112/63, pulse 98, temperature 97.5 °F (36.4 °C), temperature source Temporal Artery , resp. rate 20, height 68\" (172.7 cm), weight 139 lb 3.2 oz (63.1 kg), SpO2 92 %.  No fever.    Output: Producing urine and no stool output.    HEENT: Normal HEENT exam.    Lungs:  Normal respiratory rate and normal effort.  He is not in respiratory distress.  There are decreased breath sounds (left).  (LEFT CT -10sx. No Air Leak. Serosanginous)  Heart: Tachycardia.    Chest: Symmetric chest wall expansion.   Abdomen: Abdomen is soft and non-distended.  Hypoactive bowel sounds.     Extremities: Normal range of motion.  There is no dependent edema.    Pulses: Distal pulses are intact.  There are no decreased pulses.    Neurological: Patient is alert and oriented to person, place and time.  Normal strength.    Pupils:  Pupils are equal, round, and reactive to light.    Skin:  Warm and dry.  (LEFT " thoracotomy incision: CDI, Dry dressing removed. Mesh dressing intact. )          Incisions:  Clean and dry.    Results Review:      Lab Results   Component Value Date    WBC 15.42 (H) 04/20/2017    HGB 10.0 (L) 04/20/2017    HCT 31.1 (L) 04/20/2017    MCV 76.6 (L) 04/20/2017     04/20/2017     Lab Results   Component Value Date    GLUCOSE 144 (H) 04/20/2017    BUN 23 (H) 04/20/2017    CREATININE 0.66 (L) 04/20/2017    EGFRIFNONA 125 04/20/2017    BCR 34.8 (H) 04/20/2017    K 4.3 04/20/2017    CO2 21.0 (L) 04/20/2017    CALCIUM 8.3 (L) 04/20/2017    ALBUMIN 2.90 (L) 03/10/2017    LABIL2 0.9 (L) 03/10/2017    AST 12 (L) 03/10/2017    ALT 38 03/10/2017     Imaging Results (last 24 hours)     ** No results found for the last 24 hours. 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   (Stable post op LEFT thoracotomy, upper lobectomy: Progressing well.     Lung mass /Advanced Squamous cell carcinoma lung: Residual tumor adhered to chest. Lymph negative. Will require postoperative adjuvant therapy.Staging pT3N0    Resp: Stable. On RA. Nebs. Pulmonary Toilet. CT H2O seal    Pain: Decrease PCA demand only. Start Percocet q4h. Toradol    Rehab: OOB, Ambulate today    Acute on Chronic Anemia: Continue home Iron supplement. CBC am.    Alcohol Use disorder: Discussed DT's, Beer offered with meals. Declines at this time.   ).     Plan:   Out of bed and up to chair  "and encourage ambulation.  Continue respiratory treatments and start/continue incentive spirometry.  Regular diet.  Chest x-ray.  Increase analgesics.   (Continue 3W care. CT H20 seal. CXR am. ).             This document has been electronically signed by CIPRIANO Peterson on April 21, 2017 10:36 AM                     Electronically signed by CIPRIANO Peterson at 4/21/2017 10:47 AM      CIPRIANO Peterson at 4/22/2017  9:04 AM  Version 1 of 1           Cardiothoracic - Vascular Surgery Daily Note        LOS: 3 days   POD# 2  LEFT THORACOTOMY, UPPER LOBECTOMY  THORACIC, MEDIASTINAL LYMPHADENCTOMY  BRONCHOSCOPY      Chief Complaint: Lung Mass. Pain partially controlled      Subjective         Subjective:  Symptoms:  Stable.  He reports cough, chest pain (surgical) and weakness.  No shortness of breath.    Diet:  Adequate intake.  No nausea or vomiting.    Activity level: Normal with assistance.    Pain:  He complains of pain that is moderate.  Pain is requiring pain medication and partially controlled.          Objective     Vital Signs  Temp:  [96.4 °F (35.8 °C)-98.2 °F (36.8 °C)] 97.3 °F (36.3 °C)  Heart Rate:  [] 101  Resp:  [18-20] 20  BP: (115-134)/(57-74) 124/69  Body mass index is 21.83 kg/(m^2).    Intake/Output Summary (Last 24 hours) at 04/22/17 0904  Last data filed at 04/22/17 0600   Gross per 24 hour   Intake             1262 ml   Output             1155 ml   Net              107 ml        CT 280cc. serosanginous Minimal air leak with cough H2O seal  Wt Readings from Last 3 Encounters:   04/22/17 143 lb 9.6 oz (65.1 kg)   04/06/17 136 lb (61.7 kg)   03/09/17 138 lb 0.1 oz (62.6 kg)         Physical Exam   Objective:  General Appearance:  Comfortable and in no acute distress.    Vital signs: (most recent): Blood pressure 124/69, pulse 101, temperature 97.3 °F (36.3 °C), temperature source Temporal Artery , resp. rate 20, height 68\" (172.7 cm), weight 143 lb 9.6 oz (65.1 kg), SpO2 " 92 %.  No fever.    Output: Producing urine and no stool output.    HEENT: Normal HEENT exam.    Lungs:  Normal respiratory rate and normal effort.  He is not in respiratory distress.  There are decreased breath sounds (left).  (LEFT CT H2O seal. Sm Air Leak with cough. Serous)  Heart: Tachycardia.    Chest: Symmetric chest wall expansion.   Abdomen: Abdomen is soft and non-distended.  Bowel sounds are normal.     Extremities: Normal range of motion.  There is no dependent edema.    Pulses: Distal pulses are intact.  There are no decreased pulses.    Neurological: Patient is alert and oriented to person, place and time.  Normal strength.    Pupils:  Pupils are equal, round, and reactive to light.    Skin:  Warm and dry.  (LEFT thoracotomy incision: CDI, Dry dressing removed. Mesh dressing intact. )          Incisions:  Clean and dry.    Results Review:      Lab Results   Component Value Date    WBC 15.31 (H) 04/22/2017    HGB 9.1 (L) 04/22/2017    HCT 28.6 (L) 04/22/2017    MCV 76.3 (L) 04/22/2017     04/22/2017     Lab Results   Component Value Date    GLUCOSE 104 (H) 04/22/2017    BUN 12 04/22/2017    CREATININE 0.51 (L) 04/22/2017    EGFRIFNONA >150 04/22/2017    BCR 23.5 04/22/2017    K 3.8 04/22/2017    CO2 26.0 04/22/2017    CALCIUM 8.6 04/22/2017    ALBUMIN 2.60 (L) 04/22/2017    LABIL2 0.8 (L) 04/22/2017     (H) 04/22/2017    ALT 59 04/22/2017     Imaging Results (last 24 hours)     Procedure Component Value Units Date/Time    XR Chest 1 View [[id number]] Collected:  04/22/17 0443     Updated:  04/22/17 0529    Narrative:       Exam: AP portable chest    INDICATION: Postop left upper lobe lobectomy    COMPARISON: 4/19/2017    FINDINGS: AP portable chest. There are two left chest tube in  place. The the mediastinal shifted to the left compatible volume  loss. There is increasing opacity in the left lung compatible  increasing pleural fluid with atelectasis and/or infiltrate. The  right lung  remains clear.       Impression:       Impression: Increasing opacity left lung compatible increasing  pleural fluid with  atelectasis  and/or infiltrate    Electronically signed by:  Peter De La Torre MD  4/22/2017 5:28 AM  CDT Workstation: [id number]                                  bisacodyl 10 mg Rectal Once   ferrous sulfate 324 mg Oral Daily With Breakfast   ketorolac 10 mg Oral Q8H   multivitamin with minerals 1 tablet Oral Daily   sennosides-docusate sodium 2 tablet Oral Nightly   vitamin C 500 mg Oral BID       Morphine     sodium chloride 100 mL/hr Last Rate: 100 mL/hr (04/21/17 7706)       Patient Active Problem List   Diagnosis Code   • Tobacco use disorder F17.200   • Alcohol use disorder F10.99   • Iron deficiency anemia D50.9   • Cavitary lesion of lung J98.4   • 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   (Stable post op LEFT thoracotomy, upper lobectomy: Progressing well.     Lung mass /Advanced Squamous cell carcinoma lung: Residual tumor adhered to chest. Lymph negative. Will require postoperative adjuvant therapy.Staging pT3N0    Resp: Stable. On RA. Positive pressure Nebs. Aggressive Pulmonary Toilet. CT H2O seal. CXR am.    Pain: PCA demand only. Start Percocet q4h. Toradol    Rehab: OOB, Ambulate today    Acute on Chronic Anemia: Continue home Iron supplement.     Alcohol Use disorder: Discussed DT's, Beer offered with meals. Declines at this time.   ).     Plan:   Out of bed and up to chair and encourage ambulation.  Continue respiratory treatments and start/continue incentive spirometry.  Regular diet.  Chest x-ray.  Increase analgesics.   (Continue 3W care. CT H20 seal. CXR am. Aggressive pulmonary toilet.).             This document has been electronically signed by CIPRIANO Peterson on April 22, 2017 9:04 AM                     Electronically signed by CIPRIANO Peterson at 4/22/2017  9:10 AM      Silvio Santiago MD at  "4/23/2017  9:47 AM  Version 1 of 1           Cardiothoracic - Vascular Surgery Daily Note        LOS: 4 days   POD# 4  LEFT THORACOTOMY, UPPER LOBECTOMY  THORACIC, MEDIASTINAL LYMPHADENCTOMY  BRONCHOSCOPY      Chief Complaint: Lung Mass. Pain partially controlled      Subjective         Subjective:  Symptoms:  Stable.  He reports cough, chest pain (surgical) and weakness.  No shortness of breath.    Diet:  Adequate intake.  No nausea or vomiting.    Activity level: Normal with assistance.    Pain:  He complains of pain that is moderate.  Pain is requiring pain medication and partially controlled.          Objective     Vital Signs  Temp:  [96.2 °F (35.7 °C)-98.4 °F (36.9 °C)] 96.2 °F (35.7 °C)  Heart Rate:  [] 96  Resp:  [18-20] 18  BP: (129-148)/(75-85) 129/79  Body mass index is 22.53 kg/(m^2).    Intake/Output Summary (Last 24 hours) at 04/23/17 0947  Last data filed at 04/23/17 0834   Gross per 24 hour   Intake             1941 ml   Output             1390 ml   Net              551 ml     I/O this shift:  In: 249 [P.O.:240; I.V.:9]  Out: -   CT 280cc. serosanginous Minimal air leak with cough H2O seal  Wt Readings from Last 3 Encounters:   04/23/17 148 lb 3.2 oz (67.2 kg)   04/06/17 136 lb (61.7 kg)   03/09/17 138 lb 0.1 oz (62.6 kg)         Physical Exam   Objective:  General Appearance:  Comfortable and in no acute distress.    Vital signs: (most recent): Blood pressure 129/79, pulse 96, temperature 96.2 °F (35.7 °C), temperature source Temporal Artery , resp. rate 18, height 68\" (172.7 cm), weight 148 lb 3.2 oz (67.2 kg), SpO2 93 %.  No fever.    Output: Producing urine and no stool output.    HEENT: Normal HEENT exam.    Lungs:  Normal respiratory rate and normal effort.  He is not in respiratory distress.  There are decreased breath sounds (left).  (LEFT CT H2O seal. Sm Air Leak with cough. Serous)  Heart: Tachycardia.    Chest: Symmetric chest wall expansion.   Abdomen: Abdomen is soft and " non-distended.  Bowel sounds are normal.     Extremities: Normal range of motion.  There is no dependent edema.    Pulses: Distal pulses are intact.  There are no decreased pulses.    Neurological: Patient is alert and oriented to person, place and time.  Normal strength.    Pupils:  Pupils are equal, round, and reactive to light.    Skin:  Warm and dry.  (LEFT thoracotomy incision: CDI, Dry dressing removed. Mesh dressing intact. )          Incisions:  Clean and dry.    Results Review:      Lab Results   Component Value Date    WBC 15.31 (H) 04/22/2017    HGB 9.1 (L) 04/22/2017    HCT 28.6 (L) 04/22/2017    MCV 76.3 (L) 04/22/2017     04/22/2017     Lab Results   Component Value Date    GLUCOSE 104 (H) 04/22/2017    BUN 12 04/22/2017    CREATININE 0.51 (L) 04/22/2017    EGFRIFNONA >150 04/22/2017    BCR 23.5 04/22/2017    K 3.8 04/22/2017    CO2 26.0 04/22/2017    CALCIUM 8.6 04/22/2017    ALBUMIN 2.60 (L) 04/22/2017    LABIL2 0.8 (L) 04/22/2017     (H) 04/22/2017    ALT 59 04/22/2017     Imaging Results (last 24 hours)     Procedure Component Value Units Date/Time    XR Chest 1 View [[id number]] Collected:  04/23/17 0510     Updated:  04/23/17 0534    Narrative:       Exam: AP portable chest    INDICATION: Post op left thoracotomy    COMPARISON: 4/22/2017 at 4:43 AM    EYES: AP portable chest. Status post left thoracotomy. Two left  chest tubes remain in place. No change in the opacity left lung  compatible with a large pleural effusion with atelectasis and/or  infiltrate. Right lung remains clear.      Impression:       No significant change.    Electronically signed by:  Peter De La Torre MD  4/23/2017 5:33 AM  CDT Workstation: [id number]                                  acetylcysteine 2 mL Nebulization 4x Daily   albuterol 2.5 mg Nebulization 4x Daily - RT   bisacodyl 10 mg Rectal Once   famotidine 20 mg Oral BID   ferrous sulfate 324 mg Oral Daily With Breakfast   ketorolac 10 mg Oral Q8H    multivitamin with minerals 1 tablet Oral Daily   sennosides-docusate sodium 2 tablet Oral Nightly   vitamin C 500 mg Oral BID       Morphine     sodium chloride 100 mL/hr Last Rate: 100 mL/hr (04/22/17 8571)       Patient Active Problem List   Diagnosis Code   • Tobacco use disorder F17.200   • Alcohol use disorder F10.99   • Iron deficiency anemia D50.9   • Cavitary lesion of lung J98.4   • 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   (Stable post op LEFT thoracotomy, upper lobectomy: Progressing well.     Lung mass /Advanced Squamous cell carcinoma lung: Residual tumor adhered to chest. Lymph negative. Will require postoperative adjuvant therapy.Staging pT3N0    Resp: Stable. On RA. Positive pressure Nebs. Aggressive Pulmonary Toilet. CT H2O seal. CXR am.    Pain: PCA demand only. Start Percocet q4h. Toradol    Rehab: OOB, Ambulate today    Acute on Chronic Anemia: Continue home Iron supplement.     Alcohol Use disorder: Discussed DT's, Beer offered with meals. Declines at this time.   ).     Plan:   Out of bed and up to chair and encourage ambulation.  Continue respiratory treatments and start/continue incentive spirometry.  Regular diet.  Chest x-ray.  Increase analgesics.   (Continue 3W care. CT H20 seal. CXR am. Aggressive pulmonary toilet.).   Resume -20cm suction  Chest PT q4H  Pain control          This document has been electronically signed by Silvio Santiago MD on April 23, 2017 9:47 AM                     Electronically signed by Silvio Santiago MD at 4/23/2017  9:56 AM      CIPRIANO Peterson at 4/24/2017  9:50 AM  Version 1 of 1           Cardiothoracic - Vascular Surgery Daily Note        LOS: 5 days   POD# 5  LEFT THORACOTOMY, UPPER LOBECTOMY  THORACIC, MEDIASTINAL LYMPHADENCTOMY  BRONCHOSCOPY      Chief Complaint: Lung Mass. Pain partially controlled      Subjective         Subjective:  Symptoms:  Stable.  He reports cough, chest pain  "(surgical) and weakness.  No shortness of breath.    Diet:  Adequate intake.  No nausea or vomiting.    Activity level: Normal with assistance.    Pain:  He complains of pain that is moderate.  Pain is requiring pain medication and partially controlled.          Objective     Vital Signs  Temp:  [96.5 °F (35.8 °C)-98.3 °F (36.8 °C)] 98.3 °F (36.8 °C)  Heart Rate:  [81-88] 85  Resp:  [16-18] 18  BP: (132-175)/(69-91) 162/80  Body mass index is 22.53 kg/(m^2).    Intake/Output Summary (Last 24 hours) at 04/24/17 0950  Last data filed at 04/24/17 0943   Gross per 24 hour   Intake          3115.67 ml   Output             1845 ml   Net          1270.67 ml     I/O this shift:  In: 1921.7 [I.V.:1921.7]  Out: 325 [Urine:325]  CT 70cc. Serous No air leak  Wt Readings from Last 3 Encounters:   04/24/17 148 lb 3.2 oz (67.2 kg)   04/06/17 136 lb (61.7 kg)   03/09/17 138 lb 0.1 oz (62.6 kg)         Physical Exam   Objective:  General Appearance:  Comfortable and in no acute distress.    Vital signs: (most recent): Blood pressure 162/80, pulse 85, temperature 98.3 °F (36.8 °C), temperature source Temporal Artery , resp. rate 18, height 68\" (172.7 cm), weight 148 lb 3.2 oz (67.2 kg), SpO2 91 %.  No fever.    Output: Producing urine and no stool output.    HEENT: Normal HEENT exam.    Lungs:  Normal respiratory rate and normal effort.  He is not in respiratory distress.  There are decreased breath sounds (left).  (LEFT CT H2O seal. No air leak. Serous)  Heart: Tachycardia.    Chest: Symmetric chest wall expansion.   Abdomen: Abdomen is soft and non-distended.  Bowel sounds are normal.     Extremities: Normal range of motion.  There is no dependent edema.    Pulses: Distal pulses are intact.  There are no decreased pulses.    Neurological: Patient is alert and oriented to person, place and time.  Normal strength.    Pupils:  Pupils are equal, round, and reactive to light.    Skin:  Warm and dry.  (LEFT thoracotomy incision: CDI, Dry " dressing removed. Mesh dressing intact. )          Incisions:  Clean and dry.    Results Review:      Lab Results   Component Value Date    WBC 15.31 (H) 04/22/2017    HGB 9.1 (L) 04/22/2017    HCT 28.6 (L) 04/22/2017    MCV 76.3 (L) 04/22/2017     04/22/2017     Lab Results   Component Value Date    GLUCOSE 104 (H) 04/22/2017    BUN 12 04/22/2017    CREATININE 0.51 (L) 04/22/2017    EGFRIFNONA >150 04/22/2017    BCR 23.5 04/22/2017    K 3.8 04/22/2017    CO2 26.0 04/22/2017    CALCIUM 8.6 04/22/2017    ALBUMIN 2.60 (L) 04/22/2017    LABIL2 0.8 (L) 04/22/2017     (H) 04/22/2017    ALT 59 04/22/2017     Imaging Results (last 24 hours)     Procedure Component Value Units Date/Time    XR Chest 1 View [[id number]] Collected:  04/24/17 0850     Updated:  04/24/17 0908    Narrative:       Chest single view       CLINICAL INDICATION: Shortness of breath. Postoperative chest.    COMPARISON: None    FINDINGS: Cardiac silhouette is normal in size. Postoperative  changes. Shift of the heart and mediastinum to the left.    Infiltrative changes and effusion left hemithorax. Two left-sided  chest tubes with tips in the left apex. The right lung remains  clear. Allowing for difference in technique no significant change  since prior examination April 23, 2017.      Impression:       CONCLUSION: As above.    Electronically signed by:  Nathaniel Vences MD  4/24/2017 9:07 AM CDT  Workstation: [id number]                                  acetylcysteine 2 mL Nebulization 4x Daily   albuterol 2.5 mg Nebulization 4x Daily - RT   bisacodyl 10 mg Rectal Once   famotidine 20 mg Oral BID   ferrous sulfate 324 mg Oral Daily With Breakfast   ketorolac 10 mg Oral Q8H   multivitamin with minerals 1 tablet Oral Daily   sennosides-docusate sodium 2 tablet Oral Nightly   vitamin C 500 mg Oral BID       Morphine     sodium chloride 100 mL/hr Last Rate: 100 mL/hr (04/23/17 2300)       Patient Active Problem List   Diagnosis Code   • Tobacco  use disorder F17.200   • Alcohol use disorder F10.99   • Iron deficiency anemia D50.9   • Cavitary lesion of lung J98.4   • 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   (Stable post op LEFT thoracotomy, upper lobectomy: Progressing well.     Lung mass /Advanced Squamous cell carcinoma lung: Residual tumor adhered to chest. Lymph negative. Will require postoperative adjuvant therapy.Staging pT3N0    Resp: Stable. On RA. Positive pressure Nebs. Aggressive Pulmonary Toilet. CT H2O seal. CXR am.    Pain: PCA demand only. Percocet q4h. Toradol    Rehab: OOB, Ambulate today    Acute on Chronic Anemia: Continue home Iron supplement.     Alcohol Use disorder: Discussed DT's, Beer offered with meals. Declines at this time.   ).     Plan:   Out of bed and up to chair and encourage ambulation.  Continue respiratory treatments and start/continue incentive spirometry.  Regular diet.  Chest x-ray.  (Continue 3W care. CT H20 seal. CXR am. Aggressive pulmonary toilet.).           This document has been electronically signed by CIPRIANO Peterson on April 24, 2017 9:50 AM                     Electronically signed by CIPRIANO Peterson at 4/24/2017 10:06 AM        Consult Notes (last 72 hours) (Notes from 4/21/2017 10:19 AM through 4/24/2017 10:19 AM)     No notes of this type exist for this encounter.

## 2017-04-24 NOTE — PLAN OF CARE
Problem: Patient Care Overview (Adult)  Goal: Plan of Care Review  Outcome: Ongoing (interventions implemented as appropriate)    04/24/17 1726   Coping/Psychosocial Response Interventions   Plan Of Care Reviewed With patient   Outcome Evaluation   Outcome Summary/Follow up Plan PT evaluation completed. Pt ambulated 300 ft with SBA of 1.O2 sats dropped to 86% on roomair; and recovered to 92% in 1 minute. Function limited primarily by decreased tolerance for functional mobility and activities. Pt will benefit from skilled PT to regain lost function.         Problem: Inpatient Physical Therapy  Goal: Transfer Training Goal 1 LTG- PT  Outcome: Ongoing (interventions implemented as appropriate)    04/24/17 1726   Transfer Training PT LTG   Transfer Training PT LTG, Date Established 04/24/17   Transfer Training PT LTG, Time to Achieve by discharge   Transfer Training PT LTG, Activity Type bed to chair /chair to bed;sit to stand/stand to sit   Transfer Training PT LTG, [place] Level independent       Goal: Gait Training Goal LTG- PT  Outcome: Ongoing (interventions implemented as appropriate)    04/24/17 1726   Gait Training PT LTG   Gait Training Goal PT LTG, Date Established 04/24/17   Gait Training Goal PT LTG, Time to Achieve by discharge   Gait Training Goal PT LTG, [place] Level independent   Gait Training Goal PT LTG, Distance to Achieve 400ft; O2 sats will not drop below 92%   Gait Training Goal PT LTG, Additional Goal 500ft, O2 sats will not drop below 92%   Gait Training Goal PT LTG, Outcome goal ongoing       Goal: Stair Training Goal LTG- PT  Outcome: Ongoing (interventions implemented as appropriate)    04/24/17 1726   Stair Training PT LTG   Stair Training Goal PT LTG, Date Established 04/24/17   Stair Training Goal PT LTG, Time to Achieve by discharge   Stair Training Goal PT LTG, Number of Steps 4   Stair Training Goal PT LTG, [place] Level conditional independence   Stair Training Goal PT  LTG, Assist Device 1 handrail   Stair Training Goal PT LTG, Additional Goal O2 sats will not drop below 92%   Stair Training Goal PT LTG, Outcome goal ongoing

## 2017-04-25 ENCOUNTER — APPOINTMENT (OUTPATIENT)
Dept: GENERAL RADIOLOGY | Facility: HOSPITAL | Age: 57
End: 2017-04-25

## 2017-04-25 PROCEDURE — 25010000002 MORPHINE SULFATE (PF) 2 MG/ML SOLUTION: Performed by: NURSE PRACTITIONER

## 2017-04-25 PROCEDURE — 94799 UNLISTED PULMONARY SVC/PX: CPT

## 2017-04-25 PROCEDURE — 94760 N-INVAS EAR/PLS OXIMETRY 1: CPT

## 2017-04-25 PROCEDURE — 99024 POSTOP FOLLOW-UP VISIT: CPT | Performed by: NURSE PRACTITIONER

## 2017-04-25 PROCEDURE — 25010000003 MORPHINE PER 10 MG: Performed by: NURSE PRACTITIONER

## 2017-04-25 PROCEDURE — 71020 HC CHEST PA AND LATERAL: CPT

## 2017-04-25 RX ORDER — MORPHINE SULFATE 2 MG/ML
2 INJECTION, SOLUTION INTRAMUSCULAR; INTRAVENOUS EVERY 4 HOURS PRN
Status: DISCONTINUED | OUTPATIENT
Start: 2017-04-25 | End: 2017-04-26 | Stop reason: HOSPADM

## 2017-04-25 RX ORDER — SENNA AND DOCUSATE SODIUM 50; 8.6 MG/1; MG/1
1 TABLET, FILM COATED ORAL 2 TIMES DAILY
Status: DISCONTINUED | OUTPATIENT
Start: 2017-04-25 | End: 2017-04-26 | Stop reason: HOSPADM

## 2017-04-25 RX ORDER — MORPHINE SULFATE 2 MG/ML
2 INJECTION, SOLUTION INTRAMUSCULAR; INTRAVENOUS ONCE
Status: COMPLETED | OUTPATIENT
Start: 2017-04-25 | End: 2017-04-25

## 2017-04-25 RX ORDER — ALBUTEROL SULFATE 2.5 MG/3ML
SOLUTION RESPIRATORY (INHALATION)
Status: DISPENSED
Start: 2017-04-25 | End: 2017-04-26

## 2017-04-25 RX ORDER — POLYETHYLENE GLYCOL 3350 17 G/17G
17 POWDER, FOR SOLUTION ORAL NIGHTLY
Status: DISCONTINUED | OUTPATIENT
Start: 2017-04-25 | End: 2017-04-26 | Stop reason: HOSPADM

## 2017-04-25 RX ORDER — ALBUTEROL SULFATE 2.5 MG/3ML
2.5 SOLUTION RESPIRATORY (INHALATION)
Status: DISCONTINUED | OUTPATIENT
Start: 2017-04-25 | End: 2017-04-26 | Stop reason: HOSPADM

## 2017-04-25 RX ADMIN — MORPHINE SULFATE 2 MG: 2 INJECTION, SOLUTION INTRAMUSCULAR; INTRAVENOUS at 22:38

## 2017-04-25 RX ADMIN — MORPHINE SULFATE: 1 INJECTION INTRAVENOUS at 06:28

## 2017-04-25 RX ADMIN — OXYCODONE HYDROCHLORIDE AND ACETAMINOPHEN 1 TABLET: 10; 325 TABLET ORAL at 16:54

## 2017-04-25 RX ADMIN — OXYCODONE HYDROCHLORIDE AND ACETAMINOPHEN 500 MG: 500 TABLET ORAL at 08:46

## 2017-04-25 RX ADMIN — SENNOSIDES AND DOCUSATE SODIUM 1 TABLET: 8.6; 5 TABLET ORAL at 18:21

## 2017-04-25 RX ADMIN — FERROUS SULFATE TAB EC 324 MG (65 MG FE EQUIVALENT) 324 MG: 324 (65 FE) TABLET DELAYED RESPONSE at 08:46

## 2017-04-25 RX ADMIN — OXYCODONE HYDROCHLORIDE AND ACETAMINOPHEN 1 TABLET: 10; 325 TABLET ORAL at 21:09

## 2017-04-25 RX ADMIN — FAMOTIDINE 20 MG: 20 TABLET ORAL at 18:21

## 2017-04-25 RX ADMIN — MORPHINE SULFATE 2 MG: 2 INJECTION, SOLUTION INTRAMUSCULAR; INTRAVENOUS at 13:20

## 2017-04-25 RX ADMIN — SENNOSIDES AND DOCUSATE SODIUM 1 TABLET: 8.6; 5 TABLET ORAL at 11:13

## 2017-04-25 RX ADMIN — ALBUTEROL SULFATE 2.5 MG: 2.5 SOLUTION RESPIRATORY (INHALATION) at 10:05

## 2017-04-25 RX ADMIN — MORPHINE SULFATE 2 MG: 2 INJECTION, SOLUTION INTRAMUSCULAR; INTRAVENOUS at 09:29

## 2017-04-25 RX ADMIN — MORPHINE SULFATE 2 MG: 2 INJECTION, SOLUTION INTRAMUSCULAR; INTRAVENOUS at 13:14

## 2017-04-25 RX ADMIN — FAMOTIDINE 20 MG: 20 TABLET ORAL at 08:46

## 2017-04-25 RX ADMIN — ALBUTEROL SULFATE 2.5 MG: 2.5 SOLUTION RESPIRATORY (INHALATION) at 06:49

## 2017-04-25 RX ADMIN — OXYCODONE HYDROCHLORIDE AND ACETAMINOPHEN 1 TABLET: 10; 325 TABLET ORAL at 11:12

## 2017-04-25 RX ADMIN — SODIUM CHLORIDE 100 ML/HR: 900 INJECTION, SOLUTION INTRAVENOUS at 04:31

## 2017-04-25 RX ADMIN — MORPHINE SULFATE 2 MG: 2 INJECTION, SOLUTION INTRAMUSCULAR; INTRAVENOUS at 18:19

## 2017-04-25 RX ADMIN — OXYCODONE HYDROCHLORIDE AND ACETAMINOPHEN 1 TABLET: 10; 325 TABLET ORAL at 06:28

## 2017-04-25 RX ADMIN — Medication 1 TABLET: at 08:46

## 2017-04-25 RX ADMIN — ALBUTEROL SULFATE 2.5 MG: 2.5 SOLUTION RESPIRATORY (INHALATION) at 13:51

## 2017-04-25 RX ADMIN — OXYCODONE HYDROCHLORIDE AND ACETAMINOPHEN 500 MG: 500 TABLET ORAL at 18:21

## 2017-04-25 RX ADMIN — ACETYLCYSTEINE 2 ML: 100 INHALANT RESPIRATORY (INHALATION) at 06:50

## 2017-04-25 RX ADMIN — ACETYLCYSTEINE 2 ML: 100 INHALANT RESPIRATORY (INHALATION) at 10:06

## 2017-04-25 NOTE — PROGRESS NOTES
"  CTVS DAILY NOTE        LOS: 6 days   POD# 6  LEFT THORACOTOMY, UPPER LOBECTOMY  THORACIC, MEDIASTINAL LYMPHADENCTOMY  BRONCHOSCOPY  PATH:  SQUAMOUS CELL CARCINOMA (8 CM), APICAL SEGMENT, WITH INVASION OF PARIETAL PLEURA.   PARIETAL PLEURA (SOFT TISSUE) MARGIN POSITIVE FOR CARCINOMA.   BRONCHIAL AND VASCULAR MARGINS NEGATIVE FOR CARCINOMA.  FUNGI CONSISTENT WITH ASPERGILLUS SPECIES (\"FUNGUS BALL\") IN THE CAVITY CONTENTS.           Chief Complaint: VAS 6 Pain improved  Concerned about control       Subjective       VAS 6     ROS:    Review of Systems   Constitution: Negative for chills, decreased appetite, fever and malaise/fatigue.   HENT: Negative for headaches, hoarse voice, nosebleeds and sore throat.    Eyes: Negative for visual disturbance.   Cardiovascular: Negative for chest pain, dyspnea on exertion, leg swelling, near-syncope and syncope.   Respiratory: Negative for cough, hemoptysis and shortness of breath.    Hematologic/Lymphatic: Does not bruise/bleed easily.   Skin: Negative for color change, dry skin and poor wound healing.        INC:  No drainage  No odor  No reddness   Musculoskeletal: Negative for back pain, falls, muscle cramps and muscle weakness.   Gastrointestinal: Positive for flatus. Negative for anorexia, change in bowel habit, heartburn, melena and nausea.        Expelling flatus but no BM    Genitourinary: Negative for hematuria.   Neurological: Negative for difficulty with concentration, excessive daytime sleepiness, dizziness, light-headedness, paresthesias and sensory change.   Psychiatric/Behavioral: Negative for altered mental status.   Allergic/Immunologic: Negative for environmental allergies.       Objective     Vital Signs  Temp:  [97.4 °F (36.3 °C)-99.2 °F (37.3 °C)] 97.8 °F (36.6 °C)  Heart Rate:  [] 86  Resp:  [16-18] 18  BP: (126-175)/(65-88) 126/65  Body mass index is 22.44 kg/(m^2).    Intake/Output Summary (Last 24 hours) at 04/25/17 0950  Last data filed at " 04/25/17 0625   Gross per 24 hour   Intake          1024.33 ml   Output             2930 ml   Net         -1905.67 ml          Wt Readings from Last 3 Encounters:   04/25/17 147 lb 9.6 oz (67 kg)   04/06/17 136 lb (61.7 kg)   03/09/17 138 lb 0.1 oz (62.6 kg)           Physical Exam   Physical Exam   Constitutional: He is oriented to person, place, and time. He appears well-nourished.   HENT:   Head: Normocephalic.   Mouth/Throat: Oropharynx is clear and moist.   Eyes: Conjunctivae are normal. Pupils are equal, round, and reactive to light.   Constricted    Neck: Neck supple. No JVD present. No tracheal deviation present.   Cardiovascular: Normal rate, regular rhythm, normal heart sounds and intact distal pulses.    Pulmonary/Chest: Effort normal. No stridor. No respiratory distress. He has no wheezes. He has no rales. He exhibits tenderness (Left surgical incision ).   Diminished in left base   CT OP 80 cc serous.    CT clamped at 0300 CXR 0800 No pneumothorax (reviewed with Dr Vences)  CT removed.  Tolerated well.  No change in BS post removal.   Abdominal: Soft. Bowel sounds are normal. He exhibits no distension.   Expelling flatus    Musculoskeletal: He exhibits no edema or tenderness.   Neurological: He is alert and oriented to person, place, and time.   Skin: Skin is warm and dry. No rash noted. No erythema. No pallor.   Left thoracotomy incision clean and dry .    CT sutures mild erythema.    Psychiatric: Judgment normal.   Nursing note and vitals reviewed.      Results Review:      Imaging Results (last 24 hours)     Procedure Component Value Units Date/Time    XR Chest 2 View [[id number]] Collected:  04/25/17 0725     Updated:  04/25/17 0915    Narrative:       Chest PA and lateral     CLINICAL INDICATION: Lung cancer, follow-up.    COMPARISON: April 24, 2017.    FINDINGS: Cardiac silhouette is normal in size. Pulmonary  vascularity is unremarkable.     Postsurgical changes left hemithorax. Surgical clips  "left  suprahilar region. Pleural thickening and/or effusion lateral  aspect left hemithorax.    Two chest tubes identified with tips in the apices. There is no  pneumothorax.    No significant changes since prior exam.      Impression:       CONCLUSION: As above.    Electronically signed by:  Nathaniel Vences MD  4/25/2017 9:15 AM CDT  Workstation: [id number]          Lab Results (last 24 hours)     Procedure Component Value Units Date/Time    Tissue Exam [[id number]] Collected:  04/19/17 1323    Specimen:  Tissue from Lung, Left Upper Lobe; Tissue from Lymph Node Updated:  04/24/17 1456     Case Report --     Surgical Pathology Report                         Case: [id number]                                  Authorizing Provider:  Angel Hernandez MD  Collected:           04/19/2017 01:23 PM          Ordering Location:     [facility]             Received:            04/19/2017 02:46 PM                                 [place] [facility]                                                              Pathologist:           Taz Natarajan MD                                                            Specimens:   1) - Lung, Left Upper Lobe, LEFT UPPER LOBE                                                         2) - Lymph Node, LYMPH NODE LEVEL FIVE                                                      Final Diagnosis --     1.  LUNG, LEFT UPPER LOBE, LOBECTOMY:   SQUAMOUS CELL CARCINOMA (8 CM), APICAL SEGMENT, WITH INVASION OF PARIETAL PLEURA.   PARIETAL PLEURA (SOFT TISSUE) MARGIN POSITIVE FOR CARCINOMA.   BRONCHIAL AND VASCULAR MARGINS NEGATIVE FOR CARCINOMA.  FUNGI CONSISTENT WITH ASPERGILLUS SPECIES (\"FUNGUS BALL\") IN THE CAVITY CONTENTS.    PERIBRONCHIAL LYMPH NODES, LEVEL 11L (3):   SINUS HISTIOCYTOSIS, NEGATIVE FOR METASTATIC CARCINOMA.     2.  SUBAORTIC LYMPH NODES, LEVEL 5 (3):   SINUS HISTIOCYTOSIS, NEGATIVE FOR METASTATIC CARCINOMA.        Synoptic Checklist --     LUNG: Resection  (Lung - 1)      SPECIMEN     " Specimen:    Lobe(s) of lung       Lobes of Lung:    Upper lobe     Procedure:    Lobectomy     Specimen Laterality:    Left    TUMOR     Primary Tumor Site:    Upper lobe     Histologic Type:    Squamous cell carcinoma     Histologic Grade:    G2: Moderately differentiated     Tumor Size:    Greatest dimension (cm): 8 cm       Additional Dimension (cm):    7 cm       Additional Dimension (cm):    5 cm     Tumor Focality:    Unifocal       Visceral Pleura Invasion:    Present       Tumor Extension:    Parietal pleura       Lymph-Vascular Invasion:    Not identified    MARGINS     Bronchial Margin:    Uninvolved by invasive carcinoma and carcinoma in situ     Vascular Margin:    Uninvolved by invasive carcinoma     Parenchymal Margin:    Involved by invasive carcinoma    LYMPH NODES     Regional Lymph Nodes:           Number of Lymph Nodes Examined:    Specify number: 6       Gene Stations Examined:    5: Subaortic/ aortopulmonary (AP) / AP window       Gene Stations Examined:    11L: Interlobar       Lymph Node Involvement:    No nodes involved    STAGE (pTNM)     Primary Tumor (pT):    pT3: Tumor greater than 7 cm in greatest dimension; or Tumor of any size that directly invades any of the following: parietal pleural chest wall (including superior sulcus tumors), diaphragm, phrenic nerve, mediastinal pleura, parietal pericardium; or Tumor of any size in the main bronchus less than 2 cm distal to the petar but without involvement of the petar; or Tumor of any size associated with atelectasis or obstructive pneumonitis of the entire lung; or Tumors of any size with separate tmor nodule(s) in same lobe     Regional Lymph Nodes (pN):    pN0: No regional lymph node metastasis    ADDITIONAL FINDINGS     Additional Pathologic Finding(s):    Other (specify): Fungal hyphae consistent with Aspergillus species in the cavity contents       Intraoperative Consultation --     The frozen section diagnosis for specimen 1 was  "called to Dr. Hernandez on 4/19/2017 at 1500.  Squamous cell carcinoma.       Gross Description --     1.  Specimen #1  Is labeled \"left upper lobe\".  It is received in 2 fragments which measure 21.0 x 13.0 x 6.0 cm and weigh 236 grams.  A cavitary lesion is noted which measures approximately 7.0 x 8.0 x 5.0 cm.  The cavity is in two fragments; the smaller shows adhesions and skeletal muscle, presumably from the chest wall/parietal pleura.  Tissue is submitted in the fresh state for culture.  Sectioning reveals a scirrhous tumor with a beige cut surface measuring 7.0 x 7.0 x 3.5 cm.  The tumor may be larger than this, occuping the entire area of the cavitary lesion; this will be evaluated microscopically.   The tumor shows a reticulated, yellow pattern on cut surface.  A section is submitted for frozen section in block 1A.  Additional sections of tumor in conjunction with parietal pleura are submitted in blocks 1B-1E.  Sections of lung from the described cavity in the superior segment are submitted in blocks 1F-1I.  These are close to  the tissue submitted for culture.  Bronchial and vascular margins submitted in 1J and 1K, respectively.  Thickened pleura, possibly tumor, from the other edge of the cavity is submitted in block 1L.  Peribronchial lymph nodes are submitted in 1M.  At least 3 lymph nodes are identified; smaller fragments of the same nodes are included in the same block.  The remaining lung shows anthracosis but no consolidation.  Sections are submitted in blocks 1N and 1O.  The section in block 1O includes the rim of the cavitary lesion.  Also present in the primary container is a smaller container with chunks of brown tissue measuring 4.0 x 4.0 x 1.5 cm.  These presumably originate from the cavity.  Sections are submitted in block 1P.  This material has a friable, grumous consistency.    2.  Specimen #2 is labeled \"lymph node level 5\".  Three lymph nodes measure 0.7, 0.9 and 1.7 cm in greatest " dimension, each.  The smaller nodes are incised and submitted in one block.  The largest is bisected and submitted in the same block.       Special Stains --     Silver stain of cavity contents is positive for fungal hyphae consistent with Aspergillus species.       Embedded Images --    Fungus Culture [[id number]] Collected:  04/19/17 1323    Specimen:  Tissue from Lung, Left Upper Lobe Updated:  04/24/17 1601     Fungus Culture No fungus isolated at less than 1 week    AFB Culture [[id number]]  (Normal) Collected:  04/19/17 1323    Specimen:  Tissue from Lung, Left Upper Lobe Updated:  04/24/17 1601     AFB Culture No AFB isolated at less than 1 week     AFB Stain No acid fast bacilli seen                            PT/INR:  No results found for: PROTIME/No results found for: INR            acetylcysteine 2 mL Nebulization 4x Daily   albuterol 2.5 mg Nebulization 4x Daily - RT   bisacodyl 10 mg Rectal Once   famotidine 20 mg Oral BID   ferrous sulfate 324 mg Oral Daily With Breakfast   multivitamin with minerals 1 tablet Oral Daily   sennosides-docusate sodium 1 tablet Oral BID   vitamin C 500 mg Oral BID       Morphine     sodium chloride 100 mL/hr Last Rate: 100 mL/hr (04/25/17 0431)         Patient Active Problem List   Diagnosis Code   • Tobacco use disorder F17.200   • Alcohol use disorder F10.99   • Iron deficiency anemia D50.9   • Cavitary lesion of lung J98.4   • 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  Satisfactory Postop.  Increase activity, ambulating with stand by assistance.  Eating well.  2.  Lung mass:  Squamous cell ca/ aspergillus.  SP Resection with neg lymph nodes.        Continue nebs, incentive spirometry, coughing and deep breathing.  CT discontinued.  Chest xray in am.        PO adjuvant therapy as OP.    3.  Postoperative Pain:  Controlled with 41 mg morphine and 50 mg percocet in 24 hours.  With CT out, will stop PCA at 1400 today and add morphine for break through pain.  Patient  agrees with plan.    4.  Acute on chronic anemia:  Tolerating oral iron.  5.  Rehab:  Has been declining PT services.  Increase ambulation.  6.  Constipation:  Has been refusing laxative.  Will add miralex.   7.  Disposition:  Possible discharge in am, if chest xray okay and constipation improved.               This document has been electronically signed by CIPRIANO Roque on April 25, 2017 9:50 AM

## 2017-04-25 NOTE — PLAN OF CARE
Problem: Patient Care Overview (Adult)  Goal: Plan of Care Review  Outcome: Ongoing (interventions implemented as appropriate)    04/25/17 1801   Coping/Psychosocial Response Interventions   Plan Of Care Reviewed With patient   Patient Care Overview   Progress improving         Problem: Lung Surgery (via Thoracotomy) (Adult)  Goal: Signs and Symptoms of Listed Potential Problems Will be Absent or Manageable (Lung Surgery)  Outcome: Ongoing (interventions implemented as appropriate)    Problem: Pain, Acute (Adult)  Goal: Acceptable Pain Control/Comfort Level  Outcome: Ongoing (interventions implemented as appropriate)

## 2017-04-25 NOTE — PLAN OF CARE
Problem: Patient Care Overview (Adult)  Goal: Plan of Care Review  Outcome: Ongoing (interventions implemented as appropriate)    04/25/17 1519   Coping/Psychosocial Response Interventions   Plan Of Care Reviewed With patient;caregiver   Patient Care Overview   Progress no change   Outcome Evaluation   Outcome Summary/Follow up Plan Pt is s/p surgery for Lung CA. He is actually eating pretty good with intake averaging ~75%. He is receiving Ensure with good acceptance. Rd will continue to monitor and encourage adequate nutrition for healing.

## 2017-04-25 NOTE — SIGNIFICANT NOTE
"   04/25/17 9635   Rehab Treatment   Discipline physical therapy assistant   Treatment Not Performed patient/family declined treatment  (pt declined, he stated, \"not now\")     "

## 2017-04-25 NOTE — PLAN OF CARE
Problem: Patient Care Overview (Adult)  Goal: Plan of Care Review  Outcome: Ongoing (interventions implemented as appropriate)    04/25/17 0503   Coping/Psychosocial Response Interventions   Plan Of Care Reviewed With patient   Patient Care Overview   Progress no change   Outcome Evaluation   Outcome Summary/Follow up Plan chest tube clamped at 0300, on 2L nasal cannula, pt resistant to activity         Problem: Lung Surgery (via Thoracotomy) (Adult)  Goal: Signs and Symptoms of Listed Potential Problems Will be Absent or Manageable (Lung Surgery)  Outcome: Ongoing (interventions implemented as appropriate)    Problem: Pain, Acute (Adult)  Goal: Acceptable Pain Control/Comfort Level  Outcome: Ongoing (interventions implemented as appropriate)

## 2017-04-25 NOTE — NURSING NOTE
Patient asked if I could change CT dressing. Patient stated No, not right now. I want to rest. It hurts when you touch it. Its like getting poked straight in the eye. So no I don't want anyone to mess with it.

## 2017-04-25 NOTE — CONSULTS
"Adult Nutrition  Assessment    Patient Name:  Tino Peres  YOB: 1960  MRN: [id number]  Admit Date:  4/19/2017    Assessment Date:  4/25/2017          Reason for Assessment       04/25/17 1505    Reason for Assessment    Reason For Assessment/Visit follow up protocol              Nutrition/Diet History       04/25/17 1505    Nutrition/Diet History    Typical Food/Fluid Intake Pt eating lunch.  He reports that his appetite is \"fine\"  Drinking some of the Ensure.  He siad that he would like to be left alone for a couple  of hours without people coming to check on him.              Labs/Tests/Procedures/Meds       04/25/17 1506    Labs/Tests/Procedures/Meds    Labs/Tests Review Reviewed;Alb;Na+;Glucose;Creat    Medication Review Reviewed, pertinent;Iron;Multivitamin            Physical Findings       04/25/17 1507    Physical Findings/Assessment    Additional Documentation Physical Appearance (Group)    Physical Appearance    Tubes other (see comments)   Chest tube            Estimated/Assessed Needs       04/25/17 1508    Calculation Measurements    Weight Used For Calculations 69.9 kg (154 lb)    Height Used for Calculations 1.727 m (5' 8\")    Estimated/Assessed Energy Needs    Energy Need Method Kcal/kg    kcal/kg 30    30 Kcal/Kg (kcal) 2095.62    Estimated Kcal Range  2100    Estimated/Assessed Protein Needs    Weight Used for Protein Calculation 69.9 kg (154 lb)    Protein (gm/kg) 1.2    1.2 Gm Protein (gm) 83.82    Estimated Protein Range 83            Nutrition Prescription Ordered       04/25/17 1509    Nutrition Prescription PO    Current PO Diet Regular    Fluid Consistency Thin    Supplement Ensure Enlive    Supplement Frequency 3 times a day            Evaluation of Received Nutrient/Fluid Intake       04/25/17 1510    PO Evaluation    Number of Days PO Intake Evaluated 3 days    Number of Meals 7    % PO Intake ~75            Comments:  Pt is s/p surgery for Lung CA. He is actually " eating pretty good with intake averaging ~75%. He is receiving Ensure with good acceptance. Rd will continue to monitor and encourage adequate nutrition for healing. '          Electronically signed by:  Tracie Boateng RD  04/25/17 3:16 PM

## 2017-04-26 ENCOUNTER — APPOINTMENT (OUTPATIENT)
Dept: GENERAL RADIOLOGY | Facility: HOSPITAL | Age: 57
End: 2017-04-26

## 2017-04-26 VITALS
DIASTOLIC BLOOD PRESSURE: 71 MMHG | RESPIRATION RATE: 18 BRPM | TEMPERATURE: 98 F | BODY MASS INDEX: 22.26 KG/M2 | SYSTOLIC BLOOD PRESSURE: 145 MMHG | HEART RATE: 99 BPM | WEIGHT: 146.9 LBS | OXYGEN SATURATION: 95 % | HEIGHT: 68 IN

## 2017-04-26 LAB
ALBUMIN SERPL-MCNC: 2.8 G/DL (ref 3.4–4.8)
ALBUMIN/GLOB SERPL: 0.9 G/DL (ref 1.1–1.8)
ALP SERPL-CCNC: 344 U/L (ref 38–126)
ALT SERPL W P-5'-P-CCNC: 46 U/L (ref 21–72)
ANION GAP SERPL CALCULATED.3IONS-SCNC: 8 MMOL/L (ref 5–15)
AST SERPL-CCNC: 21 U/L (ref 17–59)
BILIRUB SERPL-MCNC: 0.3 MG/DL (ref 0.2–1.3)
BUN BLD-MCNC: 9 MG/DL (ref 7–21)
BUN/CREAT SERPL: 19.6 (ref 7–25)
CALCIUM SPEC-SCNC: 8.4 MG/DL (ref 8.4–10.2)
CHLORIDE SERPL-SCNC: 102 MMOL/L (ref 95–110)
CO2 SERPL-SCNC: 26 MMOL/L (ref 22–31)
CREAT BLD-MCNC: 0.46 MG/DL (ref 0.7–1.3)
DEPRECATED RDW RBC AUTO: 58.1 FL (ref 35.1–43.9)
ERYTHROCYTE [DISTWIDTH] IN BLOOD BY AUTOMATED COUNT: 21.4 % (ref 11.5–14.5)
GFR SERPL CREATININE-BSD FRML MDRD: >150 ML/MIN/1.73 (ref 60–130)
GLOBULIN UR ELPH-MCNC: 3.2 GM/DL (ref 2.3–3.5)
GLUCOSE BLD-MCNC: 94 MG/DL (ref 60–100)
HCT VFR BLD AUTO: 28.6 % (ref 39–49)
HGB BLD-MCNC: 9.4 G/DL (ref 13.7–17.3)
MCH RBC QN AUTO: 24.3 PG (ref 26.5–34)
MCHC RBC AUTO-ENTMCNC: 32.9 G/DL (ref 31.5–36.3)
MCV RBC AUTO: 73.9 FL (ref 80–98)
PLATELET # BLD AUTO: 567 10*3/MM3 (ref 150–450)
PMV BLD AUTO: 8.6 FL (ref 8–12)
POTASSIUM BLD-SCNC: 3.7 MMOL/L (ref 3.5–5.1)
PROT SERPL-MCNC: 6 G/DL (ref 6.3–8.6)
RBC # BLD AUTO: 3.87 10*6/MM3 (ref 4.37–5.74)
SODIUM BLD-SCNC: 136 MMOL/L (ref 137–145)
WBC NRBC COR # BLD: 11.22 10*3/MM3 (ref 3.2–9.8)

## 2017-04-26 PROCEDURE — 94760 N-INVAS EAR/PLS OXIMETRY 1: CPT

## 2017-04-26 PROCEDURE — 94799 UNLISTED PULMONARY SVC/PX: CPT

## 2017-04-26 PROCEDURE — 99024 POSTOP FOLLOW-UP VISIT: CPT | Performed by: NURSE PRACTITIONER

## 2017-04-26 PROCEDURE — 25010000002 MORPHINE SULFATE (PF) 2 MG/ML SOLUTION: Performed by: NURSE PRACTITIONER

## 2017-04-26 PROCEDURE — 71020 HC CHEST PA AND LATERAL: CPT

## 2017-04-26 PROCEDURE — 85027 COMPLETE CBC AUTOMATED: CPT | Performed by: NURSE PRACTITIONER

## 2017-04-26 PROCEDURE — 97116 GAIT TRAINING THERAPY: CPT

## 2017-04-26 PROCEDURE — 80053 COMPREHEN METABOLIC PANEL: CPT | Performed by: NURSE PRACTITIONER

## 2017-04-26 RX ORDER — FAMOTIDINE 20 MG/1
20 TABLET, FILM COATED ORAL 2 TIMES DAILY
Qty: 60 TABLET | Refills: 0 | Status: SHIPPED | OUTPATIENT
Start: 2017-04-26 | End: 2017-12-19

## 2017-04-26 RX ORDER — SENNA AND DOCUSATE SODIUM 50; 8.6 MG/1; MG/1
1 TABLET, FILM COATED ORAL 2 TIMES DAILY
Qty: 60 TABLET | Refills: 0
Start: 2017-04-26 | End: 2017-05-01

## 2017-04-26 RX ORDER — IBUPROFEN 200 MG
400 TABLET ORAL EVERY 12 HOURS PRN
Qty: 30 TABLET | Refills: 0
Start: 2017-04-26

## 2017-04-26 RX ORDER — OXYCODONE AND ACETAMINOPHEN 10; 325 MG/1; MG/1
1 TABLET ORAL EVERY 4 HOURS PRN
Qty: 18 TABLET | Refills: 0 | Status: SHIPPED | OUTPATIENT
Start: 2017-04-26 | End: 2017-04-27 | Stop reason: SDUPTHER

## 2017-04-26 RX ORDER — POLYETHYLENE GLYCOL 3350 17 G/17G
17 POWDER, FOR SOLUTION ORAL NIGHTLY PRN
Qty: 10 EACH | Refills: 0
Start: 2017-04-26 | End: 2017-05-01

## 2017-04-26 RX ADMIN — MORPHINE SULFATE 2 MG: 2 INJECTION, SOLUTION INTRAMUSCULAR; INTRAVENOUS at 11:35

## 2017-04-26 RX ADMIN — FERROUS SULFATE TAB EC 324 MG (65 MG FE EQUIVALENT) 324 MG: 324 (65 FE) TABLET DELAYED RESPONSE at 09:28

## 2017-04-26 RX ADMIN — SENNOSIDES AND DOCUSATE SODIUM 1 TABLET: 8.6; 5 TABLET ORAL at 09:28

## 2017-04-26 RX ADMIN — FAMOTIDINE 20 MG: 20 TABLET ORAL at 09:28

## 2017-04-26 RX ADMIN — MORPHINE SULFATE 2 MG: 2 INJECTION, SOLUTION INTRAMUSCULAR; INTRAVENOUS at 06:35

## 2017-04-26 RX ADMIN — OXYCODONE HYDROCHLORIDE AND ACETAMINOPHEN 1 TABLET: 10; 325 TABLET ORAL at 01:15

## 2017-04-26 RX ADMIN — OXYCODONE HYDROCHLORIDE AND ACETAMINOPHEN 1 TABLET: 10; 325 TABLET ORAL at 05:26

## 2017-04-26 RX ADMIN — OXYCODONE HYDROCHLORIDE AND ACETAMINOPHEN 1 TABLET: 10; 325 TABLET ORAL at 13:44

## 2017-04-26 RX ADMIN — MORPHINE SULFATE 2 MG: 2 INJECTION, SOLUTION INTRAMUSCULAR; INTRAVENOUS at 02:42

## 2017-04-26 RX ADMIN — ALBUTEROL SULFATE 2.5 MG: 2.5 SOLUTION RESPIRATORY (INHALATION) at 10:22

## 2017-04-26 RX ADMIN — OXYCODONE HYDROCHLORIDE AND ACETAMINOPHEN 500 MG: 500 TABLET ORAL at 09:28

## 2017-04-26 RX ADMIN — OXYCODONE HYDROCHLORIDE AND ACETAMINOPHEN 1 TABLET: 10; 325 TABLET ORAL at 09:28

## 2017-04-26 RX ADMIN — Medication 1 TABLET: at 09:28

## 2017-04-26 NOTE — PLAN OF CARE
Problem: Patient Care Overview (Adult)  Goal: Plan of Care Review  Outcome: Ongoing (interventions implemented as appropriate)    04/25/17 1801 04/25/17 2200 04/26/17 1315   Coping/Psychosocial Response Interventions   Plan Of Care Reviewed With --  patient --    Patient Care Overview   Progress improving --  --    Outcome Evaluation   Outcome Summary/Follow up Plan --  --  sit-stand-sit ind,amb 280' w/o ad ind no lob,up/down 10 steps w/o hr ind-1 new goal met       Goal: Discharge Needs Assessment  Outcome: Ongoing (interventions implemented as appropriate)    04/24/17 1522   Discharge Needs Assessment   Concerns To Be Addressed no discharge needs identified   Equipment Needed After Discharge none   Living Environment   Transportation Available car;family or friend will provide   Self-Care   Equipment Currently Used at Home none         Problem: Inpatient Physical Therapy  Goal: Transfer Training Goal 1 LTG- PT  Outcome: Ongoing (interventions implemented as appropriate)    04/24/17 1726 04/26/17 1315   Transfer Training PT LTG   Transfer Training PT LTG, Date Established 04/24/17 --    Transfer Training PT LTG, Time to Achieve by discharge --    Transfer Training PT LTG, Activity Type bed to chair /chair to bed;sit to stand/stand to sit --    Transfer Training PT LTG, [place] Level independent --    Transfer Training PT LTG, Date Goal Reviewed --  04/26/17   Transfer Training PT LTG, Outcome --  goal not met   Transfer Training PT LTG, Reason Goal Not Met --  progress slower than expected       Goal: Gait Training Goal LTG- PT  Outcome: Ongoing (interventions implemented as appropriate)    04/24/17 1726 04/26/17 1315   Gait Training PT LTG   Gait Training Goal PT LTG, Date Established 04/24/17 --    Gait Training Goal PT LTG, Time to Achieve by discharge --    Gait Training Goal PT LTG, [place] Level independent --    Gait Training Goal PT LTG, Distance to Achieve 400ft; O2 sats will not drop below 92%  --    Gait Training Goal PT LTG, Additional Goal 500ft, O2 sats will not drop below 92% --    Gait Training Goal PT LTG, Date Goal Reviewed --  04/26/17   Gait Training Goal PT LTG, Outcome --  goal not met   Gait Training Goal PT LTG, Reason Goal Not Met --  progress slower than expected       Goal: Stair Training Goal LTG- PT  Outcome: Ongoing (interventions implemented as appropriate)    04/24/17 1726 04/26/17 1315   Stair Training PT LTG   Stair Training Goal PT LTG, Date Established --  04/26/17   Stair Training Goal PT LTG, Time to Achieve by discharge --    Stair Training Goal PT LTG, Number of Steps 4 --    Stair Training Goal PT LTG, [place] Level conditional independence --    Stair Training Goal PT LTG, Assist Device 1 handrail --    Stair Training Goal PT LTG, Additional Goal O2 sats will not drop below 92% --    Stair Training Goal PT LTG, Date Goal Reviewed --  04/26/17   Stair Training Goal PT LTG, Outcome --  goal met

## 2017-04-26 NOTE — PLAN OF CARE
Problem: Patient Care Overview (Adult)  Goal: Plan of Care Review  Outcome: Ongoing (interventions implemented as appropriate)    04/25/17 1801 04/25/17 2200   Coping/Psychosocial Response Interventions   Plan Of Care Reviewed With --  patient   Patient Care Overview   Progress improving --        Goal: Adult Individualization and Mutuality  Outcome: Ongoing (interventions implemented as appropriate)  Goal: Discharge Needs Assessment  Outcome: Ongoing (interventions implemented as appropriate)    Problem: Lung Surgery (via Thoracotomy) (Adult)  Goal: Signs and Symptoms of Listed Potential Problems Will be Absent or Manageable (Lung Surgery)  Outcome: Ongoing (interventions implemented as appropriate)    Problem: Pain, Acute (Adult)  Goal: Acceptable Pain Control/Comfort Level  Outcome: Ongoing (interventions implemented as appropriate)

## 2017-04-26 NOTE — DISCHARGE SUMMARY
"  Date of Discharge:  4/26/2017    Discharge Diagnosis:   SQUAMOUS CELL CARCINOMA (8 CM), APICAL SEGMENT, WITH INVASION OF PARIETAL PLEURA.   PARIETAL PLEURA (SOFT TISSUE) MARGIN POSITIVE FOR CARCINOMA.   BRONCHIAL AND VASCULAR MARGINS NEGATIVE FOR CARCINOMA.  FUNGI CONSISTENT WITH ASPERGILLUS SPECIES (\"FUNGUS BALL\") IN THE CAVITY CONTENTS.   Postoperative Pain:  Controlled with oral medication  Postoperative Atelectasis improved.     Problem List:  Principal Problem:    Cavitary lesion of lung  Active Problems:    Mycetoma      Presenting Problem/History of Present Illness  Lung mass [R91.8]  Mycetoma [B47.9]        Hospital Course  Patient is a 57 y.o. male presented initially to [facility] with respiratory distress, SOA,and cough.  He is a long standing smoker with history of fungal infection.  Diagnostics demonstrated cavitary lesion.  Bronchscopy was performed.  After improving preop status, receiving blood transfusion, and completing preop work up, Mr Ellis elected to proceed with surgical resection.  He returned to the hospital on 4/19/17 and underwent said procedure.  He tolerated it well.  Remained stable in OR, and CCU.  Pain was controlled with PCA morphine and percocet was later added to therapy.  His first POD he was transferred to 3 w telemetry.  His respiratory effort improved and his atelectasis improved.  His chest tubes were removed 4/25/17 follow up chest xray did not demonstrate pneumothorax.  He has remained in RSR, afebrile and is independent in his ADLs.  His pain is controlled with percocet.  He is stable and ready for discharge in satisfactory condition, and in agreement with this plan.     Procedures Performed  Procedure(s):  THORACOTOMY LEFT WITH PULMONARY RESECTION AND BRONCHOSCOPY WITH ENDO        Consults:   Consults     No orders found from 3/21/2017 to 4/20/2017.          Pertinent Test Results: see path above  Lab Results   Component Value Date    WBC 11.22 (H) 04/26/2017    HGB 9.4 (L) " 04/26/2017    HCT 28.6 (L) 04/26/2017    MCV 73.9 (L) 04/26/2017     (H) 04/26/2017     Lab Results   Component Value Date    GLUCOSE 94 04/26/2017    BUN 9 04/26/2017    CREATININE 0.46 (L) 04/26/2017    EGFRIFNONA >150 04/26/2017    BCR 19.6 04/26/2017    K 3.7 04/26/2017    CO2 26.0 04/26/2017    CALCIUM 8.4 04/26/2017    ALBUMIN 2.80 (L) 04/26/2017    LABIL2 0.9 (L) 04/26/2017    AST 21 04/26/2017    ALT 46 04/26/2017         Condition on Discharge:  Satisfactory     Vital Signs  Temp:  [97.5 °F (36.4 °C)-98.9 °F (37.2 °C)] 98 °F (36.7 °C)  Heart Rate:  [] 99  Resp:  [16-18] 18  BP: (133-159)/(71-80) 145/71  Physical Exam   Constitutional: He is oriented to person, place, and time. He appears well-nourished.   HENT:   Head: Normocephalic.   Mouth/Throat: Oropharynx is clear and moist.   Eyes: Conjunctivae are normal. Pupils are equal, round, and reactive to light.   Constricted    Neck: Neck supple. No JVD present. No tracheal deviation present.   Cardiovascular: Normal rate, regular rhythm, normal heart sounds and intact distal pulses.   Pulmonary/Chest: Effort normal. No stridor. No respiratory distress. He has no wheezes. He has no rales. He exhibits tenderness (Left surgical incision ).   Diminished in left base   CT site clean and dry  FU chest xray Improved aeration No pneumothorax    Abdominal: Soft. Bowel sounds are normal. He exhibits no distension.   Had BM    Musculoskeletal: He exhibits no edema or tenderness.   Neurological: He is alert and oriented to person, place, and time.   Skin: Skin is warm and dry. No rash noted. No erythema. No pallor.   Left thoracotomy incision clean and dry .   CT sutures mild erythema.    Psychiatric: Judgment normal.   Discharge Disposition  Home or Self Care    Discharge Medications   Tino Peres   Home Medication Instructions DINA:[id number]    Printed on:04/26/17 1410   Medication Information                      famotidine (PEPCID) 20 MG  tablet  Take 1 tablet by mouth 2 (Two) Times a Day.             ferrous sulfate 324 (65 FE) MG tablet delayed-release EC tablet  Take 1 tablet by mouth Daily With Breakfast.             ibuprofen (ADVIL,MOTRIN) 200 MG tablet  Take 2 tablets by mouth Every 12 (Twelve) Hours As Needed for Mild Pain (1-3) (Postop pain).             Multiple Vitamins-Minerals (MULTIVITAMIN ADULT PO)  Take 1 tablet by mouth Daily.             oxyCODONE-acetaminophen (PERCOCET)  MG per tablet  Take 1 tablet by mouth Every 4 (Four) Hours As Needed for Moderate Pain (4-6) for up to 1 day.             polyethylene glycol (MIRALAX) packet  Take 17 g by mouth At Night As Needed (constipation).             sennosides-docusate sodium (SENOKOT-S) 8.6-50 MG tablet  Take 1 tablet by mouth 2 (Two) Times a Day.               Apollo [id number]  Reviewed risks, benefits, and habit forming potential and weaning from narcotic medication.  Patient understands and wishes to receive prescription.  Prescription written for Percocet 10/325 # 18 after Apollo placed on file.    Discharge Diet   Diet Instructions     Diet:       Diet Texture / Consistency:  Regular                 Activity at Discharge  Activity Instructions     Discharge Activity       1) No driving for 10-14 days  and no longer taking narcotics.   2) Return to school / work in 3 weeks:[id number]}.  3) May shower daily.  Rinse Pat dry incision   4) Do not lift / push / pull more then 8-10 lbs with  LEFT ARM   Ambulate every 2 hours.    Discharge instructions include no heavy lifting anything greater than 10lbs with the left arm for approximately 4 weeks.  No sex or driving for 3-6 weeks. Patient is to continue with Incentive Spirometry 4 times daily while awake at home as well as any prescribed respiratory treatments. Printed information given to the patient with advancement of activities weekly.  . Clean operative site with antibacterial soap/water, pat dry. Keep open to air unless draining,  then may apply dry dressing.  No ointments or creams unless prescribed by provider. Signs and symptoms of infection including drainage from operative site, redness, swelling, with associated fever and/or chills notify Heart and Vascular center immediately for wound check.       Other Restrictions (Specify)                     Follow-up Appointments  Future Appointments  Date Time Provider Department Center   5/1/2017 11:00 AM CIPRIANO Werner [facility] None   5/1/2017 3:30 PM Marco A Steiner,  [facility] None     Additional Instructions for the Follow-ups that You Need to Schedule     Discharge Follow-up with Specialty    As directed    Specialty:  cardiothoracic   Follow Up:  1 Week   Follow Up Details:  Em Mcginnis Monday May 1 at 1100  Further FU appts will be made at that time   Has the patient’s follow-up appointment been scheduled and documented in the discharge navigator?:  Patient to schedule, documented in the follow-up section       Notify Physician or Go To The ED For the Following Conditions    As directed    Signs and symptoms of infection including drainage from operative site, redness, swelling, with associated fever and/or chills notify Heart and Vascular center immediately for wound check.       Return to Clinic for Follow Up (Specify Provider)    As directed    To:  Em Mcginnis   Follow Up:  1 Week   Follow Up Details:  May 1 @ 1100   Has the patient’s follow-up appointment been scheduled and documented in the discharge navigator?:  Patient to schedule, documented in the follow-up section                 Test Results Pending at Discharge   Order Current Status    AFB Culture Preliminary result    Fungus Culture Preliminary result          Time: Discharge 40 min          This document has been electronically signed by CIPRIANO Roque on April 26, 2017 2:10 PM

## 2017-04-26 NOTE — THERAPY DISCHARGE NOTE
Acute Care - Physical Therapy Discharge Summary  [facility]       Patient Name: Tino Peres  : 1960  MRN: [id number]    Today's Date: 2017       Date of Referral to PT: 17  Referring Physician: Dr. Silvio Santiago      Admit Date: 2017      PT Recommendation and Plan    Visit Dx:    ICD-10-CM ICD-9-CM   1. Cavitary lesion of lung J98.4 518.89   2. 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 Lung mass R91.8 786.6   4. Impaired physical mobility Z74.09 781.99   5. Impaired gait and mobility R26.89 781.2             Outcome Measures       17 1315 17 1456       How much help from another person do you currently need...    Turning from your back to your side while in flat bed without using bedrails? 4  -LN 4  -JAY     Moving from lying on back to sitting on the side of a flat bed without bedrails? 4  -LN 4  -JAY     Moving to and from a bed to a chair (including a wheelchair)? 4  -LN 3  -JAY     Standing up from a chair using your arms (e.g., wheelchair, bedside chair)? 4  -LN 3  -JAY     Climbing 3-5 steps with a railing? 4  -LN 3  -JAY     To walk in hospital room? 4  -LN 3  -JAY     AM-PAC 6 Clicks Score 24  -LN 20  -JAY     Functional Assessment    Outcome Measure Options AM-PAC 6 Clicks Basic Mobility (PT)  -LN AM-PAC 6 Clicks Basic Mobility (PT)  -JAY       User Key  (r) = Recorded By, (t) = Taken By, (c) = Cosigned By    Initials Name Provider Type    JAY Nidia Mathew, PT Physical Therapist    STEPHEN Sylvester PTA Physical Therapy Assistant                PT Charges       17 1315          Time Calculation    Start Time 1315  -LN      Stop Time 1325  -LN      Time Calculation (min) 10 min  -LN      PT Received On 17  -LN      Time Calculation- PT    Total Timed Code Minutes- PT 10 minute(s)  -LN        User Key  (r) = Recorded By, (t) = Taken By, (c) = Cosigned By    Initials Name Provider Type    STEPHEN Sylvester PTA Physical Therapy Assistant                  IP PT Goals       17  1607 04/26/17 1315 04/24/17 1726    Transfer Training PT LTG    Transfer Training PT LTG, Date Established   04/24/17  -JAY    Transfer Training PT LTG, Time to Achieve   by discharge  -JAY    Transfer Training PT LTG, Activity Type   bed to chair /chair to bed;sit to stand/stand to sit  -JAY    Transfer Training PT LTG, [place] Level   independent  -JAY    Transfer Training PT  LTG, Date Goal Reviewed (P)  04/26/17  -CB 04/26/17  -LN     Transfer Training PT LTG, Outcome (P)  goal not met  -CB goal not met  -LN     Transfer Training PT LTG, Reason Goal Not Met (P)  discharged from facility  -CB progress slower than expected  -LN     Gait Training PT LTG    Gait Training Goal PT LTG, Date Established   04/24/17  -JAY    Gait Training Goal PT LTG, Time to Achieve   by discharge  -JAY    Gait Training Goal PT LTG, [place] Level   independent  -JAY    Gait Training Goal PT LTG, Distance to Achieve   400ft; O2 sats will not drop below 92%  -JAY    Gait Training Goal PT LTG, Additional Goal   500ft, O2 sats will not drop below 92%  -JAY    Gait Training Goal PT LTG, Date Goal Reviewed (P)  04/26/17  -CB 04/26/17  -LN     Gait Training Goal PT LTG, Outcome (P)  goal not met  -CB goal not met  -LN goal ongoing  -JAY    Gait Training Goal PT LTG, Reason Goal Not Met (P)  discharged from facility  -CB progress slower than expected  -LN     Stair Training PT LTG    Stair Training Goal PT LTG, Date Established  04/26/17  -LN 04/24/17  -JAY    Stair Training Goal PT LTG, Time to Achieve   by discharge  -JAY    Stair Training Goal PT LTG, Number of Steps   4  -JAY    Stair Training Goal PT LTG, [place] Level   conditional independence  -JAY    Stair Training Goal PT LTG, Assist Device   1 handrail  -JAY    Stair Training Goal PT LTG, Additional Goal   O2 sats will not drop below 92%  -JAY    Stair Training Goal PT LTG, Date Goal Reviewed (P)  04/26/17  -CB 04/26/17  -LN     Stair Training Goal PT LTG, Outcome (P)  goal met  -CB  goal met  -LN goal ongoing  -JAY      User Key  (r) = Recorded By, (t) = Taken By, (c) = Cosigned By    Initials Name Provider Type    CB Leti Mcintosh, PT Physical Therapist    JAY Nidia Mathew, PT Physical Therapist    LN Kandy Sylvester, PTA Physical Therapy Assistant              PT Discharge Summary  Reason for Discharge: Discharge from facility, Per MD order  Outcomes Achieved: Patient able to partially acheive established goals  Discharge Destination: Home      Leti Mcintosh, PT   4/26/2017

## 2017-04-26 NOTE — PROGRESS NOTES
"Acute Care - Physical Therapy Treatment Note  [facility]     Patient Name: Tino Peres  : 1960  MRN: [id number]  Today's Date: 2017     Date of Referral to PT: 17  Referring Physician: Dr. Silvio Santiago    Admit Date: 2017    Visit Dx:    ICD-10-CM ICD-9-CM   1. Cavitary lesion of lung J98.4 518.89   2. 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 Lung mass R91.8 786.6   4. Impaired physical mobility Z74.09 781.99   5. Impaired gait and mobility R26.89 781.2     Patient Active Problem List   Diagnosis   • Tobacco use disorder   • Alcohol use disorder   • Iron deficiency anemia   • Cavitary lesion of lung   • Mycetoma               Adult Rehabilitation Note       17 1315          Rehab Assessment/Intervention    Discipline physical therapy assistant  -LN      Document Type therapy note (daily note)  -LN      Subjective Information agree to therapy;complains of;pain   needed encouragement to amb/steps-\"If it's necessary\"  -LN      Recorded by [LN] Kandy Sylvester PTA      Vital Signs    Pre Systolic BP Rehab 130  -LN      Pre Treatment Diastolic BP 70  -LN      Post Systolic BP Rehab 160  -LN      Post Treatment Diastolic BP 78  -LN      Pretreatment Heart Rate (beats/min) 86  -LN      Posttreatment Heart Rate (beats/min) 65  -LN      Pre SpO2 (%) 96  -LN      Post SpO2 (%) 91  -LN      Pre Patient Position Sitting  -LN      Intra Patient Position Standing  -LN      Post Patient Position Sitting  -LN      Recorded by [LN] Kandy Sylvester PTA      Pain Assessment    Pain Assessment 0-10  -LN      Pain Score 5  -LN      Post Pain Score 5  -LN      Pain Type Acute pain  -LN      Pain Location Incision  -LN      Recorded by [LN] Kandy Sylvester PTA      Cognitive Assessment/Intervention    Orientation Status oriented to;person     -LN      Recorded by [LN] Kandy Sylvester PTA      Transfer Assessment/Treatment    Transfers, Sit-Stand [place] independent  -LN      Transfers, Stand-Sit [place] " independent  -LN      Transfers, Sit-Stand-Sit, Assist Device --   none  -LN      Recorded by [LN] Kandy Sylvester, PTA      Gait Assessment/Treatment    Gait, [place] Level independent  -LN      Gait, Assistive Device --   none  -LN      Gait, Distance (Feet) 280  -LN      Recorded by [LN] Kandy Sylvester, PTA      Stairs Assessment/Treatment    Number of Stairs 10  -LN      Stairs, [place] Level independent   w/o handrails  -LN      Recorded by [LN] Kandy Sylvester, PTA      Positioning and Restraints    Post Treatment Position chair  -LN      In Chair sitting;call light within reach  -LN      Recorded by [LN] Kandy Sylvester, PTA        User Key  (r) = Recorded By, (t) = Taken By, (c) = Cosigned By    Initials Name Effective Dates    LN Kandy Sylvester, PTA 10/17/16 -                 IP PT Goals       04/26/17 1315 04/24/17 1726       Transfer Training PT LTG    Transfer Training PT LTG, Date Established  04/24/17  -JAY     Transfer Training PT LTG, Time to Achieve  by discharge  -JAY     Transfer Training PT LTG, Activity Type  bed to chair /chair to bed;sit to stand/stand to sit  -JAY     Transfer Training PT LTG, [place] Level  independent  -JAY     Transfer Training PT  LTG, Date Goal Reviewed 04/26/17  -LN      Transfer Training PT LTG, Outcome goal not met  -LN      Transfer Training PT LTG, Reason Goal Not Met progress slower than expected  -LN      Gait Training PT LTG    Gait Training Goal PT LTG, Date Established  04/24/17  -JAY     Gait Training Goal PT LTG, Time to Achieve  by discharge  -JAY     Gait Training Goal PT LTG, [place] Level  independent  -JAY     Gait Training Goal PT LTG, Distance to Achieve  400ft; O2 sats will not drop below 92%  -JAY     Gait Training Goal PT LTG, Additional Goal  500ft, O2 sats will not drop below 92%  -JAY     Gait Training Goal PT LTG, Date Goal Reviewed 04/26/17  -LN      Gait Training Goal PT LTG, Outcome goal not met  -LN goal ongoing  -JAY     Gait Training Goal PT  LTG, Reason Goal Not Met progress slower than expected  -LN      Stair Training PT LTG    Stair Training Goal PT LTG, Date Established 04/26/17  -LN 04/24/17  -JAY     Stair Training Goal PT LTG, Time to Achieve  by discharge  -     Stair Training Goal PT LTG, Number of Steps  4  -     Stair Training Goal PT LTG, [place] Level  conditional independence  -JAY     Stair Training Goal PT LTG, Assist Device  1 handrail  -     Stair Training Goal PT LTG, Additional Goal  O2 sats will not drop below 92%  -JAY     Stair Training Goal PT LTG, Date Goal Reviewed 04/26/17  -LN      Stair Training Goal PT LTG, Outcome goal met  - goal ongoing  -       User Key  (r) = Recorded By, (t) = Taken By, (c) = Cosigned By    Initials Name Provider Type    JAY Mathew, PT Physical Therapist    LN Kandy Sylvester, BRANDEE Physical Therapy Assistant          Physical Therapy Education     Title: PT OT SLP Therapies (Done)     Topic: Physical Therapy (Done)     Point: Mobility training (Done)    Learning Progress Summary    Learner Readiness Method Response Comment Documented by Status   Patient Acceptance E VU  LN 04/26/17 1412 Done    Acceptance E VU,ANG  JAY 04/24/17 1725 Done               Point: Precautions (Done)    Learning Progress Summary    Learner Readiness Method Response Comment Documented by Status   Patient Acceptance E VU  LN 04/26/17 1412 Done    Acceptance E VU,DU  JAY 04/24/17 1725 Done                      User Key     Initials Effective Dates Name Provider Type Discipline     10/17/16 -  Nidia Mathew, PT Physical Therapist PT    STEPHEN 10/17/16 -  Kandy Sylvester PTA Physical Therapy Assistant PT                    PT Recommendation and Plan  Anticipated Discharge Disposition: home (may benefit from pulmonary rehab when MD deems appropriate)  Planned Therapy Interventions: balance training, gait training, stair training, transfer training  PT Frequency: other (see comments) (5-14 times per week)  Plan of Care  Review  Outcome Summary/Follow up Plan: sit-stand-sit ind,amb 280' w/o ad ind no lob,up/down 10 steps w/o hr ind-1 new goal met          Outcome Measures       04/26/17 1315 04/24/17 1456       How much help from another person do you currently need...    Turning from your back to your side while in flat bed without using bedrails? 4  -LN 4  -JAY     Moving from lying on back to sitting on the side of a flat bed without bedrails? 4  -LN 4  -JAY     Moving to and from a bed to a chair (including a wheelchair)? 4  -LN 3  -JAY     Standing up from a chair using your arms (e.g., wheelchair, bedside chair)? 4  -LN 3  -JAY     Climbing 3-5 steps with a railing? 4  -LN 3  -JAY     To walk in hospital room? 4  -LN 3  -JAY     AM-PAC 6 Clicks Score 24  -LN 20  -JAY     Functional Assessment    Outcome Measure Options AM-PAC 6 Clicks Basic Mobility (PT)  -LN AM-PAC 6 Clicks Basic Mobility (PT)  -JAY       User Key  (r) = Recorded By, (t) = Taken By, (c) = Cosigned By    Initials Name Provider Type    JAY Mathew, PT Physical Therapist    LN Kandy Sylvester PTA Physical Therapy Assistant           Time Calculation:         PT Charges       04/26/17 1315          Time Calculation    Start Time 1315  -LN      Stop Time 1325  -LN      Time Calculation (min) 10 min  -LN      PT Received On 04/26/17  -LN      Time Calculation- PT    Total Timed Code Minutes- PT 10 minute(s)  -LN        User Key  (r) = Recorded By, (t) = Taken By, (c) = Cosigned By    Initials Name Provider Type    STEPHEN Sylvester PTA Physical Therapy Assistant          Therapy Charges for Today     Code Description Service Date Service Provider Modifiers Qty    27117089304 HC GAIT TRAINING EA 15 MIN 4/26/2017 Kandy Sylvester PTA GP 1          PT G-Codes  PT Professional Judgement Used?: Yes  Outcome Measure Options: AM-PAC 6 Clicks Basic Mobility (PT)  Score: 20  Functional Limitation: Mobility: Walking and moving around  Mobility: Walking and Moving Around Current  Status (): At least 20 percent but less than 40 percent impaired, limited or restricted  Mobility: Walking and Moving Around Goal Status (): 0 percent impaired, limited or restricted    Kandy Sylvester, PTA  4/26/2017

## 2017-04-26 NOTE — PLAN OF CARE
Problem: Inpatient Physical Therapy  Goal: Transfer Training Goal 1 LTG- PT  Outcome: Unable to achieve outcome(s) by discharge Date Met:  04/26/17 04/24/17 1726 04/26/17 1607   Transfer Training PT LTG   Transfer Training PT LTG, Date Established 04/24/17 --    Transfer Training PT LTG, Time to Achieve by discharge --    Transfer Training PT LTG, Activity Type bed to chair /chair to bed;sit to stand/stand to sit --    Transfer Training PT LTG, [place] Level independent --    Transfer Training PT LTG, Date Goal Reviewed --  04/26/17   Transfer Training PT LTG, Outcome --  goal not met   Transfer Training PT LTG, Reason Goal Not Met --  discharged from facility       Goal: Gait Training Goal LTG- PT  Outcome: Unable to achieve outcome(s) by discharge Date Met:  04/26/17 04/24/17 1726 04/26/17 1607   Gait Training PT LTG   Gait Training Goal PT LTG, Date Established 04/24/17 --    Gait Training Goal PT LTG, Time to Achieve by discharge --    Gait Training Goal PT LTG, [place] Level independent --    Gait Training Goal PT LTG, Distance to Achieve 400ft; O2 sats will not drop below 92% --    Gait Training Goal PT LTG, Additional Goal 500ft, O2 sats will not drop below 92% --    Gait Training Goal PT LTG, Date Goal Reviewed --  04/26/17   Gait Training Goal PT LTG, Outcome --  goal not met   Gait Training Goal PT LTG, Reason Goal Not Met --  discharged from facility       Goal: Stair Training Goal LTG- PT  Outcome: Outcome(s) achieved Date Met:  04/26/17 04/24/17 1726 04/26/17 1315 04/26/17 1607   Stair Training PT LTG   Stair Training Goal PT LTG, Date Established --  04/26/17 --    Stair Training Goal PT LTG, Time to Achieve by discharge --  --    Stair Training Goal PT LTG, Number of Steps 4 --  --    Stair Training Goal PT LTG, [place] Level conditional independence --  --    Stair Training Goal PT LTG, Assist Device 1 handrail --  --    Stair Training Goal PT LTG, Additional Goal O2 sats will  not drop below 92% --  --    Stair Training Goal PT LTG, Date Goal Reviewed --  --  04/26/17   Stair Training Goal PT LTG, Outcome --  --  goal met

## 2017-04-26 NOTE — PROGRESS NOTES
"  CTVS DAILY NOTE        LOS: 7 days   POD# 7  LEFT THORACOTOMY, UPPER LOBECTOMY  THORACIC, MEDIASTINAL LYMPHADENCTOMY  BRONCHOSCOPY  PATH:  SQUAMOUS CELL CARCINOMA (8 CM), APICAL SEGMENT, WITH INVASION OF PARIETAL PLEURA.   PARIETAL PLEURA (SOFT TISSUE) MARGIN POSITIVE FOR CARCINOMA.   BRONCHIAL AND VASCULAR MARGINS NEGATIVE FOR CARCINOMA.  FUNGI CONSISTENT WITH ASPERGILLUS SPECIES (\"FUNGUS BALL\") IN THE CAVITY CONTENTS.           Chief Complaint: VAS 4-5 Pain improved  Concerned about control       Subjective       VAS 5     ROS:    Review of Systems   Constitution: Negative for chills, decreased appetite, fever and malaise/fatigue.   HENT: Negative for headaches, hoarse voice, nosebleeds and sore throat.    Eyes: Negative for visual disturbance.   Cardiovascular: Negative for chest pain, dyspnea on exertion, leg swelling, near-syncope and syncope.   Respiratory: Negative for cough, hemoptysis and shortness of breath.    Hematologic/Lymphatic: Does not bruise/bleed easily.   Skin: Negative for color change, dry skin and poor wound healing.        INC:  No drainage  No odor  No reddness   Musculoskeletal: Negative for back pain, falls, muscle cramps and muscle weakness.   Gastrointestinal: Positive for flatus. Negative for anorexia, change in bowel habit, heartburn, melena and nausea.        BM    Genitourinary: Negative for hematuria.   Neurological: Negative for difficulty with concentration, excessive daytime sleepiness, dizziness, light-headedness, paresthesias and sensory change.   Psychiatric/Behavioral: Negative for altered mental status.   Allergic/Immunologic: Negative for environmental allergies.       Objective     Vital Signs  Temp:  [97.5 °F (36.4 °C)-98.9 °F (37.2 °C)] 97.6 °F (36.4 °C)  Heart Rate:  [] 88  Resp:  [16-18] 18  BP: (133-159)/(71-80) 133/71  Body mass index is 22.34 kg/(m^2).    Intake/Output Summary (Last 24 hours) at 04/26/17 1156  Last data filed at 04/26/17 0300   Gross per 24 " hour   Intake              300 ml   Output             1975 ml   Net            -1675 ml          Wt Readings from Last 3 Encounters:   04/26/17 146 lb 14.4 oz (66.6 kg)   04/06/17 136 lb (61.7 kg)   03/09/17 138 lb 0.1 oz (62.6 kg)           Physical Exam   Physical Exam   Constitutional: He is oriented to person, place, and time. He appears well-nourished.   HENT:   Head: Normocephalic.   Mouth/Throat: Oropharynx is clear and moist.   Eyes: Conjunctivae are normal. Pupils are equal, round, and reactive to light.   Constricted    Neck: Neck supple. No JVD present. No tracheal deviation present.   Cardiovascular: Normal rate, regular rhythm, normal heart sounds and intact distal pulses.    Pulmonary/Chest: Effort normal. No stridor. No respiratory distress. He has no wheezes. He has no rales. He exhibits tenderness (Left surgical incision ).   Diminished in left base   CT site clean and dry  FU chest xray  Improved aeration No pneumothorax    Abdominal: Soft. Bowel sounds are normal. He exhibits no distension.   Had BM    Musculoskeletal: He exhibits no edema or tenderness.   Neurological: He is alert and oriented to person, place, and time.   Skin: Skin is warm and dry. No rash noted. No erythema. No pallor.   Left thoracotomy incision clean and dry .    CT sutures mild erythema.    Psychiatric: Judgment normal.   Nursing note and vitals reviewed.      Results Review:    CHEST Xray:  No Pneumothorax  Improved aeration.  Improved atelectasis   Imaging Results (last 24 hours)     Procedure Component Value Units Date/Time    XR Chest 2 View [[id number]] Updated:  04/26/17 0801          Lab Results (last 24 hours)     Procedure Component Value Units Date/Time    CBC (No Diff) [[id number]]  (Abnormal) Collected:  04/26/17 0520    Specimen:  Blood Updated:  04/26/17 0642     WBC 11.22 (H) 10*3/mm3      RBC 3.87 (L) 10*6/mm3      Hemoglobin 9.4 (L) g/dL      Hematocrit 28.6 (L) %      MCV 73.9 (L) fL      MCH 24.3 (L) pg       MCHC 32.9 g/dL      RDW 21.4 (H) %      RDW-SD 58.1 (H) fl      MPV 8.6 fL      Platelets 567 (H) 10*3/mm3     Comprehensive Metabolic Panel [[id number]]  (Abnormal) Collected:  04/26/17 0520    Specimen:  Blood Updated:  04/26/17 0659     Glucose 94 mg/dL      BUN 9 mg/dL      Creatinine 0.46 (L) mg/dL      Sodium 136 (L) mmol/L      Potassium 3.7 mmol/L      Chloride 102 mmol/L      CO2 26.0 mmol/L      Calcium 8.4 mg/dL      Total Protein 6.0 (L) g/dL      Albumin 2.80 (L) g/dL      ALT (SGPT) 46 U/L      AST (SGOT) 21 U/L      Alkaline Phosphatase 344 (H) U/L      Total Bilirubin 0.3 mg/dL      eGFR Non African Amer >150 mL/min/1.73      Globulin 3.2 gm/dL      A/G Ratio 0.9 (L) g/dL      BUN/Creatinine Ratio 19.6     Anion Gap 8.0 mmol/L                             PT/INR:  No results found for: PROTIME/No results found for: INR            albuterol 2.5 mg Nebulization 4x Daily - RT   bisacodyl 10 mg Rectal Once   famotidine 20 mg Oral BID   ferrous sulfate 324 mg Oral Daily With Breakfast   multivitamin with minerals 1 tablet Oral Daily   polyethylene glycol 17 g Oral Nightly   sennosides-docusate sodium 1 tablet Oral BID   vitamin C 500 mg Oral BID            Patient Active Problem List   Diagnosis Code   • Tobacco use disorder F17.200   • Alcohol use disorder F10.99   • Iron deficiency anemia D50.9   • Cavitary lesion of lung J98.4   • 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  Satisfactory Postop.  Increase activity, ambulating independently.  Eating well. Return of body functions.   2.  Lung mass:  Squamous cell ca/ aspergillus.  SP Resection with neg lymph nodes.        Continue acapella, incentive spirometry, coughing and deep breathing.  Improved.          PO adjuvant therapy as OP.    3.  Postoperative Pain:  Controlled with 10 mg morphine and 70 mg percocet in 24 hours.  Will dc home on percocet 10/325.  Reviewed risks, benefits, and habit forming potential and weaning from narcotic medication.   Patient understands and wishes to receive prescription.  Prescription written for Percocet 10/325 #18  after [employer] [id number] placed on file.  4.  Acute on chronic anemia:  Tolerating anemia and  oral iron well.   5.  Rehab:  Independent in activities.  6.  Constipation:  Resolved.  7.  Disposition:  Discharge home.  Follow up appointment Monday.  Will arrange outpatient radiation/oncology services at that time.  Pt understands and agrees with plan.               This document has been electronically signed by CIPRIANO Roque on April 26, 2017 11:56 AM

## 2017-04-26 NOTE — PAYOR COMM NOTE
"  Updated clinicals for ref# [id number]  Ashlee Mcgraw RN case management  Phone: [phone] Fax: [phone]      Tino Peres (57 y.o. Male)     Date of Birth Social Security Number Address Home Phone MRN    1960  [id number]   [facility] [id number] [phone] [id number]    Muslim Marital Status          None Single       Admission Date Admission Type Admitting Provider Attending Provider Department, Room/Bed    4/19/17 Elective Angel Hernandez MD [place], Thomas Mark, MD [address], 302/1    Discharge Date Discharge Disposition Discharge Destination                      Attending Provider: Angel Hernandez MD     Allergies:  No Known Allergies    Isolation:  None   Infection:  None   Code Status:  FULL    Ht:  68\" (172.7 cm)   Wt:  146 lb 14.4 oz (66.6 kg)    Admission Cmt:  None   Principal Problem:  Cavitary lesion of lung [J98.4] More...                 Active Insurance as of 4/19/2017     Primary Coverage     Payor Plan Insurance Group Employer/Plan Group    [facility] [id number]     Payor Plan Address Payor Plan Phone Number Effective From Effective To    [address] [address] [phone] 1/1/2017     [place] [address]       Subscriber Name Subscriber Birth Date Member ID       FARIBAMARGIETINO BRIAN 1960 [id number]                 Emergency Contacts      (Rel.) Home Phone Work Phone Mobile Phone    Jessie Peres (Son) [phone] -- --    JaYan (Father) -- -- [phone]            Insurance Information                [facility] Phone: [phone]    Subscriber: Tino Peres Subscriber#: [id number]    Group#: [id number] Precert#:           Vital Signs (last 24 hours)       04/25 0700  -  04/26 0659 04/26 0700  -  04/26 0833   Most Recent    Temp (°F) 97.5 -  98.9      97.6     97.6 (36.4)    Heart Rate 73 -  92    96 -  101     101    Resp 16 -  18      18     18    /65 -  159/80      133/71     133/71    SpO2 (%) " (!)78 -  98      96     96          Hospital Medications (active)       Dose Frequency Start End    acetaminophen (TYLENOL) tablet 650 mg 650 mg Every 4 Hours PRN 4/19/2017     Sig - Route: Take 2 tablets by mouth Every 4 (Four) Hours As Needed for Mild Pain (1-3) or Fever (temperature greater than 101F). - Oral    acetylcysteine (MUCOMYST) 10 % solution 2 mL 2 mL 4 Times Daily 4/22/2017 4/25/2017    Sig - Route: Take 2 mL by nebulization 4 (Four) Times a Day. - Nebulization    albuterol (PROVENTIL) (2.5 MG/3ML) 0.083% nebulizer solution  - ADS Override Pull   4/25/2017 4/26/2017    Notes to Pharmacy: Created by cabinet override    albuterol (PROVENTIL) nebulizer solution 0.083% 2.5 mg/3mL 2.5 mg 4 Times Daily - RT 4/25/2017     Sig - Route: Take 2.5 mg by nebulization 4 (Four) Times a Day. - Nebulization    bacitracin injection  As Needed 4/19/2017     Sig: As Needed.    bisacodyl (DULCOLAX) suppository 10 mg 10 mg Daily PRN 4/19/2017     Sig - Route: Insert 1 suppository into the rectum Daily As Needed for Constipation. - Rectal    bisacodyl (DULCOLAX) suppository 10 mg 10 mg Once 4/21/2017     Sig - Route: Insert 1 suppository into the rectum 1 (One) Time. - Rectal    bupivacaine (PF) (MARCAINE) 0.5 % injection  As Needed 4/19/2017     Sig: As Needed.    diphenhydrAMINE (BENADRYL) injection 25 mg 25 mg Every 6 Hours PRN 4/19/2017     Sig - Route: Infuse 0.5 mL into a venous catheter Every 6 (Six) Hours As Needed for Itching. - Intravenous    famotidine (PEPCID) tablet 20 mg 20 mg 2 Times Daily 4/22/2017     Sig - Route: Take 1 tablet by mouth 2 (Two) Times a Day. - Oral    ferrous sulfate EC tablet 324 mg 324 mg Daily With Breakfast 4/20/2017     Sig - Route: Take 1 tablet by mouth Daily With Breakfast. - Oral    Morphine sulfate (PF) injection 2 mg 2 mg Once 4/25/2017 4/25/2017    Sig - Route: Infuse 1 mL into a venous catheter 1 (One) Time. - Intravenous    Morphine sulfate (PF) injection 2 mg 2 mg Every 4  "Hours PRN 4/25/2017 5/5/2017    Sig - Route: Infuse 1 mL into a venous catheter Every 4 (Four) Hours As Needed for Severe Pain (7-10). - Intravenous    Morphine sulfate (PF) injection 2 mg 2 mg Once 4/25/2017 4/25/2017    Sig - Route: Infuse 1 mL into a venous catheter 1 (One) Time. - Intravenous    multivitamin with minerals tablet 1 tablet 1 tablet Daily 4/19/2017     Sig - Route: Take 1 tablet by mouth Daily. - Oral    naloxone (NARCAN) injection 0.1 mg 0.1 mg Every 5 Minutes PRN 4/19/2017     Sig - Route: Infuse 0.25 mL into a venous catheter Every 5 (Five) Minutes As Needed for Opioid Reversal or Respiratory Depression (see administration instructions). - Intravenous    nitroglycerin (NITROSTAT) SL tablet 0.4 mg 0.4 mg Every 5 Minutes PRN 4/19/2017     Sig - Route: Place 1 tablet under the tongue Every 5 (Five) Minutes As Needed for Chest Pain. - Sublingual    ondansetron (ZOFRAN) injection 4 mg 4 mg Every 6 Hours PRN 4/19/2017     Sig - Route: Infuse 2 mL into a venous catheter Every 6 (Six) Hours As Needed for Nausea or Vomiting. - Intravenous    Linked Group 1:  \"Or\" Linked Group Details        ondansetron (ZOFRAN) tablet 4 mg 4 mg Every 6 Hours PRN 4/19/2017     Sig - Route: Take 1 tablet by mouth Every 6 (Six) Hours As Needed for Nausea or Vomiting. - Oral    Linked Group 1:  \"Or\" Linked Group Details        ondansetron ODT (ZOFRAN-ODT) disintegrating tablet 4 mg 4 mg Every 6 Hours PRN 4/19/2017     Sig - Route: Take 1 tablet by mouth Every 6 (Six) Hours As Needed for Nausea or Vomiting. - Oral    Linked Group 1:  \"Or\" Linked Group Details        oxyCODONE-acetaminophen (PERCOCET)  MG per tablet 1 tablet 1 tablet Every 4 Hours PRN 4/21/2017 4/27/2017    Sig - Route: Take 1 tablet by mouth Every 4 (Four) Hours As Needed for Moderate Pain (4-6). - Oral    polyethylene glycol (MIRALAX) powder 17 g 17 g Nightly 4/25/2017     Sig - Route: Take 17 g by mouth Every Night. - Oral    sennosides-docusate " "sodium (SENOKOT-S) 8.6-50 MG tablet 1 tablet 1 tablet 2 Times Daily 4/25/2017     Sig - Route: Take 1 tablet by mouth 2 (Two) Times a Day. - Oral    sodium chloride (BACTERIOSTATIC) injection  As Needed 4/19/2017     Sig: As Needed.    sodium chloride 0.9 % flush 1-10 mL 1-10 mL As Needed 4/19/2017     Sig - Route: Infuse 1-10 mL into a venous catheter As Needed for Line Care. - Intravenous    thrombin spray  As Needed 4/19/2017     Sig: As Needed.    vitamin C (ASCORBIC ACID) tablet 500 mg 500 mg 2 Times Daily 4/20/2017     Sig - Route: Take 1 tablet by mouth 2 (Two) Times a Day. - Oral    albuterol (PROVENTIL) nebulizer solution 0.5% 2.5 mg/0.5mL (Discontinued) 2.5 mg 4 Times Daily - RT 4/22/2017 4/25/2017    Sig - Route: Take 0.5 mL by nebulization 4 (Four) Times a Day. - Nebulization    Reason for Discontinue: Formulary change    Morphine sulfate PCA 1 mg/mL 30 mL syringe (Discontinued)  Continuous 4/21/2017 4/25/2017    Sig - Route: Infuse  into a venous catheter Continuous. - Intravenous    sennosides-docusate sodium (SENOKOT-S) 8.6-50 MG tablet 2 tablet (Discontinued) 2 tablet Nightly 4/19/2017 4/25/2017    Sig - Route: Take 2 tablets by mouth Every Night. - Oral    sodium chloride 0.9 % infusion (Discontinued) 100 mL/hr Continuous 4/19/2017 4/25/2017    Sig - Route: Infuse 100 mL/hr into a venous catheter Continuous. - Intravenous             Physician Progress Notes (most recent note)      CIPRIANO Werner at 4/25/2017  9:49 AM  Version 1 of 1           CTVS DAILY NOTE        LOS: 6 days   POD# 6  LEFT THORACOTOMY, UPPER LOBECTOMY  THORACIC, MEDIASTINAL LYMPHADENCTOMY  BRONCHOSCOPY  PATH:  SQUAMOUS CELL CARCINOMA (8 CM), APICAL SEGMENT, WITH INVASION OF PARIETAL PLEURA.   PARIETAL PLEURA (SOFT TISSUE) MARGIN POSITIVE FOR CARCINOMA.   BRONCHIAL AND VASCULAR MARGINS NEGATIVE FOR CARCINOMA.  FUNGI CONSISTENT WITH ASPERGILLUS SPECIES (\"FUNGUS BALL\") IN THE CAVITY CONTENTS.           Chief Complaint: VAS 6 " Pain improved  Concerned about control       Subjective       VAS 6     ROS:    Review of Systems   Constitution: Negative for chills, decreased appetite, fever and malaise/fatigue.   HENT: Negative for headaches, hoarse voice, nosebleeds and sore throat.    Eyes: Negative for visual disturbance.   Cardiovascular: Negative for chest pain, dyspnea on exertion, leg swelling, near-syncope and syncope.   Respiratory: Negative for cough, hemoptysis and shortness of breath.    Hematologic/Lymphatic: Does not bruise/bleed easily.   Skin: Negative for color change, dry skin and poor wound healing.        INC:  No drainage  No odor  No reddness   Musculoskeletal: Negative for back pain, falls, muscle cramps and muscle weakness.   Gastrointestinal: Positive for flatus. Negative for anorexia, change in bowel habit, heartburn, melena and nausea.        Expelling flatus but no BM    Genitourinary: Negative for hematuria.   Neurological: Negative for difficulty with concentration, excessive daytime sleepiness, dizziness, light-headedness, paresthesias and sensory change.   Psychiatric/Behavioral: Negative for altered mental status.   Allergic/Immunologic: Negative for environmental allergies.       Objective     Vital Signs  Temp:  [97.4 °F (36.3 °C)-99.2 °F (37.3 °C)] 97.8 °F (36.6 °C)  Heart Rate:  [] 86  Resp:  [16-18] 18  BP: (126-175)/(65-88) 126/65  Body mass index is 22.44 kg/(m^2).    Intake/Output Summary (Last 24 hours) at 04/25/17 0950  Last data filed at 04/25/17 0625   Gross per 24 hour   Intake          1024.33 ml   Output             2930 ml   Net         -1905.67 ml          Wt Readings from Last 3 Encounters:   04/25/17 147 lb 9.6 oz (67 kg)   04/06/17 136 lb (61.7 kg)   03/09/17 138 lb 0.1 oz (62.6 kg)           Physical Exam   Physical Exam   Constitutional: He is oriented to person, place, and time. He appears well-nourished.   HENT:   Head: Normocephalic.   Mouth/Throat: Oropharynx is clear and moist.    Eyes: Conjunctivae are normal. Pupils are equal, round, and reactive to light.   Constricted    Neck: Neck supple. No JVD present. No tracheal deviation present.   Cardiovascular: Normal rate, regular rhythm, normal heart sounds and intact distal pulses.    Pulmonary/Chest: Effort normal. No stridor. No respiratory distress. He has no wheezes. He has no rales. He exhibits tenderness (Left surgical incision ).   Diminished in left base   CT OP 80 cc serous.    CT clamped at 0300 CXR 0800 No pneumothorax (reviewed with Dr Vences)  CT removed.  Tolerated well.  No change in BS post removal.   Abdominal: Soft. Bowel sounds are normal. He exhibits no distension.   Expelling flatus    Musculoskeletal: He exhibits no edema or tenderness.   Neurological: He is alert and oriented to person, place, and time.   Skin: Skin is warm and dry. No rash noted. No erythema. No pallor.   Left thoracotomy incision clean and dry .    CT sutures mild erythema.    Psychiatric: Judgment normal.   Nursing note and vitals reviewed.      Results Review:      Imaging Results (last 24 hours)     Procedure Component Value Units Date/Time    XR Chest 2 View [[id number]] Collected:  04/25/17 0725     Updated:  04/25/17 0915    Narrative:       Chest PA and lateral     CLINICAL INDICATION: Lung cancer, follow-up.    COMPARISON: April 24, 2017.    FINDINGS: Cardiac silhouette is normal in size. Pulmonary  vascularity is unremarkable.     Postsurgical changes left hemithorax. Surgical clips left  suprahilar region. Pleural thickening and/or effusion lateral  aspect left hemithorax.    Two chest tubes identified with tips in the apices. There is no  pneumothorax.    No significant changes since prior exam.      Impression:       CONCLUSION: As above.    Electronically signed by:  Nathaniel Vences MD  4/25/2017 9:15 AM CDT  Workstation: [id number]          Lab Results (last 24 hours)     Procedure Component Value Units Date/Time    Tissue Exam [[id number]]  "Collected:  04/19/17 1323    Specimen:  Tissue from Lung, Left Upper Lobe; Tissue from Lymph Node Updated:  04/24/17 5248     Case Report --     Surgical Pathology Report                         Case: [id number]                                  Authorizing Provider:  Angel Hernandez MD  Collected:           04/19/2017 01:23 PM          Ordering Location:     [facility]             Received:            04/19/2017 02:46 PM                                 [place] [facility]                                                              Pathologist:           Taz Natarajan MD                                                            Specimens:   1) - Lung, Left Upper Lobe, LEFT UPPER LOBE                                                         2) - Lymph Node, LYMPH NODE LEVEL FIVE                                                      Final Diagnosis --     1.  LUNG, LEFT UPPER LOBE, LOBECTOMY:   SQUAMOUS CELL CARCINOMA (8 CM), APICAL SEGMENT, WITH INVASION OF PARIETAL PLEURA.   PARIETAL PLEURA (SOFT TISSUE) MARGIN POSITIVE FOR CARCINOMA.   BRONCHIAL AND VASCULAR MARGINS NEGATIVE FOR CARCINOMA.  FUNGI CONSISTENT WITH ASPERGILLUS SPECIES (\"FUNGUS BALL\") IN THE CAVITY CONTENTS.    PERIBRONCHIAL LYMPH NODES, LEVEL 11L (3):   SINUS HISTIOCYTOSIS, NEGATIVE FOR METASTATIC CARCINOMA.     2.  SUBAORTIC LYMPH NODES, LEVEL 5 (3):   SINUS HISTIOCYTOSIS, NEGATIVE FOR METASTATIC CARCINOMA.        Synoptic Checklist --     LUNG: Resection  (Lung - 1)      SPECIMEN     Specimen:    Lobe(s) of lung       Lobes of Lung:    Upper lobe     Procedure:    Lobectomy     Specimen Laterality:    Left    TUMOR     Primary Tumor Site:    Upper lobe     Histologic Type:    Squamous cell carcinoma     Histologic Grade:    G2: Moderately differentiated     Tumor Size:    Greatest dimension (cm): 8 cm       Additional Dimension (cm):    7 cm       Additional Dimension (cm):    5 cm     Tumor Focality:    Unifocal       Visceral Pleura " "Invasion:    Present       Tumor Extension:    Parietal pleura       Lymph-Vascular Invasion:    Not identified    MARGINS     Bronchial Margin:    Uninvolved by invasive carcinoma and carcinoma in situ     Vascular Margin:    Uninvolved by invasive carcinoma     Parenchymal Margin:    Involved by invasive carcinoma    LYMPH NODES     Regional Lymph Nodes:           Number of Lymph Nodes Examined:    Specify number: 6       Gene Stations Examined:    5: Subaortic/ aortopulmonary (AP) / AP window       Gene Stations Examined:    11L: Interlobar       Lymph Node Involvement:    No nodes involved    STAGE (pTNM)     Primary Tumor (pT):    pT3: Tumor greater than 7 cm in greatest dimension; or Tumor of any size that directly invades any of the following: parietal pleural chest wall (including superior sulcus tumors), diaphragm, phrenic nerve, mediastinal pleura, parietal pericardium; or Tumor of any size in the main bronchus less than 2 cm distal to the petar but without involvement of the petar; or Tumor of any size associated with atelectasis or obstructive pneumonitis of the entire lung; or Tumors of any size with separate tmor nodule(s) in same lobe     Regional Lymph Nodes (pN):    pN0: No regional lymph node metastasis    ADDITIONAL FINDINGS     Additional Pathologic Finding(s):    Other (specify): Fungal hyphae consistent with Aspergillus species in the cavity contents       Intraoperative Consultation --     The frozen section diagnosis for specimen 1 was called to Dr. Hernandez on 4/19/2017 at 1500.  Squamous cell carcinoma.       Gross Description --     1.  Specimen #1  Is labeled \"left upper lobe\".  It is received in 2 fragments which measure 21.0 x 13.0 x 6.0 cm and weigh 236 grams.  A cavitary lesion is noted which measures approximately 7.0 x 8.0 x 5.0 cm.  The cavity is in two fragments; the smaller shows adhesions and skeletal muscle, presumably from the chest wall/parietal pleura.  Tissue is " "submitted in the fresh state for culture.  Sectioning reveals a scirrhous tumor with a beige cut surface measuring 7.0 x 7.0 x 3.5 cm.  The tumor may be larger than this, occuping the entire area of the cavitary lesion; this will be evaluated microscopically.   The tumor shows a reticulated, yellow pattern on cut surface.  A section is submitted for frozen section in block 1A.  Additional sections of tumor in conjunction with parietal pleura are submitted in blocks 1B-1E.  Sections of lung from the described cavity in the superior segment are submitted in blocks 1F-1I.  These are close to  the tissue submitted for culture.  Bronchial and vascular margins submitted in 1J and 1K, respectively.  Thickened pleura, possibly tumor, from the other edge of the cavity is submitted in block 1L.  Peribronchial lymph nodes are submitted in 1M.  At least 3 lymph nodes are identified; smaller fragments of the same nodes are included in the same block.  The remaining lung shows anthracosis but no consolidation.  Sections are submitted in blocks 1N and 1O.  The section in block 1O includes the rim of the cavitary lesion.  Also present in the primary container is a smaller container with chunks of brown tissue measuring 4.0 x 4.0 x 1.5 cm.  These presumably originate from the cavity.  Sections are submitted in block 1P.  This material has a friable, grumous consistency.    2.  Specimen #2 is labeled \"lymph node level 5\".  Three lymph nodes measure 0.7, 0.9 and 1.7 cm in greatest dimension, each.  The smaller nodes are incised and submitted in one block.  The largest is bisected and submitted in the same block.       Special Stains --     Silver stain of cavity contents is positive for fungal hyphae consistent with Aspergillus species.       Embedded Images --    Fungus Culture [[id number]] Collected:  04/19/17 1323    Specimen:  Tissue from Lung, Left Upper Lobe Updated:  04/24/17 1601     Fungus Culture No fungus isolated at less " than 1 week    AFB Culture [[id number]]  (Normal) Collected:  04/19/17 1323    Specimen:  Tissue from Lung, Left Upper Lobe Updated:  04/24/17 1601     AFB Culture No AFB isolated at less than 1 week     AFB Stain No acid fast bacilli seen                            PT/INR:  No results found for: PROTIME/No results found for: INR            acetylcysteine 2 mL Nebulization 4x Daily   albuterol 2.5 mg Nebulization 4x Daily - RT   bisacodyl 10 mg Rectal Once   famotidine 20 mg Oral BID   ferrous sulfate 324 mg Oral Daily With Breakfast   multivitamin with minerals 1 tablet Oral Daily   sennosides-docusate sodium 1 tablet Oral BID   vitamin C 500 mg Oral BID       Morphine     sodium chloride 100 mL/hr Last Rate: 100 mL/hr (04/25/17 0431)         Patient Active Problem List   Diagnosis Code   • Tobacco use disorder F17.200   • Alcohol use disorder F10.99   • Iron deficiency anemia D50.9   • Cavitary lesion of lung J98.4   • 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  Satisfactory Postop.  Increase activity, ambulating with stand by assistance.  Eating well.  2.  Lung mass:  Squamous cell ca/ aspergillus.  SP Resection with neg lymph nodes.        Continue nebs, incentive spirometry, coughing and deep breathing.  CT discontinued.  Chest xray in am.        PO adjuvant therapy as OP.    3.  Postoperative Pain:  Controlled with 41 mg morphine and 50 mg percocet in 24 hours.  With CT out, will stop PCA at 1400 today and add morphine for break through pain.  Patient agrees with plan.    4.  Acute on chronic anemia:  Tolerating oral iron.  5.  Rehab:  Has been declining PT services.  Increase ambulation.  6.  Constipation:  Has been refusing laxative.  Will add miralex.   7.  Disposition:  Possible discharge in am, if chest xray okay and constipation improved.               This document has been electronically signed by CIPRIANO Roque on April 25, 2017 9:50 AM                 Electronically signed by Lelia CARRASQUILLO  CIPRIANO Mcginnis at 4/25/2017 10:14 AM        Consult Notes (last 72 hours) (Notes from 4/23/2017  8:33 AM through 4/26/2017  8:33 AM)     No notes of this type exist for this encounter.

## 2017-04-27 DIAGNOSIS — J98.4 CAVITARY LESION OF LUNG: ICD-10-CM

## 2017-04-27 RX ORDER — OXYCODONE AND ACETAMINOPHEN 10; 325 MG/1; MG/1
1 TABLET ORAL EVERY 4 HOURS PRN
Qty: 50 TABLET | Refills: 0 | Status: SHIPPED | OUTPATIENT
Start: 2017-04-27 | End: 2017-04-28

## 2017-04-27 NOTE — PAYOR COMM NOTE
"Tino Peres (57 y.o. Male)     Date of Birth Social Security Number Address Home Phone MRN    1960  [id number]  [facility] [address] [phone] [id number]    Mosque Marital Status          None Single       Admission Date Admission Type Admitting Provider Attending Provider Department, Room/Bed    4/19/17 Elective Angel Hernandez MD  [address], 302/1    Discharge Date Discharge Disposition Discharge Destination        4/26/2017 Home or Self Care Home            Attending Provider: (none)    Allergies:  No Known Allergies    Isolation:  None   Infection:  None   Code Status:  Prior    Ht:  68\" (172.7 cm)   Wt:  146 lb 14.4 oz (66.6 kg)    Admission Cmt:  None   Principal Problem:  Cavitary lesion of lung [J98.4] More...                 Active Insurance as of 4/19/2017     Primary Coverage     Payor Plan Insurance Group Employer/Plan Group    [facility] [id number]     Payor Plan Address Payor Plan Phone Number Effective From Effective To    [address] [address] [phone] 1/1/2017     [place] [address]       Subscriber Name Subscriber Birth Date Member ID       TINO PERES 1960 [id number]                 Emergency Contacts      (Rel.) Home Phone Work Phone Mobile Phone    Jessie Peres (Son) [phone] -- --    LiviaYan blevins (Father) -- -- [phone]               Discharge Summary      CIPRIANO Werner at 4/26/2017  2:10 PM            Date of Discharge:  4/26/2017    Discharge Diagnosis:   SQUAMOUS CELL CARCINOMA (8 CM), APICAL SEGMENT, WITH INVASION OF PARIETAL PLEURA.   PARIETAL PLEURA (SOFT TISSUE) MARGIN POSITIVE FOR CARCINOMA.   BRONCHIAL AND VASCULAR MARGINS NEGATIVE FOR CARCINOMA.  FUNGI CONSISTENT WITH ASPERGILLUS SPECIES (\"FUNGUS BALL\") IN THE CAVITY CONTENTS.   Postoperative Pain:  Controlled with oral medication  Postoperative Atelectasis improved.     Problem List:  Principal Problem:    Cavitary lesion of lung  Active " Problems:    Mycetoma      Presenting Problem/History of Present Illness  Lung mass [R91.8]  Mycetoma [B47.9]        Hospital Course  Patient is a 57 y.o. male presented initially to [facility] with respiratory distress, SOA,and cough.  He is a long standing smoker with history of fungal infection.  Diagnostics demonstrated cavitary lesion.  Bronchscopy was performed.  After improving preop status, receiving blood transfusion, and completing preop work up, Mr Ellis elected to proceed with surgical resection.  He returned to the hospital on 4/19/17 and underwent said procedure.  He tolerated it well.  Remained stable in OR, and CCU.  Pain was controlled with PCA morphine and percocet was later added to therapy.  His first POD he was transferred to 3 w telemetry.  His respiratory effort improved and his atelectasis improved.  His chest tubes were removed 4/25/17 follow up chest xray did not demonstrate pneumothorax.  He has remained in RSR, afebrile and is independent in his ADLs.  His pain is controlled with percocet.  He is stable and ready for discharge in satisfactory condition, and in agreement with this plan.     Procedures Performed  Procedure(s):  THORACOTOMY LEFT WITH PULMONARY RESECTION AND BRONCHOSCOPY WITH ENDO        Consults:   Consults     No orders found from 3/21/2017 to 4/20/2017.          Pertinent Test Results: see path above  Lab Results   Component Value Date    WBC 11.22 (H) 04/26/2017    HGB 9.4 (L) 04/26/2017    HCT 28.6 (L) 04/26/2017    MCV 73.9 (L) 04/26/2017     (H) 04/26/2017     Lab Results   Component Value Date    GLUCOSE 94 04/26/2017    BUN 9 04/26/2017    CREATININE 0.46 (L) 04/26/2017    EGFRIFNONA >150 04/26/2017    BCR 19.6 04/26/2017    K 3.7 04/26/2017    CO2 26.0 04/26/2017    CALCIUM 8.4 04/26/2017    ALBUMIN 2.80 (L) 04/26/2017    LABIL2 0.9 (L) 04/26/2017    AST 21 04/26/2017    ALT 46 04/26/2017         Condition on Discharge:  Satisfactory     Vital Signs  Temp:  [97.5  °F (36.4 °C)-98.9 °F (37.2 °C)] 98 °F (36.7 °C)  Heart Rate:  [] 99  Resp:  [16-18] 18  BP: (133-159)/(71-80) 145/71  Physical Exam   Constitutional: He is oriented to person, place, and time. He appears well-nourished.   HENT:   Head: Normocephalic.   Mouth/Throat: Oropharynx is clear and moist.   Eyes: Conjunctivae are normal. Pupils are equal, round, and reactive to light.   Constricted    Neck: Neck supple. No JVD present. No tracheal deviation present.   Cardiovascular: Normal rate, regular rhythm, normal heart sounds and intact distal pulses.   Pulmonary/Chest: Effort normal. No stridor. No respiratory distress. He has no wheezes. He has no rales. He exhibits tenderness (Left surgical incision ).   Diminished in left base   CT site clean and dry  FU chest xray Improved aeration No pneumothorax    Abdominal: Soft. Bowel sounds are normal. He exhibits no distension.   Had BM    Musculoskeletal: He exhibits no edema or tenderness.   Neurological: He is alert and oriented to person, place, and time.   Skin: Skin is warm and dry. No rash noted. No erythema. No pallor.   Left thoracotomy incision clean and dry .   CT sutures mild erythema.    Psychiatric: Judgment normal.   Discharge Disposition  Home or Self Care    Discharge Medications   Tino Peres   Home Medication Instructions DINA:[id number]    Printed on:04/26/17 1410   Medication Information                      famotidine (PEPCID) 20 MG tablet  Take 1 tablet by mouth 2 (Two) Times a Day.             ferrous sulfate 324 (65 FE) MG tablet delayed-release EC tablet  Take 1 tablet by mouth Daily With Breakfast.             ibuprofen (ADVIL,MOTRIN) 200 MG tablet  Take 2 tablets by mouth Every 12 (Twelve) Hours As Needed for Mild Pain (1-3) (Postop pain).             Multiple Vitamins-Minerals (MULTIVITAMIN ADULT PO)  Take 1 tablet by mouth Daily.             oxyCODONE-acetaminophen (PERCOCET)  MG per tablet  Take 1 tablet by mouth Every 4  (Four) Hours As Needed for Moderate Pain (4-6) for up to 1 day.             polyethylene glycol (MIRALAX) packet  Take 17 g by mouth At Night As Needed (constipation).             sennosides-docusate sodium (SENOKOT-S) 8.6-50 MG tablet  Take 1 tablet by mouth 2 (Two) Times a Day.               Apollo [id number]  Reviewed risks, benefits, and habit forming potential and weaning from narcotic medication.  Patient understands and wishes to receive prescription.  Prescription written for Percocet 10/325 # 18 after Apollo placed on file.    Discharge Diet   Diet Instructions     Diet:       Diet Texture / Consistency:  Regular                 Activity at Discharge  Activity Instructions     Discharge Activity       1) No driving for 10-14 days  and no longer taking narcotics.   2) Return to school / work in 3 weeks:[id number]}.  3) May shower daily.  Rinse Pat dry incision   4) Do not lift / push / pull more then 8-10 lbs with  LEFT ARM   Ambulate every 2 hours.    Discharge instructions include no heavy lifting anything greater than 10lbs with the left arm for approximately 4 weeks.  No sex or driving for 3-6 weeks. Patient is to continue with Incentive Spirometry 4 times daily while awake at home as well as any prescribed respiratory treatments. Printed information given to the patient with advancement of activities weekly.  . Clean operative site with antibacterial soap/water, pat dry. Keep open to air unless draining, then may apply dry dressing.  No ointments or creams unless prescribed by provider. Signs and symptoms of infection including drainage from operative site, redness, swelling, with associated fever and/or chills notify Heart and Vascular center immediately for wound check.       Other Restrictions (Specify)                     Follow-up Appointments  Future Appointments  Date Time Provider Department Center   5/1/2017 11:00 AM CIPRIANO Werner [facility] None   5/1/2017 3:30 PM DO JOSE Muse  FM FAC None     Additional Instructions for the Follow-ups that You Need to Schedule     Discharge Follow-up with Specialty    As directed    Specialty:  cardiothoracic   Follow Up:  1 Week   Follow Up Details:  Em Mcginnis Monday May 1 at 1100  Further FU appts will be made at that time   Has the patient’s follow-up appointment been scheduled and documented in the discharge navigator?:  Patient to schedule, documented in the follow-up section       Notify Physician or Go To The ED For the Following Conditions    As directed    Signs and symptoms of infection including drainage from operative site, redness, swelling, with associated fever and/or chills notify Heart and Vascular center immediately for wound check.       Return to Clinic for Follow Up (Specify Provider)    As directed    To:  Em Mcginnis   Follow Up:  1 Week   Follow Up Details:  May 1 @ 1100   Has the patient’s follow-up appointment been scheduled and documented in the discharge navigator?:  Patient to schedule, documented in the follow-up section                 Test Results Pending at Discharge   Order Current Status    AFB Culture Preliminary result    Fungus Culture Preliminary result          Time: Discharge 40 min          This document has been electronically signed by CIPRIANO Roque on April 26, 2017 2:10 PM       Electronically signed by Angel Hernandez MD at 4/26/2017  3:00 PM

## 2017-05-01 ENCOUNTER — OFFICE VISIT (OUTPATIENT)
Dept: CARDIAC SURGERY | Facility: CLINIC | Age: 57
End: 2017-05-01

## 2017-05-01 VITALS
WEIGHT: 134.9 LBS | TEMPERATURE: 97.2 F | HEIGHT: 68 IN | OXYGEN SATURATION: 100 % | SYSTOLIC BLOOD PRESSURE: 140 MMHG | BODY MASS INDEX: 20.45 KG/M2 | HEART RATE: 92 BPM | DIASTOLIC BLOOD PRESSURE: 70 MMHG

## 2017-05-01 DIAGNOSIS — Z48.813 SURGICAL AFTERCARE, RESPIRATORY SYSTEM: ICD-10-CM

## 2017-05-01 DIAGNOSIS — Z79.899 DRUG THERAPY: ICD-10-CM

## 2017-05-01 DIAGNOSIS — D62 ACUTE BLOOD LOSS ANEMIA: ICD-10-CM

## 2017-05-01 DIAGNOSIS — D64.9 CHRONIC ANEMIA: ICD-10-CM

## 2017-05-01 DIAGNOSIS — J98.11 ATELECTASIS: ICD-10-CM

## 2017-05-01 DIAGNOSIS — C34.82 MALIGNANT NEOPLASM OF OVERLAPPING SITES OF LEFT LUNG (HCC): Primary | ICD-10-CM

## 2017-05-01 PROBLEM — C34.92 MALIGNANT NEOPLASM OF LEFT LUNG (HCC): Status: ACTIVE | Noted: 2017-05-01

## 2017-05-01 PROCEDURE — 99024 POSTOP FOLLOW-UP VISIT: CPT | Performed by: NURSE PRACTITIONER

## 2017-05-01 RX ORDER — OXYCODONE AND ACETAMINOPHEN 10; 325 MG/1; MG/1
1 TABLET ORAL EVERY 4 HOURS PRN
COMMUNITY
End: 2017-05-08 | Stop reason: DRUGHIGH

## 2017-05-03 ENCOUNTER — DOCUMENTATION (OUTPATIENT)
Dept: ONCOLOGY | Facility: CLINIC | Age: 57
End: 2017-05-03

## 2017-05-03 ENCOUNTER — LAB (OUTPATIENT)
Dept: ONCOLOGY | Facility: HOSPITAL | Age: 57
End: 2017-05-03

## 2017-05-03 ENCOUNTER — CONSULT (OUTPATIENT)
Dept: ONCOLOGY | Facility: CLINIC | Age: 57
End: 2017-05-03

## 2017-05-03 VITALS
WEIGHT: 131.8 LBS | BODY MASS INDEX: 19.52 KG/M2 | SYSTOLIC BLOOD PRESSURE: 159 MMHG | HEART RATE: 119 BPM | DIASTOLIC BLOOD PRESSURE: 86 MMHG | RESPIRATION RATE: 20 BRPM | HEIGHT: 69 IN | TEMPERATURE: 98.5 F

## 2017-05-03 DIAGNOSIS — D50.9 IRON DEFICIENCY ANEMIA, UNSPECIFIED IRON DEFICIENCY ANEMIA TYPE: Primary | ICD-10-CM

## 2017-05-03 DIAGNOSIS — C34.82 MALIGNANT NEOPLASM OF OVERLAPPING SITES OF LEFT LUNG (HCC): ICD-10-CM

## 2017-05-03 DIAGNOSIS — D75.839 THROMBOCYTOSIS: ICD-10-CM

## 2017-05-03 DIAGNOSIS — J98.4 CAVITARY LESION OF LUNG: ICD-10-CM

## 2017-05-03 LAB
FERRITIN SERPL-MCNC: 56.8 NG/ML (ref 17.9–464)
FOLATE SERPL-MCNC: >20 NG/ML (ref 2.76–21)
IRON 24H UR-MRATE: 12 MCG/DL (ref 49–181)
IRON SATN MFR SERPL: 4 % (ref 20–55)
TIBC SERPL-MCNC: 322 MCG/DL (ref 261–462)
VIT B12 BLD-MCNC: 562 PG/ML (ref 239–931)

## 2017-05-03 PROCEDURE — G0463 HOSPITAL OUTPT CLINIC VISIT: HCPCS | Performed by: INTERNAL MEDICINE

## 2017-05-03 PROCEDURE — 82746 ASSAY OF FOLIC ACID SERUM: CPT | Performed by: INTERNAL MEDICINE

## 2017-05-03 PROCEDURE — 83540 ASSAY OF IRON: CPT | Performed by: INTERNAL MEDICINE

## 2017-05-03 PROCEDURE — 82728 ASSAY OF FERRITIN: CPT | Performed by: INTERNAL MEDICINE

## 2017-05-03 PROCEDURE — 99205 OFFICE O/P NEW HI 60 MIN: CPT | Performed by: INTERNAL MEDICINE

## 2017-05-03 PROCEDURE — 36415 COLL VENOUS BLD VENIPUNCTURE: CPT | Performed by: INTERNAL MEDICINE

## 2017-05-03 PROCEDURE — 82607 VITAMIN B-12: CPT | Performed by: INTERNAL MEDICINE

## 2017-05-03 PROCEDURE — 99406 BEHAV CHNG SMOKING 3-10 MIN: CPT | Performed by: INTERNAL MEDICINE

## 2017-05-03 PROCEDURE — 83550 IRON BINDING TEST: CPT | Performed by: INTERNAL MEDICINE

## 2017-05-03 RX ORDER — SODIUM CHLORIDE 9 MG/ML
250 INJECTION, SOLUTION INTRAVENOUS ONCE
Status: CANCELLED | OUTPATIENT
Start: 2017-05-08

## 2017-05-03 RX ORDER — ONDANSETRON 4 MG/1
4 TABLET, FILM COATED ORAL 4 TIMES DAILY PRN
Qty: 40 TABLET | Refills: 3 | Status: SHIPPED | OUTPATIENT
Start: 2017-05-03 | End: 2017-12-19

## 2017-05-03 RX ORDER — DIPHENHYDRAMINE HCL 25 MG
25 CAPSULE ORAL ONCE
Status: CANCELLED | OUTPATIENT
Start: 2017-05-08

## 2017-05-04 ENCOUNTER — CONSULT (OUTPATIENT)
Dept: RADIATION ONCOLOGY | Facility: HOSPITAL | Age: 57
End: 2017-05-04

## 2017-05-04 ENCOUNTER — HOSPITAL ENCOUNTER (OUTPATIENT)
Dept: RADIATION ONCOLOGY | Facility: HOSPITAL | Age: 57
Setting detail: RADIATION/ONCOLOGY SERIES
End: 2017-05-04

## 2017-05-04 VITALS
SYSTOLIC BLOOD PRESSURE: 162 MMHG | DIASTOLIC BLOOD PRESSURE: 87 MMHG | HEART RATE: 114 BPM | RESPIRATION RATE: 18 BRPM | WEIGHT: 129.6 LBS | TEMPERATURE: 98.6 F | BODY MASS INDEX: 19.14 KG/M2

## 2017-05-04 DIAGNOSIS — C34.12 MALIGNANT NEOPLASM OF UPPER LOBE OF LEFT LUNG (HCC): Primary | ICD-10-CM

## 2017-05-04 PROCEDURE — G0463 HOSPITAL OUTPT CLINIC VISIT: HCPCS | Performed by: RADIOLOGY

## 2017-05-04 PROCEDURE — 77263 THER RADIOLOGY TX PLNG CPLX: CPT | Performed by: RADIOLOGY

## 2017-05-04 PROCEDURE — 99245 OFF/OP CONSLTJ NEW/EST HI 55: CPT | Performed by: RADIOLOGY

## 2017-05-08 ENCOUNTER — PREP FOR SURGERY (OUTPATIENT)
Dept: CARDIAC SURGERY | Facility: CLINIC | Age: 57
End: 2017-05-08

## 2017-05-08 ENCOUNTER — OFFICE VISIT (OUTPATIENT)
Dept: PULMONOLOGY | Facility: CLINIC | Age: 57
End: 2017-05-08

## 2017-05-08 DIAGNOSIS — C34.12 MALIGNANT NEOPLASM OF UPPER LOBE OF LEFT LUNG (HCC): Primary | ICD-10-CM

## 2017-05-08 DIAGNOSIS — R06.02 SHORTNESS OF BREATH: Primary | ICD-10-CM

## 2017-05-08 LAB
FUNGUS WND CULT: ABNORMAL
FUNGUS WND CULT: ABNORMAL

## 2017-05-08 PROCEDURE — 94010 BREATHING CAPACITY TEST: CPT | Performed by: INTERNAL MEDICINE

## 2017-05-08 RX ORDER — OXYCODONE AND ACETAMINOPHEN 7.5; 325 MG/1; MG/1
1 TABLET ORAL EVERY 4 HOURS PRN
Qty: 60 TABLET | Refills: 0 | Status: SHIPPED | OUTPATIENT
Start: 2017-05-08 | End: 2017-05-22

## 2017-05-08 RX ORDER — SODIUM CHLORIDE 9 MG/ML
100 INJECTION, SOLUTION INTRAVENOUS CONTINUOUS
Status: CANCELLED | OUTPATIENT
Start: 2017-05-10

## 2017-05-08 RX ORDER — SODIUM CHLORIDE 0.9 % (FLUSH) 0.9 %
1-10 SYRINGE (ML) INJECTION AS NEEDED
Status: CANCELLED | OUTPATIENT
Start: 2017-05-10

## 2017-05-09 ENCOUNTER — INFUSION (OUTPATIENT)
Dept: ONCOLOGY | Facility: HOSPITAL | Age: 57
End: 2017-05-09

## 2017-05-09 ENCOUNTER — ANESTHESIA EVENT (OUTPATIENT)
Dept: PERIOP | Facility: HOSPITAL | Age: 57
End: 2017-05-09

## 2017-05-09 VITALS
RESPIRATION RATE: 18 BRPM | TEMPERATURE: 97.8 F | SYSTOLIC BLOOD PRESSURE: 166 MMHG | HEART RATE: 95 BPM | DIASTOLIC BLOOD PRESSURE: 96 MMHG

## 2017-05-09 DIAGNOSIS — D50.9 IRON DEFICIENCY ANEMIA, UNSPECIFIED IRON DEFICIENCY ANEMIA TYPE: Primary | ICD-10-CM

## 2017-05-09 PROCEDURE — 63710000001 DIPHENHYDRAMINE PER 50 MG: Performed by: INTERNAL MEDICINE

## 2017-05-09 PROCEDURE — 96374 THER/PROPH/DIAG INJ IV PUSH: CPT | Performed by: INTERNAL MEDICINE

## 2017-05-09 PROCEDURE — 96375 TX/PRO/DX INJ NEW DRUG ADDON: CPT | Performed by: INTERNAL MEDICINE

## 2017-05-09 PROCEDURE — 25010000002 FERUMOXYTOL 510 MG/17ML SOLUTION 510 MG VIAL: Performed by: INTERNAL MEDICINE

## 2017-05-09 RX ORDER — SODIUM CHLORIDE 9 MG/ML
250 INJECTION, SOLUTION INTRAVENOUS ONCE
Status: CANCELLED | OUTPATIENT
Start: 2017-05-15

## 2017-05-09 RX ORDER — SODIUM CHLORIDE 9 MG/ML
250 INJECTION, SOLUTION INTRAVENOUS ONCE
Status: COMPLETED | OUTPATIENT
Start: 2017-05-09 | End: 2017-05-09

## 2017-05-09 RX ORDER — FAMOTIDINE 10 MG/ML
20 INJECTION, SOLUTION INTRAVENOUS ONCE
Status: COMPLETED | OUTPATIENT
Start: 2017-05-09 | End: 2017-05-09

## 2017-05-09 RX ORDER — DIPHENHYDRAMINE HCL 25 MG
25 CAPSULE ORAL ONCE
Status: CANCELLED | OUTPATIENT
Start: 2017-05-15

## 2017-05-09 RX ORDER — DIPHENHYDRAMINE HCL 25 MG
25 CAPSULE ORAL ONCE
Status: COMPLETED | OUTPATIENT
Start: 2017-05-09 | End: 2017-05-09

## 2017-05-09 RX ADMIN — DIPHENHYDRAMINE HYDROCHLORIDE 25 MG: 25 CAPSULE ORAL at 14:25

## 2017-05-09 RX ADMIN — SODIUM CHLORIDE 250 ML: 9 INJECTION, SOLUTION INTRAVENOUS at 14:25

## 2017-05-09 RX ADMIN — FAMOTIDINE 20 MG: 10 INJECTION, SOLUTION INTRAVENOUS at 14:25

## 2017-05-09 RX ADMIN — FERUMOXYTOL 510 MG: 510 INJECTION INTRAVENOUS at 15:02

## 2017-05-10 ENCOUNTER — APPOINTMENT (OUTPATIENT)
Dept: GENERAL RADIOLOGY | Facility: HOSPITAL | Age: 57
End: 2017-05-10

## 2017-05-10 ENCOUNTER — ANESTHESIA (OUTPATIENT)
Dept: PERIOP | Facility: HOSPITAL | Age: 57
End: 2017-05-10

## 2017-05-10 ENCOUNTER — HOSPITAL ENCOUNTER (OUTPATIENT)
Facility: HOSPITAL | Age: 57
Setting detail: HOSPITAL OUTPATIENT SURGERY
Discharge: HOME | End: 2017-05-10
Attending: THORACIC SURGERY (CARDIOTHORACIC VASCULAR SURGERY) | Admitting: THORACIC SURGERY (CARDIOTHORACIC VASCULAR SURGERY)

## 2017-05-10 VITALS
HEIGHT: 68 IN | BODY MASS INDEX: 19.91 KG/M2 | WEIGHT: 131.39 LBS | SYSTOLIC BLOOD PRESSURE: 131 MMHG | OXYGEN SATURATION: 97 % | RESPIRATION RATE: 18 BRPM | DIASTOLIC BLOOD PRESSURE: 76 MMHG | HEART RATE: 90 BPM | TEMPERATURE: 96.9 F

## 2017-05-10 DIAGNOSIS — C34.12 MALIGNANT NEOPLASM OF UPPER LOBE OF LEFT LUNG (HCC): ICD-10-CM

## 2017-05-10 PROCEDURE — 76000 FLUOROSCOPY <1 HR PHYS/QHP: CPT

## 2017-05-10 PROCEDURE — 25010000002 FENTANYL CITRATE (PF) 100 MCG/2ML SOLUTION: Performed by: NURSE ANESTHETIST, CERTIFIED REGISTERED

## 2017-05-10 PROCEDURE — C1894 INTRO/SHEATH, NON-LASER: HCPCS | Performed by: THORACIC SURGERY (CARDIOTHORACIC VASCULAR SURGERY)

## 2017-05-10 PROCEDURE — 25010000002 PHENYLEPHRINE PER 1 ML: Performed by: NURSE ANESTHETIST, CERTIFIED REGISTERED

## 2017-05-10 PROCEDURE — 25010000003 CEFAZOLIN PER 500 MG: Performed by: THORACIC SURGERY (CARDIOTHORACIC VASCULAR SURGERY)

## 2017-05-10 PROCEDURE — 25010000002 HEPARIN FLUSH (PORCINE) 100 UNIT/ML SOLUTION: Performed by: THORACIC SURGERY (CARDIOTHORACIC VASCULAR SURGERY)

## 2017-05-10 PROCEDURE — C1788 PORT, INDWELLING, IMP: HCPCS | Performed by: THORACIC SURGERY (CARDIOTHORACIC VASCULAR SURGERY)

## 2017-05-10 PROCEDURE — 36561 INSERT TUNNELED CV CATH: CPT | Performed by: THORACIC SURGERY (CARDIOTHORACIC VASCULAR SURGERY)

## 2017-05-10 PROCEDURE — 25010000002 PROPOFOL 10 MG/ML EMULSION: Performed by: NURSE ANESTHETIST, CERTIFIED REGISTERED

## 2017-05-10 PROCEDURE — 25010000002 MIDAZOLAM PER 1 MG: Performed by: NURSE ANESTHETIST, CERTIFIED REGISTERED

## 2017-05-10 PROCEDURE — 25010000002 HEPARIN (PORCINE) PER 1000 UNITS: Performed by: THORACIC SURGERY (CARDIOTHORACIC VASCULAR SURGERY)

## 2017-05-10 PROCEDURE — 77001 FLUOROGUIDE FOR VEIN DEVICE: CPT | Performed by: THORACIC SURGERY (CARDIOTHORACIC VASCULAR SURGERY)

## 2017-05-10 DEVICE — POWERPORT CLEARVUE IMPLANTABLE PORT WITH ATTACHABLE 8F POLYURETHANE OPEN-ENDED SINGLE-LUMEN VENOUS CATHETER INTERMEDIATE KIT
Type: IMPLANTABLE DEVICE | Site: CHEST WALL | Status: FUNCTIONAL
Brand: POWERPORT CLEARVUE

## 2017-05-10 RX ORDER — FLUMAZENIL 0.1 MG/ML
0.2 INJECTION INTRAVENOUS AS NEEDED
Status: DISCONTINUED | OUTPATIENT
Start: 2017-05-10 | End: 2017-05-10 | Stop reason: HOSPADM

## 2017-05-10 RX ORDER — HEPARIN SODIUM 1000 [USP'U]/ML
INJECTION, SOLUTION INTRAVENOUS; SUBCUTANEOUS AS NEEDED
Status: DISCONTINUED | OUTPATIENT
Start: 2017-05-10 | End: 2017-05-10 | Stop reason: HOSPADM

## 2017-05-10 RX ORDER — PROPOFOL 10 MG/ML
VIAL (ML) INTRAVENOUS AS NEEDED
Status: DISCONTINUED | OUTPATIENT
Start: 2017-05-10 | End: 2017-05-10 | Stop reason: SURG

## 2017-05-10 RX ORDER — ONDANSETRON 4 MG/1
4 TABLET, FILM COATED ORAL ONCE AS NEEDED
Status: DISCONTINUED | OUTPATIENT
Start: 2017-05-10 | End: 2017-05-10 | Stop reason: HOSPADM

## 2017-05-10 RX ORDER — ONDANSETRON 2 MG/ML
4 INJECTION INTRAMUSCULAR; INTRAVENOUS ONCE AS NEEDED
Status: DISCONTINUED | OUTPATIENT
Start: 2017-05-10 | End: 2017-05-10 | Stop reason: HOSPADM

## 2017-05-10 RX ORDER — BUPIVACAINE HYDROCHLORIDE 5 MG/ML
INJECTION, SOLUTION EPIDURAL; INTRACAUDAL AS NEEDED
Status: DISCONTINUED | OUTPATIENT
Start: 2017-05-10 | End: 2017-05-10 | Stop reason: HOSPADM

## 2017-05-10 RX ORDER — NALOXONE HCL 0.4 MG/ML
0.2 VIAL (ML) INJECTION AS NEEDED
Status: DISCONTINUED | OUTPATIENT
Start: 2017-05-10 | End: 2017-05-10 | Stop reason: HOSPADM

## 2017-05-10 RX ORDER — FENTANYL CITRATE 50 UG/ML
INJECTION, SOLUTION INTRAMUSCULAR; INTRAVENOUS AS NEEDED
Status: DISCONTINUED | OUTPATIENT
Start: 2017-05-10 | End: 2017-05-10 | Stop reason: SURG

## 2017-05-10 RX ORDER — DIPHENHYDRAMINE HYDROCHLORIDE 50 MG/ML
12.5 INJECTION INTRAMUSCULAR; INTRAVENOUS
Status: DISCONTINUED | OUTPATIENT
Start: 2017-05-10 | End: 2017-05-10 | Stop reason: HOSPADM

## 2017-05-10 RX ORDER — LABETALOL HYDROCHLORIDE 5 MG/ML
5 INJECTION, SOLUTION INTRAVENOUS
Status: DISCONTINUED | OUTPATIENT
Start: 2017-05-10 | End: 2017-05-10 | Stop reason: HOSPADM

## 2017-05-10 RX ORDER — SODIUM CHLORIDE 9 MG/ML
100 INJECTION, SOLUTION INTRAVENOUS CONTINUOUS
Status: DISCONTINUED | OUTPATIENT
Start: 2017-05-10 | End: 2017-05-10 | Stop reason: HOSPADM

## 2017-05-10 RX ORDER — ACETAMINOPHEN 325 MG/1
650 TABLET ORAL ONCE AS NEEDED
Status: DISCONTINUED | OUTPATIENT
Start: 2017-05-10 | End: 2017-05-10 | Stop reason: HOSPADM

## 2017-05-10 RX ORDER — HYDROCODONE BITARTRATE AND ACETAMINOPHEN 7.5; 325 MG/1; MG/1
1 TABLET ORAL ONCE AS NEEDED
Status: CANCELLED | OUTPATIENT
Start: 2017-05-10 | End: 2017-05-20

## 2017-05-10 RX ORDER — SODIUM CHLORIDE 0.9 % (FLUSH) 0.9 %
1-10 SYRINGE (ML) INJECTION AS NEEDED
Status: DISCONTINUED | OUTPATIENT
Start: 2017-05-10 | End: 2017-05-10 | Stop reason: HOSPADM

## 2017-05-10 RX ORDER — BACTERIOSTATIC SODIUM CHLORIDE 0.9 %
VIAL (ML) INJECTION AS NEEDED
Status: DISCONTINUED | OUTPATIENT
Start: 2017-05-10 | End: 2017-05-10 | Stop reason: HOSPADM

## 2017-05-10 RX ORDER — BACITRACIN 50000 [IU]/1
INJECTION, POWDER, FOR SOLUTION INTRAMUSCULAR AS NEEDED
Status: DISCONTINUED | OUTPATIENT
Start: 2017-05-10 | End: 2017-05-10 | Stop reason: HOSPADM

## 2017-05-10 RX ORDER — MIDAZOLAM HYDROCHLORIDE 1 MG/ML
INJECTION INTRAMUSCULAR; INTRAVENOUS AS NEEDED
Status: DISCONTINUED | OUTPATIENT
Start: 2017-05-10 | End: 2017-05-10 | Stop reason: SURG

## 2017-05-10 RX ORDER — ACETAMINOPHEN 325 MG/1
650 TABLET ORAL ONCE
Status: DISCONTINUED | OUTPATIENT
Start: 2017-05-10 | End: 2017-05-10 | Stop reason: HOSPADM

## 2017-05-10 RX ORDER — ACETAMINOPHEN 650 MG/1
650 SUPPOSITORY RECTAL ONCE AS NEEDED
Status: DISCONTINUED | OUTPATIENT
Start: 2017-05-10 | End: 2017-05-10 | Stop reason: HOSPADM

## 2017-05-10 RX ADMIN — SODIUM CHLORIDE 100 ML/HR: 9 INJECTION, SOLUTION INTRAVENOUS at 07:45

## 2017-05-10 RX ADMIN — MIDAZOLAM 2 MG: 1 INJECTION INTRAMUSCULAR; INTRAVENOUS at 10:45

## 2017-05-10 RX ADMIN — MIDAZOLAM 2 MG: 1 INJECTION INTRAMUSCULAR; INTRAVENOUS at 10:42

## 2017-05-10 RX ADMIN — PHENYLEPHRINE HYDROCHLORIDE 100 MCG: 10 INJECTION INTRAVENOUS at 11:30

## 2017-05-10 RX ADMIN — PROPOFOL 200 MG: 10 INJECTION, EMULSION INTRAVENOUS at 10:52

## 2017-05-10 RX ADMIN — CEFAZOLIN SODIUM 2 G: 1 INJECTION, POWDER, FOR SOLUTION INTRAMUSCULAR; INTRAVENOUS at 10:49

## 2017-05-10 RX ADMIN — PHENYLEPHRINE HYDROCHLORIDE 100 MCG: 10 INJECTION INTRAVENOUS at 11:36

## 2017-05-10 RX ADMIN — FENTANYL CITRATE 100 MCG: 50 INJECTION, SOLUTION INTRAMUSCULAR; INTRAVENOUS at 10:50

## 2017-05-15 ENCOUNTER — APPOINTMENT (OUTPATIENT)
Dept: PET IMAGING | Facility: HOSPITAL | Age: 57
End: 2017-05-15

## 2017-05-16 ENCOUNTER — INFUSION (OUTPATIENT)
Dept: ONCOLOGY | Facility: HOSPITAL | Age: 57
End: 2017-05-16

## 2017-05-16 VITALS — RESPIRATION RATE: 18 BRPM | DIASTOLIC BLOOD PRESSURE: 98 MMHG | SYSTOLIC BLOOD PRESSURE: 159 MMHG | HEART RATE: 117 BPM

## 2017-05-16 DIAGNOSIS — D50.9 IRON DEFICIENCY ANEMIA, UNSPECIFIED IRON DEFICIENCY ANEMIA TYPE: ICD-10-CM

## 2017-05-16 DIAGNOSIS — Z45.2 ENCOUNTER FOR VENOUS ACCESS DEVICE CARE: Primary | ICD-10-CM

## 2017-05-16 PROCEDURE — 25010000002 HEPARIN FLUSH (PORCINE) 100 UNIT/ML SOLUTION: Performed by: INTERNAL MEDICINE

## 2017-05-16 PROCEDURE — 77334 RADIATION TREATMENT AID(S): CPT | Performed by: RADIOLOGY

## 2017-05-16 PROCEDURE — 96374 THER/PROPH/DIAG INJ IV PUSH: CPT | Performed by: INTERNAL MEDICINE

## 2017-05-16 PROCEDURE — 63710000001 DIPHENHYDRAMINE PER 50 MG: Performed by: INTERNAL MEDICINE

## 2017-05-16 PROCEDURE — 77290 THER RAD SIMULAJ FIELD CPLX: CPT | Performed by: RADIOLOGY

## 2017-05-16 PROCEDURE — 25010000002 FERUMOXYTOL 510 MG/17ML SOLUTION 510 MG VIAL: Performed by: INTERNAL MEDICINE

## 2017-05-16 PROCEDURE — 96375 TX/PRO/DX INJ NEW DRUG ADDON: CPT | Performed by: INTERNAL MEDICINE

## 2017-05-16 RX ORDER — SODIUM CHLORIDE 9 MG/ML
250 INJECTION, SOLUTION INTRAVENOUS ONCE
Status: COMPLETED | OUTPATIENT
Start: 2017-05-16 | End: 2017-05-16

## 2017-05-16 RX ORDER — FAMOTIDINE 10 MG/ML
20 INJECTION, SOLUTION INTRAVENOUS ONCE
Status: COMPLETED | OUTPATIENT
Start: 2017-05-16 | End: 2017-05-16

## 2017-05-16 RX ORDER — SODIUM CHLORIDE 0.9 % (FLUSH) 0.9 %
10 SYRINGE (ML) INJECTION AS NEEDED
Status: DISCONTINUED | OUTPATIENT
Start: 2017-05-16 | End: 2017-05-16 | Stop reason: HOSPADM

## 2017-05-16 RX ORDER — SODIUM CHLORIDE 0.9 % (FLUSH) 0.9 %
10 SYRINGE (ML) INJECTION AS NEEDED
Status: CANCELLED | OUTPATIENT
Start: 2017-05-16

## 2017-05-16 RX ORDER — DIPHENHYDRAMINE HCL 25 MG
25 CAPSULE ORAL ONCE
Status: CANCELLED | OUTPATIENT
Start: 2017-05-23

## 2017-05-16 RX ORDER — DIPHENHYDRAMINE HCL 25 MG
25 CAPSULE ORAL ONCE
Status: COMPLETED | OUTPATIENT
Start: 2017-05-16 | End: 2017-05-16

## 2017-05-16 RX ORDER — SODIUM CHLORIDE 9 MG/ML
250 INJECTION, SOLUTION INTRAVENOUS ONCE
Status: CANCELLED | OUTPATIENT
Start: 2017-05-23

## 2017-05-16 RX ADMIN — Medication 10 ML: at 13:00

## 2017-05-16 RX ADMIN — FAMOTIDINE 20 MG: 10 INJECTION, SOLUTION INTRAVENOUS at 13:47

## 2017-05-16 RX ADMIN — Medication 10 ML: at 13:46

## 2017-05-16 RX ADMIN — DIPHENHYDRAMINE HYDROCHLORIDE 25 MG: 25 CAPSULE ORAL at 13:46

## 2017-05-16 RX ADMIN — SODIUM CHLORIDE, PRESERVATIVE FREE 500 UNITS: 5 INJECTION INTRAVENOUS at 14:39

## 2017-05-16 RX ADMIN — Medication 10 ML: at 14:39

## 2017-05-16 RX ADMIN — FERUMOXYTOL 510 MG: 510 INJECTION INTRAVENOUS at 14:05

## 2017-05-16 RX ADMIN — SODIUM CHLORIDE 250 ML: 9 INJECTION, SOLUTION INTRAVENOUS at 13:46

## 2017-05-22 ENCOUNTER — HOSPITAL ENCOUNTER (OUTPATIENT)
Dept: PET IMAGING | Facility: HOSPITAL | Age: 57
Discharge: HOME | End: 2017-05-22
Admitting: INTERNAL MEDICINE

## 2017-05-22 DIAGNOSIS — C34.82 MALIGNANT NEOPLASM OF OVERLAPPING SITES OF LEFT LUNG (HCC): ICD-10-CM

## 2017-05-22 PROCEDURE — 0 FLUDEOXYGLUCOSE F18 SOLUTION: Performed by: INTERNAL MEDICINE

## 2017-05-22 PROCEDURE — A9552 F18 FDG: HCPCS | Performed by: INTERNAL MEDICINE

## 2017-05-22 PROCEDURE — 78815 PET IMAGE W/CT SKULL-THIGH: CPT

## 2017-05-22 RX ORDER — OXYCODONE HYDROCHLORIDE AND ACETAMINOPHEN 5; 325 MG/1; MG/1
1 TABLET ORAL EVERY 6 HOURS PRN
Qty: 50 TABLET | Refills: 0 | Status: SHIPPED | OUTPATIENT
Start: 2017-05-22 | End: 2017-06-05 | Stop reason: SDUPTHER

## 2017-05-22 RX ADMIN — FLUDEOXYGLUCOSE F18 1 DOSE: 300 INJECTION INTRAVENOUS at 08:33

## 2017-05-25 ENCOUNTER — HOSPITAL ENCOUNTER (OUTPATIENT)
Dept: GENERAL RADIOLOGY | Facility: HOSPITAL | Age: 57
Discharge: HOME | End: 2017-05-25
Attending: THORACIC SURGERY (CARDIOTHORACIC VASCULAR SURGERY) | Admitting: THORACIC SURGERY (CARDIOTHORACIC VASCULAR SURGERY)

## 2017-05-25 ENCOUNTER — OFFICE VISIT (OUTPATIENT)
Dept: CARDIAC SURGERY | Facility: CLINIC | Age: 57
End: 2017-05-25

## 2017-05-25 VITALS
BODY MASS INDEX: 21.22 KG/M2 | WEIGHT: 140 LBS | SYSTOLIC BLOOD PRESSURE: 140 MMHG | OXYGEN SATURATION: 98 % | HEART RATE: 95 BPM | DIASTOLIC BLOOD PRESSURE: 83 MMHG | TEMPERATURE: 98.9 F | HEIGHT: 68 IN

## 2017-05-25 DIAGNOSIS — C34.12 MALIGNANT NEOPLASM OF UPPER LOBE OF LEFT LUNG (HCC): ICD-10-CM

## 2017-05-25 DIAGNOSIS — C34.12 MALIGNANT NEOPLASM OF UPPER LOBE OF LEFT LUNG (HCC): Primary | ICD-10-CM

## 2017-05-25 PROCEDURE — 71020 HC CHEST PA AND LATERAL: CPT

## 2017-05-25 PROCEDURE — 99024 POSTOP FOLLOW-UP VISIT: CPT | Performed by: THORACIC SURGERY (CARDIOTHORACIC VASCULAR SURGERY)

## 2017-05-25 PROCEDURE — 99406 BEHAV CHNG SMOKING 3-10 MIN: CPT | Performed by: THORACIC SURGERY (CARDIOTHORACIC VASCULAR SURGERY)

## 2017-05-27 PROBLEM — Z48.813 SURGICAL AFTERCARE, RESPIRATORY SYSTEM: Status: RESOLVED | Noted: 2017-05-01 | Resolved: 2017-05-27

## 2017-05-31 LAB
MYCOBACTERIUM SPEC CULT: NORMAL
NIGHT BLUE STAIN TISS: NORMAL

## 2017-06-02 ENCOUNTER — HOSPITAL ENCOUNTER (OUTPATIENT)
Dept: RADIATION ONCOLOGY | Facility: HOSPITAL | Age: 57
Setting detail: RADIATION/ONCOLOGY SERIES
End: 2017-06-02

## 2017-06-02 PROCEDURE — 77470 SPECIAL RADIATION TREATMENT: CPT | Performed by: RADIOLOGY

## 2017-06-02 PROCEDURE — 77334 RADIATION TREATMENT AID(S): CPT | Performed by: RADIOLOGY

## 2017-06-02 PROCEDURE — 77307 TELETHX ISODOSE PLAN CPLX: CPT | Performed by: RADIOLOGY

## 2017-06-05 ENCOUNTER — INFUSION (OUTPATIENT)
Dept: ONCOLOGY | Facility: HOSPITAL | Age: 57
End: 2017-06-05

## 2017-06-05 ENCOUNTER — OFFICE VISIT (OUTPATIENT)
Dept: ONCOLOGY | Facility: CLINIC | Age: 57
End: 2017-06-05

## 2017-06-05 ENCOUNTER — APPOINTMENT (OUTPATIENT)
Dept: RADIATION ONCOLOGY | Facility: HOSPITAL | Age: 57
End: 2017-06-05

## 2017-06-05 VITALS
WEIGHT: 134.2 LBS | SYSTOLIC BLOOD PRESSURE: 173 MMHG | BODY MASS INDEX: 20.34 KG/M2 | DIASTOLIC BLOOD PRESSURE: 93 MMHG | TEMPERATURE: 97.9 F | RESPIRATION RATE: 18 BRPM | HEIGHT: 68 IN | HEART RATE: 103 BPM

## 2017-06-05 VITALS — HEART RATE: 68 BPM | SYSTOLIC BLOOD PRESSURE: 142 MMHG | DIASTOLIC BLOOD PRESSURE: 84 MMHG | RESPIRATION RATE: 18 BRPM

## 2017-06-05 DIAGNOSIS — C34.82 MALIGNANT NEOPLASM OF OVERLAPPING SITES OF LEFT LUNG (HCC): ICD-10-CM

## 2017-06-05 DIAGNOSIS — Z45.2 ENCOUNTER FOR VENOUS ACCESS DEVICE CARE: ICD-10-CM

## 2017-06-05 DIAGNOSIS — C34.82 MALIGNANT NEOPLASM OF OVERLAPPING SITES OF LEFT LUNG (HCC): Primary | ICD-10-CM

## 2017-06-05 LAB
ALBUMIN SERPL-MCNC: 4 G/DL (ref 3.4–4.8)
ALBUMIN/GLOB SERPL: 1.1 G/DL (ref 1.1–1.8)
ALP SERPL-CCNC: 93 U/L (ref 38–126)
ALT SERPL W P-5'-P-CCNC: 24 U/L (ref 21–72)
ANION GAP SERPL CALCULATED.3IONS-SCNC: 13 MMOL/L (ref 5–15)
AST SERPL-CCNC: 16 U/L (ref 17–59)
BASOPHILS # BLD AUTO: 0.05 10*3/MM3 (ref 0–0.2)
BASOPHILS NFR BLD AUTO: 0.6 % (ref 0–2)
BILIRUB SERPL-MCNC: 0.2 MG/DL (ref 0.2–1.3)
BUN BLD-MCNC: 9 MG/DL (ref 7–21)
BUN/CREAT SERPL: 18 (ref 7–25)
CALCIUM SPEC-SCNC: 9.7 MG/DL (ref 8.4–10.2)
CHLORIDE SERPL-SCNC: 106 MMOL/L (ref 95–110)
CO2 SERPL-SCNC: 22 MMOL/L (ref 22–31)
CREAT BLD-MCNC: 0.5 MG/DL (ref 0.7–1.3)
DEPRECATED RDW RBC AUTO: 76.4 FL (ref 35.1–43.9)
EOSINOPHIL # BLD AUTO: 0.04 10*3/MM3 (ref 0–0.7)
EOSINOPHIL NFR BLD AUTO: 0.5 % (ref 0–7)
ERYTHROCYTE [DISTWIDTH] IN BLOOD BY AUTOMATED COUNT: 25.9 % (ref 11.5–14.5)
GFR SERPL CREATININE-BSD FRML MDRD: 172 ML/MIN/1.73 (ref 56–130)
GLOBULIN UR ELPH-MCNC: 3.8 GM/DL (ref 2.3–3.5)
GLUCOSE BLD-MCNC: 77 MG/DL (ref 60–100)
HCT VFR BLD AUTO: 39.4 % (ref 39–49)
HGB BLD-MCNC: 12.5 G/DL (ref 13.7–17.3)
IMM GRANULOCYTES # BLD: 0.01 10*3/MM3 (ref 0–0.02)
IMM GRANULOCYTES NFR BLD: 0.1 % (ref 0–0.5)
LYMPHOCYTES # BLD AUTO: 1.16 10*3/MM3 (ref 0.6–4.2)
LYMPHOCYTES NFR BLD AUTO: 14.3 % (ref 10–50)
MCH RBC QN AUTO: 26.3 PG (ref 26.5–34)
MCHC RBC AUTO-ENTMCNC: 31.7 G/DL (ref 31.5–36.3)
MCV RBC AUTO: 82.8 FL (ref 80–98)
MONOCYTES # BLD AUTO: 0.92 10*3/MM3 (ref 0–0.9)
MONOCYTES NFR BLD AUTO: 11.3 % (ref 0–12)
NEUTROPHILS # BLD AUTO: 5.96 10*3/MM3 (ref 2–8.6)
NEUTROPHILS NFR BLD AUTO: 73.2 % (ref 37–80)
PLATELET # BLD AUTO: 392 10*3/MM3 (ref 150–450)
PMV BLD AUTO: 8.7 FL (ref 8–12)
POTASSIUM BLD-SCNC: 3.9 MMOL/L (ref 3.5–5.1)
PROT SERPL-MCNC: 7.8 G/DL (ref 6.3–8.6)
RBC # BLD AUTO: 4.76 10*6/MM3 (ref 4.37–5.74)
SODIUM BLD-SCNC: 141 MMOL/L (ref 137–145)
WBC NRBC COR # BLD: 8.14 10*3/MM3 (ref 3.2–9.8)

## 2017-06-05 PROCEDURE — 36591 DRAW BLOOD OFF VENOUS DEVICE: CPT | Performed by: INTERNAL MEDICINE

## 2017-06-05 PROCEDURE — 77412 RADIATION TX DELIVERY LVL 3: CPT | Performed by: RADIOLOGY

## 2017-06-05 PROCEDURE — 99214 OFFICE O/P EST MOD 30 MIN: CPT | Performed by: INTERNAL MEDICINE

## 2017-06-05 PROCEDURE — 96375 TX/PRO/DX INJ NEW DRUG ADDON: CPT | Performed by: INTERNAL MEDICINE

## 2017-06-05 PROCEDURE — 25010000002 DIPHENHYDRAMINE PER 50 MG: Performed by: INTERNAL MEDICINE

## 2017-06-05 PROCEDURE — 25010000002 CARBOPLATIN PER 50 MG: Performed by: INTERNAL MEDICINE

## 2017-06-05 PROCEDURE — 77427 RADIATION TX MANAGEMENT X5: CPT | Performed by: RADIOLOGY

## 2017-06-05 PROCEDURE — 96413 CHEMO IV INFUSION 1 HR: CPT | Performed by: INTERNAL MEDICINE

## 2017-06-05 PROCEDURE — 96417 CHEMO IV INFUS EACH ADDL SEQ: CPT | Performed by: INTERNAL MEDICINE

## 2017-06-05 PROCEDURE — 25010000002 PACLITAXEL PER 30 MG: Performed by: INTERNAL MEDICINE

## 2017-06-05 PROCEDURE — 77280 THER RAD SIMULAJ FIELD SMPL: CPT | Performed by: RADIOLOGY

## 2017-06-05 PROCEDURE — 25010000002 PALONOSETRON PER 25 MCG: Performed by: INTERNAL MEDICINE

## 2017-06-05 PROCEDURE — 25010000002 HEPARIN FLUSH (PORCINE) 100 UNIT/ML SOLUTION: Performed by: INTERNAL MEDICINE

## 2017-06-05 PROCEDURE — 85025 COMPLETE CBC W/AUTO DIFF WBC: CPT | Performed by: INTERNAL MEDICINE

## 2017-06-05 PROCEDURE — 80053 COMPREHEN METABOLIC PANEL: CPT | Performed by: INTERNAL MEDICINE

## 2017-06-05 PROCEDURE — 25010000003 DEXAMETHASONE SODIUM PHOSPHATE 100 MG/10ML SOLUTION 10 ML VIAL: Performed by: INTERNAL MEDICINE

## 2017-06-05 RX ORDER — FAMOTIDINE 10 MG/ML
20 INJECTION, SOLUTION INTRAVENOUS ONCE
Status: CANCELLED | OUTPATIENT
Start: 2017-06-05

## 2017-06-05 RX ORDER — FAMOTIDINE 10 MG/ML
20 INJECTION, SOLUTION INTRAVENOUS ONCE
Status: COMPLETED | OUTPATIENT
Start: 2017-06-05 | End: 2017-06-05

## 2017-06-05 RX ORDER — SODIUM CHLORIDE 9 MG/ML
250 INJECTION, SOLUTION INTRAVENOUS ONCE
Status: COMPLETED | OUTPATIENT
Start: 2017-06-05 | End: 2017-06-05

## 2017-06-05 RX ORDER — SODIUM CHLORIDE 9 MG/ML
250 INJECTION, SOLUTION INTRAVENOUS ONCE
Status: CANCELLED | OUTPATIENT
Start: 2017-06-05

## 2017-06-05 RX ORDER — PALONOSETRON 0.05 MG/ML
0.25 INJECTION, SOLUTION INTRAVENOUS ONCE
Status: COMPLETED | OUTPATIENT
Start: 2017-06-05 | End: 2017-06-05

## 2017-06-05 RX ORDER — SODIUM CHLORIDE 0.9 % (FLUSH) 0.9 %
10 SYRINGE (ML) INJECTION AS NEEDED
Status: CANCELLED | OUTPATIENT
Start: 2017-06-05

## 2017-06-05 RX ORDER — PALONOSETRON 0.05 MG/ML
0.25 INJECTION, SOLUTION INTRAVENOUS ONCE
Status: CANCELLED | OUTPATIENT
Start: 2017-06-05

## 2017-06-05 RX ORDER — SODIUM CHLORIDE 0.9 % (FLUSH) 0.9 %
10 SYRINGE (ML) INJECTION AS NEEDED
Status: DISCONTINUED | OUTPATIENT
Start: 2017-06-05 | End: 2017-06-05 | Stop reason: HOSPADM

## 2017-06-05 RX ORDER — OXYCODONE HYDROCHLORIDE AND ACETAMINOPHEN 5; 325 MG/1; MG/1
1 TABLET ORAL EVERY 6 HOURS PRN
Qty: 50 TABLET | Refills: 0 | Status: SHIPPED | OUTPATIENT
Start: 2017-06-05 | End: 2017-06-15 | Stop reason: SDUPTHER

## 2017-06-05 RX ADMIN — CARBOPLATIN 300 MG: 10 INJECTION, SOLUTION INTRAVENOUS at 13:26

## 2017-06-05 RX ADMIN — FAMOTIDINE 20 MG: 10 INJECTION, SOLUTION INTRAVENOUS at 10:39

## 2017-06-05 RX ADMIN — Medication 10 ML: at 14:19

## 2017-06-05 RX ADMIN — PALONOSETRON HYDROCHLORIDE 0.25 MG: 0.25 INJECTION INTRAVENOUS at 10:46

## 2017-06-05 RX ADMIN — DEXAMETHASONE SODIUM PHOSPHATE 12 MG: 10 INJECTION, SOLUTION INTRAMUSCULAR; INTRAVENOUS at 11:36

## 2017-06-05 RX ADMIN — Medication 10 ML: at 08:57

## 2017-06-05 RX ADMIN — DIPHENHYDRAMINE HYDROCHLORIDE 50 MG: 50 INJECTION INTRAMUSCULAR; INTRAVENOUS at 11:00

## 2017-06-05 RX ADMIN — SODIUM CHLORIDE, PRESERVATIVE FREE 500 UNITS: 5 INJECTION INTRAVENOUS at 14:19

## 2017-06-05 RX ADMIN — PACLITAXEL 85 MG: 6 INJECTION, SOLUTION INTRAVENOUS at 12:00

## 2017-06-05 RX ADMIN — SODIUM CHLORIDE 250 ML: 9 INJECTION, SOLUTION INTRAVENOUS at 10:39

## 2017-06-05 NOTE — PROGRESS NOTES
DATE OF VISIT: 6/5/2017    REASON FOR VISIT:  Squamous cell cancer of left lung, stage IIB    HISTORY OF PRESENT ILLNESS:    57-year-old male with a past medical history significant for duodenal ulcer, extensive nicotine addiction with more than 45-pack-year smoking history, history of alcohol abuse was seen in consultation on May 3, 2017 for newly diagnosed stage IIB squamous cell cancer of Of left lung.  At that point patient was also found to be iron deficient for which he received 2 doses of intravenous Feraheme.  Still complains of fatigue.  Complains of pain in left chest wall area.  Denies any fever or chills.  Patient is here to resume his concurrent chemoradiation with weekly carboplatin and Taxol today on June 5, 2017.      PAST MEDICAL HISTORY:    Past Medical History:   Diagnosis Date   • Anemia    • Aortic stenosis, mild    • Arthritis    • Chronic bronchitis    • Duodenal ulcer disease    • Inguinal hernia, left    • Lung cancer    • Mitral valve regurgitation        SOCIAL HISTORY:    Social History   Substance Use Topics   • Smoking status: Current Every Day Smoker     Packs/day: 1.00     Years: 40.00     Types: Cigarettes   • Smokeless tobacco: Former User      Comment: Reduced at half a pack per day and weanning off   • Alcohol use 8.4 oz/week     14 Cans of beer per week      Comment: 6 pk/dly       Surgical History :  Past Surgical History:   Procedure Laterality Date   • APPENDECTOMY      open   • BRONCHOSCOPY N/A 3/8/2017    Procedure: BRONCHOSCOPY with possible bronchoalveolar lavage, +/- brush, +/- needle biopsy, +/- endobronchial biopsy;  Surgeon: Candis Lr MD;  Location: [facility] ENDOSCOPY;  Service:    • EXPLORATORY LAPAROTOMY  11/17/2014    Exploratory laparotomy, repair of duodenal ulcer, modified Carlos patch   • INGUINAL HERNIA REPAIR  09/13/2013    Open repair of an indirect left inguinal hernia. Left inguinal hernia.   • PA INSJ TUNNELED CVC W/O SUBQ PORT/ AGE 6 YR/> N/A  "5/10/2017    Procedure: TUNNELED CENTRAL VENOUS CATHETER  WITH SUBCUTANEOUS  PORT,FLUOROSCOPIC GUIDANCE ;  Surgeon: Angel Hernandez MD;  Location: [facility];  Service: Cardiothoracic   • THORACOTOMY Left 4/19/2017    Procedure: THORACOTOMY LEFT WITH PULMONARY RESECTION AND BRONCHOSCOPY WITH ENDO ;  Surgeon: Angel Hernandez MD;  Location: [facility];  Service:        ALLERGIES:    No Known Allergies    REVIEW OF SYSTEMS:      CONSTITUTIONAL: Positive for 20 pound weight loss in last 6 months. Complains of fatigue. Denies any fever, chills or weight loss.      HEENT: No epistaxis, mouth sores or difficulty swallowing.     RESPIRATORY: Denies any shortness of breath or cough today.     CARDIOVASCULAR: Complains of chest pain on the left side since surgery.  No palpitation.     GASTROINTESTINAL: No abdominal pain nausea, vomiting or blood in the stool.     GENITOURINARY: No Dysuria or Hematuria.     MUSCULOSKELETAL: No new back pain or arthralgia..     LYMPHATICS: Denies any abnormal swollen glands anywhere in the body.     NEUROLOGICAL : No tingling or numbness. No headache or dizziness. No seizures or balance problems.     SKIN: No new skin lesions.         PHYSICAL EXAMINATION:      VITAL SIGNS:  /93  Pulse 103  Temp 97.9 °F (36.6 °C)  Resp 18  Ht 68\" (172.7 cm)  Wt 134 lb 3.2 oz (60.9 kg)  BMI 20.41 kg/m2    GENERAL:  Not in any distress.    HEENT:  Normocephalic, Atraumatic.Mild Conjunctival pallor. No icterus. Extraocular Movements Intact. No Facial Asymmetry noted.    NECK:  No adenopathy. No JVD.    RESPIRATORY:  Fair air entry bilateral. No rhonchi or wheezing.    CARDIOVASCULAR:  S1, S2. Regular rate and rhythm. No murmur or gallop appreciated.    ABDOMEN:  Soft, obese, nontender. Bowel sounds present in all four quadrants.  No organomegaly appreciated.    EXTREMITIES:  No edema.No Calf Tenderness.    NEUROLOGIC:  Alert, awake and oriented ×3.  No  Motor or sensory deficit appreciated. " Cranial Nerves 2-12 grossly intact.        DIAGNOSTIC DATA:    Glucose   Date Value Ref Range Status   06/05/2017 77 60 - 100 mg/dL Final     Glucose, Arterial   Date Value Ref Range Status   04/19/2017 176 mmol/L Final     Sodium   Date Value Ref Range Status   06/05/2017 141 137 - 145 mmol/L Final     Potassium   Date Value Ref Range Status   06/05/2017 3.9 3.5 - 5.1 mmol/L Final     CO2   Date Value Ref Range Status   06/05/2017 22.0 22.0 - 31.0 mmol/L Final     Chloride   Date Value Ref Range Status   06/05/2017 106 95 - 110 mmol/L Final     Anion Gap   Date Value Ref Range Status   06/05/2017 13.0 5.0 - 15.0 mmol/L Final     Creatinine   Date Value Ref Range Status   06/05/2017 0.50 (L) 0.70 - 1.30 mg/dL Final     BUN   Date Value Ref Range Status   06/05/2017 9 7 - 21 mg/dL Final     BUN/Creatinine Ratio   Date Value Ref Range Status   06/05/2017 18.0 7.0 - 25.0 Final     Calcium   Date Value Ref Range Status   06/05/2017 9.7 8.4 - 10.2 mg/dL Final     eGFR Non  Amer   Date Value Ref Range Status   06/05/2017 172 (H) 56 - 130 mL/min/1.73 Final     Alkaline Phosphatase   Date Value Ref Range Status   06/05/2017 93 38 - 126 U/L Final     Total Protein   Date Value Ref Range Status   06/05/2017 7.8 6.3 - 8.6 g/dL Final     ALT (SGPT)   Date Value Ref Range Status   06/05/2017 24 21 - 72 U/L Final     AST (SGOT)   Date Value Ref Range Status   06/05/2017 16 (L) 17 - 59 U/L Final     Total Bilirubin   Date Value Ref Range Status   06/05/2017 0.2 0.2 - 1.3 mg/dL Final     Albumin   Date Value Ref Range Status   06/05/2017 4.00 3.40 - 4.80 g/dL Final     Globulin   Date Value Ref Range Status   06/05/2017 3.8 (H) 2.3 - 3.5 gm/dL Final     A/G Ratio   Date Value Ref Range Status   06/05/2017 1.1 1.1 - 1.8 g/dL Final     Lab Results   Component Value Date    WBC 8.14 06/05/2017    HGB 12.5 (L) 06/05/2017    HCT 39.4 06/05/2017    MCV 82.8 06/05/2017     06/05/2017     Lab Results   Component Value Date     NEUTROABS 5.96 06/05/2017    IRON 12 (L) 05/03/2017    TIBC 322 05/03/2017    LABIRON 4 (L) 05/03/2017    FERRITIN 56.80 05/03/2017    [id number] 562 05/03/2017    FOLATE >20.00 05/03/2017         PATHOLOGY:  Pathology :  Pathology Report from April 19, 2017 showed:       SPECIMEN   Specimen   Lobe(s) of lung   Lobes of Lung   Upper lobe   Procedure   Lobectomy   Specimen Laterality   Left   TUMOR   Primary Tumor Site   Upper lobe   Histologic Type   Squamous cell carcinoma   Histologic Grade   G2: Moderately differentiated   Tumor Size   Greatest dimension (cm): 8 cm   Additional Dimension (cm)   7 cm       5 cm   Tumor Focality   Unifocal   Visceral Pleura Invasion   Present   Tumor Extension   Parietal pleura   Lymph-Vascular Invasion   Not identified   MARGINS   Bronchial Margin   Uninvolved by invasive carcinoma and carcinoma in situ   Vascular Margin   Uninvolved by invasive carcinoma   Parenchymal Margin   Involved by invasive carcinoma   LYMPH NODES   Regional Lymph Nodes       Number of Lymph Nodes Examined   Specify number: 6   Gene Stations Examined   5: Subaortic/ aortopulmonary (AP) / AP window       11L: Interlobar   Lymph Node Involvement   No nodes involved   STAGE (pTNM)   Primary Tumor (pT)   pT3: Tumor greater than 7 cm in greatest dimension; or Tumor of any size that directly invades any of the following: parietal pleural chest wall (including superior sulcus tumors), diaphragm, phrenic nerve, mediastinal pleura, parietal pericardium; or Tumor of any size in the main bronchus less than 2 cm distal to the petar but without involvement of the petar; or Tumor of any size associated with atelectasis or obstructive pneumonitis of the entire lung; or Tumors of any size with separate tmor nodule(s) in same lobe   Regional Lymph Nodes (pN)   pN0: No regional lymph node metastasis   ADDITIONAL FINDINGS   Additional Pathologic Finding(s)   Other (specify): Fungal hyphae consistent with Aspergillus  species in the cavity contents   .          RADIOLOGY DATA :  PET/CT done on May 22, 2017 showed:  IMPRESSION:  CONCLUSION:  1. Large Pancoast tumor involving left apex with significant  involvement of the periphery, pleura, portions of the chest wall  and contiguous involvement, destruction of at least one of the  anterior left upper ribs.     PET/CT imaging study is otherwise unremarkable. No evidence of  any distant metastases.        ASSESSMENT AND PLAN:      1.  Squamous cell cancer of left upper lobe, stage IIB, T3 N0 M0: Patient is status post surgical excision with positive pleural margin.  As per NCCN guidelines we'll start patient on concurrent chemoradiation with weekly carboplatin and Taxol.  Again patient was explained about possible side effects of chemotherapy including nausea, vomiting, diarrhea, risk of lowering of blood count, risk of infection, need for blood transfusion, neuropathy and possibility of allergic reaction.  Patient is enough prescription for Zofran for now.  Patient was encouraged to keep himself hydrated.  We will recheck his CBC and CMP next week and continue with his carboplatin and Taxol in a weekly management.  We will see him back in about 2 weeks in clinic.    2.  Iron deficiency anemia secondary to chronic blood loss.  Patient is status post 5 units of PRBC since March 2017.  Patient also received 2 doses of intravenous Feraheme on May 9, 2017 and May 16, 2017.  His hemoglobin is improved to 12.3, we will monitored with CBC for now.    3.Pulmonary aspergillosis: Patient had a fungus ball consistent with Aspergillus on a cavitary lesion of lung status postsurgical excision.     4. History of duodenal ulcer     5. Health maintenance: Patient was counseled about smoking cessation today, about 3 minutes were spent for smoking cessation counseling. He will need screening colonoscopy no later date. He remains full code.     6. Prescriptions: Prescription for Zofran has been sent to  his pharmacy today.     Gualberto Dowd MD  6/5/2017  12:31 PM        EMR Dragon/Transcription disclaimer:   Much of this encounter note is an electronic transcription/translation of spoken language to printed text. The electronic translation of spoken language may permit erroneous, or at times, nonsensical words or phrases to be inadvertently transcribed; Although I have reviewed the note for such errors, some may still exist.

## 2017-06-06 ENCOUNTER — TELEPHONE (OUTPATIENT)
Dept: NUTRITION | Facility: HOSPITAL | Age: 57
End: 2017-06-06

## 2017-06-06 DIAGNOSIS — C34.92 MALIGNANT NEOPLASM OF LEFT LUNG, UNSPECIFIED PART OF LUNG (HCC): Primary | ICD-10-CM

## 2017-06-06 DIAGNOSIS — R52 PAIN: ICD-10-CM

## 2017-06-06 PROCEDURE — 77412 RADIATION TX DELIVERY LVL 3: CPT | Performed by: RADIOLOGY

## 2017-06-06 PROCEDURE — 77417 THER RADIOLOGY PORT IMAGE(S): CPT | Performed by: RADIOLOGY

## 2017-06-06 NOTE — PROGRESS NOTES
Adult Outpatient Nutrition  Assessment    Patient Name:  Tino Peres  YOB: 1960  MRN: [id number]        Assessment Date:  6/6/2017                        Comments: 57WM recently started chemo due to lung cancer. Ht: 68 in Wt: 134 lb  Chart review revealed 20 lb/weight loss. Attempted phone contact without success.  Left voice mail offering nutrition services. Mailed information/supplement suggestions  with RD's name/number.        Electronically signed by:  Kayla Lees RD  06/06/17 3:30 PM

## 2017-06-07 ENCOUNTER — RADIATION ONCOLOGY WEEKLY ASSESSMENT (OUTPATIENT)
Dept: RADIATION ONCOLOGY | Facility: HOSPITAL | Age: 57
End: 2017-06-07

## 2017-06-07 VITALS
HEART RATE: 102 BPM | SYSTOLIC BLOOD PRESSURE: 149 MMHG | WEIGHT: 131.1 LBS | RESPIRATION RATE: 20 BRPM | DIASTOLIC BLOOD PRESSURE: 88 MMHG | TEMPERATURE: 97.8 F | BODY MASS INDEX: 19.93 KG/M2

## 2017-06-07 DIAGNOSIS — C34.12 MALIGNANT NEOPLASM OF UPPER LOBE OF LEFT LUNG (HCC): Primary | ICD-10-CM

## 2017-06-07 PROCEDURE — 77412 RADIATION TX DELIVERY LVL 3: CPT | Performed by: RADIOLOGY

## 2017-06-07 NOTE — PROGRESS NOTES
On Treatment Visit       Patient: Tino Peres   YOB: 1960   Medical Record Number: [id number]     Date of Visit  June 7, 2017   Primary Diagnosis: Stage IIB (pT3 pN0 Mx) moderately differentiated squamous cell carcinoma of the SILVANA lung, status post lobectomy with positive margins.  ICD 10 Code: C34.12     was seen today for an on treatment visit.  He is receiving radiation therapy to the SILVANA lung. He  has received 600 cGy in 3 fractions out of a planned dose of 6000 cGy in 30 fractions. He is currently receiving concurrent carboplatin/Taxol chemotherapy per Dr. Dowd .     Today on exam the patient is tolerating radiation therapy well and has no new disease or treatment-related complaints.  He continues to have intermittent left upper chest wall pain, which is stable.                                              Vitals:     Vitals:    06/07/17 1556   BP: 149/88   Pulse: 102   Resp: 20   Temp: 97.8 °F (36.6 °C)       Weight:   Wt Readings from Last 3 Encounters:   06/07/17 131 lb 1.6 oz (59.5 kg)   06/05/17 134 lb 3.2 oz (60.9 kg)   05/25/17 140 lb (63.5 kg)      Pain:    Pain Score    06/07/17 1556   PainSc:   3         Plan: We plan to continue radiation therapy as prescribed.    Jorge Alberto Hills MD  Radiation Oncology    Electronically signed by Jorge Alberto Hills MD 6/7/2017  4:03 PM

## 2017-06-08 ENCOUNTER — HOSPITAL ENCOUNTER (OUTPATIENT)
Dept: RADIATION ONCOLOGY | Facility: HOSPITAL | Age: 57
Discharge: HOME | End: 2017-06-08

## 2017-06-08 PROCEDURE — 77412 RADIATION TX DELIVERY LVL 3: CPT | Performed by: RADIOLOGY

## 2017-06-09 PROCEDURE — 77336 RADIATION PHYSICS CONSULT: CPT | Performed by: RADIOLOGY

## 2017-06-09 PROCEDURE — 77412 RADIATION TX DELIVERY LVL 3: CPT | Performed by: RADIOLOGY

## 2017-06-12 ENCOUNTER — INFUSION (OUTPATIENT)
Dept: ONCOLOGY | Facility: HOSPITAL | Age: 57
End: 2017-06-12

## 2017-06-12 VITALS
DIASTOLIC BLOOD PRESSURE: 94 MMHG | SYSTOLIC BLOOD PRESSURE: 137 MMHG | RESPIRATION RATE: 18 BRPM | TEMPERATURE: 97.4 F | HEART RATE: 110 BPM

## 2017-06-12 DIAGNOSIS — Z45.2 ENCOUNTER FOR VENOUS ACCESS DEVICE CARE: ICD-10-CM

## 2017-06-12 DIAGNOSIS — F17.200 TOBACCO USE DISORDER: ICD-10-CM

## 2017-06-12 DIAGNOSIS — IMO0002 ALCOHOL USE DISORDER: ICD-10-CM

## 2017-06-12 DIAGNOSIS — C34.82 MALIGNANT NEOPLASM OF OVERLAPPING SITES OF LEFT LUNG (HCC): Primary | ICD-10-CM

## 2017-06-12 DIAGNOSIS — B47.9 MYCETOMA: ICD-10-CM

## 2017-06-12 DIAGNOSIS — J98.4 CAVITARY LESION OF LUNG: ICD-10-CM

## 2017-06-12 DIAGNOSIS — D75.839 THROMBOCYTOSIS: ICD-10-CM

## 2017-06-12 LAB
ALBUMIN SERPL-MCNC: 4.2 G/DL (ref 3.4–4.8)
ALBUMIN/GLOB SERPL: 1.1 G/DL (ref 1.1–1.8)
ALP SERPL-CCNC: 96 U/L (ref 38–126)
ALT SERPL W P-5'-P-CCNC: 25 U/L (ref 21–72)
ANION GAP SERPL CALCULATED.3IONS-SCNC: 10 MMOL/L (ref 5–15)
ANISOCYTOSIS BLD QL: NORMAL
AST SERPL-CCNC: 25 U/L (ref 17–59)
BASOPHILS # BLD AUTO: 0.02 10*3/MM3 (ref 0–0.2)
BASOPHILS NFR BLD AUTO: 0.3 % (ref 0–2)
BILIRUB SERPL-MCNC: 0.3 MG/DL (ref 0.2–1.3)
BUN BLD-MCNC: 14 MG/DL (ref 7–21)
BUN/CREAT SERPL: 25.9 (ref 7–25)
CALCIUM SPEC-SCNC: 9.9 MG/DL (ref 8.4–10.2)
CHLORIDE SERPL-SCNC: 105 MMOL/L (ref 95–110)
CO2 SERPL-SCNC: 26 MMOL/L (ref 22–31)
CREAT BLD-MCNC: 0.54 MG/DL (ref 0.7–1.3)
DEPRECATED RDW RBC AUTO: ABNORMAL FL (ref 35.1–43.9)
EOSINOPHIL # BLD AUTO: 0.04 10*3/MM3 (ref 0–0.7)
EOSINOPHIL NFR BLD AUTO: 0.6 % (ref 0–7)
ERYTHROCYTE [DISTWIDTH] IN BLOOD BY AUTOMATED COUNT: ABNORMAL % (ref 11.5–14.5)
GFR SERPL CREATININE-BSD FRML MDRD: >150 ML/MIN/1.73 (ref 60–130)
GLOBULIN UR ELPH-MCNC: 3.8 GM/DL (ref 2.3–3.5)
GLUCOSE BLD-MCNC: 110 MG/DL (ref 60–100)
HCT VFR BLD AUTO: 37.6 % (ref 39–49)
HGB BLD-MCNC: 12.3 G/DL (ref 13.7–17.3)
HYPOCHROMIA BLD QL: NORMAL
IMM GRANULOCYTES # BLD: 0.05 10*3/MM3 (ref 0–0.02)
IMM GRANULOCYTES NFR BLD: 0.7 % (ref 0–0.5)
LYMPHOCYTES # BLD AUTO: 0.67 10*3/MM3 (ref 0.6–4.2)
LYMPHOCYTES NFR BLD AUTO: 9.3 % (ref 10–50)
MCH RBC QN AUTO: 26.7 PG (ref 26.5–34)
MCHC RBC AUTO-ENTMCNC: 32.7 G/DL (ref 31.5–36.3)
MCV RBC AUTO: 81.6 FL (ref 80–98)
MONOCYTES # BLD AUTO: 0.91 10*3/MM3 (ref 0–0.9)
MONOCYTES NFR BLD AUTO: 12.6 % (ref 0–12)
NEUTROPHILS # BLD AUTO: 5.52 10*3/MM3 (ref 2–8.6)
NEUTROPHILS NFR BLD AUTO: 76.5 % (ref 37–80)
NRBC BLD MANUAL-RTO: 0 /100 WBC (ref 0–0)
PLATELET # BLD AUTO: 377 10*3/MM3 (ref 150–450)
PMV BLD AUTO: 8.7 FL (ref 8–12)
POTASSIUM BLD-SCNC: 4 MMOL/L (ref 3.5–5.1)
PROT SERPL-MCNC: 8 G/DL (ref 6.3–8.6)
RBC # BLD AUTO: 4.61 10*6/MM3 (ref 4.37–5.74)
SMALL PLATELETS BLD QL SMEAR: ADEQUATE
SODIUM BLD-SCNC: 141 MMOL/L (ref 137–145)
WBC MORPH BLD: NORMAL
WBC NRBC COR # BLD: 7.21 10*3/MM3 (ref 3.2–9.8)

## 2017-06-12 PROCEDURE — 80053 COMPREHEN METABOLIC PANEL: CPT | Performed by: INTERNAL MEDICINE

## 2017-06-12 PROCEDURE — 25010000002 DIPHENHYDRAMINE PER 50 MG: Performed by: INTERNAL MEDICINE

## 2017-06-12 PROCEDURE — 25010000002 HEPARIN FLUSH (PORCINE) 100 UNIT/ML SOLUTION: Performed by: INTERNAL MEDICINE

## 2017-06-12 PROCEDURE — 96375 TX/PRO/DX INJ NEW DRUG ADDON: CPT | Performed by: INTERNAL MEDICINE

## 2017-06-12 PROCEDURE — 96413 CHEMO IV INFUSION 1 HR: CPT | Performed by: INTERNAL MEDICINE

## 2017-06-12 PROCEDURE — 85007 BL SMEAR W/DIFF WBC COUNT: CPT | Performed by: INTERNAL MEDICINE

## 2017-06-12 PROCEDURE — 25010000002 PALONOSETRON PER 25 MCG: Performed by: INTERNAL MEDICINE

## 2017-06-12 PROCEDURE — 25010000003 DEXAMETHASONE SODIUM PHOSPHATE 100 MG/10ML SOLUTION 10 ML VIAL: Performed by: INTERNAL MEDICINE

## 2017-06-12 PROCEDURE — 36591 DRAW BLOOD OFF VENOUS DEVICE: CPT

## 2017-06-12 PROCEDURE — 25010000002 CARBOPLATIN PER 50 MG: Performed by: INTERNAL MEDICINE

## 2017-06-12 PROCEDURE — 77412 RADIATION TX DELIVERY LVL 3: CPT | Performed by: RADIOLOGY

## 2017-06-12 PROCEDURE — 25010000002 PACLITAXEL PER 30 MG: Performed by: INTERNAL MEDICINE

## 2017-06-12 PROCEDURE — 77427 RADIATION TX MANAGEMENT X5: CPT | Performed by: RADIOLOGY

## 2017-06-12 PROCEDURE — 85025 COMPLETE CBC W/AUTO DIFF WBC: CPT | Performed by: INTERNAL MEDICINE

## 2017-06-12 PROCEDURE — 96417 CHEMO IV INFUS EACH ADDL SEQ: CPT | Performed by: INTERNAL MEDICINE

## 2017-06-12 RX ORDER — PALONOSETRON 0.05 MG/ML
0.25 INJECTION, SOLUTION INTRAVENOUS ONCE
Status: COMPLETED | OUTPATIENT
Start: 2017-06-12 | End: 2017-06-12

## 2017-06-12 RX ORDER — FAMOTIDINE 10 MG/ML
20 INJECTION, SOLUTION INTRAVENOUS ONCE
Status: COMPLETED | OUTPATIENT
Start: 2017-06-12 | End: 2017-06-12

## 2017-06-12 RX ORDER — SODIUM CHLORIDE 0.9 % (FLUSH) 0.9 %
10 SYRINGE (ML) INJECTION AS NEEDED
Status: CANCELLED | OUTPATIENT
Start: 2017-06-12

## 2017-06-12 RX ORDER — SODIUM CHLORIDE 9 MG/ML
250 INJECTION, SOLUTION INTRAVENOUS ONCE
Status: COMPLETED | OUTPATIENT
Start: 2017-06-12 | End: 2017-06-12

## 2017-06-12 RX ORDER — SODIUM CHLORIDE 0.9 % (FLUSH) 0.9 %
10 SYRINGE (ML) INJECTION AS NEEDED
Status: DISCONTINUED | OUTPATIENT
Start: 2017-06-12 | End: 2017-06-12 | Stop reason: HOSPADM

## 2017-06-12 RX ADMIN — PALONOSETRON HYDROCHLORIDE 0.25 MG: 0.25 INJECTION INTRAVENOUS at 11:31

## 2017-06-12 RX ADMIN — SODIUM CHLORIDE, PRESERVATIVE FREE 500 UNITS: 5 INJECTION INTRAVENOUS at 14:53

## 2017-06-12 RX ADMIN — Medication 10 ML: at 14:53

## 2017-06-12 RX ADMIN — Medication 10 ML: at 09:55

## 2017-06-12 RX ADMIN — CARBOPLATIN 300 MG: 10 INJECTION, SOLUTION INTRAVENOUS at 14:09

## 2017-06-12 RX ADMIN — FAMOTIDINE 20 MG: 10 INJECTION, SOLUTION INTRAVENOUS at 11:13

## 2017-06-12 RX ADMIN — PACLITAXEL 85 MG: 6 INJECTION, SOLUTION INTRAVENOUS at 12:43

## 2017-06-12 RX ADMIN — DEXAMETHASONE SODIUM PHOSPHATE 12 MG: 10 INJECTION, SOLUTION INTRAMUSCULAR; INTRAVENOUS at 12:17

## 2017-06-12 RX ADMIN — DIPHENHYDRAMINE HYDROCHLORIDE 25 MG: 50 INJECTION INTRAMUSCULAR; INTRAVENOUS at 11:52

## 2017-06-12 RX ADMIN — SODIUM CHLORIDE 250 ML: 9 INJECTION, SOLUTION INTRAVENOUS at 11:13

## 2017-06-13 ENCOUNTER — DOCUMENTATION (OUTPATIENT)
Dept: NUTRITION | Facility: HOSPITAL | Age: 57
End: 2017-06-13

## 2017-06-13 PROCEDURE — 77417 THER RADIOLOGY PORT IMAGE(S): CPT | Performed by: RADIOLOGY

## 2017-06-13 PROCEDURE — 77412 RADIATION TX DELIVERY LVL 3: CPT | Performed by: RADIOLOGY

## 2017-06-13 NOTE — PROGRESS NOTES
Adult Outpatient Nutrition  Assessment    Patient Name:  Tino Peres  YOB: 1960  MRN: [id number]        Assessment Date:  6/13/2017          CHART REVIEW  Tino Peres  1960  57 y.o.  Wt: 131 lb  Ht: 68 in  Dx: Lung Cancer  Tx: Radiation + Chemo    PATIENT/FAMILY COMMENTS  Usual Body Weight: 155 lb--trending down  Diet: Regular  Supplements: Ensure & Boost  Meals: Snacks often. Works in heat (too hot for meals)  Hydration: Pushing water and gatorade.  Food Preparation: Pt and wife seldom cook.  Nutrition Concerns:  -Working in heat puts patient at risk for dehydration.  -Weight loss (unresolved)      GOAL(s)  Optimize nutrition in attempt to prevent further loss of LBM & prevent dehydration      EDUCATION  -High calorie high protein diet  -Supplementation        * Provided samples of commercial supplements & discount coupons--recipes          for homemade supplements as well.  -Importance of staying hydrated  -Food safety    To reinforce education, written information with RD's name & number provided.  Pt receptive to suggestions, and agreed to call on dietitian as needed.                Comments         Electronically signed by:  Kayla Lees RD  06/13/17 9:15 AM

## 2017-06-14 ENCOUNTER — RADIATION ONCOLOGY WEEKLY ASSESSMENT (OUTPATIENT)
Dept: RADIATION ONCOLOGY | Facility: HOSPITAL | Age: 57
End: 2017-06-14

## 2017-06-14 VITALS
DIASTOLIC BLOOD PRESSURE: 82 MMHG | RESPIRATION RATE: 18 BRPM | SYSTOLIC BLOOD PRESSURE: 167 MMHG | HEART RATE: 102 BPM | TEMPERATURE: 97.9 F | BODY MASS INDEX: 19.46 KG/M2 | WEIGHT: 128 LBS

## 2017-06-14 DIAGNOSIS — C34.12 MALIGNANT NEOPLASM OF UPPER LOBE OF LEFT LUNG (HCC): Primary | ICD-10-CM

## 2017-06-14 PROCEDURE — 77412 RADIATION TX DELIVERY LVL 3: CPT | Performed by: RADIOLOGY

## 2017-06-14 NOTE — PROGRESS NOTES
On Treatment Visit       Patient: Tino Peres   YOB: 1960   Medical Record Number: [id number]     Date of Visit  June 14, 2017   Primary Diagnosis: Stage IIB (pT3 pN0 Mx) moderately differentiated squamous cell carcinoma of the SILVANA lung, status post lobectomy with positive margins.  ICD 10 Code: C34.12     was seen today for an on treatment visit.  He is receiving radiation therapy to the SILVANA lung. He  has received 1600 cGy in 8 fractions out of a planned dose of 6000 cGy in 30 fractions. He is currently receiving concurrent carboplatin/Taxol chemotherapy per Dr. Dowd .     Today on exam the patient is tolerating radiation therapy well and has no new disease or treatment-related complaints.  He continues to have intermittent left upper chest wall pain, which is stable.  He was encouraged to maintain adequate fluid intake, especially since he works outside in the heat.                                              Vitals:     Vitals:    06/14/17 1555   BP: 167/82   Pulse: 102   Resp: 18   Temp: 97.9 °F (36.6 °C)       Weight:   Wt Readings from Last 3 Encounters:   06/14/17 128 lb (58.1 kg)   06/07/17 131 lb 1.6 oz (59.5 kg)   06/05/17 134 lb 3.2 oz (60.9 kg)      Pain:    Pain Score    06/14/17 1555   PainSc:   5   PainLoc: Shoulder         Plan: We plan to continue radiation therapy as prescribed. He has asked that we assume responsibility for writing his pain meds prescriptions, which we will do after pulling a DEVONTE report.    Jorge Alberto Hills MD  Radiation Oncology    Electronically signed by Jorge Alberto Hills MD 6/14/2017  3:57 PM

## 2017-06-15 DIAGNOSIS — C34.12 MALIGNANT NEOPLASM OF UPPER LOBE OF LEFT LUNG (HCC): Primary | ICD-10-CM

## 2017-06-15 PROCEDURE — 77412 RADIATION TX DELIVERY LVL 3: CPT | Performed by: RADIOLOGY

## 2017-06-15 RX ORDER — OXYCODONE HYDROCHLORIDE AND ACETAMINOPHEN 5; 325 MG/1; MG/1
1 TABLET ORAL EVERY 6 HOURS PRN
Qty: 60 TABLET | Refills: 0 | Status: SHIPPED | OUTPATIENT
Start: 2017-06-15 | End: 2017-06-21 | Stop reason: SDUPTHER

## 2017-06-16 PROCEDURE — 77412 RADIATION TX DELIVERY LVL 3: CPT | Performed by: RADIOLOGY

## 2017-06-16 PROCEDURE — 77336 RADIATION PHYSICS CONSULT: CPT | Performed by: RADIOLOGY

## 2017-06-19 ENCOUNTER — OFFICE VISIT (OUTPATIENT)
Dept: ONCOLOGY | Facility: CLINIC | Age: 57
End: 2017-06-19

## 2017-06-19 ENCOUNTER — INFUSION (OUTPATIENT)
Dept: ONCOLOGY | Facility: HOSPITAL | Age: 57
End: 2017-06-19

## 2017-06-19 VITALS
WEIGHT: 130.2 LBS | HEART RATE: 106 BPM | TEMPERATURE: 97.6 F | RESPIRATION RATE: 20 BRPM | BODY MASS INDEX: 19.8 KG/M2 | SYSTOLIC BLOOD PRESSURE: 139 MMHG | DIASTOLIC BLOOD PRESSURE: 87 MMHG

## 2017-06-19 DIAGNOSIS — Z45.2 ENCOUNTER FOR VENOUS ACCESS DEVICE CARE: ICD-10-CM

## 2017-06-19 DIAGNOSIS — C34.12 MALIGNANT NEOPLASM OF UPPER LOBE OF LEFT LUNG (HCC): Primary | ICD-10-CM

## 2017-06-19 DIAGNOSIS — C34.82 MALIGNANT NEOPLASM OF OVERLAPPING SITES OF LEFT LUNG (HCC): ICD-10-CM

## 2017-06-19 LAB
ALBUMIN SERPL-MCNC: 3.8 G/DL (ref 3.4–4.8)
ALBUMIN/GLOB SERPL: 1.1 G/DL (ref 1.1–1.8)
ALP SERPL-CCNC: 82 U/L (ref 38–126)
ALT SERPL W P-5'-P-CCNC: 20 U/L (ref 21–72)
ANION GAP SERPL CALCULATED.3IONS-SCNC: 12 MMOL/L (ref 5–15)
AST SERPL-CCNC: 26 U/L (ref 17–59)
BASOPHILS # BLD MANUAL: 0.05 10*3/MM3 (ref 0–0.2)
BASOPHILS NFR BLD AUTO: 1 % (ref 0–2)
BILIRUB SERPL-MCNC: 0.2 MG/DL (ref 0.2–1.3)
BUN BLD-MCNC: 7 MG/DL (ref 7–21)
BUN/CREAT SERPL: 14.3 (ref 7–25)
CALCIUM SPEC-SCNC: 8.9 MG/DL (ref 8.4–10.2)
CHLORIDE SERPL-SCNC: 105 MMOL/L (ref 95–110)
CO2 SERPL-SCNC: 24 MMOL/L (ref 22–31)
CREAT BLD-MCNC: 0.49 MG/DL (ref 0.7–1.3)
DEPRECATED RDW RBC AUTO: ABNORMAL FL (ref 35.1–43.9)
ELLIPTOCYTES BLD QL SMEAR: ABNORMAL
EOSINOPHIL # BLD MANUAL: 0.2 10*3/MM3 (ref 0–0.7)
EOSINOPHIL NFR BLD MANUAL: 4 % (ref 0–7)
ERYTHROCYTE [DISTWIDTH] IN BLOOD BY AUTOMATED COUNT: ABNORMAL % (ref 11.5–14.5)
GFR SERPL CREATININE-BSD FRML MDRD: >150 ML/MIN/1.73 (ref 60–130)
GLOBULIN UR ELPH-MCNC: 3.4 GM/DL (ref 2.3–3.5)
GLUCOSE BLD-MCNC: 95 MG/DL (ref 60–100)
HCT VFR BLD AUTO: 37.4 % (ref 39–49)
HGB BLD-MCNC: 12.3 G/DL (ref 13.7–17.3)
LYMPHOCYTES # BLD MANUAL: 0.65 10*3/MM3 (ref 0.6–4.2)
LYMPHOCYTES NFR BLD MANUAL: 13 % (ref 10–50)
LYMPHOCYTES NFR BLD MANUAL: 8 % (ref 0–12)
MCH RBC QN AUTO: 27 PG (ref 26.5–34)
MCHC RBC AUTO-ENTMCNC: 32.9 G/DL (ref 31.5–36.3)
MCV RBC AUTO: 82.2 FL (ref 80–98)
MONOCYTES # BLD AUTO: 0.4 10*3/MM3 (ref 0–0.9)
NEUTROPHILS # BLD AUTO: 3.31 10*3/MM3 (ref 2–8.6)
NEUTROPHILS NFR BLD MANUAL: 66 % (ref 37–80)
PLAT MORPH BLD: NORMAL
PLATELET # BLD AUTO: 302 10*3/MM3 (ref 150–450)
PMV BLD AUTO: 8.1 FL (ref 8–12)
POTASSIUM BLD-SCNC: 4 MMOL/L (ref 3.5–5.1)
PROT SERPL-MCNC: 7.2 G/DL (ref 6.3–8.6)
RBC # BLD AUTO: 4.55 10*6/MM3 (ref 4.37–5.74)
SODIUM BLD-SCNC: 141 MMOL/L (ref 137–145)
VARIANT LYMPHS NFR BLD MANUAL: 8 % (ref 0–5)
WBC MORPH BLD: NORMAL
WBC NRBC COR # BLD: 5.01 10*3/MM3 (ref 3.2–9.8)

## 2017-06-19 PROCEDURE — 99214 OFFICE O/P EST MOD 30 MIN: CPT | Performed by: INTERNAL MEDICINE

## 2017-06-19 PROCEDURE — 25010000002 PACLITAXEL PER 30 MG: Performed by: INTERNAL MEDICINE

## 2017-06-19 PROCEDURE — 77412 RADIATION TX DELIVERY LVL 3: CPT | Performed by: RADIOLOGY

## 2017-06-19 PROCEDURE — 80053 COMPREHEN METABOLIC PANEL: CPT

## 2017-06-19 PROCEDURE — 96413 CHEMO IV INFUSION 1 HR: CPT | Performed by: INTERNAL MEDICINE

## 2017-06-19 PROCEDURE — 25010000002 DIPHENHYDRAMINE PER 50 MG: Performed by: INTERNAL MEDICINE

## 2017-06-19 PROCEDURE — 77427 RADIATION TX MANAGEMENT X5: CPT | Performed by: RADIOLOGY

## 2017-06-19 PROCEDURE — 25010000002 CARBOPLATIN PER 50 MG: Performed by: INTERNAL MEDICINE

## 2017-06-19 PROCEDURE — 25010000003 DEXAMETHASONE SODIUM PHOSPHATE 100 MG/10ML SOLUTION 10 ML VIAL: Performed by: INTERNAL MEDICINE

## 2017-06-19 PROCEDURE — 25010000002 HEPARIN FLUSH (PORCINE) 100 UNIT/ML SOLUTION: Performed by: INTERNAL MEDICINE

## 2017-06-19 PROCEDURE — 85025 COMPLETE CBC W/AUTO DIFF WBC: CPT

## 2017-06-19 PROCEDURE — 85007 BL SMEAR W/DIFF WBC COUNT: CPT | Performed by: INTERNAL MEDICINE

## 2017-06-19 PROCEDURE — 25010000002 PALONOSETRON PER 25 MCG: Performed by: INTERNAL MEDICINE

## 2017-06-19 PROCEDURE — 96417 CHEMO IV INFUS EACH ADDL SEQ: CPT | Performed by: INTERNAL MEDICINE

## 2017-06-19 PROCEDURE — 96375 TX/PRO/DX INJ NEW DRUG ADDON: CPT | Performed by: INTERNAL MEDICINE

## 2017-06-19 RX ORDER — PALONOSETRON 0.05 MG/ML
0.25 INJECTION, SOLUTION INTRAVENOUS ONCE
Status: CANCELLED | OUTPATIENT
Start: 2017-06-19

## 2017-06-19 RX ORDER — FAMOTIDINE 10 MG/ML
20 INJECTION, SOLUTION INTRAVENOUS ONCE
Status: CANCELLED | OUTPATIENT
Start: 2017-06-19

## 2017-06-19 RX ORDER — PALONOSETRON 0.05 MG/ML
0.25 INJECTION, SOLUTION INTRAVENOUS ONCE
Status: COMPLETED | OUTPATIENT
Start: 2017-06-19 | End: 2017-06-19

## 2017-06-19 RX ORDER — FAMOTIDINE 10 MG/ML
20 INJECTION, SOLUTION INTRAVENOUS ONCE
Status: COMPLETED | OUTPATIENT
Start: 2017-06-19 | End: 2017-06-19

## 2017-06-19 RX ORDER — SODIUM CHLORIDE 9 MG/ML
250 INJECTION, SOLUTION INTRAVENOUS ONCE
Status: CANCELLED | OUTPATIENT
Start: 2017-06-19

## 2017-06-19 RX ORDER — SODIUM CHLORIDE 0.9 % (FLUSH) 0.9 %
10 SYRINGE (ML) INJECTION AS NEEDED
Status: CANCELLED | OUTPATIENT
Start: 2017-06-19

## 2017-06-19 RX ORDER — SODIUM CHLORIDE 0.9 % (FLUSH) 0.9 %
10 SYRINGE (ML) INJECTION AS NEEDED
Status: DISCONTINUED | OUTPATIENT
Start: 2017-06-19 | End: 2017-06-19 | Stop reason: HOSPADM

## 2017-06-19 RX ORDER — SODIUM CHLORIDE 9 MG/ML
250 INJECTION, SOLUTION INTRAVENOUS ONCE
Status: COMPLETED | OUTPATIENT
Start: 2017-06-19 | End: 2017-06-19

## 2017-06-19 RX ADMIN — CARBOPLATIN 300 MG: 10 INJECTION, SOLUTION INTRAVENOUS at 11:37

## 2017-06-19 RX ADMIN — SODIUM CHLORIDE, PRESERVATIVE FREE 500 UNITS: 5 INJECTION INTRAVENOUS at 12:21

## 2017-06-19 RX ADMIN — PACLITAXEL 85 MG: 6 INJECTION, SOLUTION INTRAVENOUS at 10:19

## 2017-06-19 RX ADMIN — DIPHENHYDRAMINE HYDROCHLORIDE 25 MG: 50 INJECTION INTRAMUSCULAR; INTRAVENOUS at 09:00

## 2017-06-19 RX ADMIN — Medication 10 ML: at 08:00

## 2017-06-19 RX ADMIN — Medication 10 ML: at 12:21

## 2017-06-19 RX ADMIN — DEXAMETHASONE SODIUM PHOSPHATE 12 MG: 10 INJECTION, SOLUTION INTRAMUSCULAR; INTRAVENOUS at 09:48

## 2017-06-19 RX ADMIN — PALONOSETRON HYDROCHLORIDE 0.25 MG: 0.25 INJECTION INTRAVENOUS at 09:33

## 2017-06-19 RX ADMIN — SODIUM CHLORIDE 250 ML: 9 INJECTION, SOLUTION INTRAVENOUS at 08:45

## 2017-06-19 RX ADMIN — FAMOTIDINE 20 MG: 10 INJECTION, SOLUTION INTRAVENOUS at 08:44

## 2017-06-19 NOTE — PROGRESS NOTES
DATE OF VISIT: 6/19/2017    REASON FOR VISIT:  Squamous cell cancer of left lung, stage IIB    HISTORY OF PRESENT ILLNESS:    57-year-old male with a past medical history significant for duodenal ulcer, extensive nicotine addiction with more than 45-pack-year smoking history, history of alcohol abuse was seen in consultation on May 3, 2017 for newly diagnosed stage IIB squamous cell cancer of Of left lung.  Again on concurrent chemoradiation on June 5, 2017.  He is here to get her week 3 of carboplatin and Taxol today.  Still complains of pain in the left upper chest wall area.  Patient was recently given prescription for oxycodone by Dr. Hills.  Patient states that medication is not helping that much.  Denies any blood in the stool or urine.  Denies any excessive diarrhea.  Complains of itching of skin.      PAST MEDICAL HISTORY:    Past Medical History:   Diagnosis Date   • Anemia    • Aortic stenosis, mild    • Arthritis    • Chronic bronchitis    • Duodenal ulcer disease    • Inguinal hernia, left    • Lung cancer    • Mitral valve regurgitation        SOCIAL HISTORY:    Social History   Substance Use Topics   • Smoking status: Current Every Day Smoker     Packs/day: 1.00     Years: 40.00     Types: Cigarettes   • Smokeless tobacco: Former User      Comment: Reduced at half a pack per day and weanning off   • Alcohol use 8.4 oz/week     14 Cans of beer per week      Comment: 6 pk/dly       Surgical History :  Past Surgical History:   Procedure Laterality Date   • APPENDECTOMY      open   • BRONCHOSCOPY N/A 3/8/2017    Procedure: BRONCHOSCOPY with possible bronchoalveolar lavage, +/- brush, +/- needle biopsy, +/- endobronchial biopsy;  Surgeon: Candis Lr MD;  Location: [facility] ENDOSCOPY;  Service:    • EXPLORATORY LAPAROTOMY  11/17/2014    Exploratory laparotomy, repair of duodenal ulcer, modified Carlos patch   • INGUINAL HERNIA REPAIR  09/13/2013    Open repair of an indirect left inguinal hernia. Left  inguinal hernia.   • TX INSJ TUNNELED CVC W/O SUBQ PORT/ AGE 6 YR/> N/A 5/10/2017    Procedure: TUNNELED CENTRAL VENOUS CATHETER  WITH SUBCUTANEOUS  PORT,FLUOROSCOPIC GUIDANCE ;  Surgeon: Angel Hernandez MD;  Location: [facility];  Service: Cardiothoracic   • THORACOTOMY Left 4/19/2017    Procedure: THORACOTOMY LEFT WITH PULMONARY RESECTION AND BRONCHOSCOPY WITH ENDO ;  Surgeon: Angel Hernandez MD;  Location: [facility];  Service:        ALLERGIES:    No Known Allergies    REVIEW OF SYSTEMS:      CONSTITUTIONAL: Positive for 20 pound weight loss in last 6 months. Complains of fatigue. Denies any fever, chills or weight loss.      HEENT: No epistaxis, mouth sores or difficulty swallowing.     RESPIRATORY: Denies any shortness of breath or cough today.     CARDIOVASCULAR: Complains of chest pain on the left side since surgery.  No palpitation.     GASTROINTESTINAL: No abdominal pain nausea, vomiting or blood in the stool.     GENITOURINARY: No Dysuria or Hematuria.     MUSCULOSKELETAL: No new back pain or arthralgia..     LYMPHATICS: Denies any abnormal swollen glands anywhere in the body.     NEUROLOGICAL : No tingling or numbness. No headache or dizziness. No seizures or balance problems.     SKIN: Complains of itching of skin on upper extremity and lower extremity.         PHYSICAL EXAMINATION:      VITAL SIGNS:  /87  Pulse 106  Temp 97.6 °F (36.4 °C)  Resp 20  Wt 130 lb 3.2 oz (59.1 kg)  BMI 19.8 kg/m2    GENERAL:  Not in any distress.    HEENT:  Normocephalic, Atraumatic.Mild Conjunctival pallor. No icterus. Extraocular Movements Intact. No Facial Asymmetry noted.    NECK:  No adenopathy. No JVD.    RESPIRATORY:  Fair air entry bilateral. No rhonchi or wheezing.    CARDIOVASCULAR:  S1, S2. Regular rate and rhythm. No murmur or gallop appreciated.    ABDOMEN:  Soft,  nontender. Bowel sounds present in all four quadrants.  No organomegaly appreciated.    EXTREMITIES:  No edema.No Calf  Tenderness.    NEUROLOGIC:  Alert, awake and oriented ×3.  No  Motor or sensory deficit appreciated. Cranial Nerves 2-12 grossly intact.    SKIN: Some excoriation present on right upper extremity.        DIAGNOSTIC DATA:    Glucose   Date Value Ref Range Status   06/19/2017 95 60 - 100 mg/dL Final     Glucose, Arterial   Date Value Ref Range Status   04/19/2017 176 mmol/L Final     Sodium   Date Value Ref Range Status   06/19/2017 141 137 - 145 mmol/L Final     Potassium   Date Value Ref Range Status   06/19/2017 4.0 3.5 - 5.1 mmol/L Final     CO2   Date Value Ref Range Status   06/19/2017 24.0 22.0 - 31.0 mmol/L Final     Chloride   Date Value Ref Range Status   06/19/2017 105 95 - 110 mmol/L Final     Anion Gap   Date Value Ref Range Status   06/19/2017 12.0 5.0 - 15.0 mmol/L Final     Creatinine   Date Value Ref Range Status   06/19/2017 0.49 (L) 0.70 - 1.30 mg/dL Final     BUN   Date Value Ref Range Status   06/19/2017 7 7 - 21 mg/dL Final     BUN/Creatinine Ratio   Date Value Ref Range Status   06/19/2017 14.3 7.0 - 25.0 Final     Calcium   Date Value Ref Range Status   06/19/2017 8.9 8.4 - 10.2 mg/dL Final     eGFR Non  Amer   Date Value Ref Range Status   06/19/2017 >150 >60 mL/min/1.73 Final     Alkaline Phosphatase   Date Value Ref Range Status   06/19/2017 82 38 - 126 U/L Final     Total Protein   Date Value Ref Range Status   06/19/2017 7.2 6.3 - 8.6 g/dL Final     ALT (SGPT)   Date Value Ref Range Status   06/19/2017 20 (L) 21 - 72 U/L Final     AST (SGOT)   Date Value Ref Range Status   06/19/2017 26 17 - 59 U/L Final     Total Bilirubin   Date Value Ref Range Status   06/19/2017 0.2 0.2 - 1.3 mg/dL Final     Albumin   Date Value Ref Range Status   06/19/2017 3.80 3.40 - 4.80 g/dL Final     Globulin   Date Value Ref Range Status   06/19/2017 3.4 2.3 - 3.5 gm/dL Final     A/G Ratio   Date Value Ref Range Status   06/19/2017 1.1 1.1 - 1.8 g/dL Final     Lab Results   Component Value Date    WBC  5.01 06/19/2017    HGB 12.3 (L) 06/19/2017    HCT 37.4 (L) 06/19/2017    MCV 82.2 06/19/2017     06/19/2017     Lab Results   Component Value Date    NEUTROABS 5.52 06/12/2017    IRON 12 (L) 05/03/2017    TIBC 322 05/03/2017    LABIRON 4 (L) 05/03/2017    FERRITIN 56.80 05/03/2017    [id number] 562 05/03/2017    FOLATE >20.00 05/03/2017         PATHOLOGY:  Pathology :  Pathology Report from April 19, 2017 showed:       SPECIMEN   Specimen   Lobe(s) of lung   Lobes of Lung   Upper lobe   Procedure   Lobectomy   Specimen Laterality   Left   TUMOR   Primary Tumor Site   Upper lobe   Histologic Type   Squamous cell carcinoma   Histologic Grade   G2: Moderately differentiated   Tumor Size   Greatest dimension (cm): 8 cm   Additional Dimension (cm)   7 cm       5 cm   Tumor Focality   Unifocal   Visceral Pleura Invasion   Present   Tumor Extension   Parietal pleura   Lymph-Vascular Invasion   Not identified   MARGINS   Bronchial Margin   Uninvolved by invasive carcinoma and carcinoma in situ   Vascular Margin   Uninvolved by invasive carcinoma   Parenchymal Margin   Involved by invasive carcinoma   LYMPH NODES   Regional Lymph Nodes       Number of Lymph Nodes Examined   Specify number: 6   Gene Stations Examined   5: Subaortic/ aortopulmonary (AP) / AP window       11L: Interlobar   Lymph Node Involvement   No nodes involved   STAGE (pTNM)   Primary Tumor (pT)   pT3: Tumor greater than 7 cm in greatest dimension; or Tumor of any size that directly invades any of the following: parietal pleural chest wall (including superior sulcus tumors), diaphragm, phrenic nerve, mediastinal pleura, parietal pericardium; or Tumor of any size in the main bronchus less than 2 cm distal to the petar but without involvement of the petar; or Tumor of any size associated with atelectasis or obstructive pneumonitis of the entire lung; or Tumors of any size with separate tmor nodule(s) in same lobe   Regional Lymph Nodes (pN)   pN0:  No regional lymph node metastasis   ADDITIONAL FINDINGS   Additional Pathologic Finding(s)   Other (specify): Fungal hyphae consistent with Aspergillus species in the cavity contents   .          RADIOLOGY DATA :  PET/CT done on May 22, 2017 showed:  IMPRESSION:  CONCLUSION:  1. Large Pancoast tumor involving left apex with significant  involvement of the periphery, pleura, portions of the chest wall  and contiguous involvement, destruction of at least one of the  anterior left upper ribs.     PET/CT imaging study is otherwise unremarkable. No evidence of  any distant metastases.        ASSESSMENT AND PLAN:      1.  Squamous cell cancer of left upper lobe, stage IIB, T3 N0 M0: Patient is status post surgical excision with positive pleural margin.  As per NCCN guidelines we'll start patient on concurrent chemoradiation with weekly carboplatin and Taxol.  Patient was started on concurrent chemoradiation with weekly carboplatin and Taxol on June 5, 2017.  Patient has tolerated chemotherapy well so far without any excessive side effect.  At this point we will continue with her week 3 of carboplatin and Taxol today.  We will see him back in about 2 weeks with a repeat CBC and CMP on that day.  We will recheck his CBC and CMP next week and resume weekly carboplatin and Taxol.    2.  Iron deficiency anemia secondary to chronic blood loss.  Patient is status post 5 units of PRBC since March 2017.  Patient also received 2 doses of intravenous Feraheme on May 9, 2017 and May 16, 2017.  His hemoglobin is improved to 12.3, we will monitored with CBC for now.    3.Pulmonary aspergillosis: Patient had a fungus ball consistent with Aspergillus on a cavitary lesion of lung status postsurgical excision.     4. History of duodenal ulcer     5. Health maintenance: Patient was counseled about smoking cessation today, about 3 minutes were spent for smoking cessation counseling. He will need screening colonoscopy no later date. He  remains full code.     6. Prescriptions: Prescription for Zofran has been sent to his pharmacy today.    7.  Pain management: Patient is complaining of pain in the left upper chest wall area despite of getting oxycodone/acetaminophen prescription by Dr. Hills felt recently.  At this point we'll refer him to pain management clinic.  This recommendation were discussed with patient.         Gualberto Dowd MD  6/19/2017  8:29 AM        EMR Dragon/Transcription disclaimer:   Much of this encounter note is an electronic transcription/translation of spoken language to printed text. The electronic translation of spoken language may permit erroneous, or at times, nonsensical words or phrases to be inadvertently transcribed; Although I have reviewed the note for such errors, some may still exist.

## 2017-06-20 PROCEDURE — 77412 RADIATION TX DELIVERY LVL 3: CPT | Performed by: RADIOLOGY

## 2017-06-20 PROCEDURE — 77417 THER RADIOLOGY PORT IMAGE(S): CPT | Performed by: RADIOLOGY

## 2017-06-21 ENCOUNTER — RADIATION ONCOLOGY WEEKLY ASSESSMENT (OUTPATIENT)
Dept: RADIATION ONCOLOGY | Facility: HOSPITAL | Age: 57
End: 2017-06-21

## 2017-06-21 VITALS
BODY MASS INDEX: 19.23 KG/M2 | RESPIRATION RATE: 16 BRPM | SYSTOLIC BLOOD PRESSURE: 156 MMHG | HEART RATE: 90 BPM | DIASTOLIC BLOOD PRESSURE: 88 MMHG | TEMPERATURE: 97.8 F | WEIGHT: 126.5 LBS

## 2017-06-21 DIAGNOSIS — C34.12 MALIGNANT NEOPLASM OF UPPER LOBE OF LEFT LUNG (HCC): Primary | ICD-10-CM

## 2017-06-21 PROCEDURE — 77412 RADIATION TX DELIVERY LVL 3: CPT | Performed by: RADIOLOGY

## 2017-06-21 RX ORDER — OXYCODONE HYDROCHLORIDE AND ACETAMINOPHEN 5; 325 MG/1; MG/1
1 TABLET ORAL EVERY 6 HOURS PRN
Qty: 60 TABLET | Refills: 0 | Status: SHIPPED | OUTPATIENT
Start: 2017-06-21 | End: 2017-09-21 | Stop reason: ALTCHOICE

## 2017-06-21 RX ORDER — OXYCODONE HCL 10 MG/1
10 TABLET, FILM COATED, EXTENDED RELEASE ORAL EVERY 12 HOURS
Qty: 30 TABLET | Refills: 0 | Status: SHIPPED | OUTPATIENT
Start: 2017-06-21 | End: 2017-09-21 | Stop reason: ALTCHOICE

## 2017-06-21 NOTE — PROGRESS NOTES
On Treatment Visit       Patient: Tino Peres   YOB: 1960   Medical Record Number: [id number]     Date of Visit  June 21, 2017   Primary Diagnosis: Stage IIB (pT3 pN0 Mx) moderately differentiated squamous cell carcinoma of the SILVANA lung, status post lobectomy with positive margins.  ICD 10 Code: C34.12     was seen today for an on treatment visit.  He is receiving radiation therapy to the SILVANA lung. He  has received 2600 cGy in 13 fractions out of a planned dose of 6000 cGy in 30 fractions. He is currently receiving concurrent carboplatin/Taxol chemotherapy per Dr. Dowd .     Today on exam the patient is tolerating radiation therapy well and has no new disease or treatment-related complaints.  He continues to have intermittent left upper chest wall pain, which is an 8 out of 10 today.  This increase is most likely secondary to his heavy work schedule..                                              Vitals:     Vitals:    06/21/17 1547   BP: 156/88   Pulse: 90   Resp: 16   Temp: 97.8 °F (36.6 °C)       Weight:   Wt Readings from Last 3 Encounters:   06/21/17 126 lb 8 oz (57.4 kg)   06/19/17 130 lb 3.2 oz (59.1 kg)   06/14/17 128 lb (58.1 kg)      Pain:    Pain Score    06/21/17 1547   PainSc: 8  Comment: Left Upper   PainLoc: Chest         Plan: We plan to continue radiation therapy as prescribed. We will write a prescription for a long-acting pain medicine (OxyContin 20 mg bid) in addition to his short acting pain medicine for breakthrough (Roxicet 5/325).      Jorge Alberto Hills MD  Radiation Oncology    Electronically signed by Jorge Alberto Hills MD 6/21/2017  3:55 PM

## 2017-06-22 PROCEDURE — 77412 RADIATION TX DELIVERY LVL 3: CPT | Performed by: RADIOLOGY

## 2017-06-23 PROCEDURE — 77336 RADIATION PHYSICS CONSULT: CPT | Performed by: RADIOLOGY

## 2017-06-23 PROCEDURE — 77412 RADIATION TX DELIVERY LVL 3: CPT | Performed by: RADIOLOGY

## 2017-06-26 ENCOUNTER — INFUSION (OUTPATIENT)
Dept: ONCOLOGY | Facility: HOSPITAL | Age: 57
End: 2017-06-26

## 2017-06-26 VITALS — TEMPERATURE: 98.1 F | HEART RATE: 80 BPM | SYSTOLIC BLOOD PRESSURE: 174 MMHG | DIASTOLIC BLOOD PRESSURE: 96 MMHG

## 2017-06-26 DIAGNOSIS — C34.82 MALIGNANT NEOPLASM OF OVERLAPPING SITES OF LEFT LUNG (HCC): Primary | ICD-10-CM

## 2017-06-26 DIAGNOSIS — Z45.2 ENCOUNTER FOR VENOUS ACCESS DEVICE CARE: ICD-10-CM

## 2017-06-26 DIAGNOSIS — C34.82 MALIGNANT NEOPLASM OF OVERLAPPING SITES OF LEFT LUNG (HCC): ICD-10-CM

## 2017-06-26 LAB
ALBUMIN SERPL-MCNC: 4 G/DL (ref 3.4–4.8)
ALBUMIN/GLOB SERPL: 1.2 G/DL (ref 1.1–1.8)
ALP SERPL-CCNC: 83 U/L (ref 38–126)
ALT SERPL W P-5'-P-CCNC: 26 U/L (ref 21–72)
ANION GAP SERPL CALCULATED.3IONS-SCNC: 12 MMOL/L (ref 5–15)
ANISOCYTOSIS BLD QL: NORMAL
AST SERPL-CCNC: 40 U/L (ref 17–59)
BASOPHILS # BLD AUTO: 0.02 10*3/MM3 (ref 0–0.2)
BASOPHILS NFR BLD AUTO: 0.4 % (ref 0–2)
BILIRUB SERPL-MCNC: 0.3 MG/DL (ref 0.2–1.3)
BUN BLD-MCNC: 8 MG/DL (ref 7–21)
BUN/CREAT SERPL: 18.2 (ref 7–25)
CALCIUM SPEC-SCNC: 9.2 MG/DL (ref 8.4–10.2)
CHLORIDE SERPL-SCNC: 102 MMOL/L (ref 95–110)
CO2 SERPL-SCNC: 25 MMOL/L (ref 22–31)
CREAT BLD-MCNC: 0.44 MG/DL (ref 0.7–1.3)
DEPRECATED RDW RBC AUTO: ABNORMAL FL (ref 35.1–43.9)
EOSINOPHIL # BLD AUTO: 0.07 10*3/MM3 (ref 0–0.7)
EOSINOPHIL NFR BLD AUTO: 1.3 % (ref 0–7)
ERYTHROCYTE [DISTWIDTH] IN BLOOD BY AUTOMATED COUNT: ABNORMAL % (ref 11.5–14.5)
GFR SERPL CREATININE-BSD FRML MDRD: 199 ML/MIN/1.73 (ref 56–130)
GLOBULIN UR ELPH-MCNC: 3.4 GM/DL (ref 2.3–3.5)
GLUCOSE BLD-MCNC: 126 MG/DL (ref 60–100)
HCT VFR BLD AUTO: 36.5 % (ref 39–49)
HGB BLD-MCNC: 12.1 G/DL (ref 13.7–17.3)
IMM GRANULOCYTES # BLD: 0.01 10*3/MM3 (ref 0–0.02)
IMM GRANULOCYTES NFR BLD: 0.2 % (ref 0–0.5)
LYMPHOCYTES # BLD AUTO: 0.36 10*3/MM3 (ref 0.6–4.2)
LYMPHOCYTES NFR BLD AUTO: 6.8 % (ref 10–50)
MCH RBC QN AUTO: 27.6 PG (ref 26.5–34)
MCHC RBC AUTO-ENTMCNC: 33.2 G/DL (ref 31.5–36.3)
MCV RBC AUTO: 83.3 FL (ref 80–98)
MONOCYTES # BLD AUTO: 0.9 10*3/MM3 (ref 0–0.9)
MONOCYTES NFR BLD AUTO: 16.9 % (ref 0–12)
NEUTROPHILS # BLD AUTO: 3.95 10*3/MM3 (ref 2–8.6)
NEUTROPHILS NFR BLD AUTO: 74.4 % (ref 37–80)
NRBC BLD MANUAL-RTO: 0 /100 WBC (ref 0–0)
PLATELET # BLD AUTO: 310 10*3/MM3 (ref 150–450)
PMV BLD AUTO: 8.5 FL (ref 8–12)
POIKILOCYTOSIS BLD QL SMEAR: NORMAL
POTASSIUM BLD-SCNC: 4 MMOL/L (ref 3.5–5.1)
PROT SERPL-MCNC: 7.4 G/DL (ref 6.3–8.6)
RBC # BLD AUTO: 4.38 10*6/MM3 (ref 4.37–5.74)
SMALL PLATELETS BLD QL SMEAR: ADEQUATE
SODIUM BLD-SCNC: 139 MMOL/L (ref 137–145)
WBC MORPH BLD: NORMAL
WBC NRBC COR # BLD: 5.31 10*3/MM3 (ref 3.2–9.8)

## 2017-06-26 PROCEDURE — 80053 COMPREHEN METABOLIC PANEL: CPT

## 2017-06-26 PROCEDURE — 25010000002 PALONOSETRON PER 25 MCG: Performed by: INTERNAL MEDICINE

## 2017-06-26 PROCEDURE — 96413 CHEMO IV INFUSION 1 HR: CPT | Performed by: INTERNAL MEDICINE

## 2017-06-26 PROCEDURE — 25010000002 CARBOPLATIN PER 50 MG: Performed by: INTERNAL MEDICINE

## 2017-06-26 PROCEDURE — 25010000002 HEPARIN FLUSH (PORCINE) 100 UNIT/ML SOLUTION: Performed by: INTERNAL MEDICINE

## 2017-06-26 PROCEDURE — 77427 RADIATION TX MANAGEMENT X5: CPT | Performed by: RADIOLOGY

## 2017-06-26 PROCEDURE — 85025 COMPLETE CBC W/AUTO DIFF WBC: CPT

## 2017-06-26 PROCEDURE — 25010000002 PACLITAXEL PER 30 MG: Performed by: INTERNAL MEDICINE

## 2017-06-26 PROCEDURE — 96375 TX/PRO/DX INJ NEW DRUG ADDON: CPT | Performed by: INTERNAL MEDICINE

## 2017-06-26 PROCEDURE — 25010000002 DIPHENHYDRAMINE PER 50 MG: Performed by: INTERNAL MEDICINE

## 2017-06-26 PROCEDURE — 77412 RADIATION TX DELIVERY LVL 3: CPT | Performed by: RADIOLOGY

## 2017-06-26 PROCEDURE — 85007 BL SMEAR W/DIFF WBC COUNT: CPT

## 2017-06-26 PROCEDURE — 96417 CHEMO IV INFUS EACH ADDL SEQ: CPT | Performed by: INTERNAL MEDICINE

## 2017-06-26 PROCEDURE — 25010000003 DEXAMETHASONE SODIUM PHOSPHATE 100 MG/10ML SOLUTION 10 ML VIAL: Performed by: INTERNAL MEDICINE

## 2017-06-26 RX ORDER — SODIUM CHLORIDE 0.9 % (FLUSH) 0.9 %
10 SYRINGE (ML) INJECTION AS NEEDED
Status: DISCONTINUED | OUTPATIENT
Start: 2017-06-26 | End: 2017-06-26 | Stop reason: HOSPADM

## 2017-06-26 RX ORDER — SODIUM CHLORIDE 9 MG/ML
250 INJECTION, SOLUTION INTRAVENOUS ONCE
Status: COMPLETED | OUTPATIENT
Start: 2017-06-26 | End: 2017-06-26

## 2017-06-26 RX ORDER — FAMOTIDINE 10 MG/ML
20 INJECTION, SOLUTION INTRAVENOUS ONCE
Status: CANCELLED | OUTPATIENT
Start: 2017-06-26

## 2017-06-26 RX ORDER — PALONOSETRON 0.05 MG/ML
0.25 INJECTION, SOLUTION INTRAVENOUS ONCE
Status: CANCELLED | OUTPATIENT
Start: 2017-06-26

## 2017-06-26 RX ORDER — FAMOTIDINE 10 MG/ML
20 INJECTION, SOLUTION INTRAVENOUS ONCE
Status: COMPLETED | OUTPATIENT
Start: 2017-06-26 | End: 2017-06-26

## 2017-06-26 RX ORDER — SODIUM CHLORIDE 9 MG/ML
250 INJECTION, SOLUTION INTRAVENOUS ONCE
Status: CANCELLED | OUTPATIENT
Start: 2017-06-26

## 2017-06-26 RX ORDER — SODIUM CHLORIDE 0.9 % (FLUSH) 0.9 %
10 SYRINGE (ML) INJECTION AS NEEDED
Status: CANCELLED | OUTPATIENT
Start: 2017-06-26

## 2017-06-26 RX ORDER — PALONOSETRON 0.05 MG/ML
0.25 INJECTION, SOLUTION INTRAVENOUS ONCE
Status: COMPLETED | OUTPATIENT
Start: 2017-06-26 | End: 2017-06-26

## 2017-06-26 RX ADMIN — FAMOTIDINE 20 MG: 10 INJECTION INTRAVENOUS at 10:50

## 2017-06-26 RX ADMIN — SODIUM CHLORIDE 250 ML: 9 INJECTION, SOLUTION INTRAVENOUS at 10:52

## 2017-06-26 RX ADMIN — DEXAMETHASONE SODIUM PHOSPHATE 12 MG: 10 INJECTION, SOLUTION INTRAMUSCULAR; INTRAVENOUS at 11:39

## 2017-06-26 RX ADMIN — SODIUM CHLORIDE, PRESERVATIVE FREE 500 UNITS: 5 INJECTION INTRAVENOUS at 14:20

## 2017-06-26 RX ADMIN — PALONOSETRON HYDROCHLORIDE 0.25 MG: 0.25 INJECTION INTRAVENOUS at 11:28

## 2017-06-26 RX ADMIN — PACLITAXEL 85 MG: 6 INJECTION, SOLUTION INTRAVENOUS at 12:12

## 2017-06-26 RX ADMIN — CARBOPLATIN 300 MG: 10 INJECTION, SOLUTION INTRAVENOUS at 13:29

## 2017-06-26 RX ADMIN — Medication 10 ML: at 14:20

## 2017-06-26 RX ADMIN — DIPHENHYDRAMINE HYDROCHLORIDE 25 MG: 50 INJECTION INTRAMUSCULAR; INTRAVENOUS at 11:04

## 2017-06-26 RX ADMIN — Medication 10 ML: at 09:00

## 2017-06-27 ENCOUNTER — OFFICE VISIT (OUTPATIENT)
Dept: PAIN MEDICINE | Facility: CLINIC | Age: 57
End: 2017-06-27

## 2017-06-27 ENCOUNTER — APPOINTMENT (OUTPATIENT)
Dept: LAB | Facility: HOSPITAL | Age: 57
End: 2017-06-27

## 2017-06-27 VITALS
HEIGHT: 68 IN | WEIGHT: 128.3 LBS | SYSTOLIC BLOOD PRESSURE: 142 MMHG | BODY MASS INDEX: 19.45 KG/M2 | DIASTOLIC BLOOD PRESSURE: 88 MMHG

## 2017-06-27 DIAGNOSIS — Z79.899 HIGH RISK MEDICATIONS (NOT ANTICOAGULANTS) LONG-TERM USE: ICD-10-CM

## 2017-06-27 DIAGNOSIS — R07.89 LEFT-SIDED CHEST WALL PAIN: Primary | ICD-10-CM

## 2017-06-27 DIAGNOSIS — Z98.890 HX OF EXPLORATORY THORACOTOMY: ICD-10-CM

## 2017-06-27 DIAGNOSIS — Z85.118 HX OF CANCER OF LUNG: ICD-10-CM

## 2017-06-27 PROCEDURE — 99204 OFFICE O/P NEW MOD 45 MIN: CPT | Performed by: NURSE PRACTITIONER

## 2017-06-27 PROCEDURE — 80307 DRUG TEST PRSMV CHEM ANLYZR: CPT | Performed by: NURSE PRACTITIONER

## 2017-06-27 PROCEDURE — G0481 DRUG TEST DEF 8-14 CLASSES: HCPCS | Performed by: NURSE PRACTITIONER

## 2017-06-27 PROCEDURE — 77417 THER RADIOLOGY PORT IMAGE(S): CPT | Performed by: RADIOLOGY

## 2017-06-27 PROCEDURE — 77412 RADIATION TX DELIVERY LVL 3: CPT | Performed by: RADIOLOGY

## 2017-06-27 NOTE — PROGRESS NOTES
"Tino Peres is a 57 y.o. male.   1960    HPI:   Location: left ant. Chest wall  Quality: shooting and aching  Severity: 5/10  Timing: constant  Alleviating: pain medication  Aggravating: no paticular one reason    Patient referred to pain management via Dr. Dowd related to chronic left chest wall. Pt with 1 year of left rib pain and hypersensitive area. Pt with low H and H, and seen Dr. Jeanette Lr. Pt sent [facility].  Pt was DX Squamous cell cancer of left lung, stage IIB- April 2017. Pt had a thoracotomy and with surgery in May 2017 \"tumor removal\" Dr. Hernandez. Pt currently in radiation [place] (17/30 completed) and Chemo x 1 week. Pt currently on Oxycontin 10mg q day and percocet 5mg bid. Pt reports percocet 5mg bid is not helping. I have read ancillary notes and images. Pt remains having left chest wall pain. Explained in great detail history and physical, examination, ancillary physician notes and images reviewed, and pain management options.        The following portions of the patient's history were reviewed by me and updated as appropriate: allergies, current medications, past family history, past medical history, past social history, past surgical history and problem list.    Past Medical History:   Diagnosis Date   • Anemia    • Aortic stenosis, mild    • Arthritis    • Chronic bronchitis    • Duodenal ulcer disease    • Inguinal hernia, left    • Lung cancer    • Mitral valve regurgitation        Social History     Social History   • Marital status: Single     Spouse name: N/A   • Number of children: 2   • Years of education: 14     Occupational History   • production      Social History Main Topics   • Smoking status: Current Every Day Smoker     Packs/day: 1.00     Years: 40.00     Types: Cigarettes   • Smokeless tobacco: Former User      Comment: Reduced at half a pack per day and weanning off   • Alcohol use 8.4 oz/week     14 Cans of beer per week      Comment: 6 pk/dly   • Drug use: No   • Sexual " activity: Defer     Other Topics Concern   • Not on file     Social History Narrative    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  Lives alone in [place].  Drinks a 6 pack daily.       Family History   Problem Relation Age of Onset   • Asthma Father    • Cancer Father    • Diabetes Father    • Hypertension Father    • Alcohol abuse Mother    • Heart failure Brother          Current Outpatient Prescriptions:   •  famotidine (PEPCID) 20 MG tablet, Take 1 tablet by mouth 2 (Two) Times a Day., Disp: 60 tablet, Rfl: 0  •  ferrous sulfate 324 (65 FE) MG tablet delayed-release EC tablet, Take 1 tablet by mouth Daily With Breakfast., Disp: 30 tablet, Rfl: 1  •  ibuprofen (ADVIL,MOTRIN) 200 MG tablet, Take 2 tablets by mouth Every 12 (Twelve) Hours As Needed for Mild Pain (1-3) (Postop pain)., Disp: 30 tablet, Rfl: 0  •  Multiple Vitamins-Minerals (MULTIVITAMIN ADULT PO), Take 1 tablet by mouth Daily., Disp: , Rfl:   •  ondansetron (ZOFRAN) 4 MG tablet, Take 1 tablet by mouth 4 (Four) Times a Day As Needed for Nausea or Vomiting., Disp: 40 tablet, Rfl: 3  •  oxyCODONE (oxyCONTIN) 10 MG 12 hr tablet, Take 1 tablet by mouth Every 12 (Twelve) Hours., Disp: 30 tablet, Rfl: 0  •  oxyCODONE-acetaminophen (ROXICET) 5-325 MG per tablet, Take 1 tablet by mouth Every 6 (Six) Hours As Needed for Severe Pain (7-10)., Disp: 60 tablet, Rfl: 0  No current facility-administered medications for this visit.     No Known Allergies    Review of Systems   Cardiovascular: Positive for chest pain (left ant. chest wall).   All other systems reviewed and are negative.    All review of systems reviewed and negative except for above.    Physical Exam   Constitutional: He is oriented to person, place, and time. Vital signs are normal. He appears well-developed and well-nourished. No distress.   HENT:   Head: Normocephalic and atraumatic.   Eyes: Conjunctivae and EOM are normal. Pupils are equal, round, and reactive to  light.   Neck: Normal range of motion. Neck supple.   Cardiovascular: Normal rate and regular rhythm.    Pulmonary/Chest: Effort normal. No respiratory distress. He has decreased breath sounds (large left axillary horizatonal healed surgical scar) in the left upper field.   Hx left upper lobe thoracotomy   Abdominal: Soft. Bowel sounds are normal. He exhibits no distension. There is no tenderness.   Musculoskeletal:        Cervical back: He exhibits normal range of motion and no tenderness.        Arms:  Neurological: He is alert and oriented to person, place, and time. He has normal reflexes. He displays no tremor and normal reflexes. No cranial nerve deficit. He exhibits normal muscle tone. He displays no seizure activity. Coordination and gait normal.   Reflex Scores:       Tricep reflexes are 2+ on the right side and 2+ on the left side.       Bicep reflexes are 2+ on the right side and 2+ on the left side.       Brachioradialis reflexes are 2+ on the right side and 2+ on the left side.       Patellar reflexes are 2+ on the right side and 2+ on the left side.       Achilles reflexes are 2+ on the right side and 2+ on the left side.  Skin: Skin is warm and dry. He is not diaphoretic.   Psychiatric: He has a normal mood and affect. His behavior is normal. Judgment normal. He expresses no homicidal and no suicidal ideation. He expresses no suicidal plans and no homicidal plans.   Nursing note and vitals reviewed.      Tino was seen today for chest pain.    Diagnoses and all orders for this visit:    Left-sided chest wall pain    Hx of cancer of lung    Hx of exploratory thoracotomy    High risk medications (not anticoagulants) long-term use  -     ToxASSURE Select 13 (MW)        Medication: Patient reports no negative side effects, Patient reports appropriate usage and storage habits, Patient's opioid provides enough reflief to be more active and perform activities of daily living with less discomfort. and Opioid  contract was read and discussed today and the patient chooses to sign. Will increase Percocet 7.5mg QID x 1 RX. Pt will follow up within month for renewal Oxycontin RX.     Interventional: Spoke with [facility] - Dr. Hills's nurse Eugenie. Will overtake his percocet and follow up in 1 month before oxycontin RX runs out.  Pt currently under oncology care- completing radiation and chemotherapy.     Rehab: none at this time     Behavioral: No aberrant behavior noted. DEVONTE Report # [id number]  was reviewed and is consistent with stated history    Urine drug screen Ordered today to test for drugs of abuse and prescribed medications    ORT: 2  Hx Alcohol usage. NO depression. NO SI thoughts or SI attempts.     PHQ-9: 4          This document has been electronically signed by CIPRIANO Hardy on June 27, 2017 10:51 AM          This document has been electronically signed by CIPRIANO Hardy on June 27, 2017 10:51 AM

## 2017-06-28 ENCOUNTER — RADIATION ONCOLOGY WEEKLY ASSESSMENT (OUTPATIENT)
Dept: RADIATION ONCOLOGY | Facility: HOSPITAL | Age: 57
End: 2017-06-28

## 2017-06-28 VITALS
HEART RATE: 111 BPM | RESPIRATION RATE: 18 BRPM | WEIGHT: 125.6 LBS | DIASTOLIC BLOOD PRESSURE: 94 MMHG | BODY MASS INDEX: 19.1 KG/M2 | TEMPERATURE: 98.9 F | SYSTOLIC BLOOD PRESSURE: 151 MMHG

## 2017-06-28 DIAGNOSIS — C34.12 MALIGNANT NEOPLASM OF UPPER LOBE OF LEFT LUNG (HCC): Primary | ICD-10-CM

## 2017-06-28 PROCEDURE — 77412 RADIATION TX DELIVERY LVL 3: CPT | Performed by: RADIOLOGY

## 2017-06-28 NOTE — PROGRESS NOTES
On Treatment Visit       Patient: Tino Peres   YOB: 1960   Medical Record Number: [id number]     Date of Visit  June 28, 2017   Primary Diagnosis: Stage IIB (pT3 pN0 Mx) moderately differentiated squamous cell carcinoma of the SILVANA lung, status post lobectomy with positive margins.  ICD 10 Code: C34.12     was seen today for an on treatment visit.  He is receiving radiation therapy to the SILVANA lung. He  has received 3600 cGy in 18 fractions out of a planned dose of 6000 cGy in 30 fractions. He is currently receiving concurrent carboplatin/Taxol chemotherapy per Dr. Dowd .     Today on exam the patient is tolerating radiation therapy well and has no new disease or treatment-related complaints.  He continues to have intermittent left upper chest wall pain, which is an 5 out of 10 today. He has recently started seeing pain management for help with pain control.                                               Vitals:     Vitals:    06/28/17 1544   BP: 151/94   Pulse: 111   Resp: 18   Temp: 98.9 °F (37.2 °C)       Weight:   Wt Readings from Last 3 Encounters:   06/28/17 125 lb 9.6 oz (57 kg)   06/27/17 128 lb 4.8 oz (58.2 kg)   06/21/17 126 lb 8 oz (57.4 kg)      Pain:    Pain Score    06/28/17 1544   PainSc:   5         Plan: We plan to continue radiation therapy as prescribed. The pain management service is now in control of his pain meds.      Jorge Alberto Hills MD  Radiation Oncology    Electronically signed by Jorge Alberto Hills MD 6/28/2017  3:55 PM

## 2017-06-29 PROCEDURE — 77412 RADIATION TX DELIVERY LVL 3: CPT | Performed by: RADIOLOGY

## 2017-06-29 PROCEDURE — 77295 3-D RADIOTHERAPY PLAN: CPT | Performed by: RADIOLOGY

## 2017-06-29 PROCEDURE — 77334 RADIATION TREATMENT AID(S): CPT | Performed by: RADIOLOGY

## 2017-06-29 PROCEDURE — 77300 RADIATION THERAPY DOSE PLAN: CPT | Performed by: RADIOLOGY

## 2017-06-30 ENCOUNTER — DOCUMENTATION (OUTPATIENT)
Dept: NUTRITION | Facility: HOSPITAL | Age: 57
End: 2017-06-30

## 2017-06-30 PROCEDURE — 77336 RADIATION PHYSICS CONSULT: CPT | Performed by: RADIOLOGY

## 2017-06-30 PROCEDURE — 77412 RADIATION TX DELIVERY LVL 3: CPT | Performed by: RADIOLOGY

## 2017-06-30 NOTE — PROGRESS NOTES
Adult Outpatient Nutrition  Assessment    Patient Name:  Tino Peres  YOB: 1960  MRN: [id number]        Assessment Date:  6/30/2017                        Comments:  Brief follow-up visit to reinforce nutrition/hydration needs. Pt states is holding weight at present--131 lb.   Takes x4 Ensure Plus or Boost Plus daily. Pushing water and Gatorade. 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  Pain less today. Praised patient for following recommendations.        Electronically signed by:  Kayla Lees RD  06/30/17 2:46 PM

## 2017-07-03 ENCOUNTER — OFFICE VISIT (OUTPATIENT)
Dept: ONCOLOGY | Facility: CLINIC | Age: 57
End: 2017-07-03

## 2017-07-03 ENCOUNTER — APPOINTMENT (OUTPATIENT)
Dept: RADIATION ONCOLOGY | Facility: HOSPITAL | Age: 57
End: 2017-07-03

## 2017-07-03 ENCOUNTER — HOSPITAL ENCOUNTER (OUTPATIENT)
Dept: RADIATION ONCOLOGY | Facility: HOSPITAL | Age: 57
Setting detail: RADIATION/ONCOLOGY SERIES
End: 2017-07-03

## 2017-07-03 ENCOUNTER — INFUSION (OUTPATIENT)
Dept: ONCOLOGY | Facility: HOSPITAL | Age: 57
End: 2017-07-03

## 2017-07-03 VITALS
DIASTOLIC BLOOD PRESSURE: 76 MMHG | HEART RATE: 88 BPM | SYSTOLIC BLOOD PRESSURE: 126 MMHG | WEIGHT: 128.2 LBS | RESPIRATION RATE: 18 BRPM | TEMPERATURE: 97.3 F | BODY MASS INDEX: 19.49 KG/M2

## 2017-07-03 DIAGNOSIS — C34.82 MALIGNANT NEOPLASM OF OVERLAPPING SITES OF LEFT LUNG (HCC): ICD-10-CM

## 2017-07-03 DIAGNOSIS — C34.82 MALIGNANT NEOPLASM OF OVERLAPPING SITES OF LEFT LUNG (HCC): Primary | ICD-10-CM

## 2017-07-03 DIAGNOSIS — Z45.2 ENCOUNTER FOR VENOUS ACCESS DEVICE CARE: ICD-10-CM

## 2017-07-03 LAB
ALBUMIN SERPL-MCNC: 3.9 G/DL (ref 3.4–4.8)
ALBUMIN/GLOB SERPL: 1.2 G/DL (ref 1.1–1.8)
ALP SERPL-CCNC: 82 U/L (ref 38–126)
ALT SERPL W P-5'-P-CCNC: 31 U/L (ref 21–72)
ANION GAP SERPL CALCULATED.3IONS-SCNC: 12 MMOL/L (ref 5–15)
AST SERPL-CCNC: 22 U/L (ref 17–59)
BASOPHILS # BLD AUTO: 0.02 10*3/MM3 (ref 0–0.2)
BASOPHILS NFR BLD AUTO: 0.4 % (ref 0–2)
BILIRUB SERPL-MCNC: 0.1 MG/DL (ref 0.2–1.3)
BUN BLD-MCNC: 12 MG/DL (ref 7–21)
BUN/CREAT SERPL: 20.3 (ref 7–25)
CALCIUM SPEC-SCNC: 9 MG/DL (ref 8.4–10.2)
CHLORIDE SERPL-SCNC: 103 MMOL/L (ref 95–110)
CO2 SERPL-SCNC: 24 MMOL/L (ref 22–31)
CREAT BLD-MCNC: 0.59 MG/DL (ref 0.7–1.3)
DEPRECATED RDW RBC AUTO: 75.7 FL (ref 35.1–43.9)
EOSINOPHIL # BLD AUTO: 0.12 10*3/MM3 (ref 0–0.7)
EOSINOPHIL NFR BLD AUTO: 2.4 % (ref 0–7)
ERYTHROCYTE [DISTWIDTH] IN BLOOD BY AUTOMATED COUNT: 25.2 % (ref 11.5–14.5)
GFR SERPL CREATININE-BSD FRML MDRD: 142 ML/MIN/1.73 (ref 56–130)
GLOBULIN UR ELPH-MCNC: 3.3 GM/DL (ref 2.3–3.5)
GLUCOSE BLD-MCNC: 102 MG/DL (ref 60–100)
HCT VFR BLD AUTO: 35.7 % (ref 39–49)
HGB BLD-MCNC: 11.8 G/DL (ref 13.7–17.3)
IMM GRANULOCYTES # BLD: 0.01 10*3/MM3 (ref 0–0.02)
IMM GRANULOCYTES NFR BLD: 0.2 % (ref 0–0.5)
LYMPHOCYTES # BLD AUTO: 0.29 10*3/MM3 (ref 0.6–4.2)
LYMPHOCYTES NFR BLD AUTO: 5.8 % (ref 10–50)
MCH RBC QN AUTO: 28.2 PG (ref 26.5–34)
MCHC RBC AUTO-ENTMCNC: 33.1 G/DL (ref 31.5–36.3)
MCV RBC AUTO: 85.2 FL (ref 80–98)
MONOCYTES # BLD AUTO: 0.77 10*3/MM3 (ref 0–0.9)
MONOCYTES NFR BLD AUTO: 15.4 % (ref 0–12)
NEUTROPHILS # BLD AUTO: 3.8 10*3/MM3 (ref 2–8.6)
NEUTROPHILS NFR BLD AUTO: 75.8 % (ref 37–80)
PLATELET # BLD AUTO: 209 10*3/MM3 (ref 150–450)
PMV BLD AUTO: 8.3 FL (ref 8–12)
POTASSIUM BLD-SCNC: 4 MMOL/L (ref 3.5–5.1)
PROT SERPL-MCNC: 7.2 G/DL (ref 6.3–8.6)
RBC # BLD AUTO: 4.19 10*6/MM3 (ref 4.37–5.74)
SODIUM BLD-SCNC: 139 MMOL/L (ref 137–145)
WBC NRBC COR # BLD: 5.01 10*3/MM3 (ref 3.2–9.8)

## 2017-07-03 PROCEDURE — 80053 COMPREHEN METABOLIC PANEL: CPT

## 2017-07-03 PROCEDURE — 99214 OFFICE O/P EST MOD 30 MIN: CPT | Performed by: INTERNAL MEDICINE

## 2017-07-03 PROCEDURE — 96417 CHEMO IV INFUS EACH ADDL SEQ: CPT | Performed by: INTERNAL MEDICINE

## 2017-07-03 PROCEDURE — 96375 TX/PRO/DX INJ NEW DRUG ADDON: CPT | Performed by: INTERNAL MEDICINE

## 2017-07-03 PROCEDURE — 77336 RADIATION PHYSICS CONSULT: CPT | Performed by: RADIOLOGY

## 2017-07-03 PROCEDURE — 25010000002 PACLITAXEL PER 30 MG: Performed by: INTERNAL MEDICINE

## 2017-07-03 PROCEDURE — 77412 RADIATION TX DELIVERY LVL 3: CPT | Performed by: RADIOLOGY

## 2017-07-03 PROCEDURE — 25010000002 DIPHENHYDRAMINE PER 50 MG: Performed by: INTERNAL MEDICINE

## 2017-07-03 PROCEDURE — 96413 CHEMO IV INFUSION 1 HR: CPT | Performed by: INTERNAL MEDICINE

## 2017-07-03 PROCEDURE — 25010000003 DEXAMETHASONE SODIUM PHOSPHATE 100 MG/10ML SOLUTION 10 ML VIAL: Performed by: INTERNAL MEDICINE

## 2017-07-03 PROCEDURE — 25010000002 HEPARIN FLUSH (PORCINE) 100 UNIT/ML SOLUTION: Performed by: INTERNAL MEDICINE

## 2017-07-03 PROCEDURE — 77427 RADIATION TX MANAGEMENT X5: CPT | Performed by: RADIOLOGY

## 2017-07-03 PROCEDURE — 77412 RADIATION TX DELIVERY LVL 3: CPT | Performed by: NURSE PRACTITIONER

## 2017-07-03 PROCEDURE — 36415 COLL VENOUS BLD VENIPUNCTURE: CPT

## 2017-07-03 PROCEDURE — 25010000002 PALONOSETRON PER 25 MCG: Performed by: INTERNAL MEDICINE

## 2017-07-03 PROCEDURE — 25010000002 CARBOPLATIN PER 50 MG: Performed by: INTERNAL MEDICINE

## 2017-07-03 PROCEDURE — 85025 COMPLETE CBC W/AUTO DIFF WBC: CPT

## 2017-07-03 RX ORDER — SODIUM CHLORIDE 9 MG/ML
250 INJECTION, SOLUTION INTRAVENOUS ONCE
Status: COMPLETED | OUTPATIENT
Start: 2017-07-03 | End: 2017-07-03

## 2017-07-03 RX ORDER — PALONOSETRON 0.05 MG/ML
0.25 INJECTION, SOLUTION INTRAVENOUS ONCE
Status: CANCELLED | OUTPATIENT
Start: 2017-07-03

## 2017-07-03 RX ORDER — FAMOTIDINE 10 MG/ML
20 INJECTION, SOLUTION INTRAVENOUS ONCE
Status: CANCELLED | OUTPATIENT
Start: 2017-07-03

## 2017-07-03 RX ORDER — SODIUM CHLORIDE 0.9 % (FLUSH) 0.9 %
10 SYRINGE (ML) INJECTION AS NEEDED
Status: CANCELLED | OUTPATIENT
Start: 2017-07-03

## 2017-07-03 RX ORDER — FAMOTIDINE 10 MG/ML
20 INJECTION, SOLUTION INTRAVENOUS ONCE
Status: COMPLETED | OUTPATIENT
Start: 2017-07-03 | End: 2017-07-03

## 2017-07-03 RX ORDER — SODIUM CHLORIDE 0.9 % (FLUSH) 0.9 %
10 SYRINGE (ML) INJECTION AS NEEDED
Status: DISCONTINUED | OUTPATIENT
Start: 2017-07-03 | End: 2017-07-03 | Stop reason: HOSPADM

## 2017-07-03 RX ORDER — SODIUM CHLORIDE 9 MG/ML
250 INJECTION, SOLUTION INTRAVENOUS ONCE
Status: CANCELLED | OUTPATIENT
Start: 2017-07-03

## 2017-07-03 RX ORDER — PALONOSETRON 0.05 MG/ML
0.25 INJECTION, SOLUTION INTRAVENOUS ONCE
Status: COMPLETED | OUTPATIENT
Start: 2017-07-03 | End: 2017-07-03

## 2017-07-03 RX ADMIN — PALONOSETRON HYDROCHLORIDE 0.25 MG: 0.25 INJECTION INTRAVENOUS at 09:38

## 2017-07-03 RX ADMIN — CARBOPLATIN 280 MG: 10 INJECTION, SOLUTION INTRAVENOUS at 12:12

## 2017-07-03 RX ADMIN — Medication 10 ML: at 13:07

## 2017-07-03 RX ADMIN — PACLITAXEL 85 MG: 6 INJECTION, SOLUTION INTRAVENOUS at 10:54

## 2017-07-03 RX ADMIN — DEXAMETHASONE SODIUM PHOSPHATE 12 MG: 10 INJECTION, SOLUTION INTRAMUSCULAR; INTRAVENOUS at 10:25

## 2017-07-03 RX ADMIN — SODIUM CHLORIDE, PRESERVATIVE FREE 500 UNITS: 5 INJECTION INTRAVENOUS at 13:08

## 2017-07-03 RX ADMIN — FAMOTIDINE 20 MG: 10 INJECTION INTRAVENOUS at 09:20

## 2017-07-03 RX ADMIN — Medication 10 ML: at 08:00

## 2017-07-03 RX ADMIN — DIPHENHYDRAMINE HYDROCHLORIDE 25 MG: 50 INJECTION INTRAMUSCULAR; INTRAVENOUS at 09:54

## 2017-07-03 RX ADMIN — SODIUM CHLORIDE 250 ML: 9 INJECTION, SOLUTION INTRAVENOUS at 09:10

## 2017-07-03 NOTE — PROGRESS NOTES
DATE OF VISIT: 7/3/2017    REASON FOR VISIT:  Squamous cell cancer of left lung, stage IIB    HISTORY OF PRESENT ILLNESS:    57-year-old male with a past medical history significant for duodenal ulcer, extensive nicotine addiction with more than 45-pack-year smoking history, history of alcohol abuse was seen in consultation on May 3, 2017 for newly diagnosed stage IIB squamous cell cancer of Of left lung.  Patient was started on concurrent chemoradiation on June 5, 2017.  He is here to get her week 5 of carboplatin and Taxol today.  Still complains of pain in the left upper chest wall area.    Denies any blood in the stool or urine.  Denies any excessive diarrhea.        PAST MEDICAL HISTORY:    Past Medical History:   Diagnosis Date   • Anemia    • Aortic stenosis, mild    • Arthritis    • Chronic bronchitis    • Duodenal ulcer disease    • Inguinal hernia, left    • Lung cancer    • Mitral valve regurgitation        SOCIAL HISTORY:    Social History   Substance Use Topics   • Smoking status: Current Every Day Smoker     Packs/day: 1.00     Years: 40.00     Types: Cigarettes   • Smokeless tobacco: Former User      Comment: Reduced at half a pack per day and weanning off   • Alcohol use 8.4 oz/week     14 Cans of beer per week      Comment: 6 pk/dly       Surgical History :  Past Surgical History:   Procedure Laterality Date   • APPENDECTOMY      open   • BRONCHOSCOPY N/A 3/8/2017    Procedure: BRONCHOSCOPY with possible bronchoalveolar lavage, +/- brush, +/- needle biopsy, +/- endobronchial biopsy;  Surgeon: Candis Lr MD;  Location: [facility] ENDOSCOPY;  Service:    • EXPLORATORY LAPAROTOMY  11/17/2014    Exploratory laparotomy, repair of duodenal ulcer, modified Carlos patch   • INGUINAL HERNIA REPAIR  09/13/2013    Open repair of an indirect left inguinal hernia. Left inguinal hernia.   • NC INSJ TUNNELED CVC W/O SUBQ PORT/ AGE 6 YR/> N/A 5/10/2017    Procedure: TUNNELED CENTRAL VENOUS CATHETER  WITH  SUBCUTANEOUS  PORT,FLUOROSCOPIC GUIDANCE ;  Surgeon: Angel Hernandez MD;  Location: [facility];  Service: Cardiothoracic   • THORACOTOMY Left 4/19/2017    Procedure: THORACOTOMY LEFT WITH PULMONARY RESECTION AND BRONCHOSCOPY WITH ENDO ;  Surgeon: Angel Hernandez MD;  Location: [facility];  Service:        ALLERGIES:    No Known Allergies    REVIEW OF SYSTEMS:      CONSTITUTIONAL:  Complains of fatigue. Denies any fever, chills or weight loss.      HEENT: States at end of week starts having difficulty with swallowing which improves by Monday.No epistaxis, mouth sores or difficulty swallowing.     RESPIRATORY: Denies any shortness of breath or cough today.     CARDIOVASCULAR: Complains of chest pain on the left side since surgery.  No palpitation.     GASTROINTESTINAL: No abdominal pain nausea, vomiting or blood in the stool.     GENITOURINARY: No Dysuria or Hematuria.     MUSCULOSKELETAL: No new back pain or arthralgia..     LYMPHATICS: Denies any abnormal swollen glands anywhere in the body.     NEUROLOGICAL : No tingling or numbness. No headache or dizziness. No seizures or balance problems.     SKIN: Complains of itching of skin on upper extremity and lower extremity.         PHYSICAL EXAMINATION:      VITAL SIGNS:  /76  Pulse 88  Temp 97.3 °F (36.3 °C)  Resp 18  Wt 128 lb 3.2 oz (58.2 kg)  BMI 19.49 kg/m2    GENERAL:  Not in any distress.    HEENT:  Normocephalic, Atraumatic.Mild Conjunctival pallor. No icterus. Extraocular Movements Intact. No Facial Asymmetry noted.    NECK:  No adenopathy. No JVD.    RESPIRATORY:  Fair air entry bilateral. No rhonchi or wheezing.    CARDIOVASCULAR:  S1, S2. Regular rate and rhythm. No murmur or gallop appreciated.    ABDOMEN:  Soft,  nontender. Bowel sounds present in all four quadrants.  No organomegaly appreciated.    EXTREMITIES:  No edema.No Calf Tenderness.    NEUROLOGIC:  Alert, awake and oriented ×3.  No  Motor or sensory deficit appreciated.  Cranial Nerves 2-12 grossly intact.    SKIN: Some excoriation present on right upper extremity.        DIAGNOSTIC DATA:    Glucose   Date Value Ref Range Status   07/03/2017 102 (H) 60 - 100 mg/dL Final     Glucose, Arterial   Date Value Ref Range Status   04/19/2017 176 mmol/L Final     Sodium   Date Value Ref Range Status   07/03/2017 139 137 - 145 mmol/L Final     Potassium   Date Value Ref Range Status   07/03/2017 4.0 3.5 - 5.1 mmol/L Final     CO2   Date Value Ref Range Status   07/03/2017 24.0 22.0 - 31.0 mmol/L Final     Chloride   Date Value Ref Range Status   07/03/2017 103 95 - 110 mmol/L Final     Anion Gap   Date Value Ref Range Status   07/03/2017 12.0 5.0 - 15.0 mmol/L Final     Creatinine   Date Value Ref Range Status   07/03/2017 0.59 (L) 0.70 - 1.30 mg/dL Final     BUN   Date Value Ref Range Status   07/03/2017 12 7 - 21 mg/dL Final     BUN/Creatinine Ratio   Date Value Ref Range Status   07/03/2017 20.3 7.0 - 25.0 Final     Calcium   Date Value Ref Range Status   07/03/2017 9.0 8.4 - 10.2 mg/dL Final     eGFR Non  Amer   Date Value Ref Range Status   07/03/2017 142 (H) 56 - 130 mL/min/1.73 Final     Alkaline Phosphatase   Date Value Ref Range Status   07/03/2017 82 38 - 126 U/L Final     Total Protein   Date Value Ref Range Status   07/03/2017 7.2 6.3 - 8.6 g/dL Final     ALT (SGPT)   Date Value Ref Range Status   07/03/2017 31 21 - 72 U/L Final     AST (SGOT)   Date Value Ref Range Status   07/03/2017 22 17 - 59 U/L Final     Total Bilirubin   Date Value Ref Range Status   07/03/2017 0.1 (L) 0.2 - 1.3 mg/dL Final     Albumin   Date Value Ref Range Status   07/03/2017 3.90 3.40 - 4.80 g/dL Final     Globulin   Date Value Ref Range Status   07/03/2017 3.3 2.3 - 3.5 gm/dL Final     A/G Ratio   Date Value Ref Range Status   07/03/2017 1.2 1.1 - 1.8 g/dL Final     Lab Results   Component Value Date    WBC 5.01 07/03/2017    HGB 11.8 (L) 07/03/2017    HCT 35.7 (L) 07/03/2017    MCV 85.2  07/03/2017     07/03/2017     Lab Results   Component Value Date    NEUTROABS 3.80 07/03/2017    IRON 12 (L) 05/03/2017    TIBC 322 05/03/2017    LABIRON 4 (L) 05/03/2017    FERRITIN 56.80 05/03/2017    [id number] 562 05/03/2017    FOLATE >20.00 05/03/2017         PATHOLOGY:  Pathology :  Pathology Report from April 19, 2017 showed:       SPECIMEN   Specimen   Lobe(s) of lung   Lobes of Lung   Upper lobe   Procedure   Lobectomy   Specimen Laterality   Left   TUMOR   Primary Tumor Site   Upper lobe   Histologic Type   Squamous cell carcinoma   Histologic Grade   G2: Moderately differentiated   Tumor Size   Greatest dimension (cm): 8 cm   Additional Dimension (cm)   7 cm       5 cm   Tumor Focality   Unifocal   Visceral Pleura Invasion   Present   Tumor Extension   Parietal pleura   Lymph-Vascular Invasion   Not identified   MARGINS   Bronchial Margin   Uninvolved by invasive carcinoma and carcinoma in situ   Vascular Margin   Uninvolved by invasive carcinoma   Parenchymal Margin   Involved by invasive carcinoma   LYMPH NODES   Regional Lymph Nodes       Number of Lymph Nodes Examined   Specify number: 6   Gene Stations Examined   5: Subaortic/ aortopulmonary (AP) / AP window       11L: Interlobar   Lymph Node Involvement   No nodes involved   STAGE (pTNM)   Primary Tumor (pT)   pT3: Tumor greater than 7 cm in greatest dimension; or Tumor of any size that directly invades any of the following: parietal pleural chest wall (including superior sulcus tumors), diaphragm, phrenic nerve, mediastinal pleura, parietal pericardium; or Tumor of any size in the main bronchus less than 2 cm distal to the petar but without involvement of the petar; or Tumor of any size associated with atelectasis or obstructive pneumonitis of the entire lung; or Tumors of any size with separate tmor nodule(s) in same lobe   Regional Lymph Nodes (pN)   pN0: No regional lymph node metastasis   ADDITIONAL FINDINGS   Additional Pathologic  Finding(s)   Other (specify): Fungal hyphae consistent with Aspergillus species in the cavity contents   .          RADIOLOGY DATA :  PET/CT done on May 22, 2017 showed:  IMPRESSION:  CONCLUSION:  1. Large Pancoast tumor involving left apex with significant  involvement of the periphery, pleura, portions of the chest wall  and contiguous involvement, destruction of at least one of the  anterior left upper ribs.     PET/CT imaging study is otherwise unremarkable. No evidence of  any distant metastases.        ASSESSMENT AND PLAN:      1.  Squamous cell cancer of left upper lobe, stage IIB, T3 N0 M0: Patient is status post surgical excision with positive pleural margin.  As per NCCN guidelines we'll start patient on concurrent chemoradiation with weekly carboplatin and Taxol.  Patient was started on concurrent chemoradiation with weekly carboplatin and Taxol on June 5, 2017.  Patient has tolerated chemotherapy well so far without any excessive side effect.  At this point we will continue with her week 5 of carboplatin and Taxol today.  We will see him back in about 2 weeks with a repeat CBC and CMP on that day.  We will recheck his CBC and CMP next week and resume weekly carboplatin and Taxol.    2.  Iron deficiency anemia secondary to chronic blood loss.  Patient is status post 5 units of PRBC since March 2017.  Patient also received 2 doses of intravenous Feraheme on May 9, 2017 and May 16, 2017.  His hemoglobin 11.8 today, we will monitored with CBC for now.    3.Pulmonary aspergillosis: Patient had a fungus ball consistent with Aspergillus on a cavitary lesion of lung status postsurgical excision.     4. History of duodenal ulcer     5. Health maintenance: Patient was counseled about smoking cessation today, about 3 minutes were spent for smoking cessation counseling. He will need screening colonoscopy no later date. He remains full code.     6. Prescriptions: patient has enough prescription for Zofran.    7.   Pain management: patient is being managed by pain management clinic.       Gualberto Dowd MD  7/3/2017  11:37 AM        EMR Dragon/Transcription disclaimer:   Much of this encounter note is an electronic transcription/translation of spoken language to printed text. The electronic translation of spoken language may permit erroneous, or at times, nonsensical words or phrases to be inadvertently transcribed; Although I have reviewed the note for such errors, some may still exist.

## 2017-07-05 ENCOUNTER — RADIATION ONCOLOGY WEEKLY ASSESSMENT (OUTPATIENT)
Dept: RADIATION ONCOLOGY | Facility: HOSPITAL | Age: 57
End: 2017-07-05

## 2017-07-05 VITALS
WEIGHT: 126.6 LBS | HEART RATE: 70 BPM | BODY MASS INDEX: 19.25 KG/M2 | RESPIRATION RATE: 18 BRPM | SYSTOLIC BLOOD PRESSURE: 152 MMHG | DIASTOLIC BLOOD PRESSURE: 88 MMHG | TEMPERATURE: 98 F

## 2017-07-05 DIAGNOSIS — C34.12 MALIGNANT NEOPLASM OF UPPER LOBE OF LEFT LUNG (HCC): Primary | ICD-10-CM

## 2017-07-05 PROCEDURE — 77280 THER RAD SIMULAJ FIELD SMPL: CPT | Performed by: RADIOLOGY

## 2017-07-05 PROCEDURE — 77412 RADIATION TX DELIVERY LVL 3: CPT | Performed by: RADIOLOGY

## 2017-07-05 NOTE — PROGRESS NOTES
On Treatment Visit       Patient: Tino Peres   YOB: 1960   Medical Record Number: [id number]     Date of Visit  July 5, 2017   Primary Diagnosis: Stage IIB (pT3 pN0 Mx) moderately differentiated squamous cell carcinoma of the SILVANA lung, status post lobectomy with positive margins.  ICD 10 Code: C34.12     was seen today for an on treatment visit.  He is receiving radiation therapy to the SILVANA lung. He  has received 4400 cGy in 22 fractions out of a planned dose of 6000 cGy in 30 fractions. He is currently receiving concurrent carboplatin/Taxol chemotherapy per Dr. Dowd .     Today on exam the patient is tolerating radiation therapy well and has no new disease or treatment-related complaints.  He continues to have intermittent left upper chest wall pain, which is an 2 out of 10 today. He has recently started seeing pain management for help with pain control.                                               Vitals:     Vitals:    07/05/17 0849   BP: 152/88   Pulse: 70   Resp: 18   Temp: 98 °F (36.7 °C)       Weight:   Wt Readings from Last 3 Encounters:   07/05/17 126 lb 9.6 oz (57.4 kg)   07/03/17 128 lb 3.2 oz (58.2 kg)   06/28/17 125 lb 9.6 oz (57 kg)      Pain:    Pain Score    07/05/17 0849   PainSc:   2   PainLoc: Chest         Plan: We plan to continue radiation therapy as prescribed. The pain management service is now in control of his pain meds.      Jorge Alberto Hills MD  Radiation Oncology    Electronically signed by Jorge Alberto Hills MD 7/5/2017  9:01 AM

## 2017-07-06 LAB — CONV REPORT SUMMARY: NORMAL

## 2017-07-06 PROCEDURE — 77412 RADIATION TX DELIVERY LVL 3: CPT | Performed by: RADIOLOGY

## 2017-07-06 PROCEDURE — 77412 RADIATION TX DELIVERY LVL 3: CPT | Performed by: NURSE PRACTITIONER

## 2017-07-07 PROCEDURE — 77412 RADIATION TX DELIVERY LVL 3: CPT

## 2017-07-07 PROCEDURE — 77417 THER RADIOLOGY PORT IMAGE(S): CPT

## 2017-07-07 PROCEDURE — 77412 RADIATION TX DELIVERY LVL 3: CPT | Performed by: RADIOLOGY

## 2017-07-07 PROCEDURE — 77417 THER RADIOLOGY PORT IMAGE(S): CPT | Performed by: RADIOLOGY

## 2017-07-10 ENCOUNTER — RADIATION ONCOLOGY WEEKLY ASSESSMENT (OUTPATIENT)
Dept: RADIATION ONCOLOGY | Facility: HOSPITAL | Age: 57
End: 2017-07-10

## 2017-07-10 ENCOUNTER — INFUSION (OUTPATIENT)
Dept: ONCOLOGY | Facility: HOSPITAL | Age: 57
End: 2017-07-10

## 2017-07-10 VITALS
TEMPERATURE: 98.2 F | HEART RATE: 105 BPM | RESPIRATION RATE: 18 BRPM | SYSTOLIC BLOOD PRESSURE: 155 MMHG | DIASTOLIC BLOOD PRESSURE: 95 MMHG

## 2017-07-10 DIAGNOSIS — C34.82 MALIGNANT NEOPLASM OF OVERLAPPING SITES OF LEFT LUNG (HCC): ICD-10-CM

## 2017-07-10 DIAGNOSIS — C34.12 MALIGNANT NEOPLASM OF UPPER LOBE OF LEFT LUNG (HCC): Primary | ICD-10-CM

## 2017-07-10 DIAGNOSIS — C34.12 MALIGNANT NEOPLASM OF UPPER LOBE OF LEFT LUNG (HCC): ICD-10-CM

## 2017-07-10 DIAGNOSIS — Z45.2 ENCOUNTER FOR VENOUS ACCESS DEVICE CARE: Primary | ICD-10-CM

## 2017-07-10 LAB
ALBUMIN SERPL-MCNC: 4.3 G/DL (ref 3.4–4.8)
ALBUMIN/GLOB SERPL: 1.3 G/DL (ref 1.1–1.8)
ALP SERPL-CCNC: 69 U/L (ref 38–126)
ALT SERPL W P-5'-P-CCNC: 25 U/L (ref 21–72)
ANION GAP SERPL CALCULATED.3IONS-SCNC: 13 MMOL/L (ref 5–15)
AST SERPL-CCNC: 27 U/L (ref 17–59)
BASOPHILS # BLD AUTO: 0.03 10*3/MM3 (ref 0–0.2)
BASOPHILS NFR BLD AUTO: 0.7 % (ref 0–2)
BILIRUB SERPL-MCNC: 0.2 MG/DL (ref 0.2–1.3)
BUN BLD-MCNC: 7 MG/DL (ref 7–21)
BUN/CREAT SERPL: 14.3 (ref 7–25)
CALCIUM SPEC-SCNC: 9.1 MG/DL (ref 8.4–10.2)
CHLORIDE SERPL-SCNC: 103 MMOL/L (ref 95–110)
CO2 SERPL-SCNC: 24 MMOL/L (ref 22–31)
CREAT BLD-MCNC: 0.49 MG/DL (ref 0.7–1.3)
DEPRECATED RDW RBC AUTO: 75.4 FL (ref 35.1–43.9)
EOSINOPHIL # BLD AUTO: 0.11 10*3/MM3 (ref 0–0.7)
EOSINOPHIL NFR BLD AUTO: 2.6 % (ref 0–7)
ERYTHROCYTE [DISTWIDTH] IN BLOOD BY AUTOMATED COUNT: 25 % (ref 11.5–14.5)
GFR SERPL CREATININE-BSD FRML MDRD: 176 ML/MIN/1.73 (ref 56–130)
GLOBULIN UR ELPH-MCNC: 3.4 GM/DL (ref 2.3–3.5)
GLUCOSE BLD-MCNC: 134 MG/DL (ref 60–100)
HCT VFR BLD AUTO: 37.2 % (ref 39–49)
HGB BLD-MCNC: 12.5 G/DL (ref 13.7–17.3)
IMM GRANULOCYTES # BLD: 0.01 10*3/MM3 (ref 0–0.02)
IMM GRANULOCYTES NFR BLD: 0.2 % (ref 0–0.5)
LYMPHOCYTES # BLD AUTO: 0.73 10*3/MM3 (ref 0.6–4.2)
LYMPHOCYTES NFR BLD AUTO: 17.2 % (ref 10–50)
MCH RBC QN AUTO: 28.9 PG (ref 26.5–34)
MCHC RBC AUTO-ENTMCNC: 33.6 G/DL (ref 31.5–36.3)
MCV RBC AUTO: 85.9 FL (ref 80–98)
MONOCYTES # BLD AUTO: 0.21 10*3/MM3 (ref 0–0.9)
MONOCYTES NFR BLD AUTO: 5 % (ref 0–12)
NEUTROPHILS # BLD AUTO: 3.15 10*3/MM3 (ref 2–8.6)
NEUTROPHILS NFR BLD AUTO: 74.3 % (ref 37–80)
PLATELET # BLD AUTO: 179 10*3/MM3 (ref 150–450)
PMV BLD AUTO: 8.7 FL (ref 8–12)
POTASSIUM BLD-SCNC: 3.6 MMOL/L (ref 3.5–5.1)
PROT SERPL-MCNC: 7.7 G/DL (ref 6.3–8.6)
RBC # BLD AUTO: 4.33 10*6/MM3 (ref 4.37–5.74)
SODIUM BLD-SCNC: 140 MMOL/L (ref 137–145)
WBC NRBC COR # BLD: 4.24 10*3/MM3 (ref 3.2–9.8)

## 2017-07-10 PROCEDURE — 96375 TX/PRO/DX INJ NEW DRUG ADDON: CPT | Performed by: INTERNAL MEDICINE

## 2017-07-10 PROCEDURE — 25010000002 HEPARIN FLUSH (PORCINE) 100 UNIT/ML SOLUTION: Performed by: INTERNAL MEDICINE

## 2017-07-10 PROCEDURE — 77412 RADIATION TX DELIVERY LVL 3: CPT | Performed by: RADIOLOGY

## 2017-07-10 PROCEDURE — 36415 COLL VENOUS BLD VENIPUNCTURE: CPT

## 2017-07-10 PROCEDURE — 77336 RADIATION PHYSICS CONSULT: CPT | Performed by: RADIOLOGY

## 2017-07-10 PROCEDURE — 85025 COMPLETE CBC W/AUTO DIFF WBC: CPT

## 2017-07-10 PROCEDURE — 25010000002 PALONOSETRON PER 25 MCG: Performed by: INTERNAL MEDICINE

## 2017-07-10 PROCEDURE — 25010000002 PACLITAXEL PER 30 MG: Performed by: INTERNAL MEDICINE

## 2017-07-10 PROCEDURE — 25010000003 DEXAMETHASONE SODIUM PHOSPHATE 100 MG/10ML SOLUTION 10 ML VIAL: Performed by: INTERNAL MEDICINE

## 2017-07-10 PROCEDURE — 96417 CHEMO IV INFUS EACH ADDL SEQ: CPT | Performed by: INTERNAL MEDICINE

## 2017-07-10 PROCEDURE — 25010000002 DIPHENHYDRAMINE PER 50 MG: Performed by: INTERNAL MEDICINE

## 2017-07-10 PROCEDURE — 96413 CHEMO IV INFUSION 1 HR: CPT | Performed by: INTERNAL MEDICINE

## 2017-07-10 PROCEDURE — 25010000002 CARBOPLATIN PER 50 MG: Performed by: INTERNAL MEDICINE

## 2017-07-10 PROCEDURE — 80053 COMPREHEN METABOLIC PANEL: CPT

## 2017-07-10 RX ORDER — SODIUM CHLORIDE 0.9 % (FLUSH) 0.9 %
10 SYRINGE (ML) INJECTION AS NEEDED
Status: CANCELLED | OUTPATIENT
Start: 2017-07-10

## 2017-07-10 RX ORDER — FAMOTIDINE 10 MG/ML
20 INJECTION, SOLUTION INTRAVENOUS ONCE
Status: COMPLETED | OUTPATIENT
Start: 2017-07-10 | End: 2017-07-10

## 2017-07-10 RX ORDER — SODIUM CHLORIDE 9 MG/ML
250 INJECTION, SOLUTION INTRAVENOUS ONCE
Status: COMPLETED | OUTPATIENT
Start: 2017-07-10 | End: 2017-07-10

## 2017-07-10 RX ORDER — FAMOTIDINE 10 MG/ML
20 INJECTION, SOLUTION INTRAVENOUS ONCE
Status: CANCELLED | OUTPATIENT
Start: 2017-07-10

## 2017-07-10 RX ORDER — SODIUM CHLORIDE 9 MG/ML
250 INJECTION, SOLUTION INTRAVENOUS ONCE
Status: CANCELLED | OUTPATIENT
Start: 2017-07-10

## 2017-07-10 RX ORDER — PALONOSETRON 0.05 MG/ML
0.25 INJECTION, SOLUTION INTRAVENOUS ONCE
Status: CANCELLED | OUTPATIENT
Start: 2017-07-10

## 2017-07-10 RX ORDER — OXYCODONE AND ACETAMINOPHEN 7.5; 325 MG/1; MG/1
TABLET ORAL
Refills: 0 | COMMUNITY
Start: 2017-06-30 | End: 2017-10-17 | Stop reason: SDUPTHER

## 2017-07-10 RX ORDER — SODIUM CHLORIDE 0.9 % (FLUSH) 0.9 %
10 SYRINGE (ML) INJECTION AS NEEDED
Status: DISCONTINUED | OUTPATIENT
Start: 2017-07-10 | End: 2017-07-10 | Stop reason: HOSPADM

## 2017-07-10 RX ORDER — PALONOSETRON 0.05 MG/ML
0.25 INJECTION, SOLUTION INTRAVENOUS ONCE
Status: COMPLETED | OUTPATIENT
Start: 2017-07-10 | End: 2017-07-10

## 2017-07-10 RX ADMIN — SODIUM CHLORIDE, PRESERVATIVE FREE 500 UNITS: 5 INJECTION INTRAVENOUS at 12:12

## 2017-07-10 RX ADMIN — DIPHENHYDRAMINE HYDROCHLORIDE 25 MG: 50 INJECTION INTRAMUSCULAR; INTRAVENOUS at 09:53

## 2017-07-10 RX ADMIN — PACLITAXEL 85 MG: 6 INJECTION, SOLUTION INTRAVENOUS at 10:20

## 2017-07-10 RX ADMIN — DEXAMETHASONE SODIUM PHOSPHATE 12 MG: 10 INJECTION, SOLUTION INTRAMUSCULAR; INTRAVENOUS at 09:33

## 2017-07-10 RX ADMIN — Medication 10 ML: at 12:12

## 2017-07-10 RX ADMIN — CARBOPLATIN 300 MG: 10 INJECTION, SOLUTION INTRAVENOUS at 11:36

## 2017-07-10 RX ADMIN — PALONOSETRON HYDROCHLORIDE 0.25 MG: 0.25 INJECTION INTRAVENOUS at 09:24

## 2017-07-10 RX ADMIN — SODIUM CHLORIDE 250 ML: 9 INJECTION, SOLUTION INTRAVENOUS at 09:09

## 2017-07-10 RX ADMIN — FAMOTIDINE 20 MG: 10 INJECTION INTRAVENOUS at 09:14

## 2017-07-10 NOTE — PROGRESS NOTES
On Treatment Visit       Patient: Tino Peres   YOB: 1960   Medical Record Number: [id number]     Date of Visit  July 10, 2017   Primary Diagnosis: Stage IIB (pT3 pN0 Mx) moderately differentiated squamous cell carcinoma of the SILVANA lung, status post lobectomy with positive margins.  ICD 10 Code: C34.12     was seen today for an on treatment visit.  He is receiving radiation therapy to the SILVANA lung. He  has received 5000 cGy in 25 fractions out of a planned dose of 6000 cGy in 30 fractions. He is currently receiving concurrent carboplatin/Taxol chemotherapy per Dr. Dowd .     Today on exam the patient is complaining of skin tenderness on the left scapula. Exam reveals 2 small areas of dry desquamation, along with moderate erythema, in the treatment field. He is otherwise tolerating radiation therapy well and has no other disease or treatment-related complaints.                                                Vitals:     There were no vitals filed for this visit.    Weight:   Wt Readings from Last 3 Encounters:   07/05/17 126 lb 9.6 oz (57.4 kg)   07/03/17 128 lb 3.2 oz (58.2 kg)   06/28/17 125 lb 9.6 oz (57 kg)      Pain:    There were no vitals filed for this visit.      Plan: We plan to continue radiation therapy as prescribed.       Jorge Alberto Hills MD  Radiation Oncology    Electronically signed by Jorge Alberto Hills MD 7/10/2017  8:09 AM

## 2017-07-11 PROCEDURE — 77412 RADIATION TX DELIVERY LVL 3: CPT | Performed by: RADIOLOGY

## 2017-07-11 PROCEDURE — 77427 RADIATION TX MANAGEMENT X5: CPT | Performed by: RADIOLOGY

## 2017-07-11 PROCEDURE — 77336 RADIATION PHYSICS CONSULT: CPT | Performed by: RADIOLOGY

## 2017-07-12 ENCOUNTER — RADIATION ONCOLOGY WEEKLY ASSESSMENT (OUTPATIENT)
Dept: RADIATION ONCOLOGY | Facility: HOSPITAL | Age: 57
End: 2017-07-12

## 2017-07-12 VITALS
TEMPERATURE: 97.5 F | DIASTOLIC BLOOD PRESSURE: 90 MMHG | SYSTOLIC BLOOD PRESSURE: 152 MMHG | WEIGHT: 122.2 LBS | RESPIRATION RATE: 18 BRPM | HEART RATE: 110 BPM | BODY MASS INDEX: 18.58 KG/M2

## 2017-07-12 DIAGNOSIS — C34.12 MALIGNANT NEOPLASM OF UPPER LOBE OF LEFT LUNG (HCC): Primary | ICD-10-CM

## 2017-07-12 PROCEDURE — 77412 RADIATION TX DELIVERY LVL 3: CPT | Performed by: RADIOLOGY

## 2017-07-12 NOTE — PROGRESS NOTES
On Treatment Visit       Patient: Tino Peres   YOB: 1960   Medical Record Number: [id number]     Date of Visit  July 12, 2017   Primary Diagnosis: Stage IIB (pT3 pN0 Mx) moderately differentiated squamous cell carcinoma of the SILVANA lung, status post lobectomy with positive margins.  ICD 10 Code: C34.12     was seen today for an on treatment visit.  He is receiving radiation therapy to the SILVANA lung. He  has received 5400 cGy in 27 fractions out of a planned dose of 6000 cGy in 30 fractions. He is currently receiving concurrent carboplatin/Taxol chemotherapy per Dr. Dowd .     Today on exam, the patient continues to have skin tenderness on the left scapula. He was given a prescription for Silvadene cream earlier in the week, but has not had this filled yet. He does apply moisturizing cream liberally. Exam reveals multiple small areas of dry desquamation, along with moderate erythema, in the left supraclavicular area and left scapular area. He is otherwise tolerating radiation therapy well and has no other disease or treatment-related complaints. He states that his pain has improved over the last 2 days.                                               Vitals:     Vitals:    07/12/17 0739   BP: 152/90   Pulse: 110   Resp: 18   Temp: 97.5 °F (36.4 °C)       Weight:   Wt Readings from Last 3 Encounters:   07/12/17 122 lb 3.2 oz (55.4 kg)   07/05/17 126 lb 9.6 oz (57.4 kg)   07/03/17 128 lb 3.2 oz (58.2 kg)      Pain:    Pain Score    07/12/17 0739   PainSc:   1         Plan: We plan to continue radiation therapy as prescribed. Mr. Peres is scheduled to complete radiation therapy next week. He is scheduled to follow-up with Dr. Dowd on Monday, 7/17/17, and with pain medicine on 7/25/17. He is scheduled to have a CT of the chest in November and follow-up with Dr. Hernandez at that time. We plan to see the patient back in our clinic for follow-up in 8 weeks.      Jorge Alberto Hills MD  Radiation  Oncology    Electronically signed by Jorge Alberto Hills MD 7/12/2017  7:49 AM

## 2017-07-13 PROCEDURE — 77412 RADIATION TX DELIVERY LVL 3: CPT | Performed by: RADIOLOGY

## 2017-07-14 PROCEDURE — 77412 RADIATION TX DELIVERY LVL 3: CPT | Performed by: RADIOLOGY

## 2017-07-17 ENCOUNTER — INFUSION (OUTPATIENT)
Dept: ONCOLOGY | Facility: HOSPITAL | Age: 57
End: 2017-07-17

## 2017-07-17 ENCOUNTER — OFFICE VISIT (OUTPATIENT)
Dept: ONCOLOGY | Facility: CLINIC | Age: 57
End: 2017-07-17

## 2017-07-17 ENCOUNTER — OFFICE VISIT (OUTPATIENT)
Dept: RADIATION ONCOLOGY | Facility: HOSPITAL | Age: 57
End: 2017-07-17

## 2017-07-17 VITALS
HEART RATE: 103 BPM | WEIGHT: 119.3 LBS | DIASTOLIC BLOOD PRESSURE: 94 MMHG | SYSTOLIC BLOOD PRESSURE: 152 MMHG | BODY MASS INDEX: 18.14 KG/M2 | TEMPERATURE: 97.7 F | RESPIRATION RATE: 16 BRPM

## 2017-07-17 VITALS
HEIGHT: 68 IN | HEART RATE: 103 BPM | RESPIRATION RATE: 16 BRPM | BODY MASS INDEX: 18.07 KG/M2 | TEMPERATURE: 97.7 F | WEIGHT: 119.2 LBS | SYSTOLIC BLOOD PRESSURE: 152 MMHG | DIASTOLIC BLOOD PRESSURE: 94 MMHG

## 2017-07-17 DIAGNOSIS — C34.12 MALIGNANT NEOPLASM OF UPPER LOBE OF LEFT LUNG (HCC): Primary | ICD-10-CM

## 2017-07-17 DIAGNOSIS — T45.1X5A CHEMOTHERAPY-INDUCED NEUTROPENIA (HCC): ICD-10-CM

## 2017-07-17 DIAGNOSIS — C34.82 MALIGNANT NEOPLASM OF OVERLAPPING SITES OF LEFT LUNG (HCC): Primary | ICD-10-CM

## 2017-07-17 DIAGNOSIS — D50.9 IRON DEFICIENCY ANEMIA, UNSPECIFIED IRON DEFICIENCY ANEMIA TYPE: ICD-10-CM

## 2017-07-17 DIAGNOSIS — D70.1 CHEMOTHERAPY-INDUCED NEUTROPENIA (HCC): ICD-10-CM

## 2017-07-17 DIAGNOSIS — C34.82 MALIGNANT NEOPLASM OF OVERLAPPING SITES OF LEFT LUNG (HCC): ICD-10-CM

## 2017-07-17 DIAGNOSIS — Z45.2 ENCOUNTER FOR VENOUS ACCESS DEVICE CARE: ICD-10-CM

## 2017-07-17 LAB
ALBUMIN SERPL-MCNC: 4.1 G/DL (ref 3.4–4.8)
ALBUMIN/GLOB SERPL: 1.3 G/DL (ref 1.1–1.8)
ALP SERPL-CCNC: 69 U/L (ref 38–126)
ALT SERPL W P-5'-P-CCNC: 32 U/L (ref 21–72)
ANION GAP SERPL CALCULATED.3IONS-SCNC: 13 MMOL/L (ref 5–15)
AST SERPL-CCNC: 22 U/L (ref 17–59)
BASOPHILS # BLD AUTO: 0.02 10*3/MM3 (ref 0–0.2)
BASOPHILS NFR BLD AUTO: 0.9 % (ref 0–2)
BILIRUB SERPL-MCNC: 0.3 MG/DL (ref 0.2–1.3)
BUN BLD-MCNC: 8 MG/DL (ref 7–21)
BUN/CREAT SERPL: 15.1 (ref 7–25)
CALCIUM SPEC-SCNC: 9.1 MG/DL (ref 8.4–10.2)
CHLORIDE SERPL-SCNC: 100 MMOL/L (ref 95–110)
CO2 SERPL-SCNC: 24 MMOL/L (ref 22–31)
CREAT BLD-MCNC: 0.53 MG/DL (ref 0.7–1.3)
DEPRECATED RDW RBC AUTO: 74.2 FL (ref 35.1–43.9)
EOSINOPHIL # BLD AUTO: 0.07 10*3/MM3 (ref 0–0.7)
EOSINOPHIL NFR BLD AUTO: 3.1 % (ref 0–7)
ERYTHROCYTE [DISTWIDTH] IN BLOOD BY AUTOMATED COUNT: 24.1 % (ref 11.5–14.5)
GFR SERPL CREATININE-BSD FRML MDRD: >150 ML/MIN/1.73 (ref 60–130)
GLOBULIN UR ELPH-MCNC: 3.2 GM/DL (ref 2.3–3.5)
GLUCOSE BLD-MCNC: 163 MG/DL (ref 60–100)
HCT VFR BLD AUTO: 34.3 % (ref 39–49)
HGB BLD-MCNC: 11.7 G/DL (ref 13.7–17.3)
IMM GRANULOCYTES # BLD: 0.01 10*3/MM3 (ref 0–0.02)
IMM GRANULOCYTES NFR BLD: 0.4 % (ref 0–0.5)
LYMPHOCYTES # BLD AUTO: 0.32 10*3/MM3 (ref 0.6–4.2)
LYMPHOCYTES NFR BLD AUTO: 14.2 % (ref 10–50)
MCH RBC QN AUTO: 29.5 PG (ref 26.5–34)
MCHC RBC AUTO-ENTMCNC: 34.1 G/DL (ref 31.5–36.3)
MCV RBC AUTO: 86.4 FL (ref 80–98)
MONOCYTES # BLD AUTO: 0.31 10*3/MM3 (ref 0–0.9)
MONOCYTES NFR BLD AUTO: 13.8 % (ref 0–12)
NEUTROPHILS # BLD AUTO: 1.52 10*3/MM3 (ref 2–8.6)
NEUTROPHILS NFR BLD AUTO: 67.6 % (ref 37–80)
PLATELET # BLD AUTO: 202 10*3/MM3 (ref 150–450)
PMV BLD AUTO: 8.7 FL (ref 8–12)
POTASSIUM BLD-SCNC: 3.8 MMOL/L (ref 3.5–5.1)
PROT SERPL-MCNC: 7.3 G/DL (ref 6.3–8.6)
RBC # BLD AUTO: 3.97 10*6/MM3 (ref 4.37–5.74)
SODIUM BLD-SCNC: 137 MMOL/L (ref 137–145)
WBC NRBC COR # BLD: 2.25 10*3/MM3 (ref 3.2–9.8)

## 2017-07-17 PROCEDURE — 25010000002 PACLITAXEL PER 30 MG: Performed by: INTERNAL MEDICINE

## 2017-07-17 PROCEDURE — 25010000002 HEPARIN FLUSH (PORCINE) 100 UNIT/ML SOLUTION: Performed by: INTERNAL MEDICINE

## 2017-07-17 PROCEDURE — 80053 COMPREHEN METABOLIC PANEL: CPT

## 2017-07-17 PROCEDURE — 96417 CHEMO IV INFUS EACH ADDL SEQ: CPT | Performed by: INTERNAL MEDICINE

## 2017-07-17 PROCEDURE — 36591 DRAW BLOOD OFF VENOUS DEVICE: CPT | Performed by: INTERNAL MEDICINE

## 2017-07-17 PROCEDURE — 99214 OFFICE O/P EST MOD 30 MIN: CPT | Performed by: INTERNAL MEDICINE

## 2017-07-17 PROCEDURE — 96375 TX/PRO/DX INJ NEW DRUG ADDON: CPT | Performed by: INTERNAL MEDICINE

## 2017-07-17 PROCEDURE — 85025 COMPLETE CBC W/AUTO DIFF WBC: CPT

## 2017-07-17 PROCEDURE — 25010000002 CARBOPLATIN PER 50 MG: Performed by: INTERNAL MEDICINE

## 2017-07-17 PROCEDURE — 77336 RADIATION PHYSICS CONSULT: CPT | Performed by: RADIOLOGY

## 2017-07-17 PROCEDURE — 25010000003 DEXAMETHASONE SODIUM PHOSPHATE 100 MG/10ML SOLUTION 10 ML VIAL: Performed by: INTERNAL MEDICINE

## 2017-07-17 PROCEDURE — 77412 RADIATION TX DELIVERY LVL 3: CPT | Performed by: RADIOLOGY

## 2017-07-17 PROCEDURE — 25010000002 DIPHENHYDRAMINE PER 50 MG: Performed by: INTERNAL MEDICINE

## 2017-07-17 PROCEDURE — 96413 CHEMO IV INFUSION 1 HR: CPT | Performed by: INTERNAL MEDICINE

## 2017-07-17 PROCEDURE — 25010000002 PALONOSETRON PER 25 MCG: Performed by: INTERNAL MEDICINE

## 2017-07-17 RX ORDER — FAMOTIDINE 10 MG/ML
20 INJECTION, SOLUTION INTRAVENOUS ONCE
Status: COMPLETED | OUTPATIENT
Start: 2017-07-17 | End: 2017-07-17

## 2017-07-17 RX ORDER — PALONOSETRON 0.05 MG/ML
0.25 INJECTION, SOLUTION INTRAVENOUS ONCE
Status: COMPLETED | OUTPATIENT
Start: 2017-07-17 | End: 2017-07-17

## 2017-07-17 RX ORDER — SODIUM CHLORIDE 9 MG/ML
250 INJECTION, SOLUTION INTRAVENOUS ONCE
Status: CANCELLED | OUTPATIENT
Start: 2017-07-17

## 2017-07-17 RX ORDER — PALONOSETRON 0.05 MG/ML
0.25 INJECTION, SOLUTION INTRAVENOUS ONCE
Status: CANCELLED | OUTPATIENT
Start: 2017-07-17

## 2017-07-17 RX ORDER — SODIUM CHLORIDE 0.9 % (FLUSH) 0.9 %
10 SYRINGE (ML) INJECTION AS NEEDED
Status: CANCELLED | OUTPATIENT
Start: 2017-07-17

## 2017-07-17 RX ORDER — FAMOTIDINE 10 MG/ML
20 INJECTION, SOLUTION INTRAVENOUS ONCE
Status: CANCELLED | OUTPATIENT
Start: 2017-07-17

## 2017-07-17 RX ORDER — SODIUM CHLORIDE 9 MG/ML
250 INJECTION, SOLUTION INTRAVENOUS ONCE
Status: COMPLETED | OUTPATIENT
Start: 2017-07-17 | End: 2017-07-17

## 2017-07-17 RX ORDER — SODIUM CHLORIDE 0.9 % (FLUSH) 0.9 %
10 SYRINGE (ML) INJECTION AS NEEDED
Status: DISCONTINUED | OUTPATIENT
Start: 2017-07-17 | End: 2017-07-17 | Stop reason: HOSPADM

## 2017-07-17 RX ADMIN — FAMOTIDINE 20 MG: 10 INJECTION, SOLUTION INTRAVENOUS at 10:19

## 2017-07-17 RX ADMIN — DIPHENHYDRAMINE HYDROCHLORIDE 25 MG: 50 INJECTION INTRAMUSCULAR; INTRAVENOUS at 10:30

## 2017-07-17 RX ADMIN — Medication 10 ML: at 07:58

## 2017-07-17 RX ADMIN — Medication 10 ML: at 13:41

## 2017-07-17 RX ADMIN — SODIUM CHLORIDE, PRESERVATIVE FREE 500 UNITS: 5 INJECTION INTRAVENOUS at 13:41

## 2017-07-17 RX ADMIN — PACLITAXEL 85 MG: 6 INJECTION, SOLUTION INTRAVENOUS at 11:33

## 2017-07-17 RX ADMIN — PALONOSETRON HYDROCHLORIDE 0.25 MG: 0.25 INJECTION INTRAVENOUS at 10:05

## 2017-07-17 RX ADMIN — CARBOPLATIN 290 MG: 10 INJECTION, SOLUTION INTRAVENOUS at 12:43

## 2017-07-17 RX ADMIN — DEXAMETHASONE SODIUM PHOSPHATE 12 MG: 10 INJECTION, SOLUTION INTRAMUSCULAR; INTRAVENOUS at 11:01

## 2017-07-17 RX ADMIN — SODIUM CHLORIDE 250 ML: 9 INJECTION, SOLUTION INTRAVENOUS at 10:00

## 2017-07-17 NOTE — PROGRESS NOTES
DATE OF VISIT: 7/17/2017    REASON FOR VISIT:  Squamous cell cancer of left lung, stage IIB    HISTORY OF PRESENT ILLNESS:    57-year-old male with a past medical history significant for duodenal ulcer, extensive nicotine addiction with more than 45-pack-year smoking history, history of alcohol abuse was seen in consultation on May 3, 2017 for newly diagnosed stage IIB squamous cell cancer of Of left lung.  Patient was started on concurrent chemoradiation on June 5, 2017.  He is here to get his  7th and final weekly of carboplatin and Taxol today on 07/17/17.  Still complains of pain in the left upper chest wall area.    Denies any blood in the stool or urine.  Denies any excessive diarrhea.        PAST MEDICAL HISTORY:    Past Medical History:   Diagnosis Date   • Anemia    • Aortic stenosis, mild    • Arthritis    • Chronic bronchitis    • Duodenal ulcer disease    • Inguinal hernia, left    • Lung cancer    • Mitral valve regurgitation        SOCIAL HISTORY:    Social History   Substance Use Topics   • Smoking status: Current Every Day Smoker     Packs/day: 1.00     Years: 40.00     Types: Cigarettes   • Smokeless tobacco: Former User      Comment: Reduced at half a pack per day and weanning off   • Alcohol use 8.4 oz/week     14 Cans of beer per week      Comment: 6 pk/dly       Surgical History :  Past Surgical History:   Procedure Laterality Date   • APPENDECTOMY      open   • BRONCHOSCOPY N/A 3/8/2017    Procedure: BRONCHOSCOPY with possible bronchoalveolar lavage, +/- brush, +/- needle biopsy, +/- endobronchial biopsy;  Surgeon: Candis Lr MD;  Location: [facility], [place] ENDOSCOPY;  Service:    • EXPLORATORY LAPAROTOMY  11/17/2014    Exploratory laparotomy, repair of duodenal ulcer, modified Carlos patch   • INGUINAL HERNIA REPAIR  09/13/2013    Open repair of an indirect left inguinal hernia. Left inguinal hernia.   • MI INSJ TUNNELED CVC W/O SUBQ PORT/ AGE 6 YR/> N/A 5/10/2017    Procedure: TUNNELED CENTRAL  "VENOUS CATHETER  WITH SUBCUTANEOUS  PORT,FLUOROSCOPIC GUIDANCE ;  Surgeon: Angel Hernandez MD;  Location: [facility];  Service: Cardiothoracic   • THORACOTOMY Left 4/19/2017    Procedure: THORACOTOMY LEFT WITH PULMONARY RESECTION AND BRONCHOSCOPY WITH ENDO ;  Surgeon: Angel Hernandez MD;  Location: [facility];  Service:        ALLERGIES:    No Known Allergies    REVIEW OF SYSTEMS:      CONSTITUTIONAL:  Complains of fatigue. Denies any fever, chills or weight loss.      HEENT: States at end of week starts having difficulty with swallowing which improves by Monday.No epistaxis, mouth sores or difficulty swallowing.     RESPIRATORY: Denies any shortness of breath or cough today.     CARDIOVASCULAR: Complains of chest pain on the left side since surgery.  No palpitation.     GASTROINTESTINAL: No abdominal pain nausea, vomiting or blood in the stool.     GENITOURINARY: No Dysuria or Hematuria.     MUSCULOSKELETAL: No new back pain or arthralgia..     LYMPHATICS: Denies any abnormal swollen glands anywhere in the body.     NEUROLOGICAL : No tingling or numbness. No headache or dizziness. No seizures or balance problems.     SKIN: Complains Of burning and skin rash along left scapula.         PHYSICAL EXAMINATION:      VITAL SIGNS:  /94  Pulse 103  Temp 97.7 °F (36.5 °C)  Resp 16  Ht 68\" (172.7 cm)  Wt 119 lb 3.2 oz (54.1 kg)  BMI 18.12 kg/m2    GENERAL:  Not in any distress.    HEENT:  Normocephalic, Atraumatic.Mild Conjunctival pallor. No icterus. Extraocular Movements Intact. No Facial Asymmetry noted.    NECK:  No adenopathy. No JVD.    RESPIRATORY:  Fair air entry bilateral. No rhonchi or wheezing.    CARDIOVASCULAR:  S1, S2. Regular rate and rhythm. No murmur or gallop appreciated.    ABDOMEN:  Soft,  nontender. Bowel sounds present in all four quadrants.  No organomegaly appreciated.    EXTREMITIES:  No edema.No Calf Tenderness.    NEUROLOGIC:  Alert, awake and oriented ×3.  No  Motor or " sensory deficit appreciated. Cranial Nerves 2-12 grossly intact.    SKIN: Dry desquamation and skin darkening present along left scapula.        DIAGNOSTIC DATA:    Glucose   Date Value Ref Range Status   07/17/2017 163 (H) 60 - 100 mg/dL Final     Glucose, Arterial   Date Value Ref Range Status   04/19/2017 176 mmol/L Final     Sodium   Date Value Ref Range Status   07/17/2017 137 137 - 145 mmol/L Final     Potassium   Date Value Ref Range Status   07/17/2017 3.8 3.5 - 5.1 mmol/L Final     CO2   Date Value Ref Range Status   07/17/2017 24.0 22.0 - 31.0 mmol/L Final     Chloride   Date Value Ref Range Status   07/17/2017 100 95 - 110 mmol/L Final     Anion Gap   Date Value Ref Range Status   07/17/2017 13.0 5.0 - 15.0 mmol/L Final     Creatinine   Date Value Ref Range Status   07/17/2017 0.53 (L) 0.70 - 1.30 mg/dL Final     BUN   Date Value Ref Range Status   07/17/2017 8 7 - 21 mg/dL Final     BUN/Creatinine Ratio   Date Value Ref Range Status   07/17/2017 15.1 7.0 - 25.0 Final     Calcium   Date Value Ref Range Status   07/17/2017 9.1 8.4 - 10.2 mg/dL Final     eGFR Non  Amer   Date Value Ref Range Status   07/17/2017 >150 >60 mL/min/1.73 Final     Alkaline Phosphatase   Date Value Ref Range Status   07/17/2017 69 38 - 126 U/L Final     Total Protein   Date Value Ref Range Status   07/17/2017 7.3 6.3 - 8.6 g/dL Final     ALT (SGPT)   Date Value Ref Range Status   07/17/2017 32 21 - 72 U/L Final     AST (SGOT)   Date Value Ref Range Status   07/17/2017 22 17 - 59 U/L Final     Total Bilirubin   Date Value Ref Range Status   07/17/2017 0.3 0.2 - 1.3 mg/dL Final     Albumin   Date Value Ref Range Status   07/17/2017 4.10 3.40 - 4.80 g/dL Final     Globulin   Date Value Ref Range Status   07/17/2017 3.2 2.3 - 3.5 gm/dL Final     A/G Ratio   Date Value Ref Range Status   07/17/2017 1.3 1.1 - 1.8 g/dL Final     Lab Results   Component Value Date    WBC 2.25 (L) 07/17/2017    HGB 11.7 (L) 07/17/2017    HCT 34.3  (L) 07/17/2017    MCV 86.4 07/17/2017     07/17/2017     Lab Results   Component Value Date    NEUTROABS 1.52 (L) 07/17/2017    IRON 12 (L) 05/03/2017    TIBC 322 05/03/2017    LABIRON 4 (L) 05/03/2017    FERRITIN 56.80 05/03/2017    [id number] 562 05/03/2017    FOLATE >20.00 05/03/2017         PATHOLOGY:  Pathology :  Pathology Report from April 19, 2017 showed:       SPECIMEN   Specimen   Lobe(s) of lung   Lobes of Lung   Upper lobe   Procedure   Lobectomy   Specimen Laterality   Left   TUMOR   Primary Tumor Site   Upper lobe   Histologic Type   Squamous cell carcinoma   Histologic Grade   G2: Moderately differentiated   Tumor Size   Greatest dimension (cm): 8 cm   Additional Dimension (cm)   7 cm       5 cm   Tumor Focality   Unifocal   Visceral Pleura Invasion   Present   Tumor Extension   Parietal pleura   Lymph-Vascular Invasion   Not identified   MARGINS   Bronchial Margin   Uninvolved by invasive carcinoma and carcinoma in situ   Vascular Margin   Uninvolved by invasive carcinoma   Parenchymal Margin   Involved by invasive carcinoma   LYMPH NODES   Regional Lymph Nodes       Number of Lymph Nodes Examined   Specify number: 6   Gene Stations Examined   5: Subaortic/ aortopulmonary (AP) / AP window       11L: Interlobar   Lymph Node Involvement   No nodes involved   STAGE (pTNM)   Primary Tumor (pT)   pT3: Tumor greater than 7 cm in greatest dimension; or Tumor of any size that directly invades any of the following: parietal pleural chest wall (including superior sulcus tumors), diaphragm, phrenic nerve, mediastinal pleura, parietal pericardium; or Tumor of any size in the main bronchus less than 2 cm distal to the petar but without involvement of the petar; or Tumor of any size associated with atelectasis or obstructive pneumonitis of the entire lung; or Tumors of any size with separate tmor nodule(s) in same lobe   Regional Lymph Nodes (pN)   pN0: No regional lymph node metastasis   ADDITIONAL  FINDINGS   Additional Pathologic Finding(s)   Other (specify): Fungal hyphae consistent with Aspergillus species in the cavity contents   .          RADIOLOGY DATA :  PET/CT done on May 22, 2017 showed:  IMPRESSION:  CONCLUSION:  1. Large Pancoast tumor involving left apex with significant  involvement of the periphery, pleura, portions of the chest wall  and contiguous involvement, destruction of at least one of the  anterior left upper ribs.     PET/CT imaging study is otherwise unremarkable. No evidence of  any distant metastases.        ASSESSMENT AND PLAN:      1.  Squamous cell cancer of left upper lobe, stage IIB, T3 N0 M0: Patient is status post surgical excision with positive pleural margin.  As per NCCN guidelines we'll start patient on concurrent chemoradiation with weekly carboplatin and Taxol.  Patient was started on weekly carboplatin and Taxol with radiation on June 50,017.  Patient is here to get here seventh and final weekly dose of carboplatin and Taxol today on June 17, 2017.  Patient is also finishing his radiation today.  We will see him back in about 3 months with a repeat CBC and CMP on that day.  Patient will need CT scanning about 6 months from now for surveillance purpose.  We will order a CT scan upon next clinic visit in 3 months.  Patient was encouraged to call us with any unusual side effects from chemotherapy.  It was discussed with patient as per NCCN guidelines surveillance CT scan will be required every 6 months for first 2 years after that we will change it to every year for next 3 years.    2.  Mild leukopenia: Secondary to chemotherapy .Patient's ANC is 1500.  White blood cell is 2.27.  We will go ahead with a final weekly dose of carboplatin and Taxol today.    3.  Iron deficiency anemia secondary to chronic blood loss.  Patient is status post 5 units of PRBC since March 2017.  Patient also received 2 doses of intravenous Feraheme on May 9, 2017 and May 16, 2017.  His hemoglobin  11.8 today, we will monitored with CBC for now.    4.Pulmonary aspergillosis: Patient had a fungus ball consistent with Aspergillus on a cavitary lesion of lung status postsurgical excision.     5. History of duodenal ulcer     6. Health maintenance: Patient was counseled about smoking cessation today, about 3 minutes were spent for smoking cessation counseling. He will need screening colonoscopy no later date. He remains full code.     7. Prescriptions: patient has enough prescription for Zofran.         Gualberto Dowd MD  7/17/2017  8:49 AM        EMR Dragon/Transcription disclaimer:   Much of this encounter note is an electronic transcription/translation of spoken language to printed text. The electronic translation of spoken language may permit erroneous, or at times, nonsensical words or phrases to be inadvertently transcribed; Although I have reviewed the note for such errors, some may still exist.

## 2017-07-17 NOTE — PATIENT INSTRUCTIONS
RADIATION DISCHARGE INSTRUCTIONS    Tino Peres  1960  [id number]    July 17, 2017    · The effects from your radiation treatments will continue for several weeks, so expect them to get better slowly.  · Fatigue (extreme tiredness and weakness) may last for several weeks but will improve slowly.  · Continue to rest as necessary, drink plenty of fluids, and eat nutritious foods as you are able.    Skin Care: Skin reactions will slowly heal.  Follow the skin care instructions provided by your nurse.    Oral Care: Continue any oral care instructions your nurse provided for you if needed.  If you must have dental work or surgery in the treatment area, ask you dentist/physician to call Dr. Hills prior to the procedure.    Diet: [x]  High Calorie, High Protein for 4 weeks      [x]  Continue eating 6 small meals per day for 4 weeks     []  Dietary Supplement 2 times per day     []  Continue soft/liquid diet     []  Low residue-after diarrhea and abdominal cramping have stopped (3-4 weeks), gradually add foods to your diet, one food at a time every 2-3 days.      []  No spicy or foods that are high in citric acid (ex: oranges, grapefruit, tomatoes, pineapple).    Medications: Continue your regular medications.  Take a multi-vitamin daily for 6 months.     []  Follow Dexamethasone (Decadron) prescriptions provided (if applicable).    Maya Parker RN  July 17, 2017  8:00 AM

## 2017-07-25 ENCOUNTER — OFFICE VISIT (OUTPATIENT)
Dept: PAIN MEDICINE | Facility: CLINIC | Age: 57
End: 2017-07-25

## 2017-07-25 VITALS
WEIGHT: 121.9 LBS | HEIGHT: 68 IN | BODY MASS INDEX: 18.47 KG/M2 | SYSTOLIC BLOOD PRESSURE: 160 MMHG | DIASTOLIC BLOOD PRESSURE: 94 MMHG

## 2017-07-25 DIAGNOSIS — Z98.890 HX OF EXPLORATORY THORACOTOMY: ICD-10-CM

## 2017-07-25 DIAGNOSIS — Z79.899 HIGH RISK MEDICATIONS (NOT ANTICOAGULANTS) LONG-TERM USE: ICD-10-CM

## 2017-07-25 DIAGNOSIS — R07.89 LEFT-SIDED CHEST WALL PAIN: Primary | ICD-10-CM

## 2017-07-25 DIAGNOSIS — Z85.118 HX OF CANCER OF LUNG: ICD-10-CM

## 2017-07-25 PROCEDURE — 99214 OFFICE O/P EST MOD 30 MIN: CPT | Performed by: NURSE PRACTITIONER

## 2017-07-25 RX ORDER — OXYCODONE AND ACETAMINOPHEN 7.5; 325 MG/1; MG/1
1 TABLET ORAL 4 TIMES DAILY
Qty: 120 TABLET | Refills: 0 | Status: SHIPPED | OUTPATIENT
Start: 2017-07-25 | End: 2017-08-24

## 2017-07-25 NOTE — PROGRESS NOTES
Tino Peres is a 57 y.o. male.   1960    HPI:   Location: left ant chest wall  Quality: shooting and aching  Severity: 5/10  Timing: constant  Alleviating: pain medication  Aggravating: no particular one reason will set it off     Feels as though left chest wall pain is better.  Out of oxycontin for two weeks and feels as though percocet is helping.  States he has a radiation burn on his back which he is dressing.      The following portions of the patient's history were reviewed by me and updated as appropriate: allergies, current medications, past family history, past medical history, past social history, past surgical history and problem list.    Past Medical History:   Diagnosis Date   • Anemia    • Aortic stenosis, mild    • Arthritis    • Chronic bronchitis    • Duodenal ulcer disease    • Inguinal hernia, left    • Lung cancer    • Mitral valve regurgitation        Social History     Social History   • Marital status: Single     Spouse name: N/A   • Number of children: 2   • Years of education: 14     Occupational History   • production      Social History Main Topics   • Smoking status: Current Every Day Smoker     Packs/day: 1.00     Years: 40.00     Types: Cigarettes   • Smokeless tobacco: Former User      Comment: Reduced at half a pack per day and weanning off   • Alcohol use 8.4 oz/week     14 Cans of beer per week      Comment: 6 pk/dly   • Drug use: No   • Sexual activity: Defer     Other Topics Concern   • Not on file     Social History Narrative    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  Lives alone in [place].  Drinks a 6 pack daily.       Family History   Problem Relation Age of Onset   • Asthma Father    • Cancer Father    • Diabetes Father    • Hypertension Father    • Alcohol abuse Mother    • Heart failure Brother          Current Outpatient Prescriptions:   •  famotidine (PEPCID) 20 MG tablet, Take 1 tablet by mouth 2 (Two) Times a Day., Disp: 60  tablet, Rfl: 0  •  ferrous sulfate 324 (65 FE) MG tablet delayed-release EC tablet, Take 1 tablet by mouth Daily With Breakfast., Disp: 30 tablet, Rfl: 1  •  ibuprofen (ADVIL,MOTRIN) 200 MG tablet, Take 2 tablets by mouth Every 12 (Twelve) Hours As Needed for Mild Pain (1-3) (Postop pain)., Disp: 30 tablet, Rfl: 0  •  Multiple Vitamins-Minerals (MULTIVITAMIN ADULT PO), Take 1 tablet by mouth Daily., Disp: , Rfl:   •  ondansetron (ZOFRAN) 4 MG tablet, Take 1 tablet by mouth 4 (Four) Times a Day As Needed for Nausea or Vomiting., Disp: 40 tablet, Rfl: 3  •  oxyCODONE (oxyCONTIN) 10 MG 12 hr tablet, Take 1 tablet by mouth Every 12 (Twelve) Hours., Disp: 30 tablet, Rfl: 0  •  oxyCODONE-acetaminophen (PERCOCET) 7.5-325 MG per tablet, TAKE ONE TABLET BY MOUTH FOUR TIMES A DAY AS NEEDED FOR BREAKTHROUGH PAIN, Disp: , Rfl: 0  •  oxyCODONE-acetaminophen (ROXICET) 5-325 MG per tablet, Take 1 tablet by mouth Every 6 (Six) Hours As Needed for Severe Pain (7-10)., Disp: 60 tablet, Rfl: 0  •  silver sulfadiazine (SILVADENE, SSD) 1 % cream, Apply  topically 2 (Two) Times a Day., Disp: 50 g, Rfl: 2  •  oxyCODONE-acetaminophen (PERCOCET) 7.5-325 MG per tablet, Take 1 tablet by mouth 4 (Four) Times a Day for 30 days., Disp: 120 tablet, Rfl: 0  •  oxyCODONE-acetaminophen (PERCOCET) 7.5-325 MG per tablet, Take 1 tablet by mouth 4 (Four) Times a Day for 30 days., Disp: 120 tablet, Rfl: 0    No Known Allergies    Review of Systems   Musculoskeletal:        Left ant. Chest wall pain   All other systems reviewed and are negative.    All systems reviewed and negative except for above.    Physical Exam   Constitutional: He appears well-developed and well-nourished. No distress.   Pulmonary/Chest:   ttp left ant chest wall pain.   Neurological: He is alert.   Skin:   Healing burn left post chest.    Psychiatric: He has a normal mood and affect. His behavior is normal. Judgment normal.       Tino was seen today for chest pain.    Diagnoses and  all orders for this visit:    Left-sided chest wall pain    Hx of cancer of lung    Hx of exploratory thoracotomy    High risk medications (not anticoagulants) long-term use    Other orders  -     oxyCODONE-acetaminophen (PERCOCET) 7.5-325 MG per tablet; Take 1 tablet by mouth 4 (Four) Times a Day for 30 days.  -     oxyCODONE-acetaminophen (PERCOCET) 7.5-325 MG per tablet; Take 1 tablet by mouth 4 (Four) Times a Day for 30 days.        Medication: Patient reports no negative side effects, Patient reports appropriate usage and storage habits, Patient's opioid provides enough reflief to be more active and perform activities of daily living with less discomfort. and Refill opioid medication as above.    Interventional: none at this time    Rehab: none at this time    Behavioral: No aberrant behavior noted. DEVONTE Report [id number]  was reviewed and is consistent with stated history    Urine drug screen Reviewed from last visit and is inappropriately positive for THC.  States he had been using this for appetite.  Had discussion about aberrant behavior and possible consequences.          This document has been electronically signed by Sandeep Agustin MD on July 25, 2017 12:08 PM

## 2017-07-26 NOTE — PROGRESS NOTES
Radiation Completion Note       Patient: Tino Peres   YOB: 1960   Medical Record Number: [id number]   Date of Completion: 7/17/2017     Summary: Mr. Peres completed radiation therapy today in our department. The following is a summary of his course of treatment.    Diagnosis: Stage IIB (pT3 pN0 Mx) moderately differentiated squamous cell carcinoma of the SILVANA lung, status post lobectomy with positive margins.  ICD 10 Code: C34.12    Treatment course: Mr. Peres received 6000 cGy to the postoperative SILVANA lung in 30 fractions over 42 elapsed days, from 6/5/17 through 7/17/17, utilizing 3-D conformal external beam radiation therapy and 6 & 18 MV photons. He completed his radiation therapy as prescribed, with no significant treatment breaks. He  received concurrent carboplatin/Taxol chemotherapy per Dr. Dowd    Response/Tolerance: Mr. Peres tolerated his radiation therapy well and had no significant treatment-related complaints. Today upon completion, his left upper chest wall pain has improved when compared to the started XRT. He has mild-moderate skin tenderness overlying the left scapula, with associated moderate erythema and small areas of dry desquamation.    Disposition: Mr. Peres is scheduled to follow-up with Dr. Dowd later today, and with pain medicine on 7/25/17. He is scheduled to have a CT of the chest in November and follow-up with Dr. Hernandez at that time. We plan to see the patient back in our clinic for follow-up in 8 weeks.        Jorge Alberto Hills MD  Radiation Oncology    Electronically signed by Jorge Alberto Hills MD 7/17/2017 2:42 PM

## 2017-09-12 ENCOUNTER — OFFICE VISIT (OUTPATIENT)
Dept: RADIATION ONCOLOGY | Facility: HOSPITAL | Age: 57
End: 2017-09-12

## 2017-09-12 ENCOUNTER — HOSPITAL ENCOUNTER (OUTPATIENT)
Dept: RADIATION ONCOLOGY | Facility: HOSPITAL | Age: 57
Setting detail: RADIATION/ONCOLOGY SERIES
End: 2017-09-12

## 2017-09-12 VITALS
BODY MASS INDEX: 19.13 KG/M2 | TEMPERATURE: 98.8 F | SYSTOLIC BLOOD PRESSURE: 194 MMHG | HEART RATE: 105 BPM | RESPIRATION RATE: 20 BRPM | WEIGHT: 125.8 LBS | DIASTOLIC BLOOD PRESSURE: 90 MMHG

## 2017-09-12 DIAGNOSIS — C34.12 MALIGNANT NEOPLASM OF UPPER LOBE OF LEFT LUNG (HCC): Primary | ICD-10-CM

## 2017-09-12 PROCEDURE — G0463 HOSPITAL OUTPT CLINIC VISIT: HCPCS | Performed by: RADIOLOGY

## 2017-09-12 PROCEDURE — 99212 OFFICE O/P EST SF 10 MIN: CPT | Performed by: RADIOLOGY

## 2017-09-12 NOTE — PROGRESS NOTES
Progress Note      Patient: Tino Peres   YOB: 1960   Medical Record Number: [id number]   Date of Visit  September 12, 2017   Primary Diagnosis: Malignant neoplasm of left lung    Staging form: Lung, AJCC V7      Clinical: Stage IIB (T3, N0, M0) - Signed by Gualberto Dowd MD on 5/3/2017    No primary diagnosis found.    History of present illness: Mr. Tino Peres returns to our clinic today for a routine follow-up exam. He is a 57-year-old white male who is now 2 months status post completion of adjuvant radiation therapy along with concurrent chemotherapy following a left upper lobectomy with positive margins for Stage IIB (T3 N0 M0) squamous cell carcinoma of the SILVANA lung. He underwent a lobectomy with positive margins on 4/19/17. He then received adjuvant chemo/XRT, receiving 6000 cGy in 30 fractions to the thorax, along with concurrent carboplatin/Taxol chemotherapy. He completed his radiation therapy on 7/17/17, and returns to our clinic today for his first follow-up visit since completion of therapy. He was last seen in follow-up by Dr. Dowd on 7/17/17. He continues to follow with Dr. Sandeep Agustin in pain management, and was last seen in his clinic for follow-up on 7/25/17. He has not seen Dr. Hernandez for follow-up since completion of chemo/XRT.    Today on exam the patient states that he is doing well and has no new disease or treatment-related complaints. He continues to have left chest wall pain, but this has significantly improved since completion of XRT. He has occasional cough and shortness of breath, but these too have improved. He states that he has a good appetite and energy levels. He also states that he has a good libido.                                   Review of Systems   Respiratory: Positive for cough and shortness of breath.      The remainder of his review of systems is otherwise negative.    Pain:    Pain Score    09/12/17 1519   PainSc:   3   PainLoc: Chest          Physical Exam:  Vitals:     Vitals:    09/12/17 1519   BP: (!) 194/90   Pulse: 105   Resp: 20   Temp: 98.8 °F (37.1 °C)       Weight:   Wt Readings from Last 3 Encounters:   09/12/17 125 lb 12.8 oz (57.1 kg)   07/25/17 121 lb 14.4 oz (55.3 kg)   07/17/17 119 lb 3.2 oz (54.1 kg)     Constitutional: The patient is a well-developed, well-nourished white male in no acute distress.  Alert and oriented ×3.  Lymphatics: No cervical, supraclavicular, axillary, or inguinal lymphadenopathy is palpated.  CV: Regular rate and rhythm.  No murmurs, rubs, or gallops are appreciated.  Respiratory: Lungs clear to auscultation.  Breath sounds equal bilaterally.  Extremities: No clubbing, cyanosis, or edema.  Neurologic: Cranial nerves II through XII are grossly intact, with no focal neurological deficits noted on exam.  Psychiatric: Alert and oriented x3. Normal affect, with no anxiety or depression noted.    Radiographs : No recent scans for review     Assessment/Plan: Mr. Tino Peres is a 57-year-old white male who is now 2 months status post completion of adjuvant radiation therapy along with concurrent chemotherapy following a left upper lobectomy with positive margins for Stage IIB (T3 N0 M0) squamous cell carcinoma of the SILVAAN lung. He is doing well and has no evidence of recurrent or metastatic disease at this time. He has follow-up scheduled with Dr. Dowd on 10/17/17 and with Dr. Agustin on 9/21/17. Both Dr. Hernandez and Dr. Dowd have mentioned plans to repeat a CT of the chest in 6 months (November vs January). We plan to see the patient back in our clinic for routine follow-up in 6 months. He was instructed follow-up with his PCP regarding his elevated blood pressure.    Jorge Alberto Hills MD  Radiation Oncology    Electronically signed by Jorge Alberto Hills MD  9/12/2017 3:21 PM     Cc: Dr. Padilla Agustin

## 2017-09-21 ENCOUNTER — OFFICE VISIT (OUTPATIENT)
Dept: PAIN MEDICINE | Facility: CLINIC | Age: 57
End: 2017-09-21

## 2017-09-21 VITALS
HEIGHT: 69 IN | DIASTOLIC BLOOD PRESSURE: 98 MMHG | WEIGHT: 124.8 LBS | SYSTOLIC BLOOD PRESSURE: 160 MMHG | BODY MASS INDEX: 18.48 KG/M2

## 2017-09-21 DIAGNOSIS — R07.89 LEFT-SIDED CHEST WALL PAIN: Primary | ICD-10-CM

## 2017-09-21 DIAGNOSIS — Z79.899 HIGH RISK MEDICATIONS (NOT ANTICOAGULANTS) LONG-TERM USE: ICD-10-CM

## 2017-09-21 DIAGNOSIS — Z98.890 HX OF EXPLORATORY THORACOTOMY: ICD-10-CM

## 2017-09-21 DIAGNOSIS — Z85.118 HX OF CANCER OF LUNG: ICD-10-CM

## 2017-09-21 PROCEDURE — 99213 OFFICE O/P EST LOW 20 MIN: CPT | Performed by: PAIN MEDICINE

## 2017-09-21 RX ORDER — OXYCODONE AND ACETAMINOPHEN 7.5; 325 MG/1; MG/1
1 TABLET ORAL 4 TIMES DAILY
Qty: 120 TABLET | Refills: 0 | Status: SHIPPED | OUTPATIENT
Start: 2017-09-21 | End: 2017-10-17 | Stop reason: SDUPTHER

## 2017-09-21 RX ORDER — OXYCODONE AND ACETAMINOPHEN 7.5; 325 MG/1; MG/1
1 TABLET ORAL 4 TIMES DAILY
Qty: 120 TABLET | Refills: 0 | Status: SHIPPED | OUTPATIENT
Start: 2017-09-21 | End: 2017-10-21

## 2017-10-17 ENCOUNTER — INFUSION (OUTPATIENT)
Dept: ONCOLOGY | Facility: HOSPITAL | Age: 57
End: 2017-10-17

## 2017-10-17 ENCOUNTER — OFFICE VISIT (OUTPATIENT)
Dept: ONCOLOGY | Facility: CLINIC | Age: 57
End: 2017-10-17

## 2017-10-17 VITALS
TEMPERATURE: 97.8 F | DIASTOLIC BLOOD PRESSURE: 81 MMHG | HEART RATE: 72 BPM | RESPIRATION RATE: 16 BRPM | OXYGEN SATURATION: 98 % | WEIGHT: 125.9 LBS | HEIGHT: 69 IN | SYSTOLIC BLOOD PRESSURE: 141 MMHG | BODY MASS INDEX: 18.65 KG/M2

## 2017-10-17 DIAGNOSIS — Z45.2 ENCOUNTER FOR VENOUS ACCESS DEVICE CARE: ICD-10-CM

## 2017-10-17 DIAGNOSIS — C34.12 MALIGNANT NEOPLASM OF UPPER LOBE OF LEFT LUNG (HCC): ICD-10-CM

## 2017-10-17 DIAGNOSIS — D75.89 MACROCYTOSIS: Primary | ICD-10-CM

## 2017-10-17 DIAGNOSIS — C34.12 MALIGNANT NEOPLASM OF UPPER LOBE OF LEFT LUNG (HCC): Primary | ICD-10-CM

## 2017-10-17 LAB
ALBUMIN SERPL-MCNC: 4.2 G/DL (ref 3.4–4.8)
ALBUMIN/GLOB SERPL: 1.5 G/DL (ref 1.1–1.8)
ALP SERPL-CCNC: 62 U/L (ref 38–126)
ALT SERPL W P-5'-P-CCNC: 26 U/L (ref 21–72)
ANION GAP SERPL CALCULATED.3IONS-SCNC: 11 MMOL/L (ref 5–15)
AST SERPL-CCNC: 27 U/L (ref 17–59)
BASOPHILS # BLD AUTO: 0.05 10*3/MM3 (ref 0–0.2)
BASOPHILS NFR BLD AUTO: 0.8 % (ref 0–2)
BILIRUB SERPL-MCNC: 0.3 MG/DL (ref 0.2–1.3)
BUN BLD-MCNC: 13 MG/DL (ref 7–21)
BUN/CREAT SERPL: 21 (ref 7–25)
CALCIUM SPEC-SCNC: 8.8 MG/DL (ref 8.4–10.2)
CHLORIDE SERPL-SCNC: 104 MMOL/L (ref 95–110)
CO2 SERPL-SCNC: 27 MMOL/L (ref 22–31)
CREAT BLD-MCNC: 0.62 MG/DL (ref 0.7–1.3)
DEPRECATED RDW RBC AUTO: 45.3 FL (ref 35.1–43.9)
EOSINOPHIL # BLD AUTO: 0.29 10*3/MM3 (ref 0–0.7)
EOSINOPHIL NFR BLD AUTO: 4.9 % (ref 0–7)
ERYTHROCYTE [DISTWIDTH] IN BLOOD BY AUTOMATED COUNT: 12 % (ref 11.5–14.5)
FOLATE SERPL-MCNC: >20 NG/ML (ref 2.76–21)
GFR SERPL CREATININE-BSD FRML MDRD: 134 ML/MIN/1.73 (ref 56–130)
GLOBULIN UR ELPH-MCNC: 2.8 GM/DL (ref 2.3–3.5)
GLUCOSE BLD-MCNC: 101 MG/DL (ref 60–100)
HCT VFR BLD AUTO: 38.7 % (ref 39–49)
HGB BLD-MCNC: 13.3 G/DL (ref 13.7–17.3)
IMM GRANULOCYTES # BLD: 0.02 10*3/MM3 (ref 0–0.02)
IMM GRANULOCYTES NFR BLD: 0.3 % (ref 0–0.5)
LYMPHOCYTES # BLD AUTO: 0.9 10*3/MM3 (ref 0.6–4.2)
LYMPHOCYTES NFR BLD AUTO: 15.2 % (ref 10–50)
MCH RBC QN AUTO: 35.4 PG (ref 26.5–34)
MCHC RBC AUTO-ENTMCNC: 34.4 G/DL (ref 31.5–36.3)
MCV RBC AUTO: 102.9 FL (ref 80–98)
MONOCYTES # BLD AUTO: 0.82 10*3/MM3 (ref 0–0.9)
MONOCYTES NFR BLD AUTO: 13.8 % (ref 0–12)
NEUTROPHILS # BLD AUTO: 3.85 10*3/MM3 (ref 2–8.6)
NEUTROPHILS NFR BLD AUTO: 65 % (ref 37–80)
NRBC BLD MANUAL-RTO: 0 /100 WBC (ref 0–0)
PLATELET # BLD AUTO: 288 10*3/MM3 (ref 150–450)
PMV BLD AUTO: 8.5 FL (ref 8–12)
POTASSIUM BLD-SCNC: 4.1 MMOL/L (ref 3.5–5.1)
PROT SERPL-MCNC: 7 G/DL (ref 6.3–8.6)
RBC # BLD AUTO: 3.76 10*6/MM3 (ref 4.37–5.74)
SODIUM BLD-SCNC: 142 MMOL/L (ref 137–145)
VIT B12 BLD-MCNC: 465 PG/ML (ref 239–931)
WBC NRBC COR # BLD: 5.93 10*3/MM3 (ref 3.2–9.8)

## 2017-10-17 PROCEDURE — 80053 COMPREHEN METABOLIC PANEL: CPT

## 2017-10-17 PROCEDURE — 99214 OFFICE O/P EST MOD 30 MIN: CPT | Performed by: INTERNAL MEDICINE

## 2017-10-17 PROCEDURE — 82746 ASSAY OF FOLIC ACID SERUM: CPT | Performed by: INTERNAL MEDICINE

## 2017-10-17 PROCEDURE — 85025 COMPLETE CBC W/AUTO DIFF WBC: CPT

## 2017-10-17 PROCEDURE — 82607 VITAMIN B-12: CPT | Performed by: INTERNAL MEDICINE

## 2017-10-17 PROCEDURE — 25010000002 HEPARIN FLUSH (PORCINE) 100 UNIT/ML SOLUTION: Performed by: INTERNAL MEDICINE

## 2017-10-17 PROCEDURE — 36591 DRAW BLOOD OFF VENOUS DEVICE: CPT | Performed by: INTERNAL MEDICINE

## 2017-10-17 PROCEDURE — G0463 HOSPITAL OUTPT CLINIC VISIT: HCPCS | Performed by: INTERNAL MEDICINE

## 2017-10-17 RX ORDER — FOLIC ACID 1 MG/1
1 TABLET ORAL DAILY
Qty: 90 TABLET | Refills: 1 | Status: SHIPPED | OUTPATIENT
Start: 2017-10-17 | End: 2018-02-21 | Stop reason: SDDI

## 2017-10-17 RX ORDER — LANOLIN ALCOHOL/MO/W.PET/CERES
1000 CREAM (GRAM) TOPICAL DAILY
Qty: 90 TABLET | Refills: 1 | Status: SHIPPED | OUTPATIENT
Start: 2017-10-17 | End: 2018-01-01

## 2017-10-17 RX ORDER — SODIUM CHLORIDE 0.9 % (FLUSH) 0.9 %
10 SYRINGE (ML) INJECTION AS NEEDED
Status: CANCELLED | OUTPATIENT
Start: 2017-10-17

## 2017-10-17 RX ORDER — SODIUM CHLORIDE 0.9 % (FLUSH) 0.9 %
10 SYRINGE (ML) INJECTION AS NEEDED
Status: DISCONTINUED | OUTPATIENT
Start: 2017-10-17 | End: 2017-10-17 | Stop reason: HOSPADM

## 2017-10-17 RX ADMIN — SODIUM CHLORIDE, PRESERVATIVE FREE 500 UNITS: 5 INJECTION INTRAVENOUS at 08:08

## 2017-10-17 RX ADMIN — Medication 10 ML: at 07:36

## 2017-10-17 NOTE — PROGRESS NOTES
DATE OF VISIT: 10/17/2017    REASON FOR VISIT:  Squamous cell cancer of left lung, stage IIB    HISTORY OF PRESENT ILLNESS:    57-year-old male with a past medical history significant for duodenal ulcer, extensive nicotine addiction with more than 45-pack-year smoking history, history of alcohol abuse was seen in consultation on May 3, 2017 for newly diagnosed stage IIB squamous cell cancer of Of left lung.  Patient finished concurrent chemoradiation on July 17, 2017.  Follow-up visit today.  Denies any new chest pain or cough or shortness of breath.  Still complains of pain on the left upper chest wall.       PAST MEDICAL HISTORY:    Past Medical History:   Diagnosis Date   • Anemia    • Aortic stenosis, mild    • Arthritis    • Chronic bronchitis    • Duodenal ulcer disease    • Inguinal hernia, left    • Lung cancer    • Mitral valve regurgitation        SOCIAL HISTORY:    Social History   Substance Use Topics   • Smoking status: Current Every Day Smoker     Packs/day: 1.00     Years: 40.00     Types: Cigarettes   • Smokeless tobacco: Former User      Comment: Reduced at half a pack per day and weanning off   • Alcohol use 8.4 oz/week     14 Cans of beer per week      Comment: 6 pk/dly       Surgical History :  Past Surgical History:   Procedure Laterality Date   • APPENDECTOMY      open   • BRONCHOSCOPY N/A 3/8/2017    Procedure: BRONCHOSCOPY with possible bronchoalveolar lavage, +/- brush, +/- needle biopsy, +/- endobronchial biopsy;  Surgeon: Candis Lr MD;  Location: [facility] ENDOSCOPY;  Service:    • EXPLORATORY LAPAROTOMY  11/17/2014    Exploratory laparotomy, repair of duodenal ulcer, modified Carlos patch   • INGUINAL HERNIA REPAIR  09/13/2013    Open repair of an indirect left inguinal hernia. Left inguinal hernia.   • [place] INSJ TUNNELED CVC W/O SUBQ PORT/ AGE 6 YR/> N/A 5/10/2017    Procedure: TUNNELED CENTRAL VENOUS CATHETER  WITH SUBCUTANEOUS  PORT,FLUOROSCOPIC GUIDANCE ;  Surgeon: Angel Causey  "MD Mary;  Location: [facility];  Service: Cardiothoracic   • THORACOTOMY Left 4/19/2017    Procedure: THORACOTOMY LEFT WITH PULMONARY RESECTION AND BRONCHOSCOPY WITH ENDO ;  Surgeon: Angel Hernandez MD;  Location: [facility];  Service:        ALLERGIES:    No Known Allergies    REVIEW OF SYSTEMS:      CONSTITUTIONAL:  States fatigue and appetite is improved. Denies any fever, chills or weight loss.      HEENT: No epistaxis, mouth sores or difficulty swallowing.     RESPIRATORY: Denies any shortness of breath or cough today.     CARDIOVASCULAR: Complains of chest pain on the left side since surgery.  No palpitation.     GASTROINTESTINAL: No abdominal pain nausea, vomiting or blood in the stool.     GENITOURINARY: No Dysuria or Hematuria.     MUSCULOSKELETAL: No new back pain or arthralgia..     LYMPHATICS: Denies any abnormal swollen glands anywhere in the body.     NEUROLOGICAL : No tingling or numbness. No headache or dizziness. No seizures or balance problems.     SKIN: No new skin lesions.         PHYSICAL EXAMINATION:      VITAL SIGNS:  /81  Pulse 72  Temp 97.8 °F (36.6 °C) (Temporal Artery )   Resp 16  Ht 69\" (175.3 cm)  Wt 125 lb 14.4 oz (57.1 kg)  SpO2 98%  BMI 18.59 kg/m2    GENERAL:  Not in any distress.    HEENT:  Normocephalic, Atraumatic.Mild Conjunctival pallor. No icterus. Extraocular Movements Intact. No Facial Asymmetry noted.    NECK:  No adenopathy. No JVD.    RESPIRATORY:  Fair air entry bilateral. No rhonchi or wheezing.    CARDIOVASCULAR:  S1, S2. Regular rate and rhythm. No murmur or gallop appreciated.    ABDOMEN:  Soft,  nontender. Bowel sounds present in all four quadrants.  No organomegaly appreciated.    EXTREMITIES:  No edema.No Calf Tenderness.    NEUROLOGIC:  Alert, awake and oriented ×3.  No  Motor or sensory deficit appreciated. Cranial Nerves 2-12 grossly intact.            DIAGNOSTIC DATA:    Glucose   Date Value Ref Range Status   10/17/2017 101 (H) 60 - 100 " mg/dL Final     Glucose, Arterial   Date Value Ref Range Status   04/19/2017 176 mmol/L Final     Sodium   Date Value Ref Range Status   10/17/2017 142 137 - 145 mmol/L Final     Potassium   Date Value Ref Range Status   10/17/2017 4.1 3.5 - 5.1 mmol/L Final     CO2   Date Value Ref Range Status   10/17/2017 27.0 22.0 - 31.0 mmol/L Final     Chloride   Date Value Ref Range Status   10/17/2017 104 95 - 110 mmol/L Final     Anion Gap   Date Value Ref Range Status   10/17/2017 11.0 5.0 - 15.0 mmol/L Final     Creatinine   Date Value Ref Range Status   10/17/2017 0.62 (L) 0.70 - 1.30 mg/dL Final     BUN   Date Value Ref Range Status   10/17/2017 13 7 - 21 mg/dL Final     BUN/Creatinine Ratio   Date Value Ref Range Status   10/17/2017 21.0 7.0 - 25.0 Final     Calcium   Date Value Ref Range Status   10/17/2017 8.8 8.4 - 10.2 mg/dL Final     eGFR Non  Amer   Date Value Ref Range Status   10/17/2017 134 (H) 56 - 130 mL/min/1.73 Final     Alkaline Phosphatase   Date Value Ref Range Status   10/17/2017 62 38 - 126 U/L Final     Total Protein   Date Value Ref Range Status   10/17/2017 7.0 6.3 - 8.6 g/dL Final     ALT (SGPT)   Date Value Ref Range Status   10/17/2017 26 21 - 72 U/L Final     AST (SGOT)   Date Value Ref Range Status   10/17/2017 27 17 - 59 U/L Final     Total Bilirubin   Date Value Ref Range Status   10/17/2017 0.3 0.2 - 1.3 mg/dL Final     Albumin   Date Value Ref Range Status   10/17/2017 4.20 3.40 - 4.80 g/dL Final     Globulin   Date Value Ref Range Status   10/17/2017 2.8 2.3 - 3.5 gm/dL Final     A/G Ratio   Date Value Ref Range Status   10/17/2017 1.5 1.1 - 1.8 g/dL Final     Lab Results   Component Value Date    WBC 5.93 10/17/2017    HGB 13.3 (L) 10/17/2017    HCT 38.7 (L) 10/17/2017    .9 (H) 10/17/2017     10/17/2017     Lab Results   Component Value Date    NEUTROABS 3.85 10/17/2017    IRON 12 (L) 05/03/2017    TIBC 322 05/03/2017    LABIRON 4 (L) 05/03/2017    FERRITIN 56.80  05/03/2017    [id number] 562 05/03/2017    FOLATE >20.00 05/03/2017         PATHOLOGY:  Pathology :  Pathology Report from April 19, 2017 showed:       SPECIMEN   Specimen   Lobe(s) of lung   Lobes of Lung   Upper lobe   Procedure   Lobectomy   Specimen Laterality   Left   TUMOR   Primary Tumor Site   Upper lobe   Histologic Type   Squamous cell carcinoma   Histologic Grade   G2: Moderately differentiated   Tumor Size   Greatest dimension (cm): 8 cm   Additional Dimension (cm)   7 cm       5 cm   Tumor Focality   Unifocal   Visceral Pleura Invasion   Present   Tumor Extension   Parietal pleura   Lymph-Vascular Invasion   Not identified   MARGINS   Bronchial Margin   Uninvolved by invasive carcinoma and carcinoma in situ   Vascular Margin   Uninvolved by invasive carcinoma   Parenchymal Margin   Involved by invasive carcinoma   LYMPH NODES   Regional Lymph Nodes       Number of Lymph Nodes Examined   Specify number: 6   Gene Stations Examined   5: Subaortic/ aortopulmonary (AP) / AP window       11L: Interlobar   Lymph Node Involvement   No nodes involved   STAGE (pTNM)   Primary Tumor (pT)   pT3: Tumor greater than 7 cm in greatest dimension; or Tumor of any size that directly invades any of the following: parietal pleural chest wall (including superior sulcus tumors), diaphragm, phrenic nerve, mediastinal pleura, parietal pericardium; or Tumor of any size in the main bronchus less than 2 cm distal to the petar but without involvement of the petar; or Tumor of any size associated with atelectasis or obstructive pneumonitis of the entire lung; or Tumors of any size with separate tmor nodule(s) in same lobe   Regional Lymph Nodes (pN)   pN0: No regional lymph node metastasis   ADDITIONAL FINDINGS   Additional Pathologic Finding(s)   Other (specify): Fungal hyphae consistent with Aspergillus species in the cavity contents   .          RADIOLOGY DATA :  PET/CT done on May 22, 2017  showed:  IMPRESSION:  CONCLUSION:  1. Large Pancoast tumor involving left apex with significant  involvement of the periphery, pleura, portions of the chest wall  and contiguous involvement, destruction of at least one of the  anterior left upper ribs.     PET/CT imaging study is otherwise unremarkable. No evidence of  any distant metastases.        ASSESSMENT AND PLAN:      1.  Squamous cell cancer of left upper lobe, stage IIB, T3 N0 M0: Patient is status post surgical excision with positive pleural margin.  As per NCCN guidelines we'll start patient on concurrent chemoradiation with weekly carboplatin and Taxol.  Patient received concurrent chemoradiation with  weekly carboplatin and Taxol with radiation  From June 05,2017 until 07/17/2017.  We will see patient back in about 2 months with a repeat CT of chest with contrast to be done prior to that for surveillance purpose.  It was discussed with patient as per NCCN guidelines surveillance CT scan will be required every 6 months for first 2 years after that we will change it to every year for next 3 years.  Patient was also provided survivorship care plan by Meredith loyola.    2.  macrocytosis: Patient was found to have MCV of 102 today which was 86 last clinic visit 3 months ago.  Patient does admit to drinking 2-3 beer on a daily basis.  We will check vitamin B12 and folate today.  Patient was asked to stay away from alcohol completely.  If patient has any vitamin B12 or folate deficiency we'll replace it.    3.Pulmonary aspergillosis: Patient had a fungus ball consistent with Aspergillus on a cavitary lesion of lung status postsurgical excision.     4. History of duodenal ulcer     5. Health maintenance: Patient still smokes about 10 cigarettes every day.  Patient was counseled about smoking cessation today, about 3 minutes were spent for smoking cessation counseling. He will need screening colonoscopy no later date. He remains full code.              Gualberto CARRASQUILLO  MD Nile  10/17/2017  8:09 AM        EMR Dragon/Transcription disclaimer:   Much of this encounter note is an electronic transcription/translation of spoken language to printed text. The electronic translation of spoken language may permit erroneous, or at times, nonsensical words or phrases to be inadvertently transcribed; Although I have reviewed the note for such errors, some may still exist.

## 2017-11-06 ENCOUNTER — APPOINTMENT (OUTPATIENT)
Dept: CT IMAGING | Facility: HOSPITAL | Age: 57
End: 2017-11-06

## 2017-11-20 ENCOUNTER — OFFICE VISIT (OUTPATIENT)
Dept: PAIN MEDICINE | Facility: CLINIC | Age: 57
End: 2017-11-20

## 2017-11-20 VITALS
SYSTOLIC BLOOD PRESSURE: 140 MMHG | DIASTOLIC BLOOD PRESSURE: 80 MMHG | WEIGHT: 129.3 LBS | HEIGHT: 69 IN | BODY MASS INDEX: 19.15 KG/M2

## 2017-11-20 DIAGNOSIS — Z98.890 HX OF EXPLORATORY THORACOTOMY: ICD-10-CM

## 2017-11-20 DIAGNOSIS — Z79.899 HIGH RISK MEDICATIONS (NOT ANTICOAGULANTS) LONG-TERM USE: ICD-10-CM

## 2017-11-20 DIAGNOSIS — R07.89 LEFT-SIDED CHEST WALL PAIN: Primary | ICD-10-CM

## 2017-11-20 PROCEDURE — 99213 OFFICE O/P EST LOW 20 MIN: CPT | Performed by: PAIN MEDICINE

## 2017-11-20 RX ORDER — OXYCODONE AND ACETAMINOPHEN 7.5; 325 MG/1; MG/1
1 TABLET ORAL 4 TIMES DAILY
Qty: 120 TABLET | Refills: 0 | Status: SHIPPED | OUTPATIENT
Start: 2017-11-20 | End: 2017-12-19 | Stop reason: SDUPTHER

## 2017-11-20 RX ORDER — OXYCODONE AND ACETAMINOPHEN 7.5; 325 MG/1; MG/1
1 TABLET ORAL 4 TIMES DAILY
Qty: 120 TABLET | Refills: 0 | Status: SHIPPED | OUTPATIENT
Start: 2017-11-20 | End: 2017-12-20

## 2017-11-20 NOTE — PROGRESS NOTES
Tino Peres is a 57 y.o. male.   1960    HPI:   Location: left ant chest wall  Quality: shooting and aching  Severity: 5/10  Timing: constant  Alleviating: pain medication  Aggravating: nothing specific     Patient reports that the opioid medication still provides him good relief and allows more activity than he would have without the opioid medication. He denies side effects.        The following portions of the patient's history were reviewed by me and updated as appropriate: allergies, current medications, past family history, past medical history, past social history, past surgical history and problem list.    Past Medical History:   Diagnosis Date   • Anemia    • Aortic stenosis, mild    • Arthritis    • Chronic bronchitis    • Duodenal ulcer disease    • Inguinal hernia, left    • Lung cancer    • Mitral valve regurgitation        Social History     Social History   • Marital status: Single     Spouse name: N/A   • Number of children: 2   • Years of education: 14     Occupational History   • production      Social History Main Topics   • Smoking status: Current Every Day Smoker     Packs/day: 1.00     Years: 40.00     Types: Cigarettes   • Smokeless tobacco: Former User      Comment: Reduced at half a pack per day and weanning off   • Alcohol use 8.4 oz/week     14 Cans of beer per week      Comment: 6 pk/dly   • Drug use: No   • Sexual activity: Defer     Other Topics Concern   • Not on file     Social History Narrative    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  Lives alone in [place].  Drinks a 6 pack daily.       Family History   Problem Relation Age of Onset   • Asthma Father    • Cancer Father    • Diabetes Father    • Hypertension Father    • Alcohol abuse Mother    • Heart failure Brother          Current Outpatient Prescriptions:   •  famotidine (PEPCID) 20 MG tablet, Take 1 tablet by mouth 2 (Two) Times a Day. (Patient taking differently: Take 20 mg by mouth As  Needed.), Disp: 60 tablet, Rfl: 0  •  ferrous sulfate 324 (65 FE) MG tablet delayed-release EC tablet, Take 1 tablet by mouth Daily With Breakfast., Disp: 30 tablet, Rfl: 1  •  folic acid (FOLVITE) 1 MG tablet, Take 1 tablet by mouth Daily., Disp: 90 tablet, Rfl: 1  •  ibuprofen (ADVIL,MOTRIN) 200 MG tablet, Take 2 tablets by mouth Every 12 (Twelve) Hours As Needed for Mild Pain (1-3) (Postop pain)., Disp: 30 tablet, Rfl: 0  •  Multiple Vitamins-Minerals (MULTIVITAMIN ADULT PO), Take 1 tablet by mouth Daily., Disp: , Rfl:   •  ondansetron (ZOFRAN) 4 MG tablet, Take 1 tablet by mouth 4 (Four) Times a Day As Needed for Nausea or Vomiting., Disp: 40 tablet, Rfl: 3  •  vitamin B-12 (CYANOCOBALAMIN) 1000 MCG tablet, Take 1 tablet by mouth Daily., Disp: 90 tablet, Rfl: 1  •  oxyCODONE-acetaminophen (PERCOCET) 7.5-325 MG per tablet, Take 1 tablet by mouth 4 (Four) Times a Day for 30 days., Disp: 120 tablet, Rfl: 0  •  oxyCODONE-acetaminophen (PERCOCET) 7.5-325 MG per tablet, Take 1 tablet by mouth 4 (Four) Times a Day for 30 days., Disp: 120 tablet, Rfl: 0    No Known Allergies    Review of Systems   Musculoskeletal:        Left ant chest wall     All other systems reviewed and are negative.    All systems reviewed and negative except for above.    Physical Exam   Constitutional: He appears well-developed and well-nourished. No distress.   Pulmonary/Chest:   ttp left ant chest wall pain.   Neurological: He is alert.   Psychiatric: He has a normal mood and affect. His behavior is normal. Judgment normal.       Tino was seen today for chest pain.    Diagnoses and all orders for this visit:    Left-sided chest wall pain    Hx of exploratory thoracotomy    High risk medications (not anticoagulants) long-term use    Other orders  -     oxyCODONE-acetaminophen (PERCOCET) 7.5-325 MG per tablet; Take 1 tablet by mouth 4 (Four) Times a Day for 30 days.  -     oxyCODONE-acetaminophen (PERCOCET) 7.5-325 MG per tablet; Take 1 tablet  by mouth 4 (Four) Times a Day for 30 days.      Medication: Patient reports no negative side effects, Patient reports appropriate usage and storage habits, Patient's opioid provides enough relief to be more active and perform activities of daily living with less discomfort. and Refill opioid medication as above.   I explained that the pain clinic will be closing after the first of the year.  I suggested pt discuss what to do next with  pcp and that pt may call back in about a month to find out if there is going to be a new clinic in the area.       Interventional: none at this time    Rehab: none at this time    Behavioral: No aberrant behavior noted. DEVONTE Report #[id number] was reviewed and is consistent with stated history    Urine drug screen None at this time          This document has been electronically signed by Sandeep Agustin MD on November 20, 2017 8:17 AM

## 2017-12-01 ENCOUNTER — HOSPITAL ENCOUNTER (OUTPATIENT)
Dept: CT IMAGING | Facility: HOSPITAL | Age: 57
Discharge: HOME | End: 2017-12-01
Attending: INTERNAL MEDICINE | Admitting: INTERNAL MEDICINE

## 2017-12-01 DIAGNOSIS — C34.12 MALIGNANT NEOPLASM OF UPPER LOBE OF LEFT LUNG (HCC): ICD-10-CM

## 2017-12-01 PROCEDURE — 71260 CT THORAX DX C+: CPT

## 2017-12-01 PROCEDURE — 0 IOPAMIDOL 61 % SOLUTION: Performed by: INTERNAL MEDICINE

## 2017-12-01 RX ADMIN — IOPAMIDOL 80 ML: 612 INJECTION, SOLUTION INTRAVENOUS at 08:15

## 2017-12-19 ENCOUNTER — LAB (OUTPATIENT)
Dept: ONCOLOGY | Facility: HOSPITAL | Age: 57
End: 2017-12-19

## 2017-12-19 ENCOUNTER — OFFICE VISIT (OUTPATIENT)
Dept: ONCOLOGY | Facility: CLINIC | Age: 57
End: 2017-12-19

## 2017-12-19 VITALS
SYSTOLIC BLOOD PRESSURE: 156 MMHG | HEIGHT: 69 IN | HEART RATE: 89 BPM | RESPIRATION RATE: 16 BRPM | BODY MASS INDEX: 19.57 KG/M2 | DIASTOLIC BLOOD PRESSURE: 89 MMHG | TEMPERATURE: 99 F | WEIGHT: 132.1 LBS

## 2017-12-19 DIAGNOSIS — C34.12 MALIGNANT NEOPLASM OF UPPER LOBE OF LEFT LUNG (HCC): Primary | ICD-10-CM

## 2017-12-19 DIAGNOSIS — Z45.2 ENCOUNTER FOR VENOUS ACCESS DEVICE CARE: ICD-10-CM

## 2017-12-19 DIAGNOSIS — C34.12 MALIGNANT NEOPLASM OF UPPER LOBE OF LEFT LUNG (HCC): ICD-10-CM

## 2017-12-19 DIAGNOSIS — D50.9 IRON DEFICIENCY ANEMIA, UNSPECIFIED IRON DEFICIENCY ANEMIA TYPE: ICD-10-CM

## 2017-12-19 DIAGNOSIS — R07.89 LEFT-SIDED CHEST WALL PAIN: ICD-10-CM

## 2017-12-19 DIAGNOSIS — D75.89 MACROCYTOSIS: Primary | ICD-10-CM

## 2017-12-19 LAB
ALBUMIN SERPL-MCNC: 4.1 G/DL (ref 3.4–4.8)
ALBUMIN/GLOB SERPL: 1.3 G/DL (ref 1.1–1.8)
ALP SERPL-CCNC: 84 U/L (ref 38–126)
ALT SERPL W P-5'-P-CCNC: 25 U/L (ref 21–72)
ANION GAP SERPL CALCULATED.3IONS-SCNC: 9 MMOL/L (ref 5–15)
AST SERPL-CCNC: 31 U/L (ref 17–59)
BASOPHILS # BLD AUTO: 0.04 10*3/MM3 (ref 0–0.2)
BASOPHILS NFR BLD AUTO: 0.5 % (ref 0–2)
BILIRUB SERPL-MCNC: 0.3 MG/DL (ref 0.2–1.3)
BUN BLD-MCNC: 14 MG/DL (ref 7–21)
BUN/CREAT SERPL: 25.9 (ref 7–25)
CALCIUM SPEC-SCNC: 8.9 MG/DL (ref 8.4–10.2)
CHLORIDE SERPL-SCNC: 105 MMOL/L (ref 95–110)
CO2 SERPL-SCNC: 27 MMOL/L (ref 22–31)
CREAT BLD-MCNC: 0.54 MG/DL (ref 0.7–1.3)
DEPRECATED RDW RBC AUTO: 50.8 FL (ref 35.1–43.9)
EOSINOPHIL # BLD AUTO: 0.35 10*3/MM3 (ref 0–0.7)
EOSINOPHIL NFR BLD AUTO: 4.4 % (ref 0–7)
ERYTHROCYTE [DISTWIDTH] IN BLOOD BY AUTOMATED COUNT: 14.2 % (ref 11.5–14.5)
GFR SERPL CREATININE-BSD FRML MDRD: 157 ML/MIN/1.73 (ref 56–130)
GLOBULIN UR ELPH-MCNC: 3.2 GM/DL (ref 2.3–3.5)
GLUCOSE BLD-MCNC: 96 MG/DL (ref 60–100)
HCT VFR BLD AUTO: 37.3 % (ref 39–49)
HGB BLD-MCNC: 12.5 G/DL (ref 13.7–17.3)
IMM GRANULOCYTES # BLD: 0.01 10*3/MM3 (ref 0–0.02)
IMM GRANULOCYTES NFR BLD: 0.1 % (ref 0–0.5)
LYMPHOCYTES # BLD AUTO: 1.06 10*3/MM3 (ref 0.6–4.2)
LYMPHOCYTES NFR BLD AUTO: 13.2 % (ref 10–50)
MCH RBC QN AUTO: 32.9 PG (ref 26.5–34)
MCHC RBC AUTO-ENTMCNC: 33.5 G/DL (ref 31.5–36.3)
MCV RBC AUTO: 98.2 FL (ref 80–98)
MONOCYTES # BLD AUTO: 1.11 10*3/MM3 (ref 0–0.9)
MONOCYTES NFR BLD AUTO: 13.8 % (ref 0–12)
NEUTROPHILS # BLD AUTO: 5.45 10*3/MM3 (ref 2–8.6)
NEUTROPHILS NFR BLD AUTO: 68 % (ref 37–80)
PLATELET # BLD AUTO: 312 10*3/MM3 (ref 150–450)
PMV BLD AUTO: 8.8 FL (ref 8–12)
POTASSIUM BLD-SCNC: 4 MMOL/L (ref 3.5–5.1)
PROT SERPL-MCNC: 7.3 G/DL (ref 6.3–8.6)
RBC # BLD AUTO: 3.8 10*6/MM3 (ref 4.37–5.74)
SODIUM BLD-SCNC: 141 MMOL/L (ref 137–145)
WBC NRBC COR # BLD: 8.02 10*3/MM3 (ref 3.2–9.8)

## 2017-12-19 PROCEDURE — 96523 IRRIG DRUG DELIVERY DEVICE: CPT | Performed by: INTERNAL MEDICINE

## 2017-12-19 PROCEDURE — 25010000002 HEPARIN FLUSH (PORCINE) 100 UNIT/ML SOLUTION: Performed by: INTERNAL MEDICINE

## 2017-12-19 PROCEDURE — 36415 COLL VENOUS BLD VENIPUNCTURE: CPT | Performed by: INTERNAL MEDICINE

## 2017-12-19 PROCEDURE — 85025 COMPLETE CBC W/AUTO DIFF WBC: CPT

## 2017-12-19 PROCEDURE — 99215 OFFICE O/P EST HI 40 MIN: CPT | Performed by: INTERNAL MEDICINE

## 2017-12-19 PROCEDURE — 80053 COMPREHEN METABOLIC PANEL: CPT

## 2017-12-19 RX ORDER — SODIUM CHLORIDE 0.9 % (FLUSH) 0.9 %
10 SYRINGE (ML) INJECTION AS NEEDED
Status: DISCONTINUED | OUTPATIENT
Start: 2017-12-19 | End: 2017-12-19 | Stop reason: HOSPADM

## 2017-12-19 RX ORDER — SODIUM CHLORIDE 0.9 % (FLUSH) 0.9 %
10 SYRINGE (ML) INJECTION AS NEEDED
Status: CANCELLED | OUTPATIENT
Start: 2018-01-10

## 2017-12-19 RX ADMIN — SODIUM CHLORIDE, PRESERVATIVE FREE 500 UNITS: 5 INJECTION INTRAVENOUS at 07:44

## 2017-12-19 RX ADMIN — Medication 10 ML: at 07:34

## 2017-12-19 NOTE — PATIENT INSTRUCTIONS
PET Scan  A PET scan, also called positron emission tomography, is a test that creates pictures of the inside of the body. A PET scan requires a small dose of a harmless radioactive material to be injected into a vein. When this material combines with certain substances in the body, it produces tiny particles that can be detected by a scanner and converted into pictures.   The pictures created during a PET scan can be used to study a disease. They are often used to study cancer and cancer therapy. The colors and brightness on the pictures show different levels of organ and tissue function. For example, cancer tissue appears brighter than normal tissue on a PET scan picture.  LET YOUR HEALTH CARE PROVIDER KNOW ABOUT:   · Any allergies you have.  · All medicines you are taking, including vitamins, herbs, eye drops, creams, and over-the-counter medicines.  · Previous problems you or members of your family have had with the use of anesthetics.  · Any blood disorders you have.  · Previous surgeries you have had.  · Medical conditions you have.  · If you are afraid of cramped spaces (claustrophobic). If claustrophobia is a problem, it usually can be relieved with mild sedatives or antianxiety medicines.  · If you have trouble staying still for long periods of time.  BEFORE THE PROCEDURE   · Do not eat or drink anything after midnight on the night before the procedure or as directed by your health care provider.  · Take medicines only as directed by your health care provider.  · If you have diabetes, ask your health care provider for diet guidelines to control sugar (glucose) levels on the day of the test.  PROCEDURE   · A small amount of radioactive material will be injected into a vein. The test will begin 30-60 minutes after the injection, when the material has traveled around your body.  · You will lie on a cushioned table, and the table will be moved through the center of a machine that looks like a large donut. It  will take about 30-60 minutes for the machine to produce pictures of your body. You will need to stay still during this time.  AFTER THE PROCEDURE  · You may resume your normal diet and activities.  · Drink several 8 oz glasses of water following the test to flush the radioactive material out of your body.     This information is not intended to replace advice given to you by your health care provider. Make sure you discuss any questions you have with your health care provider.     Document Released: 06/23/2004 Document Revised: 01/08/2016 Document Reviewed: 04/01/2015  [employer] Patient Education ©2017 [employer]

## 2017-12-19 NOTE — PROGRESS NOTES
DATE OF VISIT: 12/19/2017    REASON FOR VISIT:  Squamous cell cancer of left lung, stage IIB    HISTORY OF PRESENT ILLNESS:    57-year-old male with a past medical history significant for duodenal ulcer, extensive nicotine addiction with more than 45-pack-year smoking history, history of alcohol abuse was seen in consultation on May 3, 2017 for newly diagnosed stage IIB squamous cell cancer of Of left lung.  Patient finished concurrent chemoradiation on July 17, 2017.  Patient had a CT of chest done on December 1, 2017, is here to discuss result of CT scan and further recommendation.  Still complains of worsening discomfort in left upper chest wall.  Denies any new cough or hemoptysis.  Denies any blood in stool or urine.         PAST MEDICAL HISTORY:    Past Medical History:   Diagnosis Date   • Anemia    • Aortic stenosis, mild    • Arthritis    • Chronic bronchitis    • Duodenal ulcer disease    • Inguinal hernia, left    • Lung cancer    • Mitral valve regurgitation        SOCIAL HISTORY:    Social History   Substance Use Topics   • Smoking status: Current Every Day Smoker     Packs/day: 1.00     Years: 40.00     Types: Cigarettes   • Smokeless tobacco: Former User      Comment: Reduced at half a pack per day and weanning off   • Alcohol use 8.4 oz/week     14 Cans of beer per week      Comment: 6 pk/dly       Surgical History :  Past Surgical History:   Procedure Laterality Date   • APPENDECTOMY      open   • BRONCHOSCOPY N/A 3/8/2017    Procedure: BRONCHOSCOPY with possible bronchoalveolar lavage, +/- brush, +/- needle biopsy, +/- endobronchial biopsy;  Surgeon: Candis Lr MD;  Location: [facility] ENDOSCOPY;  Service:    • EXPLORATORY LAPAROTOMY  11/17/2014    Exploratory laparotomy, repair of duodenal ulcer, modified Carlos patch   • INGUINAL HERNIA REPAIR  09/13/2013    Open repair of an indirect left inguinal hernia. Left inguinal hernia.   • CA INSJ TUNNELED CVC W/O SUBQ PORT/ AGE 6 YR/> N/A  "5/10/2017    Procedure: TUNNELED CENTRAL VENOUS CATHETER  WITH SUBCUTANEOUS  PORT,FLUOROSCOPIC GUIDANCE ;  Surgeon: Angel Hernandez MD;  Location: [facility];  Service: Cardiothoracic   • THORACOTOMY Left 4/19/2017    Procedure: THORACOTOMY LEFT WITH PULMONARY RESECTION AND BRONCHOSCOPY WITH ENDO ;  Surgeon: Angel Hernandez MD;  Location: [facility];  Service:        ALLERGIES:    No Known Allergies    FAMILY HISTORY:  Family History   Problem Relation Age of Onset   • Asthma Father    • Cancer Father    • Diabetes Father    • Hypertension Father    • Alcohol abuse Mother    • Heart failure Brother          REVIEW OF SYSTEMS:      CONSTITUTIONAL:  Positive for fatigue, denies any recent worsening. Denies any fever, chills or weight loss.      HEENT: No epistaxis, mouth sores or difficulty swallowing.     RESPIRATORY: Denies any shortness of breath or cough today.     CARDIOVASCULAR: Complains of worsening left chest wall pain.  No palpitation.     GASTROINTESTINAL: No abdominal pain nausea, vomiting or blood in the stool.     GENITOURINARY: No Dysuria or Hematuria.     MUSCULOSKELETAL: No new back pain or arthralgia..     LYMPHATICS: Denies any abnormal swollen glands anywhere in the body.     NEUROLOGICAL : No tingling or numbness. No headache or dizziness. No seizures or balance problems.     SKIN: No new skin lesions.         PHYSICAL EXAMINATION:      VITAL SIGNS:  /89  Pulse 89  Temp 99 °F (37.2 °C) (Temporal Artery )   Resp 16  Ht 175.3 cm (69.02\")  Wt 59.9 kg (132 lb 1.6 oz)  BMI 19.5 kg/m2    GENERAL:  Not in any distress.    HEENT:  Normocephalic, Atraumatic.Mild Conjunctival pallor. No icterus. Extraocular Movements Intact. No Facial Asymmetry noted.    NECK:  No adenopathy. No JVD.    RESPIRATORY:  Fair air entry bilateral. No rhonchi or wheezing.  Mild erythema of left chest wall.    CARDIOVASCULAR:  S1, S2. Regular rate and rhythm. No murmur or gallop appreciated.    ABDOMEN:  " Soft,  nontender. Bowel sounds present in all four quadrants.  No organomegaly appreciated.    EXTREMITIES:  No edema.No Calf Tenderness.    NEUROLOGIC:  Alert, awake and oriented ×3.  No  Motor or sensory deficit appreciated. Cranial Nerves 2-12 grossly intact.            DIAGNOSTIC DATA:    Glucose   Date Value Ref Range Status   12/19/2017 96 60 - 100 mg/dL Final     Glucose, Arterial   Date Value Ref Range Status   04/19/2017 176 mmol/L Final     Sodium   Date Value Ref Range Status   12/19/2017 141 137 - 145 mmol/L Final     Potassium   Date Value Ref Range Status   12/19/2017 4.0 3.5 - 5.1 mmol/L Final     CO2   Date Value Ref Range Status   12/19/2017 27.0 22.0 - 31.0 mmol/L Final     Chloride   Date Value Ref Range Status   12/19/2017 105 95 - 110 mmol/L Final     Anion Gap   Date Value Ref Range Status   12/19/2017 9.0 5.0 - 15.0 mmol/L Final     Creatinine   Date Value Ref Range Status   12/19/2017 0.54 (L) 0.70 - 1.30 mg/dL Final     BUN   Date Value Ref Range Status   12/19/2017 14 7 - 21 mg/dL Final     BUN/Creatinine Ratio   Date Value Ref Range Status   12/19/2017 25.9 (H) 7.0 - 25.0 Final     Calcium   Date Value Ref Range Status   12/19/2017 8.9 8.4 - 10.2 mg/dL Final     eGFR Non  Amer   Date Value Ref Range Status   12/19/2017 157 (H) 56 - 130 mL/min/1.73 Final     Alkaline Phosphatase   Date Value Ref Range Status   12/19/2017 84 38 - 126 U/L Final     Total Protein   Date Value Ref Range Status   12/19/2017 7.3 6.3 - 8.6 g/dL Final     ALT (SGPT)   Date Value Ref Range Status   12/19/2017 25 21 - 72 U/L Final     AST (SGOT)   Date Value Ref Range Status   12/19/2017 31 17 - 59 U/L Final     Total Bilirubin   Date Value Ref Range Status   12/19/2017 0.3 0.2 - 1.3 mg/dL Final     Albumin   Date Value Ref Range Status   12/19/2017 4.10 3.40 - 4.80 g/dL Final     Globulin   Date Value Ref Range Status   12/19/2017 3.2 2.3 - 3.5 gm/dL Final     A/G Ratio   Date Value Ref Range Status    12/19/2017 1.3 1.1 - 1.8 g/dL Final     Lab Results   Component Value Date    WBC 8.02 12/19/2017    HGB 12.5 (L) 12/19/2017    HCT 37.3 (L) 12/19/2017    MCV 98.2 (H) 12/19/2017     12/19/2017     Lab Results   Component Value Date    NEUTROABS 5.45 12/19/2017    IRON 12 (L) 05/03/2017    TIBC 322 05/03/2017    LABIRON 4 (L) 05/03/2017    FERRITIN 56.80 05/03/2017    [id number] 465 10/17/2017    FOLATE >20.00 10/17/2017         PATHOLOGY:  Pathology :  Pathology Report from April 19, 2017 showed:       SPECIMEN   Specimen   Lobe(s) of lung   Lobes of Lung   Upper lobe   Procedure   Lobectomy   Specimen Laterality   Left   TUMOR   Primary Tumor Site   Upper lobe   Histologic Type   Squamous cell carcinoma   Histologic Grade   G2: Moderately differentiated   Tumor Size   Greatest dimension (cm): 8 cm   Additional Dimension (cm)   7 cm       5 cm   Tumor Focality   Unifocal   Visceral Pleura Invasion   Present   Tumor Extension   Parietal pleura   Lymph-Vascular Invasion   Not identified   MARGINS   Bronchial Margin   Uninvolved by invasive carcinoma and carcinoma in situ   Vascular Margin   Uninvolved by invasive carcinoma   Parenchymal Margin   Involved by invasive carcinoma   LYMPH NODES   Regional Lymph Nodes       Number of Lymph Nodes Examined   Specify number: 6   Gene Stations Examined   5: Subaortic/ aortopulmonary (AP) / AP window       11L: Interlobar   Lymph Node Involvement   No nodes involved   STAGE (pTNM)   Primary Tumor (pT)   pT3: Tumor greater than 7 cm in greatest dimension; or Tumor of any size that directly invades any of the following: parietal pleural chest wall (including superior sulcus tumors), diaphragm, phrenic nerve, mediastinal pleura, parietal pericardium; or Tumor of any size in the main bronchus less than 2 cm distal to the petar but without involvement of the petar; or Tumor of any size associated with atelectasis or obstructive pneumonitis of the entire lung; or Tumors  of any size with separate tmor nodule(s) in same lobe   Regional Lymph Nodes (pN)   pN0: No regional lymph node metastasis   ADDITIONAL FINDINGS   Additional Pathologic Finding(s)   Other (specify): Fungal hyphae consistent with Aspergillus species in the cavity contents   .          RADIOLOGY DATA :  CT chest with contrast done on December 1, 2017 been discussed with patient, it showed:  IMPRESSION:  1.  Postsurgical changes consistent with prior left upper  lobectomy and probable AP window mediastinal lymph node  dissection.  2.  Precarinal single enlarged lymph node and shotty AP window  lymph node in patient with known lung carcinoma are suspicious  for metastatic involvement.  3.  Mild centrilobular emphysematous changes.  4.  Interval development of multiple left lower lobe superior  segment noncalcified nodules as described above. The largest  measures 5.7 mm in greatest diameter. This interval change in  patient with known lung carcinoma would be suspicious for early  metastatic disease.  5.  Left lateral hemithorax abnormal pleural thickening which is  invading into the chest wall and causing erosive changes of the  left second, third, fourth, fifth, sixth ribs. These findings are  suspicious for primary lung neoplasm or metastatic involvement in  this region versus less likely postsurgical changes..  6.  Left lateral sixth and fourth and fifth rib transverse linear  lucencies. These are consistent with regions of fracture and/or  postsurgical changes.  7.  Left kidney mid zone 1.5 mm nonobstructive renal calculi.        PET/CT done on May 22, 2017 showed:  IMPRESSION:  CONCLUSION:  1. Large Pancoast tumor involving left apex with significant  involvement of the periphery, pleura, portions of the chest wall  and contiguous involvement, destruction of at least one of the  anterior left upper ribs.     PET/CT imaging study is otherwise unremarkable. No evidence of  any distant metastases.          ASSESSMENT  AND PLAN:      1.  Squamous cell cancer of left upper lobe, stage IIB, T3 N0 M0: Patient is status post surgical excision with positive pleural margin.  As per NCCN guidelines patient received concurrent chemoradiation with weekly carboplatin and Taxol.  Patient received concurrent chemoradiation with  weekly carboplatin and Taxol with radiation  From June 05,2017 until 07/17/2017.  CT scan done on December 1, 2017 shows enlarged left-sided precarinal lymph node as well as thickening of the left pleura eroding into the left rib cage worrisome for recurrence of cancer.  Result of CT scan were discussed with patient.  We will get a PET/CT done to see if this changes have any uptake on PET/CT.  We will also send out tumor for Foundation 1 testing as well as PDL- 1 testing to see if patient would be a candidate for any targeted therapy if PET/CT shows any recurrence.  We will see him back in about 2 weeks to go over the result of PET/CT as well as other test.    2.  macrocytosis: Patient was found to have MCV of 98 today .  Patient does admit to drinking 2-3 beer on a daily basis.  He remains on vitamin B12 thousand micrograms by mouth daily.  Again it was recommended to stay away from alcohol completely.    3.Pulmonary aspergillosis: Patient had a fungus ball consistent with Aspergillus on a cavitary lesion of lung status postsurgical excision.     4. History of duodenal ulcer     5. Health maintenance: Patient states that he has not smoked cigarrette for last 2 days.  Recommend continuing smoking cessation.  About 3 minutes were spent for smoking cessation counseling.   He remains full code.              Gualberto Dowd MD  12/19/2017  8:52 AM        EMR Dragon/Transcription disclaimer:   Much of this encounter note is an electronic transcription/translation of spoken language to printed text. The electronic translation of spoken language may permit erroneous, or at times, nonsensical words or phrases to be  inadvertently transcribed; Although I have reviewed the note for such errors, some may still exist.

## 2017-12-26 LAB
LAB AP CASE REPORT: NORMAL
LAB AP SPECIAL STAINS: NORMAL
LAB AP SYNOPTIC CHECKLIST: NORMAL
Lab: NORMAL
Lab: NORMAL
PATH REPORT.ADDENDUM SPEC: NORMAL
PATH REPORT.FINAL DX SPEC: NORMAL
PATH REPORT.GROSS SPEC: NORMAL

## 2017-12-29 ENCOUNTER — HOSPITAL ENCOUNTER (OUTPATIENT)
Dept: PET IMAGING | Facility: HOSPITAL | Age: 57
Discharge: HOME | End: 2017-12-29
Admitting: INTERNAL MEDICINE

## 2017-12-29 DIAGNOSIS — C34.12 MALIGNANT NEOPLASM OF UPPER LOBE OF LEFT LUNG (HCC): ICD-10-CM

## 2017-12-29 LAB — REF LAB TEST RESULTS: NORMAL

## 2017-12-29 PROCEDURE — 0 FLUDEOXYGLUCOSE F18 SOLUTION: Performed by: INTERNAL MEDICINE

## 2017-12-29 PROCEDURE — A9552 F18 FDG: HCPCS | Performed by: INTERNAL MEDICINE

## 2017-12-29 PROCEDURE — 78815 PET IMAGE W/CT SKULL-THIGH: CPT

## 2017-12-29 RX ADMIN — FLUDEOXYGLUCOSE F18 1 DOSE: 300 INJECTION INTRAVENOUS at 10:02

## 2018-01-01 ENCOUNTER — INFUSION (OUTPATIENT)
Dept: ONCOLOGY | Facility: HOSPITAL | Age: 58
End: 2018-01-01

## 2018-01-01 ENCOUNTER — OFFICE VISIT (OUTPATIENT)
Dept: ONCOLOGY | Facility: CLINIC | Age: 58
End: 2018-01-01

## 2018-01-01 ENCOUNTER — HOSPITAL ENCOUNTER (OUTPATIENT)
Dept: PET IMAGING | Facility: HOSPITAL | Age: 58
Discharge: HOME | End: 2018-12-22
Admitting: INTERNAL MEDICINE

## 2018-01-01 ENCOUNTER — HOSPITAL ENCOUNTER (OUTPATIENT)
Dept: CT IMAGING | Facility: HOSPITAL | Age: 58
Discharge: HOME | End: 2018-07-17
Attending: INTERNAL MEDICINE | Admitting: INTERNAL MEDICINE

## 2018-01-01 ENCOUNTER — HOSPITAL ENCOUNTER (OUTPATIENT)
Dept: PET IMAGING | Facility: HOSPITAL | Age: 58
End: 2018-01-01

## 2018-01-01 ENCOUNTER — TELEPHONE (OUTPATIENT)
Dept: ONCOLOGY | Facility: HOSPITAL | Age: 58
End: 2018-01-01

## 2018-01-01 ENCOUNTER — HOSPITAL ENCOUNTER (OUTPATIENT)
Dept: CT IMAGING | Facility: HOSPITAL | Age: 58
Discharge: HOME | End: 2018-12-07
Attending: INTERNAL MEDICINE | Admitting: INTERNAL MEDICINE

## 2018-01-01 ENCOUNTER — HOSPITAL ENCOUNTER (OUTPATIENT)
Dept: CT IMAGING | Facility: HOSPITAL | Age: 58
Discharge: HOME | End: 2018-09-12
Admitting: INTERNAL MEDICINE

## 2018-01-01 ENCOUNTER — APPOINTMENT (OUTPATIENT)
Dept: CT IMAGING | Facility: HOSPITAL | Age: 58
End: 2018-01-01
Attending: INTERNAL MEDICINE

## 2018-01-01 ENCOUNTER — APPOINTMENT (OUTPATIENT)
Dept: CT IMAGING | Facility: HOSPITAL | Age: 58
End: 2018-01-01

## 2018-01-01 ENCOUNTER — HOSPITAL ENCOUNTER (OUTPATIENT)
Dept: CT IMAGING | Facility: HOSPITAL | Age: 58
Discharge: HOME | End: 2018-05-09
Attending: INTERNAL MEDICINE | Admitting: INTERNAL MEDICINE

## 2018-01-01 ENCOUNTER — PATIENT MESSAGE (OUTPATIENT)
Dept: ONCOLOGY | Facility: CLINIC | Age: 58
End: 2018-01-01

## 2018-01-01 ENCOUNTER — OFFICE VISIT (OUTPATIENT)
Dept: FAMILY MEDICINE CLINIC | Facility: CLINIC | Age: 58
End: 2018-01-01

## 2018-01-01 VITALS
OXYGEN SATURATION: 97 % | HEART RATE: 91 BPM | RESPIRATION RATE: 18 BRPM | HEIGHT: 68 IN | TEMPERATURE: 98.4 F | BODY MASS INDEX: 20.4 KG/M2 | DIASTOLIC BLOOD PRESSURE: 70 MMHG | SYSTOLIC BLOOD PRESSURE: 150 MMHG | WEIGHT: 134.6 LBS

## 2018-01-01 VITALS
BODY MASS INDEX: 19.64 KG/M2 | WEIGHT: 129.6 LBS | HEART RATE: 87 BPM | DIASTOLIC BLOOD PRESSURE: 87 MMHG | TEMPERATURE: 98.8 F | RESPIRATION RATE: 16 BRPM | OXYGEN SATURATION: 98 % | HEIGHT: 68 IN | SYSTOLIC BLOOD PRESSURE: 134 MMHG

## 2018-01-01 VITALS
DIASTOLIC BLOOD PRESSURE: 83 MMHG | RESPIRATION RATE: 18 BRPM | HEIGHT: 69 IN | WEIGHT: 127 LBS | BODY MASS INDEX: 18.81 KG/M2 | SYSTOLIC BLOOD PRESSURE: 126 MMHG | HEART RATE: 88 BPM | TEMPERATURE: 97.8 F

## 2018-01-01 VITALS
DIASTOLIC BLOOD PRESSURE: 78 MMHG | BODY MASS INDEX: 19.31 KG/M2 | SYSTOLIC BLOOD PRESSURE: 125 MMHG | WEIGHT: 127.4 LBS | RESPIRATION RATE: 18 BRPM | HEART RATE: 79 BPM | TEMPERATURE: 98 F | HEIGHT: 68 IN | OXYGEN SATURATION: 98 %

## 2018-01-01 VITALS
HEART RATE: 94 BPM | DIASTOLIC BLOOD PRESSURE: 89 MMHG | RESPIRATION RATE: 18 BRPM | BODY MASS INDEX: 19.88 KG/M2 | TEMPERATURE: 98.7 F | WEIGHT: 131.2 LBS | HEIGHT: 68 IN | SYSTOLIC BLOOD PRESSURE: 173 MMHG

## 2018-01-01 VITALS
WEIGHT: 126.8 LBS | DIASTOLIC BLOOD PRESSURE: 82 MMHG | TEMPERATURE: 97.3 F | RESPIRATION RATE: 18 BRPM | HEART RATE: 108 BPM | SYSTOLIC BLOOD PRESSURE: 139 MMHG | BODY MASS INDEX: 18.73 KG/M2

## 2018-01-01 VITALS
SYSTOLIC BLOOD PRESSURE: 130 MMHG | OXYGEN SATURATION: 95 % | BODY MASS INDEX: 19.25 KG/M2 | HEART RATE: 88 BPM | HEIGHT: 68 IN | WEIGHT: 127 LBS | DIASTOLIC BLOOD PRESSURE: 80 MMHG

## 2018-01-01 VITALS
HEIGHT: 68 IN | DIASTOLIC BLOOD PRESSURE: 93 MMHG | BODY MASS INDEX: 19.94 KG/M2 | TEMPERATURE: 98.8 F | HEART RATE: 98 BPM | OXYGEN SATURATION: 96 % | WEIGHT: 131.6 LBS | SYSTOLIC BLOOD PRESSURE: 149 MMHG | RESPIRATION RATE: 18 BRPM

## 2018-01-01 VITALS
DIASTOLIC BLOOD PRESSURE: 91 MMHG | WEIGHT: 128.6 LBS | SYSTOLIC BLOOD PRESSURE: 179 MMHG | BODY MASS INDEX: 19.49 KG/M2 | RESPIRATION RATE: 18 BRPM | TEMPERATURE: 99 F | HEIGHT: 68 IN | HEART RATE: 82 BPM

## 2018-01-01 VITALS
RESPIRATION RATE: 18 BRPM | DIASTOLIC BLOOD PRESSURE: 77 MMHG | WEIGHT: 128.8 LBS | OXYGEN SATURATION: 98 % | BODY MASS INDEX: 19.52 KG/M2 | TEMPERATURE: 98 F | HEIGHT: 68 IN | HEART RATE: 95 BPM | SYSTOLIC BLOOD PRESSURE: 141 MMHG

## 2018-01-01 VITALS
HEIGHT: 68 IN | RESPIRATION RATE: 18 BRPM | DIASTOLIC BLOOD PRESSURE: 99 MMHG | WEIGHT: 126.2 LBS | TEMPERATURE: 98.7 F | SYSTOLIC BLOOD PRESSURE: 179 MMHG | BODY MASS INDEX: 19.13 KG/M2 | HEART RATE: 103 BPM

## 2018-01-01 VITALS
SYSTOLIC BLOOD PRESSURE: 148 MMHG | TEMPERATURE: 98.2 F | WEIGHT: 124.4 LBS | RESPIRATION RATE: 18 BRPM | BODY MASS INDEX: 18.85 KG/M2 | HEIGHT: 68 IN | OXYGEN SATURATION: 98 % | HEART RATE: 91 BPM | DIASTOLIC BLOOD PRESSURE: 91 MMHG

## 2018-01-01 VITALS
BODY MASS INDEX: 19.43 KG/M2 | HEIGHT: 68 IN | RESPIRATION RATE: 18 BRPM | HEART RATE: 88 BPM | TEMPERATURE: 98.3 F | SYSTOLIC BLOOD PRESSURE: 165 MMHG | DIASTOLIC BLOOD PRESSURE: 89 MMHG | WEIGHT: 128.2 LBS

## 2018-01-01 VITALS
BODY MASS INDEX: 20.56 KG/M2 | RESPIRATION RATE: 18 BRPM | SYSTOLIC BLOOD PRESSURE: 191 MMHG | HEART RATE: 108 BPM | TEMPERATURE: 97.9 F | WEIGHT: 135.2 LBS | DIASTOLIC BLOOD PRESSURE: 90 MMHG

## 2018-01-01 DIAGNOSIS — D50.9 IRON DEFICIENCY ANEMIA, UNSPECIFIED IRON DEFICIENCY ANEMIA TYPE: ICD-10-CM

## 2018-01-01 DIAGNOSIS — C79.51 BONE METASTASIS: ICD-10-CM

## 2018-01-01 DIAGNOSIS — C34.12 MALIGNANT NEOPLASM OF UPPER LOBE OF LEFT LUNG (HCC): ICD-10-CM

## 2018-01-01 DIAGNOSIS — Z45.2 ENCOUNTER FOR VENOUS ACCESS DEVICE CARE: Primary | ICD-10-CM

## 2018-01-01 DIAGNOSIS — IMO0002 ALCOHOL USE DISORDER: ICD-10-CM

## 2018-01-01 DIAGNOSIS — D75.89 MACROCYTOSIS: ICD-10-CM

## 2018-01-01 DIAGNOSIS — I10 ESSENTIAL HYPERTENSION: ICD-10-CM

## 2018-01-01 DIAGNOSIS — C34.12 MALIGNANT NEOPLASM OF UPPER LOBE OF LEFT LUNG (HCC): Primary | ICD-10-CM

## 2018-01-01 DIAGNOSIS — D75.839 THROMBOCYTOSIS: ICD-10-CM

## 2018-01-01 DIAGNOSIS — C79.51 BONE METASTASIS: Primary | ICD-10-CM

## 2018-01-01 DIAGNOSIS — IMO0001 OTHER COMPLICATION DUE TO VENOUS ACCESS DEVICE, INITIAL ENCOUNTER: ICD-10-CM

## 2018-01-01 DIAGNOSIS — D72.829 LEUKOCYTOSIS, UNSPECIFIED TYPE: ICD-10-CM

## 2018-01-01 DIAGNOSIS — Z45.2 ENCOUNTER FOR VENOUS ACCESS DEVICE CARE: ICD-10-CM

## 2018-01-01 DIAGNOSIS — D50.9 IRON DEFICIENCY ANEMIA, UNSPECIFIED IRON DEFICIENCY ANEMIA TYPE: Primary | ICD-10-CM

## 2018-01-01 DIAGNOSIS — R94.6 ABNORMAL THYROID FUNCTION TEST: ICD-10-CM

## 2018-01-01 DIAGNOSIS — F17.200 TOBACCO USE DISORDER: ICD-10-CM

## 2018-01-01 DIAGNOSIS — R07.89 LEFT-SIDED CHEST WALL PAIN: ICD-10-CM

## 2018-01-01 DIAGNOSIS — D75.839 THROMBOCYTOSIS: Primary | ICD-10-CM

## 2018-01-01 DIAGNOSIS — Z76.89 ENCOUNTER TO ESTABLISH CARE WITH NEW DOCTOR: Primary | ICD-10-CM

## 2018-01-01 LAB
ALBUMIN SERPL-MCNC: 3.9 G/DL (ref 3.4–4.8)
ALBUMIN SERPL-MCNC: 4 G/DL (ref 3.4–4.8)
ALBUMIN SERPL-MCNC: 4.1 G/DL (ref 3.4–4.8)
ALBUMIN SERPL-MCNC: 4.2 G/DL (ref 3.4–4.8)
ALBUMIN SERPL-MCNC: 4.2 G/DL (ref 3.4–4.8)
ALBUMIN SERPL-MCNC: 4.3 G/DL (ref 3.4–4.8)
ALBUMIN SERPL-MCNC: 4.4 G/DL (ref 3.4–4.8)
ALBUMIN/GLOB SERPL: 1.1 G/DL (ref 1.1–1.8)
ALBUMIN/GLOB SERPL: 1.2 G/DL (ref 1.1–1.8)
ALBUMIN/GLOB SERPL: 1.3 G/DL (ref 1.1–1.8)
ALBUMIN/GLOB SERPL: 1.4 G/DL (ref 1.1–1.8)
ALP SERPL-CCNC: 100 U/L (ref 38–126)
ALP SERPL-CCNC: 100 U/L (ref 38–126)
ALP SERPL-CCNC: 101 U/L (ref 38–126)
ALP SERPL-CCNC: 102 U/L (ref 38–126)
ALP SERPL-CCNC: 102 U/L (ref 38–126)
ALP SERPL-CCNC: 119 U/L (ref 38–126)
ALP SERPL-CCNC: 70 U/L (ref 38–126)
ALP SERPL-CCNC: 80 U/L (ref 38–126)
ALP SERPL-CCNC: 90 U/L (ref 38–126)
ALP SERPL-CCNC: 93 U/L (ref 38–126)
ALP SERPL-CCNC: 96 U/L (ref 38–126)
ALP SERPL-CCNC: 97 U/L (ref 38–126)
ALT SERPL W P-5'-P-CCNC: 13 U/L (ref 21–72)
ALT SERPL W P-5'-P-CCNC: 16 U/L (ref 21–72)
ALT SERPL W P-5'-P-CCNC: 17 U/L (ref 21–72)
ALT SERPL W P-5'-P-CCNC: 19 U/L (ref 21–72)
ALT SERPL W P-5'-P-CCNC: 20 U/L (ref 21–72)
ALT SERPL W P-5'-P-CCNC: 21 U/L (ref 21–72)
ALT SERPL W P-5'-P-CCNC: 23 U/L (ref 21–72)
ALT SERPL W P-5'-P-CCNC: 25 U/L (ref 21–72)
ALT SERPL W P-5'-P-CCNC: 26 U/L (ref 21–72)
ALT SERPL W P-5'-P-CCNC: 27 U/L (ref 21–72)
ALT SERPL W P-5'-P-CCNC: 27 U/L (ref 21–72)
ALT SERPL W P-5'-P-CCNC: 28 U/L (ref 21–72)
ANION GAP SERPL CALCULATED.3IONS-SCNC: 10 MMOL/L (ref 5–15)
ANION GAP SERPL CALCULATED.3IONS-SCNC: 10 MMOL/L (ref 5–15)
ANION GAP SERPL CALCULATED.3IONS-SCNC: 11 MMOL/L (ref 5–15)
ANION GAP SERPL CALCULATED.3IONS-SCNC: 12 MMOL/L (ref 5–15)
ANION GAP SERPL CALCULATED.3IONS-SCNC: 13 MMOL/L (ref 5–15)
ANION GAP SERPL CALCULATED.3IONS-SCNC: 14 MMOL/L (ref 5–15)
ANION GAP SERPL CALCULATED.3IONS-SCNC: 6 MMOL/L (ref 5–15)
ANION GAP SERPL CALCULATED.3IONS-SCNC: 7 MMOL/L (ref 5–15)
ANION GAP SERPL CALCULATED.3IONS-SCNC: 8 MMOL/L (ref 5–15)
ANION GAP SERPL CALCULATED.3IONS-SCNC: 9 MMOL/L (ref 5–15)
AST SERPL-CCNC: 18 U/L (ref 17–59)
AST SERPL-CCNC: 23 U/L (ref 17–59)
AST SERPL-CCNC: 25 U/L (ref 17–59)
AST SERPL-CCNC: 26 U/L (ref 17–59)
AST SERPL-CCNC: 26 U/L (ref 17–59)
AST SERPL-CCNC: 28 U/L (ref 17–59)
AST SERPL-CCNC: 30 U/L (ref 17–59)
AST SERPL-CCNC: 30 U/L (ref 17–59)
AST SERPL-CCNC: 33 U/L (ref 17–59)
AST SERPL-CCNC: 38 U/L (ref 17–59)
BASOPHILS # BLD AUTO: 0.04 10*3/MM3 (ref 0–0.2)
BASOPHILS # BLD AUTO: 0.04 10*3/MM3 (ref 0–0.2)
BASOPHILS # BLD AUTO: 0.05 10*3/MM3 (ref 0–0.2)
BASOPHILS # BLD AUTO: 0.06 10*3/MM3 (ref 0–0.2)
BASOPHILS # BLD AUTO: 0.09 10*3/MM3 (ref 0–0.2)
BASOPHILS NFR BLD AUTO: 0.3 % (ref 0–2)
BASOPHILS NFR BLD AUTO: 0.4 % (ref 0–2)
BASOPHILS NFR BLD AUTO: 0.4 % (ref 0–2)
BASOPHILS NFR BLD AUTO: 0.5 % (ref 0–2)
BASOPHILS NFR BLD AUTO: 0.6 % (ref 0–2)
BASOPHILS NFR BLD AUTO: 0.7 % (ref 0–2)
BASOPHILS NFR BLD AUTO: 0.8 % (ref 0–2)
BASOPHILS NFR BLD AUTO: 0.9 % (ref 0–2)
BILIRUB SERPL-MCNC: 0.2 MG/DL (ref 0.2–1.3)
BILIRUB SERPL-MCNC: 0.2 MG/DL (ref 0.2–1.3)
BILIRUB SERPL-MCNC: 0.3 MG/DL (ref 0.2–1.3)
BILIRUB SERPL-MCNC: 0.4 MG/DL (ref 0.2–1.3)
BILIRUB SERPL-MCNC: 0.4 MG/DL (ref 0.2–1.3)
BILIRUB SERPL-MCNC: 0.5 MG/DL (ref 0.2–1.3)
BUN BLD-MCNC: 10 MG/DL (ref 7–21)
BUN BLD-MCNC: 10 MG/DL (ref 7–21)
BUN BLD-MCNC: 11 MG/DL (ref 7–21)
BUN BLD-MCNC: 12 MG/DL (ref 7–21)
BUN BLD-MCNC: 14 MG/DL (ref 7–21)
BUN BLD-MCNC: 14 MG/DL (ref 7–21)
BUN BLD-MCNC: 15 MG/DL (ref 7–21)
BUN BLD-MCNC: 16 MG/DL (ref 7–21)
BUN BLD-MCNC: 8 MG/DL (ref 7–21)
BUN BLD-MCNC: 8 MG/DL (ref 7–21)
BUN/CREAT SERPL: 16.7 (ref 7–25)
BUN/CREAT SERPL: 18.6 (ref 7–25)
BUN/CREAT SERPL: 19 (ref 7–25)
BUN/CREAT SERPL: 20.3 (ref 7–25)
BUN/CREAT SERPL: 20.8 (ref 7–25)
BUN/CREAT SERPL: 22 (ref 7–25)
BUN/CREAT SERPL: 25.6 (ref 7–25)
BUN/CREAT SERPL: 25.6 (ref 7–25)
BUN/CREAT SERPL: 25.9 (ref 7–25)
BUN/CREAT SERPL: 28.6 (ref 7–25)
BUN/CREAT SERPL: 29.4 (ref 7–25)
BUN/CREAT SERPL: 32.7 (ref 7–25)
CALCIUM SPEC-SCNC: 8.5 MG/DL (ref 8.4–10.2)
CALCIUM SPEC-SCNC: 8.7 MG/DL (ref 8.4–10.2)
CALCIUM SPEC-SCNC: 8.8 MG/DL (ref 8.4–10.2)
CALCIUM SPEC-SCNC: 8.9 MG/DL (ref 8.4–10.2)
CALCIUM SPEC-SCNC: 9 MG/DL (ref 8.4–10.2)
CALCIUM SPEC-SCNC: 9.1 MG/DL (ref 8.4–10.2)
CALCIUM SPEC-SCNC: 9.3 MG/DL (ref 8.4–10.2)
CALCIUM SPEC-SCNC: 9.5 MG/DL (ref 8.4–10.2)
CHLORIDE SERPL-SCNC: 101 MMOL/L (ref 95–110)
CHLORIDE SERPL-SCNC: 101 MMOL/L (ref 95–110)
CHLORIDE SERPL-SCNC: 102 MMOL/L (ref 95–110)
CHLORIDE SERPL-SCNC: 104 MMOL/L (ref 95–110)
CHLORIDE SERPL-SCNC: 104 MMOL/L (ref 95–110)
CHLORIDE SERPL-SCNC: 105 MMOL/L (ref 95–110)
CHLORIDE SERPL-SCNC: 107 MMOL/L (ref 95–110)
CHLORIDE SERPL-SCNC: 108 MMOL/L (ref 95–110)
CHLORIDE SERPL-SCNC: 108 MMOL/L (ref 95–110)
CHLORIDE SERPL-SCNC: 97 MMOL/L (ref 95–110)
CHLORIDE SERPL-SCNC: 98 MMOL/L (ref 95–110)
CHLORIDE SERPL-SCNC: 99 MMOL/L (ref 95–110)
CO2 SERPL-SCNC: 23 MMOL/L (ref 22–31)
CO2 SERPL-SCNC: 23 MMOL/L (ref 22–31)
CO2 SERPL-SCNC: 24 MMOL/L (ref 22–31)
CO2 SERPL-SCNC: 25 MMOL/L (ref 22–31)
CO2 SERPL-SCNC: 25 MMOL/L (ref 22–31)
CO2 SERPL-SCNC: 27 MMOL/L (ref 22–31)
CO2 SERPL-SCNC: 28 MMOL/L (ref 22–31)
CO2 SERPL-SCNC: 28 MMOL/L (ref 22–31)
CO2 SERPL-SCNC: 29 MMOL/L (ref 22–31)
CO2 SERPL-SCNC: 31 MMOL/L (ref 22–31)
CREAT BLD-MCNC: 0.39 MG/DL (ref 0.7–1.3)
CREAT BLD-MCNC: 0.43 MG/DL (ref 0.7–1.3)
CREAT BLD-MCNC: 0.43 MG/DL (ref 0.7–1.3)
CREAT BLD-MCNC: 0.48 MG/DL (ref 0.7–1.3)
CREAT BLD-MCNC: 0.48 MG/DL (ref 0.7–1.3)
CREAT BLD-MCNC: 0.49 MG/DL (ref 0.7–1.3)
CREAT BLD-MCNC: 0.49 MG/DL (ref 0.7–1.3)
CREAT BLD-MCNC: 0.5 MG/DL (ref 0.7–1.3)
CREAT BLD-MCNC: 0.51 MG/DL (ref 0.7–1.3)
CREAT BLD-MCNC: 0.54 MG/DL (ref 0.7–1.3)
CREAT BLD-MCNC: 0.58 MG/DL (ref 0.7–1.3)
CREAT BLD-MCNC: 0.59 MG/DL (ref 0.7–1.3)
DEPRECATED RDW RBC AUTO: 47 FL (ref 35.1–43.9)
DEPRECATED RDW RBC AUTO: 48.7 FL (ref 35.1–43.9)
DEPRECATED RDW RBC AUTO: 48.7 FL (ref 35.1–43.9)
DEPRECATED RDW RBC AUTO: 49.6 FL (ref 35.1–43.9)
DEPRECATED RDW RBC AUTO: 49.7 FL (ref 35.1–43.9)
DEPRECATED RDW RBC AUTO: 51.1 FL (ref 35.1–43.9)
DEPRECATED RDW RBC AUTO: 51.2 FL (ref 35.1–43.9)
DEPRECATED RDW RBC AUTO: 52 FL (ref 35.1–43.9)
DEPRECATED RDW RBC AUTO: 54.1 FL (ref 35.1–43.9)
DEPRECATED RDW RBC AUTO: 54.5 FL (ref 35.1–43.9)
DEPRECATED RDW RBC AUTO: 55.6 FL (ref 35.1–43.9)
DEPRECATED RDW RBC AUTO: 56.7 FL (ref 35.1–43.9)
EOSINOPHIL # BLD AUTO: 0.09 10*3/MM3 (ref 0–0.7)
EOSINOPHIL # BLD AUTO: 0.12 10*3/MM3 (ref 0–0.7)
EOSINOPHIL # BLD AUTO: 0.2 10*3/MM3 (ref 0–0.7)
EOSINOPHIL # BLD AUTO: 0.24 10*3/MM3 (ref 0–0.7)
EOSINOPHIL # BLD AUTO: 0.27 10*3/MM3 (ref 0–0.7)
EOSINOPHIL # BLD AUTO: 0.29 10*3/MM3 (ref 0–0.7)
EOSINOPHIL # BLD AUTO: 0.31 10*3/MM3 (ref 0–0.7)
EOSINOPHIL # BLD AUTO: 0.32 10*3/MM3 (ref 0–0.7)
EOSINOPHIL # BLD AUTO: 0.33 10*3/MM3 (ref 0–0.7)
EOSINOPHIL # BLD AUTO: 0.37 10*3/MM3 (ref 0–0.7)
EOSINOPHIL # BLD AUTO: 0.37 10*3/MM3 (ref 0–0.7)
EOSINOPHIL # BLD AUTO: 0.48 10*3/MM3 (ref 0–0.7)
EOSINOPHIL NFR BLD AUTO: 0.9 % (ref 0–7)
EOSINOPHIL NFR BLD AUTO: 1 % (ref 0–7)
EOSINOPHIL NFR BLD AUTO: 2.2 % (ref 0–7)
EOSINOPHIL NFR BLD AUTO: 2.5 % (ref 0–7)
EOSINOPHIL NFR BLD AUTO: 2.6 % (ref 0–7)
EOSINOPHIL NFR BLD AUTO: 2.7 % (ref 0–7)
EOSINOPHIL NFR BLD AUTO: 2.7 % (ref 0–7)
EOSINOPHIL NFR BLD AUTO: 3.2 % (ref 0–7)
EOSINOPHIL NFR BLD AUTO: 3.2 % (ref 0–7)
EOSINOPHIL NFR BLD AUTO: 3.8 % (ref 0–7)
EOSINOPHIL NFR BLD AUTO: 4.7 % (ref 0–7)
EOSINOPHIL NFR BLD AUTO: 5.4 % (ref 0–7)
ERYTHROCYTE [DISTWIDTH] IN BLOOD BY AUTOMATED COUNT: 14.1 % (ref 11.5–14.5)
ERYTHROCYTE [DISTWIDTH] IN BLOOD BY AUTOMATED COUNT: 14.5 % (ref 11.5–14.5)
ERYTHROCYTE [DISTWIDTH] IN BLOOD BY AUTOMATED COUNT: 14.6 % (ref 11.5–14.5)
ERYTHROCYTE [DISTWIDTH] IN BLOOD BY AUTOMATED COUNT: 14.9 % (ref 11.5–14.5)
ERYTHROCYTE [DISTWIDTH] IN BLOOD BY AUTOMATED COUNT: 14.9 % (ref 11.5–14.5)
ERYTHROCYTE [DISTWIDTH] IN BLOOD BY AUTOMATED COUNT: 15 % (ref 11.5–14.5)
ERYTHROCYTE [DISTWIDTH] IN BLOOD BY AUTOMATED COUNT: 15.6 % (ref 11.5–14.5)
ERYTHROCYTE [DISTWIDTH] IN BLOOD BY AUTOMATED COUNT: 15.8 % (ref 11.5–14.5)
ERYTHROCYTE [DISTWIDTH] IN BLOOD BY AUTOMATED COUNT: 16.1 % (ref 11.5–14.5)
ERYTHROCYTE [DISTWIDTH] IN BLOOD BY AUTOMATED COUNT: 16.5 % (ref 11.5–14.5)
FERRITIN SERPL-MCNC: 120 NG/ML (ref 17.9–464)
FERRITIN SERPL-MCNC: 73 NG/ML (ref 17.9–464)
FOLATE SERPL-MCNC: >20 NG/ML (ref 2.76–21)
FOLATE SERPL-MCNC: >20 NG/ML (ref 2.76–21)
GFR SERPL CREATININE-BSD FRML MDRD: 142 ML/MIN/1.73 (ref 56–130)
GFR SERPL CREATININE-BSD FRML MDRD: 144 ML/MIN/1.73 (ref 56–130)
GFR SERPL CREATININE-BSD FRML MDRD: 157 ML/MIN/1.73 (ref 56–130)
GFR SERPL CREATININE-BSD FRML MDRD: 168 ML/MIN/1.73 (ref 56–130)
GFR SERPL CREATININE-BSD FRML MDRD: 171 ML/MIN/1.73 (ref 56–130)
GFR SERPL CREATININE-BSD FRML MDRD: 175 ML/MIN/1.73 (ref 56–130)
GFR SERPL CREATININE-BSD FRML MDRD: 175 ML/MIN/1.73 (ref 56–130)
GFR SERPL CREATININE-BSD FRML MDRD: 180 ML/MIN/1.73 (ref 56–130)
GFR SERPL CREATININE-BSD FRML MDRD: 180 ML/MIN/1.73 (ref 56–130)
GFR SERPL CREATININE-BSD FRML MDRD: 204 ML/MIN/1.73 (ref 56–130)
GFR SERPL CREATININE-BSD FRML MDRD: 204 ML/MIN/1.73 (ref 56–130)
GFR SERPL CREATININE-BSD FRML MDRD: 228 ML/MIN/1.73 (ref 56–130)
GLOBULIN UR ELPH-MCNC: 3 GM/DL (ref 2.3–3.5)
GLOBULIN UR ELPH-MCNC: 3.1 GM/DL (ref 2.3–3.5)
GLOBULIN UR ELPH-MCNC: 3.2 GM/DL (ref 2.3–3.5)
GLOBULIN UR ELPH-MCNC: 3.2 GM/DL (ref 2.3–3.5)
GLOBULIN UR ELPH-MCNC: 3.3 GM/DL (ref 2.3–3.5)
GLOBULIN UR ELPH-MCNC: 3.3 GM/DL (ref 2.3–3.5)
GLOBULIN UR ELPH-MCNC: 3.4 GM/DL (ref 2.3–3.5)
GLOBULIN UR ELPH-MCNC: 3.5 GM/DL (ref 2.3–3.5)
GLOBULIN UR ELPH-MCNC: 3.5 GM/DL (ref 2.3–3.5)
GLOBULIN UR ELPH-MCNC: 3.6 GM/DL (ref 2.3–3.5)
GLUCOSE BLD-MCNC: 106 MG/DL (ref 60–100)
GLUCOSE BLD-MCNC: 107 MG/DL (ref 60–100)
GLUCOSE BLD-MCNC: 111 MG/DL (ref 60–100)
GLUCOSE BLD-MCNC: 117 MG/DL (ref 60–100)
GLUCOSE BLD-MCNC: 120 MG/DL (ref 60–100)
GLUCOSE BLD-MCNC: 128 MG/DL (ref 60–100)
GLUCOSE BLD-MCNC: 137 MG/DL (ref 60–100)
GLUCOSE BLD-MCNC: 143 MG/DL (ref 60–100)
GLUCOSE BLD-MCNC: 87 MG/DL (ref 60–100)
GLUCOSE BLD-MCNC: 88 MG/DL (ref 60–100)
GLUCOSE BLD-MCNC: 89 MG/DL (ref 60–100)
GLUCOSE BLD-MCNC: 94 MG/DL (ref 60–100)
HCT VFR BLD AUTO: 35.7 % (ref 39–49)
HCT VFR BLD AUTO: 36.1 % (ref 39–49)
HCT VFR BLD AUTO: 36.5 % (ref 39–49)
HCT VFR BLD AUTO: 36.7 % (ref 39–49)
HCT VFR BLD AUTO: 36.7 % (ref 39–49)
HCT VFR BLD AUTO: 37.6 % (ref 39–49)
HCT VFR BLD AUTO: 37.7 % (ref 39–49)
HCT VFR BLD AUTO: 38.2 % (ref 39–49)
HCT VFR BLD AUTO: 38.3 % (ref 39–49)
HCT VFR BLD AUTO: 38.4 % (ref 39–49)
HCT VFR BLD AUTO: 39 % (ref 39–49)
HCT VFR BLD AUTO: 39 % (ref 39–49)
HGB BLD-MCNC: 12.2 G/DL (ref 13.7–17.3)
HGB BLD-MCNC: 12.3 G/DL (ref 13.7–17.3)
HGB BLD-MCNC: 12.4 G/DL (ref 13.7–17.3)
HGB BLD-MCNC: 12.5 G/DL (ref 13.7–17.3)
HGB BLD-MCNC: 12.5 G/DL (ref 13.7–17.3)
HGB BLD-MCNC: 12.7 G/DL (ref 13.7–17.3)
HGB BLD-MCNC: 12.9 G/DL (ref 13.7–17.3)
HGB BLD-MCNC: 13 G/DL (ref 13.7–17.3)
HGB BLD-MCNC: 13 G/DL (ref 13.7–17.3)
HGB BLD-MCNC: 13.1 G/DL (ref 13.7–17.3)
HGB BLD-MCNC: 13.4 G/DL (ref 13.7–17.3)
HGB BLD-MCNC: 13.4 G/DL (ref 13.7–17.3)
IMM GRANULOCYTES # BLD: 0.01 10*3/MM3 (ref 0–0.02)
IMM GRANULOCYTES # BLD: 0.01 10*3/MM3 (ref 0–0.02)
IMM GRANULOCYTES # BLD: 0.02 10*3/MM3 (ref 0–0.02)
IMM GRANULOCYTES # BLD: 0.03 10*3/MM3 (ref 0–0.02)
IMM GRANULOCYTES NFR BLD: 0.1 % (ref 0–0.5)
IMM GRANULOCYTES NFR BLD: 0.1 % (ref 0–0.5)
IMM GRANULOCYTES NFR BLD: 0.2 % (ref 0–0.5)
IMM GRANULOCYTES NFR BLD: 0.3 % (ref 0–0.5)
IRON 24H UR-MRATE: 17 MCG/DL (ref 49–181)
IRON 24H UR-MRATE: 27 MCG/DL (ref 49–181)
IRON SATN MFR SERPL: 6 % (ref 20–55)
IRON SATN MFR SERPL: 9 % (ref 20–55)
LYMPHOCYTES # BLD AUTO: 0.66 10*3/MM3 (ref 0.6–4.2)
LYMPHOCYTES # BLD AUTO: 0.67 10*3/MM3 (ref 0.6–4.2)
LYMPHOCYTES # BLD AUTO: 0.67 10*3/MM3 (ref 0.6–4.2)
LYMPHOCYTES # BLD AUTO: 0.69 10*3/MM3 (ref 0.6–4.2)
LYMPHOCYTES # BLD AUTO: 0.75 10*3/MM3 (ref 0.6–4.2)
LYMPHOCYTES # BLD AUTO: 0.8 10*3/MM3 (ref 0.6–4.2)
LYMPHOCYTES # BLD AUTO: 0.83 10*3/MM3 (ref 0.6–4.2)
LYMPHOCYTES # BLD AUTO: 0.9 10*3/MM3 (ref 0.6–4.2)
LYMPHOCYTES # BLD AUTO: 0.92 10*3/MM3 (ref 0.6–4.2)
LYMPHOCYTES # BLD AUTO: 0.95 10*3/MM3 (ref 0.6–4.2)
LYMPHOCYTES # BLD AUTO: 1.33 10*3/MM3 (ref 0.6–4.2)
LYMPHOCYTES # BLD AUTO: 1.43 10*3/MM3 (ref 0.6–4.2)
LYMPHOCYTES NFR BLD AUTO: 10.5 % (ref 10–50)
LYMPHOCYTES NFR BLD AUTO: 12 % (ref 10–50)
LYMPHOCYTES NFR BLD AUTO: 13.3 % (ref 10–50)
LYMPHOCYTES NFR BLD AUTO: 5.7 % (ref 10–50)
LYMPHOCYTES NFR BLD AUTO: 5.8 % (ref 10–50)
LYMPHOCYTES NFR BLD AUTO: 7.2 % (ref 10–50)
LYMPHOCYTES NFR BLD AUTO: 7.5 % (ref 10–50)
LYMPHOCYTES NFR BLD AUTO: 7.5 % (ref 10–50)
LYMPHOCYTES NFR BLD AUTO: 8.8 % (ref 10–50)
LYMPHOCYTES NFR BLD AUTO: 9.2 % (ref 10–50)
LYMPHOCYTES NFR BLD AUTO: 9.3 % (ref 10–50)
LYMPHOCYTES NFR BLD AUTO: 9.9 % (ref 10–50)
MCH RBC QN AUTO: 30.8 PG (ref 26.5–34)
MCH RBC QN AUTO: 31.1 PG (ref 26.5–34)
MCH RBC QN AUTO: 31.6 PG (ref 26.5–34)
MCH RBC QN AUTO: 31.7 PG (ref 26.5–34)
MCH RBC QN AUTO: 31.7 PG (ref 26.5–34)
MCH RBC QN AUTO: 31.8 PG (ref 26.5–34)
MCH RBC QN AUTO: 31.8 PG (ref 26.5–34)
MCH RBC QN AUTO: 31.9 PG (ref 26.5–34)
MCH RBC QN AUTO: 32.1 PG (ref 26.5–34)
MCH RBC QN AUTO: 32.3 PG (ref 26.5–34)
MCH RBC QN AUTO: 32.7 PG (ref 26.5–34)
MCH RBC QN AUTO: 32.9 PG (ref 26.5–34)
MCHC RBC AUTO-ENTMCNC: 33.6 G/DL (ref 31.5–36.3)
MCHC RBC AUTO-ENTMCNC: 33.7 G/DL (ref 31.5–36.3)
MCHC RBC AUTO-ENTMCNC: 33.7 G/DL (ref 31.5–36.3)
MCHC RBC AUTO-ENTMCNC: 33.8 G/DL (ref 31.5–36.3)
MCHC RBC AUTO-ENTMCNC: 33.9 G/DL (ref 31.5–36.3)
MCHC RBC AUTO-ENTMCNC: 34.1 G/DL (ref 31.5–36.3)
MCHC RBC AUTO-ENTMCNC: 34.1 G/DL (ref 31.5–36.3)
MCHC RBC AUTO-ENTMCNC: 34.2 G/DL (ref 31.5–36.3)
MCHC RBC AUTO-ENTMCNC: 34.3 G/DL (ref 31.5–36.3)
MCHC RBC AUTO-ENTMCNC: 34.4 G/DL (ref 31.5–36.3)
MCHC RBC AUTO-ENTMCNC: 34.6 G/DL (ref 31.5–36.3)
MCHC RBC AUTO-ENTMCNC: 34.9 G/DL (ref 31.5–36.3)
MCV RBC AUTO: 90.4 FL (ref 80–98)
MCV RBC AUTO: 91.9 FL (ref 80–98)
MCV RBC AUTO: 92.2 FL (ref 80–98)
MCV RBC AUTO: 92.2 FL (ref 80–98)
MCV RBC AUTO: 93.4 FL (ref 80–98)
MCV RBC AUTO: 93.7 FL (ref 80–98)
MCV RBC AUTO: 93.9 FL (ref 80–98)
MCV RBC AUTO: 94 FL (ref 80–98)
MCV RBC AUTO: 94 FL (ref 80–98)
MCV RBC AUTO: 94.1 FL (ref 80–98)
MCV RBC AUTO: 95.5 FL (ref 80–98)
MCV RBC AUTO: 95.8 FL (ref 80–98)
MONOCYTES # BLD AUTO: 0.58 10*3/MM3 (ref 0–0.9)
MONOCYTES # BLD AUTO: 0.92 10*3/MM3 (ref 0–0.9)
MONOCYTES # BLD AUTO: 0.93 10*3/MM3 (ref 0–0.9)
MONOCYTES # BLD AUTO: 0.93 10*3/MM3 (ref 0–0.9)
MONOCYTES # BLD AUTO: 0.97 10*3/MM3 (ref 0–0.9)
MONOCYTES # BLD AUTO: 1.07 10*3/MM3 (ref 0–0.9)
MONOCYTES # BLD AUTO: 1.12 10*3/MM3 (ref 0–0.9)
MONOCYTES # BLD AUTO: 1.18 10*3/MM3 (ref 0–0.9)
MONOCYTES # BLD AUTO: 1.2 10*3/MM3 (ref 0–0.9)
MONOCYTES # BLD AUTO: 1.2 10*3/MM3 (ref 0–0.9)
MONOCYTES # BLD AUTO: 1.28 10*3/MM3 (ref 0–0.9)
MONOCYTES # BLD AUTO: 1.4 10*3/MM3 (ref 0–0.9)
MONOCYTES NFR BLD AUTO: 10.1 % (ref 0–12)
MONOCYTES NFR BLD AUTO: 10.4 % (ref 0–12)
MONOCYTES NFR BLD AUTO: 11.4 % (ref 0–12)
MONOCYTES NFR BLD AUTO: 11.9 % (ref 0–12)
MONOCYTES NFR BLD AUTO: 12.1 % (ref 0–12)
MONOCYTES NFR BLD AUTO: 12.3 % (ref 0–12)
MONOCYTES NFR BLD AUTO: 12.3 % (ref 0–12)
MONOCYTES NFR BLD AUTO: 12.5 % (ref 0–12)
MONOCYTES NFR BLD AUTO: 14.4 % (ref 0–12)
MONOCYTES NFR BLD AUTO: 5.4 % (ref 0–12)
MONOCYTES NFR BLD AUTO: 8.1 % (ref 0–12)
MONOCYTES NFR BLD AUTO: 8.9 % (ref 0–12)
NEUTROPHILS # BLD AUTO: 5.65 10*3/MM3 (ref 2–8.6)
NEUTROPHILS # BLD AUTO: 6.06 10*3/MM3 (ref 2–8.6)
NEUTROPHILS # BLD AUTO: 6.4 10*3/MM3 (ref 2–8.6)
NEUTROPHILS # BLD AUTO: 7.1 10*3/MM3 (ref 2–8.6)
NEUTROPHILS # BLD AUTO: 7.55 10*3/MM3 (ref 2–8.6)
NEUTROPHILS # BLD AUTO: 7.72 10*3/MM3 (ref 2–8.6)
NEUTROPHILS # BLD AUTO: 7.92 10*3/MM3 (ref 2–8.6)
NEUTROPHILS # BLD AUTO: 8.19 10*3/MM3 (ref 2–8.6)
NEUTROPHILS # BLD AUTO: 8.32 10*3/MM3 (ref 2–8.6)
NEUTROPHILS # BLD AUTO: 8.35 10*3/MM3 (ref 2–8.6)
NEUTROPHILS # BLD AUTO: 9.15 10*3/MM3 (ref 2–8.6)
NEUTROPHILS # BLD AUTO: 9.75 10*3/MM3 (ref 2–8.6)
NEUTROPHILS NFR BLD AUTO: 71.4 % (ref 37–80)
NEUTROPHILS NFR BLD AUTO: 72 % (ref 37–80)
NEUTROPHILS NFR BLD AUTO: 73.8 % (ref 37–80)
NEUTROPHILS NFR BLD AUTO: 74.9 % (ref 37–80)
NEUTROPHILS NFR BLD AUTO: 75.3 % (ref 37–80)
NEUTROPHILS NFR BLD AUTO: 76.7 % (ref 37–80)
NEUTROPHILS NFR BLD AUTO: 76.7 % (ref 37–80)
NEUTROPHILS NFR BLD AUTO: 77.9 % (ref 37–80)
NEUTROPHILS NFR BLD AUTO: 78.5 % (ref 37–80)
NEUTROPHILS NFR BLD AUTO: 78.7 % (ref 37–80)
NEUTROPHILS NFR BLD AUTO: 78.9 % (ref 37–80)
NEUTROPHILS NFR BLD AUTO: 84.5 % (ref 37–80)
NRBC BLD MANUAL-RTO: 0 /100 WBC (ref 0–0)
PLATELET # BLD AUTO: 371 10*3/MM3 (ref 150–450)
PLATELET # BLD AUTO: 381 10*3/MM3 (ref 150–450)
PLATELET # BLD AUTO: 393 10*3/MM3 (ref 150–450)
PLATELET # BLD AUTO: 394 10*3/MM3 (ref 150–450)
PLATELET # BLD AUTO: 396 10*3/MM3 (ref 150–450)
PLATELET # BLD AUTO: 403 10*3/MM3 (ref 150–450)
PLATELET # BLD AUTO: 407 10*3/MM3 (ref 150–450)
PLATELET # BLD AUTO: 411 10*3/MM3 (ref 150–450)
PLATELET # BLD AUTO: 419 10*3/MM3 (ref 150–450)
PLATELET # BLD AUTO: 439 10*3/MM3 (ref 150–450)
PLATELET # BLD AUTO: 444 10*3/MM3 (ref 150–450)
PLATELET # BLD AUTO: 456 10*3/MM3 (ref 150–450)
PMV BLD AUTO: 7.7 FL (ref 8–12)
PMV BLD AUTO: 8.4 FL (ref 8–12)
PMV BLD AUTO: 8.5 FL (ref 8–12)
PMV BLD AUTO: 8.6 FL (ref 8–12)
PMV BLD AUTO: 8.7 FL (ref 8–12)
PMV BLD AUTO: 8.7 FL (ref 8–12)
PMV BLD AUTO: 8.9 FL (ref 8–12)
POTASSIUM BLD-SCNC: 3.6 MMOL/L (ref 3.5–5.1)
POTASSIUM BLD-SCNC: 3.7 MMOL/L (ref 3.5–5.1)
POTASSIUM BLD-SCNC: 3.9 MMOL/L (ref 3.5–5.1)
POTASSIUM BLD-SCNC: 4 MMOL/L (ref 3.5–5.1)
POTASSIUM BLD-SCNC: 4 MMOL/L (ref 3.5–5.1)
POTASSIUM BLD-SCNC: 4.1 MMOL/L (ref 3.5–5.1)
POTASSIUM BLD-SCNC: 4.2 MMOL/L (ref 3.5–5.1)
PROT SERPL-MCNC: 6.9 G/DL (ref 6.3–8.6)
PROT SERPL-MCNC: 7.1 G/DL (ref 6.3–8.6)
PROT SERPL-MCNC: 7.2 G/DL (ref 6.3–8.6)
PROT SERPL-MCNC: 7.3 G/DL (ref 6.3–8.6)
PROT SERPL-MCNC: 7.4 G/DL (ref 6.3–8.6)
PROT SERPL-MCNC: 7.4 G/DL (ref 6.3–8.6)
PROT SERPL-MCNC: 7.5 G/DL (ref 6.3–8.6)
PROT SERPL-MCNC: 7.6 G/DL (ref 6.3–8.6)
PROT SERPL-MCNC: 7.9 G/DL (ref 6.3–8.6)
RBC # BLD AUTO: 3.77 10*6/MM3 (ref 4.37–5.74)
RBC # BLD AUTO: 3.8 10*6/MM3 (ref 4.37–5.74)
RBC # BLD AUTO: 3.93 10*6/MM3 (ref 4.37–5.74)
RBC # BLD AUTO: 3.96 10*6/MM3 (ref 4.37–5.74)
RBC # BLD AUTO: 4 10*6/MM3 (ref 4.37–5.74)
RBC # BLD AUTO: 4.01 10*6/MM3 (ref 4.37–5.74)
RBC # BLD AUTO: 4.06 10*6/MM3 (ref 4.37–5.74)
RBC # BLD AUTO: 4.07 10*6/MM3 (ref 4.37–5.74)
RBC # BLD AUTO: 4.09 10*6/MM3 (ref 4.37–5.74)
RBC # BLD AUTO: 4.1 10*6/MM3 (ref 4.37–5.74)
RBC # BLD AUTO: 4.15 10*6/MM3 (ref 4.37–5.74)
RBC # BLD AUTO: 4.23 10*6/MM3 (ref 4.37–5.74)
SODIUM BLD-SCNC: 136 MMOL/L (ref 137–145)
SODIUM BLD-SCNC: 137 MMOL/L (ref 137–145)
SODIUM BLD-SCNC: 138 MMOL/L (ref 137–145)
SODIUM BLD-SCNC: 138 MMOL/L (ref 137–145)
SODIUM BLD-SCNC: 139 MMOL/L (ref 137–145)
SODIUM BLD-SCNC: 140 MMOL/L (ref 137–145)
SODIUM BLD-SCNC: 141 MMOL/L (ref 137–145)
SODIUM BLD-SCNC: 142 MMOL/L (ref 137–145)
T4 FREE SERPL-MCNC: 0.67 NG/DL (ref 0.78–2.19)
T4 FREE SERPL-MCNC: 0.78 NG/DL (ref 0.78–2.19)
T4 FREE SERPL-MCNC: 0.79 NG/DL (ref 0.78–2.19)
T4 FREE SERPL-MCNC: 0.9 NG/DL (ref 0.78–2.19)
TIBC SERPL-MCNC: 284 MCG/DL (ref 261–462)
TIBC SERPL-MCNC: 298 MCG/DL (ref 261–462)
TSH SERPL DL<=0.05 MIU/L-ACNC: 2.89 MIU/ML (ref 0.46–4.68)
TSH SERPL DL<=0.05 MIU/L-ACNC: 2.9 MIU/ML (ref 0.46–4.68)
TSH SERPL DL<=0.05 MIU/L-ACNC: 3.18 MIU/ML (ref 0.46–4.68)
TSH SERPL DL<=0.05 MIU/L-ACNC: 4.21 MIU/ML (ref 0.46–4.68)
VIT B12 BLD-MCNC: 487 PG/ML (ref 239–931)
VIT B12 BLD-MCNC: 620 PG/ML (ref 239–931)
WBC NRBC COR # BLD: 10.06 10*3/MM3 (ref 3.2–9.8)
WBC NRBC COR # BLD: 10.32 10*3/MM3 (ref 3.2–9.8)
WBC NRBC COR # BLD: 10.43 10*3/MM3 (ref 3.2–9.8)
WBC NRBC COR # BLD: 10.72 10*3/MM3 (ref 3.2–9.8)
WBC NRBC COR # BLD: 11.1 10*3/MM3 (ref 3.2–9.8)
WBC NRBC COR # BLD: 11.54 10*3/MM3 (ref 3.2–9.8)
WBC NRBC COR # BLD: 11.59 10*3/MM3 (ref 3.2–9.8)
WBC NRBC COR # BLD: 7.9 10*3/MM3 (ref 3.2–9.8)
WBC NRBC COR # BLD: 8.06 10*3/MM3 (ref 3.2–9.8)
WBC NRBC COR # BLD: 8.89 10*3/MM3 (ref 3.2–9.8)
WBC NRBC COR # BLD: 9.6 10*3/MM3 (ref 3.2–9.8)
WBC NRBC COR # BLD: 9.63 10*3/MM3 (ref 3.2–9.8)

## 2018-01-01 PROCEDURE — G8420 CALC BMI NORM PARAMETERS: HCPCS | Performed by: INTERNAL MEDICINE

## 2018-01-01 PROCEDURE — 80053 COMPREHEN METABOLIC PANEL: CPT

## 2018-01-01 PROCEDURE — 85025 COMPLETE CBC W/AUTO DIFF WBC: CPT

## 2018-01-01 PROCEDURE — G8418 CALC BMI BLW LOW PARAM F/U: HCPCS | Performed by: INTERNAL MEDICINE

## 2018-01-01 PROCEDURE — 83540 ASSAY OF IRON: CPT | Performed by: INTERNAL MEDICINE

## 2018-01-01 PROCEDURE — 96413 CHEMO IV INFUSION 1 HR: CPT | Performed by: INTERNAL MEDICINE

## 2018-01-01 PROCEDURE — 25010000002 HEPARIN FLUSH (PORCINE) 100 UNIT/ML SOLUTION: Performed by: INTERNAL MEDICINE

## 2018-01-01 PROCEDURE — 99214 OFFICE O/P EST MOD 30 MIN: CPT | Performed by: INTERNAL MEDICINE

## 2018-01-01 PROCEDURE — 84443 ASSAY THYROID STIM HORMONE: CPT

## 2018-01-01 PROCEDURE — 99215 OFFICE O/P EST HI 40 MIN: CPT | Performed by: INTERNAL MEDICINE

## 2018-01-01 PROCEDURE — 1123F ACP DISCUSS/DSCN MKR DOCD: CPT | Performed by: INTERNAL MEDICINE

## 2018-01-01 PROCEDURE — 25010000002 ALTEPLASE 2 MG RECONSTITUTED SOLUTION: Performed by: INTERNAL MEDICINE

## 2018-01-01 PROCEDURE — 84439 ASSAY OF FREE THYROXINE: CPT

## 2018-01-01 PROCEDURE — 82728 ASSAY OF FERRITIN: CPT | Performed by: INTERNAL MEDICINE

## 2018-01-01 PROCEDURE — 82746 ASSAY OF FOLIC ACID SERUM: CPT | Performed by: INTERNAL MEDICINE

## 2018-01-01 PROCEDURE — 83550 IRON BINDING TEST: CPT | Performed by: INTERNAL MEDICINE

## 2018-01-01 PROCEDURE — 25010000002 IOPAMIDOL 61 % SOLUTION: Performed by: INTERNAL MEDICINE

## 2018-01-01 PROCEDURE — 74177 CT ABD & PELVIS W/CONTRAST: CPT

## 2018-01-01 PROCEDURE — 99406 BEHAV CHNG SMOKING 3-10 MIN: CPT | Performed by: INTERNAL MEDICINE

## 2018-01-01 PROCEDURE — 78815 PET IMAGE W/CT SKULL-THIGH: CPT

## 2018-01-01 PROCEDURE — 99203 OFFICE O/P NEW LOW 30 MIN: CPT | Performed by: FAMILY MEDICINE

## 2018-01-01 PROCEDURE — 71260 CT THORAX DX C+: CPT

## 2018-01-01 PROCEDURE — 36591 DRAW BLOOD OFF VENOUS DEVICE: CPT | Performed by: INTERNAL MEDICINE

## 2018-01-01 PROCEDURE — 25010000002 PEMBROLIZUMAB 100 MG/4ML SOLUTION 4 ML VIAL: Performed by: INTERNAL MEDICINE

## 2018-01-01 PROCEDURE — 36593 DECLOT VASCULAR DEVICE: CPT | Performed by: INTERNAL MEDICINE

## 2018-01-01 PROCEDURE — 82607 VITAMIN B-12: CPT | Performed by: INTERNAL MEDICINE

## 2018-01-01 PROCEDURE — 36415 COLL VENOUS BLD VENIPUNCTURE: CPT | Performed by: INTERNAL MEDICINE

## 2018-01-01 PROCEDURE — 0 FLUDEOXYGLUCOSE F18 SOLUTION: Performed by: INTERNAL MEDICINE

## 2018-01-01 PROCEDURE — A9552 F18 FDG: HCPCS | Performed by: INTERNAL MEDICINE

## 2018-01-01 PROCEDURE — G0463 HOSPITAL OUTPT CLINIC VISIT: HCPCS | Performed by: INTERNAL MEDICINE

## 2018-01-01 PROCEDURE — 99406 BEHAV CHNG SMOKING 3-10 MIN: CPT | Performed by: FAMILY MEDICINE

## 2018-01-01 RX ORDER — MORPHINE SULFATE 30 MG/1
30 TABLET, FILM COATED, EXTENDED RELEASE ORAL 2 TIMES DAILY
Qty: 60 TABLET | Refills: 0 | Status: SHIPPED | OUTPATIENT
Start: 2018-01-01 | End: 2018-01-01 | Stop reason: SDUPTHER

## 2018-01-01 RX ORDER — DEXAMETHASONE 4 MG/1
TABLET ORAL
Qty: 12 TABLET | Refills: 5 | Status: SHIPPED | OUTPATIENT
Start: 2018-01-01 | End: 2019-01-01

## 2018-01-01 RX ORDER — SODIUM CHLORIDE 9 MG/ML
250 INJECTION, SOLUTION INTRAVENOUS ONCE
Status: COMPLETED | OUTPATIENT
Start: 2018-01-01 | End: 2018-01-01

## 2018-01-01 RX ORDER — SODIUM CHLORIDE 0.9 % (FLUSH) 0.9 %
10 SYRINGE (ML) INJECTION AS NEEDED
Status: DISCONTINUED | OUTPATIENT
Start: 2018-01-01 | End: 2018-01-01 | Stop reason: HOSPADM

## 2018-01-01 RX ORDER — SODIUM CHLORIDE 9 MG/ML
250 INJECTION, SOLUTION INTRAVENOUS ONCE
Status: CANCELLED | OUTPATIENT
Start: 2018-01-01

## 2018-01-01 RX ORDER — OXYCODONE AND ACETAMINOPHEN 10; 325 MG/1; MG/1
1 TABLET ORAL EVERY 4 HOURS PRN
Qty: 180 TABLET | Refills: 0 | Status: SHIPPED | OUTPATIENT
Start: 2018-01-01 | End: 2018-01-01 | Stop reason: SDUPTHER

## 2018-01-01 RX ORDER — SODIUM CHLORIDE 0.9 % (FLUSH) 0.9 %
10 SYRINGE (ML) INJECTION AS NEEDED
Status: CANCELLED | OUTPATIENT
Start: 2019-01-01

## 2018-01-01 RX ORDER — AMLODIPINE BESYLATE 5 MG/1
5 TABLET ORAL DAILY
Qty: 30 TABLET | Refills: 2 | Status: SHIPPED | OUTPATIENT
Start: 2018-01-01

## 2018-01-01 RX ORDER — OXYCODONE AND ACETAMINOPHEN 10; 325 MG/1; MG/1
1 TABLET ORAL EVERY 4 HOURS PRN
Qty: 18 TABLET | Refills: 0 | Status: SHIPPED | OUTPATIENT
Start: 2018-01-01 | End: 2018-01-01 | Stop reason: SDUPTHER

## 2018-01-01 RX ORDER — SODIUM CHLORIDE 0.9 % (FLUSH) 0.9 %
10 SYRINGE (ML) INJECTION AS NEEDED
Status: CANCELLED | OUTPATIENT
Start: 2018-01-01

## 2018-01-01 RX ORDER — OXYCODONE AND ACETAMINOPHEN 10; 325 MG/1; MG/1
1 TABLET ORAL EVERY 4 HOURS PRN
Qty: 180 TABLET | Refills: 0 | Status: SHIPPED | OUTPATIENT
Start: 2018-01-01 | End: 2019-01-01 | Stop reason: SDUPTHER

## 2018-01-01 RX ORDER — LANOLIN ALCOHOL/MO/W.PET/CERES
1000 CREAM (GRAM) TOPICAL DAILY
Qty: 90 TABLET | Refills: 1 | Status: SHIPPED | OUTPATIENT
Start: 2018-01-01

## 2018-01-01 RX ORDER — ONDANSETRON 4 MG/1
8 TABLET, FILM COATED ORAL 3 TIMES DAILY PRN
Qty: 40 TABLET | Refills: 3 | Status: SHIPPED | OUTPATIENT
Start: 2018-01-01 | End: 2019-01-01 | Stop reason: SDUPTHER

## 2018-01-01 RX ORDER — 0.9 % SODIUM CHLORIDE 0.9 %
10 VIAL (ML) INJECTION ONCE
Status: COMPLETED | OUTPATIENT
Start: 2018-01-01 | End: 2018-01-01

## 2018-01-01 RX ORDER — FERROUS SULFATE TAB EC 324 MG (65 MG FE EQUIVALENT) 324 (65 FE) MG
324 TABLET DELAYED RESPONSE ORAL
Qty: 30 TABLET | Refills: 1 | Status: SHIPPED | OUTPATIENT
Start: 2018-01-01

## 2018-01-01 RX ORDER — AMLODIPINE BESYLATE 5 MG/1
5 TABLET ORAL DAILY
Qty: 30 TABLET | Refills: 0 | Status: SHIPPED | OUTPATIENT
Start: 2018-01-01 | End: 2018-01-01 | Stop reason: SDUPTHER

## 2018-01-01 RX ORDER — MORPHINE SULFATE 30 MG/1
30 TABLET, FILM COATED, EXTENDED RELEASE ORAL 2 TIMES DAILY
Qty: 60 TABLET | Refills: 0 | Status: SHIPPED | OUTPATIENT
Start: 2018-01-01 | End: 2019-01-01 | Stop reason: SDUPTHER

## 2018-01-01 RX ADMIN — FLUDEOXYGLUCOSE F18 1 DOSE: 300 INJECTION INTRAVENOUS at 09:20

## 2018-01-01 RX ADMIN — Medication 10 ML: at 09:53

## 2018-01-01 RX ADMIN — SODIUM CHLORIDE 250 ML: 9 INJECTION, SOLUTION INTRAVENOUS at 08:43

## 2018-01-01 RX ADMIN — ALTEPLASE 2 MG: 2.2 INJECTION, POWDER, LYOPHILIZED, FOR SOLUTION INTRAVENOUS at 07:49

## 2018-01-01 RX ADMIN — HEPARIN SODIUM (PORCINE) LOCK FLUSH IV SOLN 100 UNIT/ML 500 UNITS: 100 SOLUTION at 10:29

## 2018-01-01 RX ADMIN — Medication 10 ML: at 08:09

## 2018-01-01 RX ADMIN — SODIUM CHLORIDE 250 ML: 9 INJECTION, SOLUTION INTRAVENOUS at 09:06

## 2018-01-01 RX ADMIN — Medication 10 ML: at 10:09

## 2018-01-01 RX ADMIN — SODIUM CHLORIDE 200 MG: 9 INJECTION, SOLUTION INTRAVENOUS at 09:10

## 2018-01-01 RX ADMIN — SODIUM CHLORIDE, PRESERVATIVE FREE 500 UNITS: 5 INJECTION INTRAVENOUS at 10:28

## 2018-01-01 RX ADMIN — Medication 10 ML: at 07:57

## 2018-01-01 RX ADMIN — HEPARIN SODIUM (PORCINE) LOCK FLUSH IV SOLN 100 UNIT/ML 500 UNITS: 100 SOLUTION at 10:10

## 2018-01-01 RX ADMIN — SODIUM CHLORIDE 250 ML: 9 INJECTION, SOLUTION INTRAVENOUS at 09:01

## 2018-01-01 RX ADMIN — IOPAMIDOL 90 ML: 612 INJECTION, SOLUTION INTRAVENOUS at 10:25

## 2018-01-01 RX ADMIN — Medication 10 ML: at 08:28

## 2018-01-01 RX ADMIN — SODIUM CHLORIDE 250 ML: 9 INJECTION, SOLUTION INTRAVENOUS at 09:17

## 2018-01-01 RX ADMIN — Medication 10 ML: at 10:28

## 2018-01-01 RX ADMIN — Medication 10 ML: at 08:03

## 2018-01-01 RX ADMIN — Medication 10 ML: at 09:57

## 2018-01-01 RX ADMIN — SODIUM CHLORIDE, PRESERVATIVE FREE 500 UNITS: 5 INJECTION INTRAVENOUS at 08:00

## 2018-01-01 RX ADMIN — ALTEPLASE 2 MG: 2.2 INJECTION, POWDER, LYOPHILIZED, FOR SOLUTION INTRAVENOUS at 08:41

## 2018-01-01 RX ADMIN — Medication 10 ML: at 09:52

## 2018-01-01 RX ADMIN — IOPAMIDOL 90 ML: 612 INJECTION, SOLUTION INTRAVENOUS at 07:55

## 2018-01-01 RX ADMIN — Medication 10 ML: at 07:41

## 2018-01-01 RX ADMIN — ALTEPLASE 2 MG: 2.2 INJECTION, POWDER, LYOPHILIZED, FOR SOLUTION INTRAVENOUS at 08:11

## 2018-01-01 RX ADMIN — Medication 10 ML: at 09:51

## 2018-01-01 RX ADMIN — IOPAMIDOL 94 ML: 612 INJECTION, SOLUTION INTRAVENOUS at 14:50

## 2018-01-01 RX ADMIN — HEPARIN SODIUM (PORCINE) LOCK FLUSH IV SOLN 100 UNIT/ML 500 UNITS: 100 SOLUTION at 10:09

## 2018-01-01 RX ADMIN — SODIUM CHLORIDE, PRESERVATIVE FREE 500 UNITS: 5 INJECTION INTRAVENOUS at 10:08

## 2018-01-01 RX ADMIN — SODIUM CHLORIDE 200 MG: 9 INJECTION, SOLUTION INTRAVENOUS at 09:13

## 2018-01-01 RX ADMIN — SODIUM CHLORIDE, PRESERVATIVE FREE 500 UNITS: 5 INJECTION INTRAVENOUS at 09:51

## 2018-01-01 RX ADMIN — Medication 10 ML: at 07:44

## 2018-01-01 RX ADMIN — SODIUM CHLORIDE 10 ML: 9 INJECTION, SOLUTION INTRAMUSCULAR; INTRAVENOUS; SUBCUTANEOUS at 07:59

## 2018-01-01 RX ADMIN — SODIUM CHLORIDE, PRESERVATIVE FREE 500 UNITS: 5 INJECTION INTRAVENOUS at 08:40

## 2018-01-01 RX ADMIN — SODIUM CHLORIDE, PRESERVATIVE FREE 500 UNITS: 5 INJECTION INTRAVENOUS at 09:53

## 2018-01-01 RX ADMIN — SODIUM CHLORIDE 200 MG: 9 INJECTION, SOLUTION INTRAVENOUS at 09:28

## 2018-01-01 RX ADMIN — SODIUM CHLORIDE 250 ML: 9 INJECTION, SOLUTION INTRAVENOUS at 08:44

## 2018-01-01 RX ADMIN — HEPARIN SODIUM (PORCINE) LOCK FLUSH IV SOLN 100 UNIT/ML 500 UNITS: 100 SOLUTION at 09:57

## 2018-01-01 RX ADMIN — SODIUM CHLORIDE, PRESERVATIVE FREE 500 UNITS: 5 INJECTION INTRAVENOUS at 10:21

## 2018-01-01 RX ADMIN — Medication 10 ML: at 11:12

## 2018-01-01 RX ADMIN — SODIUM CHLORIDE 10 ML: 9 INJECTION, SOLUTION INTRAMUSCULAR; INTRAVENOUS; SUBCUTANEOUS at 10:21

## 2018-01-01 RX ADMIN — SODIUM CHLORIDE 200 MG: 9 INJECTION, SOLUTION INTRAVENOUS at 09:03

## 2018-01-01 RX ADMIN — SODIUM CHLORIDE, PRESERVATIVE FREE 500 UNITS: 5 INJECTION INTRAVENOUS at 11:12

## 2018-01-01 RX ADMIN — SODIUM CHLORIDE, PRESERVATIVE FREE 10 ML: 5 INJECTION INTRAVENOUS at 08:11

## 2018-01-01 RX ADMIN — Medication 10 ML: at 08:00

## 2018-01-01 RX ADMIN — SODIUM CHLORIDE 200 MG: 9 INJECTION, SOLUTION INTRAVENOUS at 09:22

## 2018-01-01 RX ADMIN — SODIUM CHLORIDE, PRESERVATIVE FREE 10 ML: 5 INJECTION INTRAVENOUS at 07:58

## 2018-01-01 RX ADMIN — SODIUM CHLORIDE, PRESERVATIVE FREE 10 ML: 5 INJECTION INTRAVENOUS at 08:40

## 2018-01-01 RX ADMIN — SODIUM CHLORIDE 200 MG: 9 INJECTION, SOLUTION INTRAVENOUS at 09:51

## 2018-01-01 RX ADMIN — SODIUM CHLORIDE 250 ML: 9 INJECTION, SOLUTION INTRAVENOUS at 09:13

## 2018-01-01 RX ADMIN — SODIUM CHLORIDE 200 MG: 9 INJECTION, SOLUTION INTRAVENOUS at 09:20

## 2018-01-01 RX ADMIN — Medication 10 ML: at 08:17

## 2018-01-01 RX ADMIN — SODIUM CHLORIDE, PRESERVATIVE FREE 10 ML: 5 INJECTION INTRAVENOUS at 10:08

## 2018-01-01 RX ADMIN — SODIUM CHLORIDE 200 MG: 9 INJECTION, SOLUTION INTRAVENOUS at 10:42

## 2018-01-01 RX ADMIN — SODIUM CHLORIDE, PRESERVATIVE FREE 500 UNITS: 5 INJECTION INTRAVENOUS at 11:17

## 2018-01-01 RX ADMIN — SODIUM CHLORIDE 200 MG: 9 INJECTION, SOLUTION INTRAVENOUS at 10:33

## 2018-01-01 RX ADMIN — SODIUM CHLORIDE 200 MG: 9 INJECTION, SOLUTION INTRAVENOUS at 09:31

## 2018-01-01 RX ADMIN — Medication 10 ML: at 08:01

## 2018-01-01 RX ADMIN — SODIUM CHLORIDE 250 ML: 9 INJECTION, SOLUTION INTRAVENOUS at 08:55

## 2018-01-01 RX ADMIN — Medication 10 ML: at 10:10

## 2018-01-01 RX ADMIN — SODIUM CHLORIDE, PRESERVATIVE FREE 500 UNITS: 5 INJECTION INTRAVENOUS at 09:52

## 2018-01-01 RX ADMIN — IOPAMIDOL 95 ML: 612 INJECTION, SOLUTION INTRAVENOUS at 09:00

## 2018-01-01 RX ADMIN — SODIUM CHLORIDE 200 MG: 9 INJECTION, SOLUTION INTRAVENOUS at 09:14

## 2018-01-01 RX ADMIN — SODIUM CHLORIDE 250 ML: 9 INJECTION, SOLUTION INTRAVENOUS at 09:15

## 2018-01-01 RX ADMIN — Medication 10 ML: at 11:17

## 2018-01-01 RX ADMIN — SODIUM CHLORIDE 250 ML: 9 INJECTION, SOLUTION INTRAVENOUS at 09:28

## 2018-01-01 RX ADMIN — SODIUM CHLORIDE 250 ML: 9 INJECTION, SOLUTION INTRAVENOUS at 09:21

## 2018-01-05 ENCOUNTER — OFFICE VISIT (OUTPATIENT)
Dept: ONCOLOGY | Facility: CLINIC | Age: 58
End: 2018-01-05

## 2018-01-05 VITALS
DIASTOLIC BLOOD PRESSURE: 88 MMHG | BODY MASS INDEX: 19.59 KG/M2 | RESPIRATION RATE: 16 BRPM | HEIGHT: 69 IN | WEIGHT: 132.28 LBS | HEART RATE: 97 BPM | SYSTOLIC BLOOD PRESSURE: 162 MMHG | TEMPERATURE: 98.9 F

## 2018-01-05 DIAGNOSIS — C79.51 BONE METASTASIS: Primary | ICD-10-CM

## 2018-01-05 DIAGNOSIS — C34.12 MALIGNANT NEOPLASM OF UPPER LOBE OF LEFT LUNG (HCC): ICD-10-CM

## 2018-01-05 PROCEDURE — 99406 BEHAV CHNG SMOKING 3-10 MIN: CPT | Performed by: INTERNAL MEDICINE

## 2018-01-05 PROCEDURE — 99215 OFFICE O/P EST HI 40 MIN: CPT | Performed by: INTERNAL MEDICINE

## 2018-01-05 PROCEDURE — G0463 HOSPITAL OUTPT CLINIC VISIT: HCPCS | Performed by: INTERNAL MEDICINE

## 2018-01-05 RX ORDER — OXYCODONE AND ACETAMINOPHEN 10; 325 MG/1; MG/1
1 TABLET ORAL EVERY 6 HOURS PRN
Qty: 120 TABLET | Refills: 0 | Status: SHIPPED | OUTPATIENT
Start: 2018-01-05 | End: 2018-01-31 | Stop reason: SDUPTHER

## 2018-01-05 RX ORDER — MORPHINE SULFATE 15 MG/1
15 TABLET, FILM COATED, EXTENDED RELEASE ORAL EVERY 12 HOURS SCHEDULED
Qty: 60 TABLET | Refills: 0 | Status: SHIPPED | OUTPATIENT
Start: 2018-01-05 | End: 2018-01-31 | Stop reason: SDUPTHER

## 2018-01-05 RX ORDER — INFLUENZA VIRUS VACCINE 15; 15; 15; 15 UG/.5ML; UG/.5ML; UG/.5ML; UG/.5ML
SUSPENSION INTRAMUSCULAR
Refills: 0 | COMMUNITY
Start: 2017-11-22

## 2018-01-05 NOTE — PROGRESS NOTES
DATE OF VISIT: 1/5/2018    REASON FOR VISIT:  Squamous cell cancer of left lung, stage IIB    HISTORY OF PRESENT ILLNESS:    57-year-old male with a past medical history significant for duodenal ulcer, extensive nicotine addiction with more than 45-pack-year smoking history, history of alcohol abuse was seen in consultation on May 3, 2017 for newly diagnosed stage IIB squamous cell cancer of Of left lung.  Patient finished concurrent chemoradiation on July 17, 2017.  Patient had a CT of chest done on December 1, 2017 showed recurrence in left chest wall region.  Patient had a PET/CT done since then.  He is here to discuss the result of PET/CT.  Still complains of worsening discomfort in left upper chest wall despite of taking Percocet 7.5/325.  Denies any new cough or hemoptysis.  Denies any blood in stool or urine.         PAST MEDICAL HISTORY:    Past Medical History:   Diagnosis Date   • Anemia    • Aortic stenosis, mild    • Arthritis    • Chronic bronchitis    • Duodenal ulcer disease    • Inguinal hernia, left    • Lung cancer    • Mitral valve regurgitation        SOCIAL HISTORY:    Social History   Substance Use Topics   • Smoking status: Current Every Day Smoker     Packs/day: 1.00     Years: 40.00     Types: Cigarettes   • Smokeless tobacco: Former User      Comment: Reduced at half a pack per day and weanning off   • Alcohol use 8.4 oz/week     14 Cans of beer per week      Comment: 6 pk/dly       Surgical History :  Past Surgical History:   Procedure Laterality Date   • APPENDECTOMY      open   • BRONCHOSCOPY N/A 3/8/2017    Procedure: BRONCHOSCOPY with possible bronchoalveolar lavage, +/- brush, +/- needle biopsy, +/- endobronchial biopsy;  Surgeon: Candis Lr MD;  Location: [facility] ENDOSCOPY;  Service:    • EXPLORATORY LAPAROTOMY  11/17/2014    Exploratory laparotomy, repair of duodenal ulcer, modified Carlos patch   • INGUINAL HERNIA REPAIR  09/13/2013    Open repair of an indirect left inguinal  "hernia. Left inguinal hernia.   • KS INSJ TUNNELED CVC W/O SUBQ PORT/ AGE 6 YR/> N/A 5/10/2017    Procedure: TUNNELED CENTRAL VENOUS CATHETER  WITH SUBCUTANEOUS  PORT,FLUOROSCOPIC GUIDANCE ;  Surgeon: Angel Hernandez MD;  Location: [facility];  Service: Cardiothoracic   • THORACOTOMY Left 4/19/2017    Procedure: THORACOTOMY LEFT WITH PULMONARY RESECTION AND BRONCHOSCOPY WITH ENDO ;  Surgeon: Angel Hernandez MD;  Location: [facility];  Service:        ALLERGIES:    No Known Allergies    FAMILY HISTORY:  Family History   Problem Relation Age of Onset   • Asthma Father    • Cancer Father    • Diabetes Father    • Hypertension Father    • Alcohol abuse Mother    • Heart failure Brother          REVIEW OF SYSTEMS:      CONSTITUTIONAL:  Positive for fatigue, denies any recent worsening. Denies any fever, chills or weight loss.      HEENT: No epistaxis, mouth sores or difficulty swallowing.     RESPIRATORY: Denies any shortness of breath or cough today.     CARDIOVASCULAR: Complains of worsening left chest wall pain, despite of taking Percocet 7.5/325.  No palpitation.     GASTROINTESTINAL: No abdominal pain nausea, vomiting or blood in the stool.     GENITOURINARY: No Dysuria or Hematuria.     MUSCULOSKELETAL: No new back pain or arthralgia..     LYMPHATICS: Denies any abnormal swollen glands anywhere in the body.     NEUROLOGICAL : No tingling or numbness. No headache or dizziness. No seizures or balance problems.     SKIN: No new skin lesions.         PHYSICAL EXAMINATION:      VITAL SIGNS:  /88  Pulse 97  Temp 98.9 °F (37.2 °C) (Temporal Artery )   Resp 16  Ht 175.3 cm (69.02\")  Wt 60 kg (132 lb 4.4 oz)  BMI 19.52 kg/m2    GENERAL:  Not in any distress.    HEENT:  Normocephalic, Atraumatic.Mild Conjunctival pallor. No icterus. Extraocular Movements Intact. No Facial Asymmetry noted.    NECK:  No adenopathy. No JVD.    RESPIRATORY:  Fair air entry bilateral. No rhonchi or wheezing.  Mild " erythema of left chest wall.    CARDIOVASCULAR:  S1, S2. Regular rate and rhythm. No murmur or gallop appreciated.    ABDOMEN:  Soft,  nontender. Bowel sounds present in all four quadrants.  No organomegaly appreciated.    EXTREMITIES:  No edema.No Calf Tenderness.    NEUROLOGIC:  Alert, awake and oriented ×3.  No  Motor or sensory deficit appreciated. Cranial Nerves 2-12 grossly intact.            DIAGNOSTIC DATA:    Glucose   Date Value Ref Range Status   12/19/2017 96 60 - 100 mg/dL Final     Glucose, Arterial   Date Value Ref Range Status   04/19/2017 176 mmol/L Final     Sodium   Date Value Ref Range Status   12/19/2017 141 137 - 145 mmol/L Final     Potassium   Date Value Ref Range Status   12/19/2017 4.0 3.5 - 5.1 mmol/L Final     CO2   Date Value Ref Range Status   12/19/2017 27.0 22.0 - 31.0 mmol/L Final     Chloride   Date Value Ref Range Status   12/19/2017 105 95 - 110 mmol/L Final     Anion Gap   Date Value Ref Range Status   12/19/2017 9.0 5.0 - 15.0 mmol/L Final     Creatinine   Date Value Ref Range Status   12/19/2017 0.54 (L) 0.70 - 1.30 mg/dL Final     BUN   Date Value Ref Range Status   12/19/2017 14 7 - 21 mg/dL Final     BUN/Creatinine Ratio   Date Value Ref Range Status   12/19/2017 25.9 (H) 7.0 - 25.0 Final     Calcium   Date Value Ref Range Status   12/19/2017 8.9 8.4 - 10.2 mg/dL Final     eGFR Non  Amer   Date Value Ref Range Status   12/19/2017 157 (H) 56 - 130 mL/min/1.73 Final     Alkaline Phosphatase   Date Value Ref Range Status   12/19/2017 84 38 - 126 U/L Final     Total Protein   Date Value Ref Range Status   12/19/2017 7.3 6.3 - 8.6 g/dL Final     ALT (SGPT)   Date Value Ref Range Status   12/19/2017 25 21 - 72 U/L Final     AST (SGOT)   Date Value Ref Range Status   12/19/2017 31 17 - 59 U/L Final     Total Bilirubin   Date Value Ref Range Status   12/19/2017 0.3 0.2 - 1.3 mg/dL Final     Albumin   Date Value Ref Range Status   12/19/2017 4.10 3.40 - 4.80 g/dL Final      Globulin   Date Value Ref Range Status   12/19/2017 3.2 2.3 - 3.5 gm/dL Final     A/G Ratio   Date Value Ref Range Status   12/19/2017 1.3 1.1 - 1.8 g/dL Final     Lab Results   Component Value Date    WBC 8.02 12/19/2017    HGB 12.5 (L) 12/19/2017    HCT 37.3 (L) 12/19/2017    MCV 98.2 (H) 12/19/2017     12/19/2017     Lab Results   Component Value Date    NEUTROABS 5.45 12/19/2017    IRON 12 (L) 05/03/2017    TIBC 322 05/03/2017    LABIRON 4 (L) 05/03/2017    FERRITIN 56.80 05/03/2017    [id number] 465 10/17/2017    FOLATE >20.00 10/17/2017         PATHOLOGY:  PDL 1 testing on tumor shows 20% positivity.    Pathology :  Pathology Report from April 19, 2017 showed:       SPECIMEN   Specimen   Lobe(s) of lung   Lobes of Lung   Upper lobe   Procedure   Lobectomy   Specimen Laterality   Left   TUMOR   Primary Tumor Site   Upper lobe   Histologic Type   Squamous cell carcinoma   Histologic Grade   G2: Moderately differentiated   Tumor Size   Greatest dimension (cm): 8 cm   Additional Dimension (cm)   7 cm       5 cm   Tumor Focality   Unifocal   Visceral Pleura Invasion   Present   Tumor Extension   Parietal pleura   Lymph-Vascular Invasion   Not identified   MARGINS   Bronchial Margin   Uninvolved by invasive carcinoma and carcinoma in situ   Vascular Margin   Uninvolved by invasive carcinoma   Parenchymal Margin   Involved by invasive carcinoma   LYMPH NODES   Regional Lymph Nodes       Number of Lymph Nodes Examined   Specify number: 6   Gene Stations Examined   5: Subaortic/ aortopulmonary (AP) / AP window       11L: Interlobar   Lymph Node Involvement   No nodes involved   STAGE (pTNM)   Primary Tumor (pT)   pT3: Tumor greater than 7 cm in greatest dimension; or Tumor of any size that directly invades any of the following: parietal pleural chest wall (including superior sulcus tumors), diaphragm, phrenic nerve, mediastinal pleura, parietal pericardium; or Tumor of any size in the main bronchus less  than 2 cm distal to the eptar but without involvement of the petar; or Tumor of any size associated with atelectasis or obstructive pneumonitis of the entire lung; or Tumors of any size with separate tmor nodule(s) in same lobe   Regional Lymph Nodes (pN)   pN0: No regional lymph node metastasis   ADDITIONAL FINDINGS   Additional Pathologic Finding(s)   Other (specify): Fungal hyphae consistent with Aspergillus species in the cavity contents   .          RADIOLOGY DATA :  PET CT done on December 29, 2017 was reviewed, discussed with patient, it showed:  IMPRESSION:  CONCLUSION:  1.  Mildly enlarging left lung apex pleural base mass lesion with  abnormal FDG activity suspicious for Pancoast tumor which is  invading into the anterior chest wall and partially eroding  through the anterior left second third and fourth ribs. No  current evidence of distant metastatic disease.       CT chest with contrast done on December 1, 2017 been discussed with patient, it showed:  IMPRESSION:  1.  Postsurgical changes consistent with prior left upper  lobectomy and probable AP window mediastinal lymph node  dissection.  2.  Precarinal single enlarged lymph node and shotty AP window  lymph node in patient with known lung carcinoma are suspicious  for metastatic involvement.  3.  Mild centrilobular emphysematous changes.  4.  Interval development of multiple left lower lobe superior  segment noncalcified nodules as described above. The largest  measures 5.7 mm in greatest diameter. This interval change in  patient with known lung carcinoma would be suspicious for early  metastatic disease.  5.  Left lateral hemithorax abnormal pleural thickening which is  invading into the chest wall and causing erosive changes of the  left second, third, fourth, fifth, sixth ribs. These findings are  suspicious for primary lung neoplasm or metastatic involvement in  this region versus less likely postsurgical changes..  6.  Left lateral sixth and  fourth and fifth rib transverse linear  lucencies. These are consistent with regions of fracture and/or  postsurgical changes.  7.  Left kidney mid zone 1.5 mm nonobstructive renal calculi.        PET/CT done on May 22, 2017 showed:  IMPRESSION:  CONCLUSION:  1. Large Pancoast tumor involving left apex with significant  involvement of the periphery, pleura, portions of the chest wall  and contiguous involvement, destruction of at least one of the  anterior left upper ribs.     PET/CT imaging study is otherwise unremarkable. No evidence of  any distant metastases.          ASSESSMENT AND PLAN:      1.  Squamous cell cancer of left upper lobe, stage IIB, T3 N0 M0: Patient is status post surgical excision with positive pleural margin.  As per NCCN guidelines patient received concurrent chemoradiation with weekly carboplatin and Taxol.  Patient received concurrent chemoradiation with  weekly carboplatin and Taxol with radiation  From June 05,2017 until 07/17/2017.  CT scan done on December 1, 2017 shows enlarged left-sided precarinal lymph node as well as thickening of the left pleura eroding into the left rib cage worrisome for recurrence of cancer.  CT done on December 29, 2017 shows left chest wall recurrence with involvement of pleura, left treat this without any evidence of distance metastasis.  His cancer is stage IV because of the involvement of chest wall and pleura and ribs.  After last clinic visit. PDl 1 testing was done which showed small positivity of 20%.  Foundation one testing has been sent off results are not back yet.  Treatment option with Keytruda was discussed with patient.  Side effect of care today including a mean mediated pneumonitis, hepatitis, colitis, hypophysitis, thyroiditis, nephritis were discussed with patient.  We will also provide them information about Keytruda today.  We will start Keytruda next week.  Plan is to give him 3 rounds of Keytruda and after that we will do restaging CT of  chest, abdomen and pelvis which was discussed.    2.  Left rib involvement secondary to local recurrence and progression: Treatment with Xgeva was discussed with patient side effect of right jaw including osteonecrosis of jaw, hypocalcemia, mouth sores were discussed.  Patient does need some dental work done, recommend following up with dentist before we start Xgeva.    3.  Pain management: Patient is complaining of severe pain in left chest wall region despite of taking Percocet 7. 5/3/25 every 4 hours.  We will change his pain medication to morphine sulfate 15 mg twice daily, prescription with 60 tablet has been provided today on January 5, 2018.  We will also provide him Percocet 10/325 every 6 hours with 120 tablets today on January 5, 2018.  Patient was instructed about the importance of bowel regimen and prevent constipation.    4.  macrocytosis: Patient was found to have MCV of 98 today .  Patient does admit to drinking 2-3 beer on a daily basis.  He remains on vitamin B12 thousand micrograms by mouth daily.  Again it was recommended to stay away from alcohol completely.    5.Pulmonary aspergillosis: Patient had a fungus ball consistent with Aspergillus on a cavitary lesion of lung status postsurgical excision.     6. History of duodenal ulcer     7. Health maintenance: Patient smoking intermittently, he was counseled about smoking cessation.  About 4 minutes were spent for smoking cessation counseling.   He remains full code.              Gualberto Dowd MD  1/5/2018  12:42 PM        EMR Dragon/Transcription disclaimer:   Much of this encounter note is an electronic transcription/translation of spoken language to printed text. The electronic translation of spoken language may permit erroneous, or at times, nonsensical words or phrases to be inadvertently transcribed; Although I have reviewed the note for such errors, some may still exist.

## 2018-01-05 NOTE — PATIENT INSTRUCTIONS
Denosumab injection  What is this medicine?  DENOSUMAB (den oh funmi mab) slows bone breakdown. Prolia is used to treat osteoporosis in women after menopause and in men. Xgeva is used to prevent bone fractures and other bone problems caused by cancer bone metastases. Xgeva is also used to treat giant cell tumor of the bone.  This medicine may be used for other purposes; ask your health care provider or pharmacist if you have questions.  COMMON BRAND NAME(S): Prolia, XGEVA  What should I tell my health care provider before I take this medicine?  They need to know if you have any of these conditions:  -dental disease  -eczema  -infection or history of infections  -kidney disease or on dialysis  -low blood calcium or vitamin D  -malabsorption syndrome  -scheduled to have surgery or tooth extraction  -taking medicine that contains denosumab  -thyroid or parathyroid disease  -an unusual reaction to denosumab, other medicines, foods, dyes, or preservatives  -pregnant or trying to get pregnant  -breast-feeding  How should I use this medicine?  This medicine is for injection under the skin. It is given by a health care professional in a hospital or clinic setting.  If you are getting Prolia, a special MedGuide will be given to you by the pharmacist with each prescription and refill. Be sure to read this information carefully each time.  For Prolia, talk to your pediatrician regarding the use of this medicine in children. Special care may be needed. For Xgeva, talk to your pediatrician regarding the use of this medicine in children. While this drug may be prescribed for children as young as 13 years for selected conditions, precautions do apply.  Overdosage: If you think you have taken too much of this medicine contact a poison control center or emergency room at once.  NOTE: This medicine is only for you. Do not share this medicine with others.  What if I miss a dose?  It is important not to miss your dose. Call your doctor  or health care professional if you are unable to keep an appointment.  What may interact with this medicine?  Do not take this medicine with any of the following medications:  -other medicines containing denosumab  This medicine may also interact with the following medications:  -medicines that suppress the immune system  -medicines that treat cancer  -steroid medicines like prednisone or cortisone  This list may not describe all possible interactions. Give your health care provider a list of all the medicines, herbs, non-prescription drugs, or dietary supplements you use. Also tell them if you smoke, drink alcohol, or use illegal drugs. Some items may interact with your medicine.  What should I watch for while using this medicine?  Visit your doctor or health care professional for regular checks on your progress. Your doctor or health care professional may order blood tests and other tests to see how you are doing.  Call your doctor or health care professional if you get a cold or other infection while receiving this medicine. Do not treat yourself. This medicine may decrease your body's ability to fight infection.  You should make sure you get enough calcium and vitamin D while you are taking this medicine, unless your doctor tells you not to. Discuss the foods you eat and the vitamins you take with your health care professional.  See your dentist regularly. Brush and floss your teeth as directed. Before you have any dental work done, tell your dentist you are receiving this medicine.  Do not become pregnant while taking this medicine or for 5 months after stopping it. Women should inform their doctor if they wish to become pregnant or think they might be pregnant. There is a potential for serious side effects to an unborn child. Talk to your health care professional or pharmacist for more information.  What side effects may I notice from receiving this medicine?  Side effects that you should report to your doctor  or health care professional as soon as possible:  -allergic reactions like skin rash, itching or hives, swelling of the face, lips, or tongue  -breathing problems  -chest pain  -fast, irregular heartbeat  -feeling faint or lightheaded, falls  -fever, chills, or any other sign of infection  -muscle spasms, tightening, or twitches  -numbness or tingling  -skin blisters or bumps, or is dry, peels, or red  -slow healing or unexplained pain in the mouth or jaw  -unusual bleeding or bruising  Side effects that usually do not require medical attention (report to your doctor or health care professional if they continue or are bothersome):  -muscle pain  -stomach upset, gas  This list may not describe all possible side effects. Call your doctor for medical advice about side effects. You may report side effects to FDA at 8-908-FDA-8860.  Where should I keep my medicine?  This medicine is only given in a clinic, doctor's office, or other health care setting and will not be stored at home.  NOTE: This sheet is a summary. It may not cover all possible information. If you have questions about this medicine, talk to your doctor, pharmacist, or health care provider.     © 2017, [employer]. (2017-01-19 10:06:55)

## 2018-01-10 ENCOUNTER — INFUSION (OUTPATIENT)
Dept: ONCOLOGY | Facility: HOSPITAL | Age: 58
End: 2018-01-10

## 2018-01-10 VITALS
DIASTOLIC BLOOD PRESSURE: 91 MMHG | TEMPERATURE: 98.1 F | HEART RATE: 101 BPM | RESPIRATION RATE: 18 BRPM | SYSTOLIC BLOOD PRESSURE: 154 MMHG

## 2018-01-10 DIAGNOSIS — C79.51 BONE METASTASIS: ICD-10-CM

## 2018-01-10 DIAGNOSIS — Z45.2 ENCOUNTER FOR VENOUS ACCESS DEVICE CARE: ICD-10-CM

## 2018-01-10 DIAGNOSIS — C34.12 MALIGNANT NEOPLASM OF UPPER LOBE OF LEFT LUNG (HCC): Primary | ICD-10-CM

## 2018-01-10 DIAGNOSIS — C34.12 MALIGNANT NEOPLASM OF UPPER LOBE OF LEFT LUNG (HCC): ICD-10-CM

## 2018-01-10 LAB
ALBUMIN SERPL-MCNC: 4.2 G/DL (ref 3.4–4.8)
ALBUMIN/GLOB SERPL: 1.3 G/DL (ref 1.1–1.8)
ALP SERPL-CCNC: 83 U/L (ref 38–126)
ALT SERPL W P-5'-P-CCNC: 22 U/L (ref 21–72)
ANION GAP SERPL CALCULATED.3IONS-SCNC: 10 MMOL/L (ref 5–15)
AST SERPL-CCNC: 28 U/L (ref 17–59)
BASOPHILS # BLD AUTO: 0.07 10*3/MM3 (ref 0–0.2)
BASOPHILS NFR BLD AUTO: 0.9 % (ref 0–2)
BILIRUB SERPL-MCNC: 0.3 MG/DL (ref 0.2–1.3)
BUN BLD-MCNC: 13 MG/DL (ref 7–21)
BUN/CREAT SERPL: 28.3 (ref 7–25)
CALCIUM SPEC-SCNC: 9.3 MG/DL (ref 8.4–10.2)
CHLORIDE SERPL-SCNC: 103 MMOL/L (ref 95–110)
CO2 SERPL-SCNC: 26 MMOL/L (ref 22–31)
CREAT BLD-MCNC: 0.46 MG/DL (ref 0.7–1.3)
DEPRECATED RDW RBC AUTO: 49.9 FL (ref 35.1–43.9)
EOSINOPHIL # BLD AUTO: 0.22 10*3/MM3 (ref 0–0.7)
EOSINOPHIL NFR BLD AUTO: 2.8 % (ref 0–7)
ERYTHROCYTE [DISTWIDTH] IN BLOOD BY AUTOMATED COUNT: 13.9 % (ref 11.5–14.5)
GFR SERPL CREATININE-BSD FRML MDRD: 189 ML/MIN/1.73 (ref 56–130)
GLOBULIN UR ELPH-MCNC: 3.3 GM/DL (ref 2.3–3.5)
GLUCOSE BLD-MCNC: 95 MG/DL (ref 60–100)
HCT VFR BLD AUTO: 33.6 % (ref 39–49)
HGB BLD-MCNC: 11.4 G/DL (ref 13.7–17.3)
IMM GRANULOCYTES # BLD: 0.02 10*3/MM3 (ref 0–0.02)
IMM GRANULOCYTES NFR BLD: 0.3 % (ref 0–0.5)
LYMPHOCYTES # BLD AUTO: 0.96 10*3/MM3 (ref 0.6–4.2)
LYMPHOCYTES NFR BLD AUTO: 12.4 % (ref 10–50)
MCH RBC QN AUTO: 33.3 PG (ref 26.5–34)
MCHC RBC AUTO-ENTMCNC: 33.9 G/DL (ref 31.5–36.3)
MCV RBC AUTO: 98.2 FL (ref 80–98)
MONOCYTES # BLD AUTO: 0.96 10*3/MM3 (ref 0–0.9)
MONOCYTES NFR BLD AUTO: 12.4 % (ref 0–12)
NEUTROPHILS # BLD AUTO: 5.49 10*3/MM3 (ref 2–8.6)
NEUTROPHILS NFR BLD AUTO: 71.2 % (ref 37–80)
PLATELET # BLD AUTO: 334 10*3/MM3 (ref 150–450)
PMV BLD AUTO: 8.2 FL (ref 8–12)
POTASSIUM BLD-SCNC: 3.9 MMOL/L (ref 3.5–5.1)
PROT SERPL-MCNC: 7.5 G/DL (ref 6.3–8.6)
RBC # BLD AUTO: 3.42 10*6/MM3 (ref 4.37–5.74)
SODIUM BLD-SCNC: 139 MMOL/L (ref 137–145)
T4 FREE SERPL-MCNC: 0.82 NG/DL (ref 0.78–2.19)
TSH SERPL DL<=0.05 MIU/L-ACNC: 1.61 MIU/ML (ref 0.46–4.68)
WBC NRBC COR # BLD: 7.72 10*3/MM3 (ref 3.2–9.8)

## 2018-01-10 PROCEDURE — 25010000002 PEMBROLIZUMAB 100 MG/4ML SOLUTION 4 ML VIAL: Performed by: INTERNAL MEDICINE

## 2018-01-10 PROCEDURE — 80053 COMPREHEN METABOLIC PANEL: CPT

## 2018-01-10 PROCEDURE — 84443 ASSAY THYROID STIM HORMONE: CPT

## 2018-01-10 PROCEDURE — 85025 COMPLETE CBC W/AUTO DIFF WBC: CPT

## 2018-01-10 PROCEDURE — 96413 CHEMO IV INFUSION 1 HR: CPT | Performed by: INTERNAL MEDICINE

## 2018-01-10 PROCEDURE — 84439 ASSAY OF FREE THYROXINE: CPT

## 2018-01-10 PROCEDURE — 25010000002 HEPARIN FLUSH (PORCINE) 100 UNIT/ML SOLUTION: Performed by: INTERNAL MEDICINE

## 2018-01-10 RX ORDER — SODIUM CHLORIDE 0.9 % (FLUSH) 0.9 %
10 SYRINGE (ML) INJECTION AS NEEDED
Status: CANCELLED | OUTPATIENT
Start: 2018-01-31

## 2018-01-10 RX ORDER — SODIUM CHLORIDE 0.9 % (FLUSH) 0.9 %
10 SYRINGE (ML) INJECTION AS NEEDED
Status: DISCONTINUED | OUTPATIENT
Start: 2018-01-10 | End: 2018-01-10 | Stop reason: HOSPADM

## 2018-01-10 RX ORDER — SODIUM CHLORIDE 9 MG/ML
250 INJECTION, SOLUTION INTRAVENOUS ONCE
Status: CANCELLED | OUTPATIENT
Start: 2018-01-10

## 2018-01-10 RX ORDER — SODIUM CHLORIDE 9 MG/ML
250 INJECTION, SOLUTION INTRAVENOUS ONCE
Status: COMPLETED | OUTPATIENT
Start: 2018-01-10 | End: 2018-01-10

## 2018-01-10 RX ADMIN — SODIUM CHLORIDE 250 ML: 9 INJECTION, SOLUTION INTRAVENOUS at 14:39

## 2018-01-10 RX ADMIN — Medication 10 ML: at 15:45

## 2018-01-10 RX ADMIN — SODIUM CHLORIDE, PRESERVATIVE FREE 500 UNITS: 5 INJECTION INTRAVENOUS at 15:45

## 2018-01-10 RX ADMIN — Medication 10 ML: at 13:43

## 2018-01-10 RX ADMIN — SODIUM CHLORIDE 200 MG: 9 INJECTION, SOLUTION INTRAVENOUS at 15:12

## 2018-01-31 ENCOUNTER — OFFICE VISIT (OUTPATIENT)
Dept: ONCOLOGY | Facility: CLINIC | Age: 58
End: 2018-01-31

## 2018-01-31 ENCOUNTER — INFUSION (OUTPATIENT)
Dept: ONCOLOGY | Facility: HOSPITAL | Age: 58
End: 2018-01-31

## 2018-01-31 VITALS
RESPIRATION RATE: 18 BRPM | SYSTOLIC BLOOD PRESSURE: 152 MMHG | HEIGHT: 69 IN | DIASTOLIC BLOOD PRESSURE: 99 MMHG | WEIGHT: 132.06 LBS | BODY MASS INDEX: 19.56 KG/M2 | HEART RATE: 90 BPM | TEMPERATURE: 98.2 F

## 2018-01-31 DIAGNOSIS — C79.51 BONE METASTASIS: ICD-10-CM

## 2018-01-31 DIAGNOSIS — C34.12 MALIGNANT NEOPLASM OF UPPER LOBE OF LEFT LUNG (HCC): ICD-10-CM

## 2018-01-31 DIAGNOSIS — C34.12 MALIGNANT NEOPLASM OF UPPER LOBE OF LEFT LUNG (HCC): Primary | ICD-10-CM

## 2018-01-31 DIAGNOSIS — Z45.2 ENCOUNTER FOR VENOUS ACCESS DEVICE CARE: ICD-10-CM

## 2018-01-31 LAB
ALBUMIN SERPL-MCNC: 4.1 G/DL (ref 3.4–4.8)
ALBUMIN/GLOB SERPL: 1.2 G/DL (ref 1.1–1.8)
ALP SERPL-CCNC: 102 U/L (ref 38–126)
ALT SERPL W P-5'-P-CCNC: 25 U/L (ref 21–72)
ANION GAP SERPL CALCULATED.3IONS-SCNC: 12 MMOL/L (ref 5–15)
AST SERPL-CCNC: 36 U/L (ref 17–59)
BASOPHILS # BLD AUTO: 0.05 10*3/MM3 (ref 0–0.2)
BASOPHILS NFR BLD AUTO: 0.5 % (ref 0–2)
BILIRUB SERPL-MCNC: 0.2 MG/DL (ref 0.2–1.3)
BUN BLD-MCNC: 9 MG/DL (ref 7–21)
BUN/CREAT SERPL: 18.4 (ref 7–25)
CALCIUM SPEC-SCNC: 9 MG/DL (ref 8.4–10.2)
CHLORIDE SERPL-SCNC: 99 MMOL/L (ref 95–110)
CO2 SERPL-SCNC: 27 MMOL/L (ref 22–31)
CREAT BLD-MCNC: 0.49 MG/DL (ref 0.7–1.3)
DEPRECATED RDW RBC AUTO: 47.8 FL (ref 35.1–43.9)
EOSINOPHIL # BLD AUTO: 0.24 10*3/MM3 (ref 0–0.7)
EOSINOPHIL NFR BLD AUTO: 2.4 % (ref 0–7)
ERYTHROCYTE [DISTWIDTH] IN BLOOD BY AUTOMATED COUNT: 13.2 % (ref 11.5–14.5)
GFR SERPL CREATININE-BSD FRML MDRD: >150 ML/MIN/1.73 (ref 60–130)
GLOBULIN UR ELPH-MCNC: 3.4 GM/DL (ref 2.3–3.5)
GLUCOSE BLD-MCNC: 96 MG/DL (ref 60–100)
HCT VFR BLD AUTO: 39.1 % (ref 39–49)
HGB BLD-MCNC: 13.4 G/DL (ref 13.7–17.3)
IMM GRANULOCYTES # BLD: 0.03 10*3/MM3 (ref 0–0.02)
IMM GRANULOCYTES NFR BLD: 0.3 % (ref 0–0.5)
LYMPHOCYTES # BLD AUTO: 1.04 10*3/MM3 (ref 0.6–4.2)
LYMPHOCYTES NFR BLD AUTO: 10.4 % (ref 10–50)
MCH RBC QN AUTO: 33.8 PG (ref 26.5–34)
MCHC RBC AUTO-ENTMCNC: 34.3 G/DL (ref 31.5–36.3)
MCV RBC AUTO: 98.5 FL (ref 80–98)
MONOCYTES # BLD AUTO: 1.09 10*3/MM3 (ref 0–0.9)
MONOCYTES NFR BLD AUTO: 10.9 % (ref 0–12)
NEUTROPHILS # BLD AUTO: 7.56 10*3/MM3 (ref 2–8.6)
NEUTROPHILS NFR BLD AUTO: 75.5 % (ref 37–80)
PLATELET # BLD AUTO: 355 10*3/MM3 (ref 150–450)
PMV BLD AUTO: 8.6 FL (ref 8–12)
POTASSIUM BLD-SCNC: 4.2 MMOL/L (ref 3.5–5.1)
PROT SERPL-MCNC: 7.5 G/DL (ref 6.3–8.6)
RBC # BLD AUTO: 3.97 10*6/MM3 (ref 4.37–5.74)
SODIUM BLD-SCNC: 138 MMOL/L (ref 137–145)
WBC NRBC COR # BLD: 10.01 10*3/MM3 (ref 3.2–9.8)

## 2018-01-31 PROCEDURE — 96413 CHEMO IV INFUSION 1 HR: CPT | Performed by: NURSE PRACTITIONER

## 2018-01-31 PROCEDURE — 25010000002 PEMBROLIZUMAB 100 MG/4ML SOLUTION 4 ML VIAL: Performed by: NURSE PRACTITIONER

## 2018-01-31 PROCEDURE — 25010000002 HEPARIN FLUSH (PORCINE) 100 UNIT/ML SOLUTION: Performed by: INTERNAL MEDICINE

## 2018-01-31 PROCEDURE — 80053 COMPREHEN METABOLIC PANEL: CPT | Performed by: INTERNAL MEDICINE

## 2018-01-31 PROCEDURE — 36415 COLL VENOUS BLD VENIPUNCTURE: CPT | Performed by: INTERNAL MEDICINE

## 2018-01-31 PROCEDURE — 99214 OFFICE O/P EST MOD 30 MIN: CPT | Performed by: NURSE PRACTITIONER

## 2018-01-31 PROCEDURE — 85025 COMPLETE CBC W/AUTO DIFF WBC: CPT | Performed by: INTERNAL MEDICINE

## 2018-01-31 RX ORDER — SODIUM CHLORIDE 0.9 % (FLUSH) 0.9 %
10 SYRINGE (ML) INJECTION AS NEEDED
Status: CANCELLED | OUTPATIENT
Start: 2018-02-21

## 2018-01-31 RX ORDER — SODIUM CHLORIDE 9 MG/ML
250 INJECTION, SOLUTION INTRAVENOUS ONCE
Status: COMPLETED | OUTPATIENT
Start: 2018-01-31 | End: 2018-01-31

## 2018-01-31 RX ORDER — MORPHINE SULFATE 15 MG/1
15 TABLET, FILM COATED, EXTENDED RELEASE ORAL EVERY 12 HOURS SCHEDULED
Qty: 60 TABLET | Refills: 0 | Status: SHIPPED | OUTPATIENT
Start: 2018-01-31 | End: 2018-04-04 | Stop reason: DRUGHIGH

## 2018-01-31 RX ORDER — SODIUM CHLORIDE 9 MG/ML
250 INJECTION, SOLUTION INTRAVENOUS ONCE
Status: CANCELLED | OUTPATIENT
Start: 2018-01-31

## 2018-01-31 RX ORDER — OXYCODONE AND ACETAMINOPHEN 10; 325 MG/1; MG/1
1 TABLET ORAL EVERY 6 HOURS PRN
Qty: 120 TABLET | Refills: 0 | Status: SHIPPED | OUTPATIENT
Start: 2018-01-31 | End: 2018-02-21 | Stop reason: SDUPTHER

## 2018-01-31 RX ORDER — SODIUM CHLORIDE 0.9 % (FLUSH) 0.9 %
10 SYRINGE (ML) INJECTION AS NEEDED
Status: DISCONTINUED | OUTPATIENT
Start: 2018-01-31 | End: 2018-01-31 | Stop reason: HOSPADM

## 2018-01-31 RX ADMIN — Medication 10 ML: at 08:39

## 2018-01-31 RX ADMIN — SODIUM CHLORIDE, PRESERVATIVE FREE 500 UNITS: 5 INJECTION INTRAVENOUS at 10:29

## 2018-01-31 RX ADMIN — Medication 10 ML: at 10:29

## 2018-01-31 RX ADMIN — SODIUM CHLORIDE 250 ML: 9 INJECTION, SOLUTION INTRAVENOUS at 09:20

## 2018-01-31 RX ADMIN — SODIUM CHLORIDE 200 MG: 9 INJECTION, SOLUTION INTRAVENOUS at 09:53

## 2018-01-31 NOTE — PROGRESS NOTES
DATE OF VISIT: 1/31/2018    REASON FOR VISIT:  Squamous cell cancer of left lung, stage IIB     HISTORY OF PRESENT ILLNESS:    57-year-old male with a past medical history significant for duodenal ulcer, extensive nicotine addiction with more than 45-pack-year smoking history, history of alcohol abuse was seen in consultation on May 3, 2017 for newly diagnosed stage IIB squamous cell cancer of Of left lung.  Patient finished concurrent chemoradiation on July 17, 2017.  Patient had a CT of chest done on December 1, 2017 showed recurrence in left chest wall region. PET/CT done on December 29, 2017 showed left chest wall recurrence with involvement of pleura, left treat this without any evidence of distance metastasis.  His cancer is stage IV because of the involvement of chest wall and pleura and ribs.  His. PDl 1 testing was done which showed small positivity of 20%.  Foundation one testing has been sent off results are not back yet.  Treatment option with Keytruda was discussed with patient on last visit and pt elected to start treatment. Planning to repeat CT scan after 3 cycles of Keytruda. He is due for cycle #2 today.    He is still having left upper chest wall pain; using MS Contin and Percocet for pain; requesting refill today. Denies any side effects with his first cycle of Keytruda.  Denies any new cough or hemoptysis.  Denies any blood in stool or urine.          PAST MEDICAL HISTORY:    Past Medical History:   Diagnosis Date   • Anemia    • Aortic stenosis, mild    • Arthritis    • Chronic bronchitis    • Duodenal ulcer disease    • Inguinal hernia, left    • Lung cancer    • Mitral valve regurgitation        SOCIAL HISTORY:    Social History   Substance Use Topics   • Smoking status: Current Every Day Smoker     Packs/day: 1.00     Years: 40.00     Types: Cigarettes   • Smokeless tobacco: Former User      Comment: Reduced at half a pack per day and weanning off   • Alcohol use 8.4 oz/week     14 Cans of  "beer per week      Comment: 6 pk/dly       Surgical History :  Past Surgical History:   Procedure Laterality Date   • APPENDECTOMY      open   • BRONCHOSCOPY N/A 3/8/2017    Procedure: BRONCHOSCOPY with possible bronchoalveolar lavage, +/- brush, +/- needle biopsy, +/- endobronchial biopsy;  Surgeon: Candis Lr MD;  Location: [facility] ENDOSCOPY;  Service:    • EXPLORATORY LAPAROTOMY  11/17/2014    Exploratory laparotomy, repair of duodenal ulcer, modified Carlos patch   • INGUINAL HERNIA REPAIR  09/13/2013    Open repair of an indirect left inguinal hernia. Left inguinal hernia.   • PA INSJ TUNNELED CVC W/O SUBQ PORT/ AGE 6 YR/> N/A 5/10/2017    Procedure: TUNNELED CENTRAL VENOUS CATHETER  WITH SUBCUTANEOUS  PORT,FLUOROSCOPIC GUIDANCE ;  Surgeon: Angel Hernandez MD;  Location: [facility] OR;  Service: Cardiothoracic   • THORACOTOMY Left 4/19/2017    Procedure: THORACOTOMY LEFT WITH PULMONARY RESECTION AND BRONCHOSCOPY WITH ENDO ;  Surgeon: Angel Hernandez MD;  Location: [facility] OR;  Service:        ALLERGIES:    No Known Allergies    REVIEW OF SYSTEMS:      CONSTITUTIONAL:  No fever, chills, or night sweats.     HEENT:  No epistaxis, mouth sores, or difficulty swallowing.    RESPIRATORY:  No new shortness of breath or cough at present.    CARDIOVASCULAR:  No chest pain or palpitations.    GASTROINTESTINAL:  No abdominal pain, nausea, vomiting, or blood in the stool.    GENITOURINARY:  No dysuria or hematuria.    MUSCULOSKELETAL:  No any new back pain or arthralgias.     NEUROLOGICAL:  No tingling or numbness. No new headache or dizziness.     LYMPHATICS:  Denies any abnormal swollen and anywhere in the body.    SKIN:  Denies any new skin rash.    PHYSICAL EXAMINATION:      VITAL SIGNS:  /99  Pulse 90  Temp 98.2 °F (36.8 °C) (Temporal Artery )   Resp 18  Ht 175.3 cm (69.02\")  Wt 59.9 kg (132 lb 0.9 oz)  BMI 19.49 kg/m2    GENERAL:  Not in any distress.    HEENT:  Normocephalic, " Atraumatic.Mild Conjunctival pallor. No icterus. . No Facial Asymmetry noted.    NECK:  No adenopathy. No JVD.    RESPIRATORY:  Fair air entry bilateral. No rhonchi or wheezing.    CARDIOVASCULAR:  S1, S2. Regular rate and rhythm. No murmur or gallop appreciated.    ABDOMEN:  Soft, obese, nontender. Bowel sounds present in all four quadrants.  No organomegaly appreciated.    EXTREMITIES:  No edema.No Calf Tenderness.    NEUROLOGIC:  Alert, awake and oriented ×3.  No  Motor or sensory deficit appreciated. Cranial Nerves 2-12 grossly intact.    SKIN : No new skin lesion identified  DIAGNOSTIC DATA:    Glucose   Date Value Ref Range Status   01/31/2018 96 60 - 100 mg/dL Final     Glucose, Arterial   Date Value Ref Range Status   04/19/2017 176 mmol/L Final     Sodium   Date Value Ref Range Status   01/31/2018 138 137 - 145 mmol/L Final     Potassium   Date Value Ref Range Status   01/31/2018 4.2 3.5 - 5.1 mmol/L Final     CO2   Date Value Ref Range Status   01/31/2018 27.0 22.0 - 31.0 mmol/L Final     Chloride   Date Value Ref Range Status   01/31/2018 99 95 - 110 mmol/L Final     Anion Gap   Date Value Ref Range Status   01/31/2018 12.0 5.0 - 15.0 mmol/L Final     Creatinine   Date Value Ref Range Status   01/31/2018 0.49 (L) 0.70 - 1.30 mg/dL Final     BUN   Date Value Ref Range Status   01/31/2018 9 7 - 21 mg/dL Final     BUN/Creatinine Ratio   Date Value Ref Range Status   01/31/2018 18.4 7.0 - 25.0 Final     Calcium   Date Value Ref Range Status   01/31/2018 9.0 8.4 - 10.2 mg/dL Final     eGFR Non  Amer   Date Value Ref Range Status   01/31/2018 >150 >60 mL/min/1.73 Final     Alkaline Phosphatase   Date Value Ref Range Status   01/31/2018 102 38 - 126 U/L Final     Total Protein   Date Value Ref Range Status   01/31/2018 7.5 6.3 - 8.6 g/dL Final     ALT (SGPT)   Date Value Ref Range Status   01/31/2018 25 21 - 72 U/L Final     AST (SGOT)   Date Value Ref Range Status   01/31/2018 36 17 - 59 U/L Final      Total Bilirubin   Date Value Ref Range Status   01/31/2018 0.2 0.2 - 1.3 mg/dL Final     Albumin   Date Value Ref Range Status   01/31/2018 4.10 3.40 - 4.80 g/dL Final     Globulin   Date Value Ref Range Status   01/31/2018 3.4 2.3 - 3.5 gm/dL Final     A/G Ratio   Date Value Ref Range Status   01/31/2018 1.2 1.1 - 1.8 g/dL Final     Lab Results   Component Value Date    WBC 10.01 (H) 01/31/2018    HGB 13.4 (L) 01/31/2018    HCT 39.1 01/31/2018    MCV 98.5 (H) 01/31/2018     01/31/2018     Lab Results   Component Value Date    NEUTROABS 7.56 01/31/2018    IRON 12 (L) 05/03/2017    TIBC 322 05/03/2017    LABIRON 4 (L) 05/03/2017    FERRITIN 56.80 05/03/2017    CJIBTNND89 465 10/17/2017    FOLATE >20.00 10/17/2017     No results found for: , LABCA2, AFPTM, HCGQUANT, , CHROMGRNA, [id number], CEA, REFLABREPO]      PATHOLOGY:  PDL 1 testing on tumor shows 20% positivity.     Pathology :  Pathology Report from April 19, 2017 showed:          SPECIMEN   Specimen    Lobe(s) of lung   Lobes of Lung    Upper lobe   Procedure    Lobectomy   Specimen Laterality    Left   TUMOR   Primary Tumor Site    Upper lobe   Histologic Type    Squamous cell carcinoma   Histologic Grade    G2: Moderately differentiated   Tumor Size    Greatest dimension (cm): 8 cm   Additional Dimension (cm)    7 cm         5 cm   Tumor Focality    Unifocal   Visceral Pleura Invasion    Present   Tumor Extension    Parietal pleura   Lymph-Vascular Invasion    Not identified   MARGINS   Bronchial Margin    Uninvolved by invasive carcinoma and carcinoma in situ   Vascular Margin    Uninvolved by invasive carcinoma   Parenchymal Margin    Involved by invasive carcinoma   LYMPH NODES   Regional Lymph Nodes         Number of Lymph Nodes Examined    Specify number: 6   Gene Stations Examined    5: Subaortic/ aortopulmonary (AP) / AP window         11L: Interlobar   Lymph Node Involvement    No nodes involved   STAGE (pTNM)   Primary Tumor  (pT)    pT3: Tumor greater than 7 cm in greatest dimension; or Tumor of any size that directly invades any of the following: parietal pleural chest wall (including superior sulcus tumors), diaphragm, phrenic nerve, mediastinal pleura, parietal pericardium; or Tumor of any size in the main bronchus less than 2 cm distal to the petar but without involvement of the petar; or Tumor of any size associated with atelectasis or obstructive pneumonitis of the entire lung; or Tumors of any size with separate tmor nodule(s) in same lobe   Regional Lymph Nodes (pN)    pN0: No regional lymph node metastasis   ADDITIONAL FINDINGS   Additional Pathologic Finding(s)    Other (specify): Fungal hyphae consistent with Aspergillus species in the cavity contents   .             RADIOLOGY DATA :  PET CT done on December 29, 2017:  IMPRESSION:  CONCLUSION:  1.  Mildly enlarging left lung apex pleural base mass lesion with  abnormal FDG activity suspicious for Pancoast tumor which is  invading into the anterior chest wall and partially eroding  through the anterior left second third and fourth ribs. No  current evidence of distant metastatic disease.         CT chest with contrast done on December 1, 2017:    IMPRESSION:  1.  Postsurgical changes consistent with prior left upper  lobectomy and probable AP window mediastinal lymph node  dissection.  2.  Precarinal single enlarged lymph node and shotty AP window  lymph node in patient with known lung carcinoma are suspicious  for metastatic involvement.  3.  Mild centrilobular emphysematous changes.  4.  Interval development of multiple left lower lobe superior  segment noncalcified nodules as described above. The largest  measures 5.7 mm in greatest diameter. This interval change in  patient with known lung carcinoma would be suspicious for early  metastatic disease.  5.  Left lateral hemithorax abnormal pleural thickening which is  invading into the chest wall and causing erosive changes  of the  left second, third, fourth, fifth, sixth ribs. These findings are  suspicious for primary lung neoplasm or metastatic involvement in  this region versus less likely postsurgical changes..  6.  Left lateral sixth and fourth and fifth rib transverse linear  lucencies. These are consistent with regions of fracture and/or  postsurgical changes.  7.  Left kidney mid zone 1.5 mm nonobstructive renal calculi.       PET/CT done on May 22, 2017 showed:  IMPRESSION:  CONCLUSION:  1. Large Pancoast tumor involving left apex with significant  involvement of the periphery, pleura, portions of the chest wall  and contiguous involvement, destruction of at least one of the  anterior left upper ribs.      PET/CT imaging study is otherwise unremarkable. No evidence of  any distant metastases.     ASSESSMENT AND PLAN:     1. Squamous cell cancer of left upper lobe, stage IIB, T3 N0 M0: Patient is status post surgical excision with positive pleural margin.  As per NCCN guidelines patient received concurrent chemoradiation with weekly carboplatin and Taxol.  Patient received concurrent chemoradiation with  weekly carboplatin and Taxol with radiation  From June 05,2017 until 07/17/2017.  CT scan done on December 1, 2017 shows enlarged left-sided precarinal lymph node as well as thickening of the left pleura eroding into the left rib cage worrisome for recurrence of cancer.  CT done on December 29, 2017 shows left chest wall recurrence with involvement of pleura, left treat this without any evidence of distance metastasis.  His cancer is stage IV because of the involvement of chest wall and pleura and ribs.  After last clinic visit. PDl 1 testing was done which showed small positivity of 20%.  Foundation one testing has been sent off results are not back yet. Patient was started on Keytruda and is due for cycle #2 today. Planning to repeat CT scan after cycle #3. Patient will return to clinic in 3 weeks for cycle #3.     2.  Left  rib involvement secondary to local recurrence and progression: Treatment with Xgeva was discussed with patient on his last visit and he states he is still needing to get some dental work done,will defer at this time.     3.  Pain management: Patient started on MS Contin on last clinic visit; is requesting refill on pain medications today. He denies any constipation.      4.Pulmonary aspergillosis: Patient had a fungus ball consistent with Aspergillus on a cavitary lesion of lung status postsurgical excision.      5. History of duodenal ulcer                    This document has been signed by CIPRIANO Wise on January 31, 2018 12:59 PM

## 2018-02-13 RX ORDER — MORPHINE SULFATE 30 MG/1
30 TABLET, FILM COATED, EXTENDED RELEASE ORAL 2 TIMES DAILY
Qty: 60 TABLET | Refills: 0 | Status: SHIPPED | OUTPATIENT
Start: 2018-02-13 | End: 2018-03-14 | Stop reason: SDUPTHER

## 2018-02-21 ENCOUNTER — INFUSION (OUTPATIENT)
Dept: ONCOLOGY | Facility: HOSPITAL | Age: 58
End: 2018-02-21

## 2018-02-21 ENCOUNTER — OFFICE VISIT (OUTPATIENT)
Dept: ONCOLOGY | Facility: CLINIC | Age: 58
End: 2018-02-21

## 2018-02-21 VITALS
HEART RATE: 88 BPM | TEMPERATURE: 98.6 F | WEIGHT: 131.39 LBS | BODY MASS INDEX: 19.46 KG/M2 | HEIGHT: 69 IN | SYSTOLIC BLOOD PRESSURE: 156 MMHG | DIASTOLIC BLOOD PRESSURE: 89 MMHG | RESPIRATION RATE: 18 BRPM

## 2018-02-21 DIAGNOSIS — C34.12 MALIGNANT NEOPLASM OF UPPER LOBE OF LEFT LUNG (HCC): ICD-10-CM

## 2018-02-21 DIAGNOSIS — C79.51 BONE METASTASIS: ICD-10-CM

## 2018-02-21 DIAGNOSIS — Z45.2 ENCOUNTER FOR VENOUS ACCESS DEVICE CARE: Primary | ICD-10-CM

## 2018-02-21 LAB
ALBUMIN SERPL-MCNC: 3.9 G/DL (ref 3.4–4.8)
ALBUMIN/GLOB SERPL: 1.1 G/DL (ref 1.1–1.8)
ALP SERPL-CCNC: 113 U/L (ref 38–126)
ALT SERPL W P-5'-P-CCNC: 27 U/L (ref 21–72)
ANION GAP SERPL CALCULATED.3IONS-SCNC: 12 MMOL/L (ref 5–15)
AST SERPL-CCNC: 28 U/L (ref 17–59)
BASOPHILS # BLD AUTO: 0.08 10*3/MM3 (ref 0–0.2)
BASOPHILS NFR BLD AUTO: 0.9 % (ref 0–2)
BILIRUB SERPL-MCNC: 0.2 MG/DL (ref 0.2–1.3)
BUN BLD-MCNC: 13 MG/DL (ref 7–21)
BUN/CREAT SERPL: 25.5 (ref 7–25)
CALCIUM SPEC-SCNC: 9.1 MG/DL (ref 8.4–10.2)
CHLORIDE SERPL-SCNC: 102 MMOL/L (ref 95–110)
CO2 SERPL-SCNC: 28 MMOL/L (ref 22–31)
CREAT BLD-MCNC: 0.51 MG/DL (ref 0.7–1.3)
DEPRECATED RDW RBC AUTO: 46.4 FL (ref 35.1–43.9)
EOSINOPHIL # BLD AUTO: 0.33 10*3/MM3 (ref 0–0.7)
EOSINOPHIL NFR BLD AUTO: 3.8 % (ref 0–7)
ERYTHROCYTE [DISTWIDTH] IN BLOOD BY AUTOMATED COUNT: 13.1 % (ref 11.5–14.5)
GFR SERPL CREATININE-BSD FRML MDRD: >150 ML/MIN/1.73 (ref 60–130)
GLOBULIN UR ELPH-MCNC: 3.5 GM/DL (ref 2.3–3.5)
GLUCOSE BLD-MCNC: 86 MG/DL (ref 60–100)
HCT VFR BLD AUTO: 39.5 % (ref 39–49)
HGB BLD-MCNC: 13.4 G/DL (ref 13.7–17.3)
IMM GRANULOCYTES # BLD: 0.03 10*3/MM3 (ref 0–0.02)
IMM GRANULOCYTES NFR BLD: 0.3 % (ref 0–0.5)
LYMPHOCYTES # BLD AUTO: 1.09 10*3/MM3 (ref 0.6–4.2)
LYMPHOCYTES NFR BLD AUTO: 12.4 % (ref 10–50)
MCH RBC QN AUTO: 32.9 PG (ref 26.5–34)
MCHC RBC AUTO-ENTMCNC: 33.9 G/DL (ref 31.5–36.3)
MCV RBC AUTO: 97.1 FL (ref 80–98)
MONOCYTES # BLD AUTO: 1.25 10*3/MM3 (ref 0–0.9)
MONOCYTES NFR BLD AUTO: 14.2 % (ref 0–12)
NEUTROPHILS # BLD AUTO: 6 10*3/MM3 (ref 2–8.6)
NEUTROPHILS NFR BLD AUTO: 68.4 % (ref 37–80)
PLATELET # BLD AUTO: 386 10*3/MM3 (ref 150–450)
PMV BLD AUTO: 8.5 FL (ref 8–12)
POTASSIUM BLD-SCNC: 4 MMOL/L (ref 3.5–5.1)
PROT SERPL-MCNC: 7.4 G/DL (ref 6.3–8.6)
RBC # BLD AUTO: 4.07 10*6/MM3 (ref 4.37–5.74)
SODIUM BLD-SCNC: 142 MMOL/L (ref 137–145)
WBC NRBC COR # BLD: 8.78 10*3/MM3 (ref 3.2–9.8)

## 2018-02-21 PROCEDURE — 96413 CHEMO IV INFUSION 1 HR: CPT | Performed by: INTERNAL MEDICINE

## 2018-02-21 PROCEDURE — 36591 DRAW BLOOD OFF VENOUS DEVICE: CPT | Performed by: INTERNAL MEDICINE

## 2018-02-21 PROCEDURE — 80053 COMPREHEN METABOLIC PANEL: CPT

## 2018-02-21 PROCEDURE — 99214 OFFICE O/P EST MOD 30 MIN: CPT | Performed by: INTERNAL MEDICINE

## 2018-02-21 PROCEDURE — 85025 COMPLETE CBC W/AUTO DIFF WBC: CPT

## 2018-02-21 PROCEDURE — 25010000002 HEPARIN FLUSH (PORCINE) 100 UNIT/ML SOLUTION: Performed by: INTERNAL MEDICINE

## 2018-02-21 PROCEDURE — 25010000002 PEMBROLIZUMAB 100 MG/4ML SOLUTION 4 ML VIAL: Performed by: INTERNAL MEDICINE

## 2018-02-21 RX ORDER — SODIUM CHLORIDE 0.9 % (FLUSH) 0.9 %
10 SYRINGE (ML) INJECTION AS NEEDED
Status: DISCONTINUED | OUTPATIENT
Start: 2018-02-21 | End: 2018-02-21 | Stop reason: HOSPADM

## 2018-02-21 RX ORDER — SODIUM CHLORIDE 9 MG/ML
250 INJECTION, SOLUTION INTRAVENOUS ONCE
Status: CANCELLED | OUTPATIENT
Start: 2018-02-21

## 2018-02-21 RX ORDER — SODIUM CHLORIDE 0.9 % (FLUSH) 0.9 %
10 SYRINGE (ML) INJECTION AS NEEDED
Status: CANCELLED | OUTPATIENT
Start: 2018-03-14

## 2018-02-21 RX ORDER — SODIUM CHLORIDE 9 MG/ML
250 INJECTION, SOLUTION INTRAVENOUS ONCE
Status: COMPLETED | OUTPATIENT
Start: 2018-02-21 | End: 2018-02-21

## 2018-02-21 RX ORDER — OXYCODONE AND ACETAMINOPHEN 10; 325 MG/1; MG/1
1 TABLET ORAL EVERY 4 HOURS PRN
Qty: 180 TABLET | Refills: 0 | Status: SHIPPED | OUTPATIENT
Start: 2018-02-21 | End: 2018-04-04 | Stop reason: SDUPTHER

## 2018-02-21 RX ADMIN — SODIUM CHLORIDE 200 MG: 9 INJECTION, SOLUTION INTRAVENOUS at 09:30

## 2018-02-21 RX ADMIN — SODIUM CHLORIDE, PRESERVATIVE FREE 500 UNITS: 5 INJECTION INTRAVENOUS at 10:07

## 2018-02-21 RX ADMIN — SODIUM CHLORIDE 250 ML: 9 INJECTION, SOLUTION INTRAVENOUS at 08:43

## 2018-02-21 RX ADMIN — Medication 10 ML: at 08:03

## 2018-02-21 RX ADMIN — Medication 10 ML: at 10:07

## 2018-02-21 NOTE — PROGRESS NOTES
DATE OF VISIT: 2/21/2018    REASON FOR VISIT:  Squamous cell cancer of left lung, stage IV    HISTORY OF PRESENT ILLNESS:    57-year-old male with a past medical history significant for duodenal ulcer, extensive nicotine addiction with more than 45-pack-year smoking history, history of alcohol abuse was seen in consultation on May 3, 2017 for newly diagnosed stage IIB squamous cell cancer of Of left lung.  Patient finished concurrent chemoradiation on July 17, 2017.  Patient had a CT of chest done on December 1, 2017 showed recurrence involving left chest wall region making it stage IV.  In view of PDL 1 showing positivity about 20%, patient was started on Keytruda January 10, 2018.  Patient is here to get cycle 3 of Keytruda today.  Still complains of discomfort in the left chest wall region.  Denies any bleeding.  States his chest pain is somewhat improved over last 2 days.      PAST MEDICAL HISTORY:    Past Medical History:   Diagnosis Date   • Anemia    • Aortic stenosis, mild    • Arthritis    • Chronic bronchitis    • Duodenal ulcer disease    • Inguinal hernia, left    • Lung cancer    • Mitral valve regurgitation        SOCIAL HISTORY:    Social History   Substance Use Topics   • Smoking status: Current Every Day Smoker     Packs/day: 1.00     Years: 40.00     Types: Cigarettes   • Smokeless tobacco: Former User      Comment: Reduced at half a pack per day and weanning off   • Alcohol use 8.4 oz/week     14 Cans of beer per week      Comment: 6 pk/dly       Surgical History :  Past Surgical History:   Procedure Laterality Date   • APPENDECTOMY      open   • BRONCHOSCOPY N/A 3/8/2017    Procedure: BRONCHOSCOPY with possible bronchoalveolar lavage, +/- brush, +/- needle biopsy, +/- endobronchial biopsy;  Surgeon: Candis Lr MD;  Location: [facility] ENDOSCOPY;  Service:    • EXPLORATORY LAPAROTOMY  11/17/2014    Exploratory laparotomy, repair of duodenal ulcer, modified Carlos patch   • INGUINAL HERNIA  "REPAIR  09/13/2013    Open repair of an indirect left inguinal hernia. Left inguinal hernia.   • [place] INSJ TUNNELED CVC W/O SUBQ PORT/ AGE 6 YR/> N/A 5/10/2017    Procedure: TUNNELED CENTRAL VENOUS CATHETER  WITH SUBCUTANEOUS  PORT,FLUOROSCOPIC GUIDANCE ;  Surgeon: Angel Hernandez MD;  Location: [facility];  Service: Cardiothoracic   • THORACOTOMY Left 4/19/2017    Procedure: THORACOTOMY LEFT WITH PULMONARY RESECTION AND BRONCHOSCOPY WITH ENDO ;  Surgeon: Angel Hernandez MD;  Location: [facility];  Service:        ALLERGIES:    No Known Allergies    FAMILY HISTORY:  Family History   Problem Relation Age of Onset   • Asthma Father    • Cancer Father    • Diabetes Father    • Hypertension Father    • Alcohol abuse Mother    • Heart failure Brother          REVIEW OF SYSTEMS:      CONSTITUTIONAL:  Positive for fatigue, denies any recent worsening. Denies any fever, chills or weight loss.      HEENT: No epistaxis, mouth sores or difficulty swallowing.     RESPIRATORY: Denies any shortness of breath or cough today.     CARDIOVASCULAR: Complains of discomfort and pain in left chest wall region despite of taking Percocet 10/325, morphine sulfate 30 mg.  No palpitation.     GASTROINTESTINAL: No abdominal pain nausea, vomiting or blood in the stool.     GENITOURINARY: No Dysuria or Hematuria.     MUSCULOSKELETAL: No new back pain or arthralgia..     LYMPHATICS: Denies any abnormal swollen glands anywhere in the body.     NEUROLOGICAL : No tingling or numbness. No headache or dizziness. No seizures or balance problems.     SKIN: No new skin lesions.         PHYSICAL EXAMINATION:      VITAL SIGNS:  /89  Pulse 88  Temp 98.6 °F (37 °C) (Temporal Artery )   Resp 18  Ht 175.3 cm (69.02\")  Wt 59.6 kg (131 lb 6.3 oz)  BMI 19.39 kg/m2    GENERAL:  Not in any distress.    HEENT:  Normocephalic, Atraumatic.Mild Conjunctival pallor. No icterus. Extraocular Movements Intact. No Facial Asymmetry noted.    NECK:  " No adenopathy. No JVD.    RESPIRATORY:  Fair air entry bilateral. No rhonchi or wheezing.  Mild erythema of left chest wall.    CARDIOVASCULAR:  S1, S2. Regular rate and rhythm. No murmur or gallop appreciated.    ABDOMEN:  Soft,  nontender. Bowel sounds present in all four quadrants.  No organomegaly appreciated.    EXTREMITIES:  No edema.No Calf Tenderness.    NEUROLOGIC:  Alert, awake and oriented ×3.  No  Motor or sensory deficit appreciated. Cranial Nerves 2-12 grossly intact.            DIAGNOSTIC DATA:    Glucose   Date Value Ref Range Status   02/21/2018 86 60 - 100 mg/dL Final     Glucose, Arterial   Date Value Ref Range Status   04/19/2017 176 mmol/L Final     Sodium   Date Value Ref Range Status   02/21/2018 142 137 - 145 mmol/L Final     Potassium   Date Value Ref Range Status   02/21/2018 4.0 3.5 - 5.1 mmol/L Final     CO2   Date Value Ref Range Status   02/21/2018 28.0 22.0 - 31.0 mmol/L Final     Chloride   Date Value Ref Range Status   02/21/2018 102 95 - 110 mmol/L Final     Anion Gap   Date Value Ref Range Status   02/21/2018 12.0 5.0 - 15.0 mmol/L Final     Creatinine   Date Value Ref Range Status   02/21/2018 0.51 (L) 0.70 - 1.30 mg/dL Final     BUN   Date Value Ref Range Status   02/21/2018 13 7 - 21 mg/dL Final     BUN/Creatinine Ratio   Date Value Ref Range Status   02/21/2018 25.5 (H) 7.0 - 25.0 Final     Calcium   Date Value Ref Range Status   02/21/2018 9.1 8.4 - 10.2 mg/dL Final     eGFR Non  Amer   Date Value Ref Range Status   02/21/2018 >150 >60 mL/min/1.73 Final     Alkaline Phosphatase   Date Value Ref Range Status   02/21/2018 113 38 - 126 U/L Final     Total Protein   Date Value Ref Range Status   02/21/2018 7.4 6.3 - 8.6 g/dL Final     ALT (SGPT)   Date Value Ref Range Status   02/21/2018 27 21 - 72 U/L Final     AST (SGOT)   Date Value Ref Range Status   02/21/2018 28 17 - 59 U/L Final     Total Bilirubin   Date Value Ref Range Status   02/21/2018 0.2 0.2 - 1.3 mg/dL Final      Albumin   Date Value Ref Range Status   02/21/2018 3.90 3.40 - 4.80 g/dL Final     Globulin   Date Value Ref Range Status   02/21/2018 3.5 2.3 - 3.5 gm/dL Final     A/G Ratio   Date Value Ref Range Status   02/21/2018 1.1 1.1 - 1.8 g/dL Final     Lab Results   Component Value Date    WBC 8.78 02/21/2018    HGB 13.4 (L) 02/21/2018    HCT 39.5 02/21/2018    MCV 97.1 02/21/2018     02/21/2018     Lab Results   Component Value Date    NEUTROABS 6.00 02/21/2018    IRON 12 (L) 05/03/2017    TIBC 322 05/03/2017    LABIRON 4 (L) 05/03/2017    FERRITIN 56.80 05/03/2017    [id number] 465 10/17/2017    FOLATE >20.00 10/17/2017         PATHOLOGY:  Foundation 1 testing done on 26 December 2017 showed:  Genomic alteration identified includes:   PTEN loss  BCORL1 loos exon 8-12  CDKN@A/B loss  KMT@C(MLL3)G638  PRKC! Amplification  STAT4 truncation intron 9  TERC amplification  [id number]    No mutation identified in EGFR, KRAS, RET, ALK,MET, ERBB2, BRAF, ROS1          PDL 1 testing on tumor shows 20% positivity.          Pathology :  Pathology Report from April 19, 2017 showed:       SPECIMEN   Specimen   Lobe(s) of lung   Lobes of Lung   Upper lobe   Procedure   Lobectomy   Specimen Laterality   Left   TUMOR   Primary Tumor Site   Upper lobe   Histologic Type   Squamous cell carcinoma   Histologic Grade   G2: Moderately differentiated   Tumor Size   Greatest dimension (cm): 8 cm   Additional Dimension (cm)   7 cm       5 cm   Tumor Focality   Unifocal   Visceral Pleura Invasion   Present   Tumor Extension   Parietal pleura   Lymph-Vascular Invasion   Not identified   MARGINS   Bronchial Margin   Uninvolved by invasive carcinoma and carcinoma in situ   Vascular Margin   Uninvolved by invasive carcinoma   Parenchymal Margin   Involved by invasive carcinoma   LYMPH NODES   Regional Lymph Nodes       Number of Lymph Nodes Examined   Specify number: 6   Gene Stations Examined   5: Subaortic/ aortopulmonary (AP) / AP window        11L: Interlobar   Lymph Node Involvement   No nodes involved   STAGE (pTNM)   Primary Tumor (pT)   pT3: Tumor greater than 7 cm in greatest dimension; or Tumor of any size that directly invades any of the following: parietal pleural chest wall (including superior sulcus tumors), diaphragm, phrenic nerve, mediastinal pleura, parietal pericardium; or Tumor of any size in the main bronchus less than 2 cm distal to the petar but without involvement of the petar; or Tumor of any size associated with atelectasis or obstructive pneumonitis of the entire lung; or Tumors of any size with separate tmor nodule(s) in same lobe   Regional Lymph Nodes (pN)   pN0: No regional lymph node metastasis   ADDITIONAL FINDINGS   Additional Pathologic Finding(s)   Other (specify): Fungal hyphae consistent with Aspergillus species in the cavity contents   .          RADIOLOGY DATA :  PET CT done on December 29, 2017 was reviewed, discussed with patient, it showed:  IMPRESSION:  CONCLUSION:  1.  Mildly enlarging left lung apex pleural base mass lesion with  abnormal FDG activity suspicious for Pancoast tumor which is  invading into the anterior chest wall and partially eroding  through the anterior left second third and fourth ribs. No  current evidence of distant metastatic disease.       CT chest with contrast done on December 1, 2017 been discussed with patient, it showed:  IMPRESSION:  1.  Postsurgical changes consistent with prior left upper  lobectomy and probable AP window mediastinal lymph node  dissection.  2.  Precarinal single enlarged lymph node and shotty AP window  lymph node in patient with known lung carcinoma are suspicious  for metastatic involvement.  3.  Mild centrilobular emphysematous changes.  4.  Interval development of multiple left lower lobe superior  segment noncalcified nodules as described above. The largest  measures 5.7 mm in greatest diameter. This interval change in  patient with known lung carcinoma  would be suspicious for early  metastatic disease.  5.  Left lateral hemithorax abnormal pleural thickening which is  invading into the chest wall and causing erosive changes of the  left second, third, fourth, fifth, sixth ribs. These findings are  suspicious for primary lung neoplasm or metastatic involvement in  this region versus less likely postsurgical changes..  6.  Left lateral sixth and fourth and fifth rib transverse linear  lucencies. These are consistent with regions of fracture and/or  postsurgical changes.  7.  Left kidney mid zone 1.5 mm nonobstructive renal calculi.        PET/CT done on May 22, 2017 showed:  IMPRESSION:  CONCLUSION:  1. Large Pancoast tumor involving left apex with significant  involvement of the periphery, pleura, portions of the chest wall  and contiguous involvement, destruction of at least one of the  anterior left upper ribs.     PET/CT imaging study is otherwise unremarkable. No evidence of  any distant metastases.          ASSESSMENT AND PLAN:      1.  Squamous cell cancer of left upper lobe, stage IIB, T3 N0 M0: Patient is status post surgical excision with positive pleural margin.  As per NCCN guidelines patient received concurrent chemoradiation with weekly carboplatin and Taxol.  Patient received concurrent chemoradiation with  weekly carboplatin and Taxol with radiation  From June 05,2017 until 07/17/2017.  CT scan done on December 1, 2017 shows enlarged left-sided precarinal lymph node as well as thickening of the left pleura eroding into the left rib cage worrisome for recurrence of cancer.  CT done on December 29, 2017 shows left chest wall recurrence with involvement of pleura, left treat this without any evidence of distance metastasis.  His cancer is stage IV because of the involvement of chest wall and pleura and ribs.  After last clinic visit. PDl 1 testing was done which showed small positivity of 20%.  Foundation 1 testing showed positivity for PTEN loss which  Everolimus can be used.  All other mutation that shows positivity are currently not a surgical.  There is no other targeted FDA approved treatment can be used.  Patient was started on treatment with Keytruda on January 10, 2018.  We will go ahead with cycle 3 of Keytruda today on February 21, 2018.  We will see patient back in clinic in about 3 weeks with a restaging CT of chest, abdomen and pelvis to be done prior to that.  Further treatment recommendation will depend on the result of CT scan.    2.  Left rib involvement secondary to local recurrence and progression: Treatment with Xgeva was discussed with patient side effect of right jaw including osteonecrosis of jaw, hypocalcemia, mouth sores were discussed.  Patient does need some extensive dental work to be done, he has not done it yet.  We will hold off Xgeva until he gets dental work done.  Importance of seeing dentist and getting dental work done was discussed with patient again today.    3.  Pain management: Patient's morphine sulfate was increased to 30 mg twice daily last week,.  He is using Percocet 10/325 every 4 hours with moderate pain relief.  We will give him prescription for Percocet 10/325 every 4 hours with 1/20/80 tablets today on February 21, 2018.  Patient was counseled about the importance of bowel regimen to prevent constipation.    4.  macrocytosis: Patient was found to have MCV of 98 today .  Patient does admit to drinking 2-3 beer on a daily basis.  He remains on vitamin B12 thousand micrograms by mouth daily.  Again it was recommended to stay away from alcohol completely.    5.Pulmonary aspergillosis: Patient had a fungus ball consistent with Aspergillus on a cavitary lesion of lung status postsurgical excision.     6. History of duodenal ulcer     7. Health maintenance: Patient smoking intermittently, he was counseled about smoking cessation.  About 4 minutes were spent for smoking cessation counseling.   He remains full  code.              Gualberto Dowd MD  2/21/2018  8:58 AM        EMR Dragon/Transcription disclaimer:   Much of this encounter note is an electronic transcription/translation of spoken language to printed text. The electronic translation of spoken language may permit erroneous, or at times, nonsensical words or phrases to be inadvertently transcribed; Although I have reviewed the note for such errors, some may still exist.

## 2018-03-08 ENCOUNTER — APPOINTMENT (OUTPATIENT)
Dept: CT IMAGING | Facility: HOSPITAL | Age: 58
End: 2018-03-08
Attending: INTERNAL MEDICINE

## 2018-03-08 ENCOUNTER — HOSPITAL ENCOUNTER (OUTPATIENT)
Dept: CT IMAGING | Facility: HOSPITAL | Age: 58
Discharge: HOME | End: 2018-03-08
Attending: INTERNAL MEDICINE | Admitting: INTERNAL MEDICINE

## 2018-03-08 DIAGNOSIS — C34.12 MALIGNANT NEOPLASM OF UPPER LOBE OF LEFT LUNG (HCC): ICD-10-CM

## 2018-03-08 DIAGNOSIS — C79.51 BONE METASTASIS: ICD-10-CM

## 2018-03-08 PROCEDURE — 0 IOPAMIDOL 61 % SOLUTION: Performed by: INTERNAL MEDICINE

## 2018-03-08 PROCEDURE — 71260 CT THORAX DX C+: CPT

## 2018-03-08 PROCEDURE — 74177 CT ABD & PELVIS W/CONTRAST: CPT

## 2018-03-08 RX ADMIN — IOPAMIDOL 75 ML: 612 INJECTION, SOLUTION INTRAVENOUS at 08:20

## 2018-03-14 ENCOUNTER — OFFICE VISIT (OUTPATIENT)
Dept: ONCOLOGY | Facility: CLINIC | Age: 58
End: 2018-03-14

## 2018-03-14 ENCOUNTER — INFUSION (OUTPATIENT)
Dept: ONCOLOGY | Facility: HOSPITAL | Age: 58
End: 2018-03-14

## 2018-03-14 VITALS
BODY MASS INDEX: 19.56 KG/M2 | TEMPERATURE: 98.4 F | WEIGHT: 132.06 LBS | RESPIRATION RATE: 16 BRPM | HEART RATE: 111 BPM | SYSTOLIC BLOOD PRESSURE: 158 MMHG | HEIGHT: 69 IN | DIASTOLIC BLOOD PRESSURE: 89 MMHG

## 2018-03-14 DIAGNOSIS — C34.12 MALIGNANT NEOPLASM OF UPPER LOBE OF LEFT LUNG (HCC): Primary | ICD-10-CM

## 2018-03-14 DIAGNOSIS — C79.51 BONE METASTASIS: ICD-10-CM

## 2018-03-14 DIAGNOSIS — Z45.2 ENCOUNTER FOR VENOUS ACCESS DEVICE CARE: ICD-10-CM

## 2018-03-14 DIAGNOSIS — D70.1 CHEMOTHERAPY-INDUCED NEUTROPENIA (HCC): ICD-10-CM

## 2018-03-14 DIAGNOSIS — T45.1X5A CHEMOTHERAPY-INDUCED NEUTROPENIA (HCC): ICD-10-CM

## 2018-03-14 DIAGNOSIS — D75.89 MACROCYTOSIS: ICD-10-CM

## 2018-03-14 LAB
ALBUMIN SERPL-MCNC: 4.2 G/DL (ref 3.4–4.8)
ALBUMIN/GLOB SERPL: 1.3 G/DL (ref 1.1–1.8)
ALP SERPL-CCNC: 96 U/L (ref 38–126)
ALT SERPL W P-5'-P-CCNC: 24 U/L (ref 21–72)
ANION GAP SERPL CALCULATED.3IONS-SCNC: 13 MMOL/L (ref 5–15)
AST SERPL-CCNC: 22 U/L (ref 17–59)
BASOPHILS # BLD AUTO: 0.07 10*3/MM3 (ref 0–0.2)
BASOPHILS NFR BLD AUTO: 0.8 % (ref 0–2)
BILIRUB SERPL-MCNC: 0.2 MG/DL (ref 0.2–1.3)
BUN BLD-MCNC: 23 MG/DL (ref 7–21)
BUN/CREAT SERPL: 29.9 (ref 7–25)
CALCIUM SPEC-SCNC: 9.4 MG/DL (ref 8.4–10.2)
CHLORIDE SERPL-SCNC: 102 MMOL/L (ref 95–110)
CO2 SERPL-SCNC: 26 MMOL/L (ref 22–31)
CREAT BLD-MCNC: 0.77 MG/DL (ref 0.7–1.3)
DEPRECATED RDW RBC AUTO: 48 FL (ref 35.1–43.9)
EOSINOPHIL # BLD AUTO: 0.24 10*3/MM3 (ref 0–0.7)
EOSINOPHIL NFR BLD AUTO: 2.9 % (ref 0–7)
ERYTHROCYTE [DISTWIDTH] IN BLOOD BY AUTOMATED COUNT: 13.8 % (ref 11.5–14.5)
GFR SERPL CREATININE-BSD FRML MDRD: 105 ML/MIN/1.73 (ref 60–130)
GLOBULIN UR ELPH-MCNC: 3.2 GM/DL (ref 2.3–3.5)
GLUCOSE BLD-MCNC: 103 MG/DL (ref 60–100)
HCT VFR BLD AUTO: 37.5 % (ref 39–49)
HGB BLD-MCNC: 13 G/DL (ref 13.7–17.3)
IMM GRANULOCYTES # BLD: 0.03 10*3/MM3 (ref 0–0.02)
IMM GRANULOCYTES NFR BLD: 0.4 % (ref 0–0.5)
LYMPHOCYTES # BLD AUTO: 0.99 10*3/MM3 (ref 0.6–4.2)
LYMPHOCYTES NFR BLD AUTO: 12 % (ref 10–50)
MCH RBC QN AUTO: 33.2 PG (ref 26.5–34)
MCHC RBC AUTO-ENTMCNC: 34.7 G/DL (ref 31.5–36.3)
MCV RBC AUTO: 95.7 FL (ref 80–98)
MONOCYTES # BLD AUTO: 0.99 10*3/MM3 (ref 0–0.9)
MONOCYTES NFR BLD AUTO: 12 % (ref 0–12)
NEUTROPHILS # BLD AUTO: 5.92 10*3/MM3 (ref 2–8.6)
NEUTROPHILS NFR BLD AUTO: 71.9 % (ref 37–80)
PLATELET # BLD AUTO: 362 10*3/MM3 (ref 150–450)
PMV BLD AUTO: 8.8 FL (ref 8–12)
POTASSIUM BLD-SCNC: 4.1 MMOL/L (ref 3.5–5.1)
PROT SERPL-MCNC: 7.4 G/DL (ref 6.3–8.6)
RBC # BLD AUTO: 3.92 10*6/MM3 (ref 4.37–5.74)
SODIUM BLD-SCNC: 141 MMOL/L (ref 137–145)
T4 FREE SERPL-MCNC: 0.83 NG/DL (ref 0.78–2.19)
TSH SERPL DL<=0.05 MIU/L-ACNC: 2.35 MIU/ML (ref 0.46–4.68)
WBC NRBC COR # BLD: 8.24 10*3/MM3 (ref 3.2–9.8)

## 2018-03-14 PROCEDURE — 84439 ASSAY OF FREE THYROXINE: CPT

## 2018-03-14 PROCEDURE — 25010000002 HEPARIN FLUSH (PORCINE) 100 UNIT/ML SOLUTION: Performed by: INTERNAL MEDICINE

## 2018-03-14 PROCEDURE — 85025 COMPLETE CBC W/AUTO DIFF WBC: CPT

## 2018-03-14 PROCEDURE — 99214 OFFICE O/P EST MOD 30 MIN: CPT | Performed by: INTERNAL MEDICINE

## 2018-03-14 PROCEDURE — 80053 COMPREHEN METABOLIC PANEL: CPT

## 2018-03-14 PROCEDURE — 25010000002 PEMBROLIZUMAB 100 MG/4ML SOLUTION 4 ML VIAL: Performed by: INTERNAL MEDICINE

## 2018-03-14 PROCEDURE — 96413 CHEMO IV INFUSION 1 HR: CPT | Performed by: INTERNAL MEDICINE

## 2018-03-14 PROCEDURE — 84443 ASSAY THYROID STIM HORMONE: CPT

## 2018-03-14 RX ORDER — SODIUM CHLORIDE 9 MG/ML
250 INJECTION, SOLUTION INTRAVENOUS ONCE
Status: CANCELLED | OUTPATIENT
Start: 2018-03-14

## 2018-03-14 RX ORDER — SODIUM CHLORIDE 0.9 % (FLUSH) 0.9 %
10 SYRINGE (ML) INJECTION AS NEEDED
Status: DISCONTINUED | OUTPATIENT
Start: 2018-03-14 | End: 2018-03-14 | Stop reason: HOSPADM

## 2018-03-14 RX ORDER — MORPHINE SULFATE 30 MG/1
30 TABLET, FILM COATED, EXTENDED RELEASE ORAL 2 TIMES DAILY
Qty: 60 TABLET | Refills: 0 | Status: SHIPPED | OUTPATIENT
Start: 2018-03-14 | End: 2018-01-01 | Stop reason: SDUPTHER

## 2018-03-14 RX ORDER — SODIUM CHLORIDE 0.9 % (FLUSH) 0.9 %
10 SYRINGE (ML) INJECTION AS NEEDED
Status: CANCELLED | OUTPATIENT
Start: 2018-04-04

## 2018-03-14 RX ORDER — SODIUM CHLORIDE 9 MG/ML
250 INJECTION, SOLUTION INTRAVENOUS ONCE
Status: COMPLETED | OUTPATIENT
Start: 2018-03-14 | End: 2018-03-14

## 2018-03-14 RX ADMIN — SODIUM CHLORIDE, PRESERVATIVE FREE 500 UNITS: 5 INJECTION INTRAVENOUS at 10:40

## 2018-03-14 RX ADMIN — Medication 10 ML: at 08:11

## 2018-03-14 RX ADMIN — Medication 10 ML: at 10:40

## 2018-03-14 RX ADMIN — SODIUM CHLORIDE 200 MG: 9 INJECTION, SOLUTION INTRAVENOUS at 10:01

## 2018-03-14 RX ADMIN — SODIUM CHLORIDE 250 ML: 9 INJECTION, SOLUTION INTRAVENOUS at 09:07

## 2018-03-14 NOTE — PROGRESS NOTES
DATE OF VISIT: 3/14/2018    REASON FOR VISIT:  Squamous cell cancer of left lung, stage IV    HISTORY OF PRESENT ILLNESS:    57-year-old male with a past medical history significant for duodenal ulcer, extensive nicotine addiction with more than 45-pack-year smoking history, history of alcohol abuse was seen in consultation on May 3, 2017 for newly diagnosed stage IIB squamous cell cancer of Of left lung.  Patient finished concurrent chemoradiation on July 17, 2017.  Patient had a CT of chest done on December 1, 2017 showed recurrence involving left chest wall region making it stage IV.  In view of PDL 1 showing positivity about 20%, patient was started on Keytruda January 10, 2018.  Patient is here to get cycle 4 of Keytruda today.  A restaging CT of chest, abdomen and pelvis with contrast done last week.  He is here to discuss the result of CT scan.  States pain in left chest wall region is somewhat improved.    Denies any bleeding.      PAST MEDICAL HISTORY:    Past Medical History:   Diagnosis Date   • Anemia    • Aortic stenosis, mild    • Arthritis    • Chronic bronchitis    • Duodenal ulcer disease    • Inguinal hernia, left    • Lung cancer    • Mitral valve regurgitation        SOCIAL HISTORY:    Social History   Substance Use Topics   • Smoking status: Current Every Day Smoker     Packs/day: 1.00     Years: 40.00     Types: Cigarettes   • Smokeless tobacco: Former User      Comment: Reduced at half a pack per day and weanning off   • Alcohol use 8.4 oz/week     14 Cans of beer per week      Comment: 6 pk/dly       Surgical History :  Past Surgical History:   Procedure Laterality Date   • APPENDECTOMY      open   • BRONCHOSCOPY N/A 3/8/2017    Procedure: BRONCHOSCOPY with possible bronchoalveolar lavage, +/- brush, +/- needle biopsy, +/- endobronchial biopsy;  Surgeon: Candis Lr MD;  Location: [facility] ENDOSCOPY;  Service:    • EXPLORATORY LAPAROTOMY  11/17/2014    Exploratory laparotomy, repair of  "duodenal ulcer, modified Carlos patch   • INGUINAL HERNIA REPAIR  09/13/2013    Open repair of an indirect left inguinal hernia. Left inguinal hernia.   • [place] INSJ TUNNELED CVC W/O SUBQ PORT/ AGE 6 YR/> N/A 5/10/2017    Procedure: TUNNELED CENTRAL VENOUS CATHETER  WITH SUBCUTANEOUS  PORT,FLUOROSCOPIC GUIDANCE ;  Surgeon: Angel Hernandez MD;  Location: [facility];  Service: Cardiothoracic   • THORACOTOMY Left 4/19/2017    Procedure: THORACOTOMY LEFT WITH PULMONARY RESECTION AND BRONCHOSCOPY WITH ENDO ;  Surgeon: Angel Hernandez MD;  Location: [facility];  Service:        ALLERGIES:    No Known Allergies    FAMILY HISTORY:  Family History   Problem Relation Age of Onset   • Asthma Father    • Cancer Father    • Diabetes Father    • Hypertension Father    • Alcohol abuse Mother    • Heart failure Brother          REVIEW OF SYSTEMS:      CONSTITUTIONAL:  Positive for fatigue, denies any recent worsening. Denies any fever, chills or weight loss.      HEENT: No epistaxis, mouth sores or difficulty swallowing.     RESPIRATORY: Denies any shortness of breath or cough today.     CARDIOVASCULAR: States pain in left chest wall is somewhat improved.  No palpitation.     GASTROINTESTINAL: No abdominal pain nausea, vomiting or blood in the stool.     GENITOURINARY: No Dysuria or Hematuria.     MUSCULOSKELETAL: No new back pain or arthralgia..     LYMPHATICS: Denies any abnormal swollen glands anywhere in the body.     NEUROLOGICAL : No tingling or numbness. No headache or dizziness. No seizures or balance problems.     SKIN: No new skin lesions.         PHYSICAL EXAMINATION:      VITAL SIGNS:  /89   Pulse 111   Temp 98.4 °F (36.9 °C) (Temporal Artery )   Resp 16   Ht 175.3 cm (69.02\")   Wt 59.9 kg (132 lb 0.9 oz)   BMI 19.49 kg/m²     GENERAL:  Not in any distress.    HEENT:  Normocephalic, Atraumatic.Mild Conjunctival pallor. No icterus. Extraocular Movements Intact. No Facial Asymmetry noted.    NECK:  " No adenopathy. No JVD.    RESPIRATORY:  Fair air entry bilateral. No rhonchi or wheezing.  Mild erythema of left chest wall.    CARDIOVASCULAR:  S1, S2. Regular rate and rhythm. No murmur or gallop appreciated.    ABDOMEN:  Soft,  nontender. Bowel sounds present in all four quadrants.  No organomegaly appreciated.    EXTREMITIES:  No edema.No Calf Tenderness.    NEUROLOGIC:  Alert, awake and oriented ×3.  No  Motor or sensory deficit appreciated. Cranial Nerves 2-12 grossly intact.            DIAGNOSTIC DATA:    Glucose   Date Value Ref Range Status   02/21/2018 86 60 - 100 mg/dL Final     Glucose, Arterial   Date Value Ref Range Status   04/19/2017 176 mmol/L Final     Sodium   Date Value Ref Range Status   02/21/2018 142 137 - 145 mmol/L Final     Potassium   Date Value Ref Range Status   02/21/2018 4.0 3.5 - 5.1 mmol/L Final     CO2   Date Value Ref Range Status   02/21/2018 28.0 22.0 - 31.0 mmol/L Final     Chloride   Date Value Ref Range Status   02/21/2018 102 95 - 110 mmol/L Final     Anion Gap   Date Value Ref Range Status   02/21/2018 12.0 5.0 - 15.0 mmol/L Final     Creatinine   Date Value Ref Range Status   02/21/2018 0.51 (L) 0.70 - 1.30 mg/dL Final     BUN   Date Value Ref Range Status   02/21/2018 13 7 - 21 mg/dL Final     BUN/Creatinine Ratio   Date Value Ref Range Status   02/21/2018 25.5 (H) 7.0 - 25.0 Final     Calcium   Date Value Ref Range Status   02/21/2018 9.1 8.4 - 10.2 mg/dL Final     eGFR Non  Amer   Date Value Ref Range Status   02/21/2018 >150 >60 mL/min/1.73 Final     Alkaline Phosphatase   Date Value Ref Range Status   02/21/2018 113 38 - 126 U/L Final     Total Protein   Date Value Ref Range Status   02/21/2018 7.4 6.3 - 8.6 g/dL Final     ALT (SGPT)   Date Value Ref Range Status   02/21/2018 27 21 - 72 U/L Final     AST (SGOT)   Date Value Ref Range Status   02/21/2018 28 17 - 59 U/L Final     Total Bilirubin   Date Value Ref Range Status   02/21/2018 0.2 0.2 - 1.3 mg/dL Final      Albumin   Date Value Ref Range Status   02/21/2018 3.90 3.40 - 4.80 g/dL Final     Globulin   Date Value Ref Range Status   02/21/2018 3.5 2.3 - 3.5 gm/dL Final     A/G Ratio   Date Value Ref Range Status   02/21/2018 1.1 1.1 - 1.8 g/dL Final     Lab Results   Component Value Date    WBC 8.24 03/14/2018    HGB 13.0 (L) 03/14/2018    HCT 37.5 (L) 03/14/2018    MCV 95.7 03/14/2018     03/14/2018     Lab Results   Component Value Date    NEUTROABS 5.92 03/14/2018    IRON 12 (L) 05/03/2017    TIBC 322 05/03/2017    LABIRON 4 (L) 05/03/2017    FERRITIN 56.80 05/03/2017    UVITTCOW85 465 10/17/2017    FOLATE >20.00 10/17/2017         PATHOLOGY:  Foundation 1 testing done on 26 December 2017 showed:  Genomic alteration identified includes:   PTEN loss  BCORL1 loos exon 8-12  CDKN@A/B loss  KMT@C(MLL3)G638  PRKC! Amplification  STAT4 truncation intron 9  TERC amplification  [id number]    No mutation identified in EGFR, KRAS, RET, ALK,MET, ERBB2, BRAF, ROS1          PDL 1 testing on tumor shows 20% positivity.          Pathology :  Pathology Report from April 19, 2017 showed:       SPECIMEN   Specimen   Lobe(s) of lung   Lobes of Lung   Upper lobe   Procedure   Lobectomy   Specimen Laterality   Left   TUMOR   Primary Tumor Site   Upper lobe   Histologic Type   Squamous cell carcinoma   Histologic Grade   G2: Moderately differentiated   Tumor Size   Greatest dimension (cm): 8 cm   Additional Dimension (cm)   7 cm       5 cm   Tumor Focality   Unifocal   Visceral Pleura Invasion   Present   Tumor Extension   Parietal pleura   Lymph-Vascular Invasion   Not identified   MARGINS   Bronchial Margin   Uninvolved by invasive carcinoma and carcinoma in situ   Vascular Margin   Uninvolved by invasive carcinoma   Parenchymal Margin   Involved by invasive carcinoma   LYMPH NODES   Regional Lymph Nodes       Number of Lymph Nodes Examined   Specify number: 6   Gene Stations Examined   5: Subaortic/ aortopulmonary (AP) / AP  window       11L: Interlobar   Lymph Node Involvement   No nodes involved   STAGE (pTNM)   Primary Tumor (pT)   pT3: Tumor greater than 7 cm in greatest dimension; or Tumor of any size that directly invades any of the following: parietal pleural chest wall (including superior sulcus tumors), diaphragm, phrenic nerve, mediastinal pleura, parietal pericardium; or Tumor of any size in the main bronchus less than 2 cm distal to the petar but without involvement of the petar; or Tumor of any size associated with atelectasis or obstructive pneumonitis of the entire lung; or Tumors of any size with separate tmor nodule(s) in same lobe   Regional Lymph Nodes (pN)   pN0: No regional lymph node metastasis   ADDITIONAL FINDINGS   Additional Pathologic Finding(s)   Other (specify): Fungal hyphae consistent with Aspergillus species in the cavity contents   .          RADIOLOGY DATA :  CT of chest, abdomen and pelvis with contrast done on March 8, 2018 was reviewed with radiologist Dr. Vences, discussed with patient, it showed:  IMPRESSION:  1. Postoperative changes from left upper lobectomy with volume  loss and associated post treatment/radiation changes in the left  lung apex. There is new posterior airspace disease in the extreme  left apex which is probably related to prior radiation therapy,  less likely pneumonia. Small scattered pulmonary nodules in the  left lung apex are partially obscured by this airspace disease  and the progression of volume loss although these appear grossly  stable.  2. Many of the small 2 to 3 mm pulmonary nodules in the right  lung are unchanged. There are three new small nodules described  above which are indeterminate and attention on follow-up is  recommended.  3. Probable radiation necrosis involving multiple anterior left  ribs. However, there may also be a component of rib destruction  due to tumor. There is increased thickening of the soft tissues  of the chest wall this location. The  soft tissue thickening has  progressed with small areas of decreased attenuation are noted in  the soft tissue. These could represent areas of necrosis or  fluid. This could be in part due to a post radiation therapy  changes although when correlating with the PET/CT and PET/CT  report, this is also suspicious for tumor extension or  involvement of the chest wall.   4. No convincing evidence of metastatic disease involving the  abdomen or pelvis.    PET CT done on December 29, 2017 was reviewed, discussed with patient, it showed:  IMPRESSION:  CONCLUSION:  1.  Mildly enlarging left lung apex pleural base mass lesion with  abnormal FDG activity suspicious for Pancoast tumor which is  invading into the anterior chest wall and partially eroding  through the anterior left second third and fourth ribs. No  current evidence of distant metastatic disease.         PET/CT done on May 22, 2017 showed:  IMPRESSION:  CONCLUSION:  1. Large Pancoast tumor involving left apex with significant  involvement of the periphery, pleura, portions of the chest wall  and contiguous involvement, destruction of at least one of the  anterior left upper ribs.     PET/CT imaging study is otherwise unremarkable. No evidence of  any distant metastases.          ASSESSMENT AND PLAN:      1.  Squamous cell cancer of left upper lobe, stage IIB, T3 N0 M0: Patient is status post surgical excision with positive pleural margin.  As per NCCN guidelines patient received concurrent chemoradiation with weekly carboplatin and Taxol.  Patient received concurrent chemoradiation with  weekly carboplatin and Taxol with radiation  From June 05,2017 until 07/17/2017.  CT scan done on December 1, 2017 shows enlarged left-sided precarinal lymph node as well as thickening of the left pleura eroding into the left rib cage worrisome for recurrence of cancer.  CT done on December 29, 2017 shows left chest wall recurrence with involvement of pleura, left treat this  without any evidence of distance metastasis.  His cancer is stage IV because of the involvement of chest wall and pleura and ribs.  After last clinic visit. PDl 1 testing was done which showed small positivity of 20%.  Foundation 1 testing showed positivity for PTEN loss which Everolimus can be used.  All other mutation that shows positivity are currently not a surgical.  There is no other targeted FDA approved treatment can be used.  Patient was started on treatment with Keytruda on January 10, 2018.  CT scan done on March 8, 2018 after 3 sounds of chemotherapy with Keytruda showed stable disease.  Result of CT scan were discussed with patient.  We will board with cycle 4 of correct her today on March 14, 2018.  Plan is to repeat restaging CT scan after 6 rounds which was discussed with patient.    2.  Left rib involvement secondary to local recurrence and progression: Treatment with Xgeva was discussed with patient side effect of right jaw including osteonecrosis of jaw, hypocalcemia, mouth sores were discussed.  Patient does need full mouth extraction, he has not done it yet.  We will hold off Xgeva until he gets dental work done.  Importance of seeing dentist and getting dental work done was discussed with patient again today.    3.  Pain management: .  He is using Percocet 10/325 every 4 hours with moderate pain relief.  We will give him prescription for Percocet 10/325 every 4 hours with 1/20/80 tablets on February 21, 2018.  Patient was counseled about the importance of bowel regimen to prevent constipation.  Prescription for morphine sulfate 30 mg by mouth twice a day with 60 tablet has been provided today on March 14, 2018.    4.  macrocytosis: Patient was found to have MCV of 98 today .  Patient does admit to drinking 2-3 beer on a daily basis.  He remains on vitamin B12 thousand micrograms by mouth daily.  Again it was recommended to stay away from alcohol completely.    5.Pulmonary aspergillosis: Patient  had a fungus ball consistent with Aspergillus on a cavitary lesion of lung status postsurgical excision.     6. History of duodenal ulcer     7. Health maintenance: Patient smoking intermittently, he was counseled about smoking cessation.  About 4 minutes were spent for smoking cessation counseling.   He remains full code.              Gualberto Dowd MD  3/14/2018  8:48 AM        EMR Dragon/Transcription disclaimer:   Much of this encounter note is an electronic transcription/translation of spoken language to printed text. The electronic translation of spoken language may permit erroneous, or at times, nonsensical words or phrases to be inadvertently transcribed; Although I have reviewed the note for such errors, some may still exist.

## 2018-04-04 ENCOUNTER — INFUSION (OUTPATIENT)
Dept: ONCOLOGY | Facility: HOSPITAL | Age: 58
End: 2018-04-04

## 2018-04-04 ENCOUNTER — OFFICE VISIT (OUTPATIENT)
Dept: ONCOLOGY | Facility: CLINIC | Age: 58
End: 2018-04-04

## 2018-04-04 VITALS
RESPIRATION RATE: 18 BRPM | BODY MASS INDEX: 19.45 KG/M2 | DIASTOLIC BLOOD PRESSURE: 99 MMHG | TEMPERATURE: 98 F | HEART RATE: 111 BPM | HEIGHT: 68 IN | WEIGHT: 128.31 LBS | SYSTOLIC BLOOD PRESSURE: 163 MMHG

## 2018-04-04 DIAGNOSIS — C34.12 MALIGNANT NEOPLASM OF UPPER LOBE OF LEFT LUNG (HCC): ICD-10-CM

## 2018-04-04 DIAGNOSIS — Z45.2 ENCOUNTER FOR VENOUS ACCESS DEVICE CARE: Primary | ICD-10-CM

## 2018-04-04 DIAGNOSIS — C79.51 BONE METASTASIS: ICD-10-CM

## 2018-04-04 LAB
ALBUMIN SERPL-MCNC: 4.5 G/DL (ref 3.4–4.8)
ALBUMIN/GLOB SERPL: 1.2 G/DL (ref 1.1–1.8)
ALP SERPL-CCNC: 106 U/L (ref 38–126)
ALT SERPL W P-5'-P-CCNC: 32 U/L (ref 21–72)
ANION GAP SERPL CALCULATED.3IONS-SCNC: 18 MMOL/L (ref 5–15)
AST SERPL-CCNC: 25 U/L (ref 17–59)
BASOPHILS # BLD AUTO: 0.06 10*3/MM3 (ref 0–0.2)
BASOPHILS NFR BLD AUTO: 0.5 % (ref 0–2)
BILIRUB SERPL-MCNC: 0.7 MG/DL (ref 0.2–1.3)
BUN BLD-MCNC: 15 MG/DL (ref 7–21)
BUN/CREAT SERPL: 28.8 (ref 7–25)
CALCIUM SPEC-SCNC: 10 MG/DL (ref 8.4–10.2)
CHLORIDE SERPL-SCNC: 101 MMOL/L (ref 95–110)
CO2 SERPL-SCNC: 25 MMOL/L (ref 22–31)
CREAT BLD-MCNC: 0.52 MG/DL (ref 0.7–1.3)
DEPRECATED RDW RBC AUTO: 49.3 FL (ref 35.1–43.9)
EOSINOPHIL # BLD AUTO: 0.17 10*3/MM3 (ref 0–0.7)
EOSINOPHIL NFR BLD AUTO: 1.5 % (ref 0–7)
ERYTHROCYTE [DISTWIDTH] IN BLOOD BY AUTOMATED COUNT: 14.1 % (ref 11.5–14.5)
GFR SERPL CREATININE-BSD FRML MDRD: 164 ML/MIN/1.73 (ref 56–130)
GLOBULIN UR ELPH-MCNC: 3.7 GM/DL (ref 2.3–3.5)
GLUCOSE BLD-MCNC: 96 MG/DL (ref 60–100)
HCT VFR BLD AUTO: 41.4 % (ref 39–49)
HGB BLD-MCNC: 14.2 G/DL (ref 13.7–17.3)
IMM GRANULOCYTES # BLD: 0.03 10*3/MM3 (ref 0–0.02)
IMM GRANULOCYTES NFR BLD: 0.3 % (ref 0–0.5)
LYMPHOCYTES # BLD AUTO: 1.1 10*3/MM3 (ref 0.6–4.2)
LYMPHOCYTES NFR BLD AUTO: 9.5 % (ref 10–50)
MCH RBC QN AUTO: 32.5 PG (ref 26.5–34)
MCHC RBC AUTO-ENTMCNC: 34.3 G/DL (ref 31.5–36.3)
MCV RBC AUTO: 94.7 FL (ref 80–98)
MONOCYTES # BLD AUTO: 1.13 10*3/MM3 (ref 0–0.9)
MONOCYTES NFR BLD AUTO: 9.8 % (ref 0–12)
NEUTROPHILS # BLD AUTO: 9.08 10*3/MM3 (ref 2–8.6)
NEUTROPHILS NFR BLD AUTO: 78.4 % (ref 37–80)
PLATELET # BLD AUTO: 349 10*3/MM3 (ref 150–450)
PMV BLD AUTO: 8.7 FL (ref 8–12)
POTASSIUM BLD-SCNC: 3.7 MMOL/L (ref 3.5–5.1)
PROT SERPL-MCNC: 8.2 G/DL (ref 6.3–8.6)
RBC # BLD AUTO: 4.37 10*6/MM3 (ref 4.37–5.74)
SODIUM BLD-SCNC: 144 MMOL/L (ref 137–145)
WBC NRBC COR # BLD: 11.57 10*3/MM3 (ref 3.2–9.8)

## 2018-04-04 PROCEDURE — 80053 COMPREHEN METABOLIC PANEL: CPT

## 2018-04-04 PROCEDURE — 25010000002 PEMBROLIZUMAB 100 MG/4ML SOLUTION 4 ML VIAL: Performed by: NURSE PRACTITIONER

## 2018-04-04 PROCEDURE — 25010000002 HEPARIN FLUSH (PORCINE) 100 UNIT/ML SOLUTION: Performed by: INTERNAL MEDICINE

## 2018-04-04 PROCEDURE — 99214 OFFICE O/P EST MOD 30 MIN: CPT | Performed by: NURSE PRACTITIONER

## 2018-04-04 PROCEDURE — 85025 COMPLETE CBC W/AUTO DIFF WBC: CPT

## 2018-04-04 PROCEDURE — 96413 CHEMO IV INFUSION 1 HR: CPT | Performed by: INTERNAL MEDICINE

## 2018-04-04 RX ORDER — SODIUM CHLORIDE 9 MG/ML
250 INJECTION, SOLUTION INTRAVENOUS ONCE
Status: CANCELLED | OUTPATIENT
Start: 2018-04-04

## 2018-04-04 RX ORDER — OXYCODONE AND ACETAMINOPHEN 10; 325 MG/1; MG/1
1 TABLET ORAL EVERY 4 HOURS PRN
Qty: 180 TABLET | Refills: 0 | Status: SHIPPED | OUTPATIENT
Start: 2018-04-04 | End: 2018-01-01 | Stop reason: SDUPTHER

## 2018-04-04 RX ORDER — SODIUM CHLORIDE 0.9 % (FLUSH) 0.9 %
10 SYRINGE (ML) INJECTION AS NEEDED
Status: DISCONTINUED | OUTPATIENT
Start: 2018-04-04 | End: 2018-04-04 | Stop reason: HOSPADM

## 2018-04-04 RX ORDER — SODIUM CHLORIDE 9 MG/ML
250 INJECTION, SOLUTION INTRAVENOUS ONCE
Status: COMPLETED | OUTPATIENT
Start: 2018-04-04 | End: 2018-04-04

## 2018-04-04 RX ORDER — SODIUM CHLORIDE 0.9 % (FLUSH) 0.9 %
10 SYRINGE (ML) INJECTION AS NEEDED
Status: CANCELLED | OUTPATIENT
Start: 2018-01-01

## 2018-04-04 RX ADMIN — Medication 10 ML: at 08:17

## 2018-04-04 RX ADMIN — SODIUM CHLORIDE, PRESERVATIVE FREE 500 UNITS: 5 INJECTION INTRAVENOUS at 10:06

## 2018-04-04 RX ADMIN — SODIUM CHLORIDE 250 ML: 9 INJECTION, SOLUTION INTRAVENOUS at 09:02

## 2018-04-04 RX ADMIN — Medication 10 ML: at 10:05

## 2018-04-04 RX ADMIN — SODIUM CHLORIDE 200 MG: 9 INJECTION, SOLUTION INTRAVENOUS at 09:21

## 2018-04-04 NOTE — PROGRESS NOTES
DATE OF VISIT: 4/4/2018    REASON FOR VISIT:  Squamous cell cancer of left lung, stage IV     HISTORY OF PRESENT ILLNESS:    57-year-old male with a past medical history significant for duodenal ulcer, extensive nicotine addiction with more than 45-pack-year smoking history, history of alcohol abuse was seen in consultation on May 3, 2017 for newly diagnosed stage IIB squamous cell cancer of Of left lung.  Patient finished concurrent chemoradiation on July 17, 2017.  Patient had a CT of chest done on December 1, 2017 showed recurrence involving left chest wall region making it stage IV.  In view of PDL 1 showing positivity about 20%, patient was started on Keytruda January 10, 2018.  Patient is here to get cycle 5 of Keytruda today.  His most recent CT scan after 3 cycles showed stable disease so plan was to continue Keytruda and repeat staging CT of chest, abdomen and pelvis with contrast after cycle #6. Pt is due for cycle #5 today. States pain in left chest wall region is somewhat improved. He is in a little more pain today as he has been out of his Percocet since Monday. He denies any bleeding.  Denies any fever or chills, appetite is fair. Denies any nausea, vomiting, constipation or diarrhea.    PAST MEDICAL HISTORY:    Past Medical History:   Diagnosis Date   • Anemia    • Aortic stenosis, mild    • Arthritis    • Chronic bronchitis    • Duodenal ulcer disease    • Inguinal hernia, left    • Lung cancer    • Mitral valve regurgitation        SOCIAL HISTORY:    Social History   Substance Use Topics   • Smoking status: Current Every Day Smoker     Packs/day: 1.00     Years: 40.00     Types: Cigarettes   • Smokeless tobacco: Former User      Comment: Reduced at half a pack per day and weanning off   • Alcohol use 8.4 oz/week     14 Cans of beer per week      Comment: 6 pk/dly       Surgical History :  Past Surgical History:   Procedure Laterality Date   • APPENDECTOMY      open   • BRONCHOSCOPY N/A 3/8/2017     "Procedure: BRONCHOSCOPY with possible bronchoalveolar lavage, +/- brush, +/- needle biopsy, +/- endobronchial biopsy;  Surgeon: Candis rL MD;  Location: [facility] ENDOSCOPY;  Service:    • EXPLORATORY LAPAROTOMY  11/17/2014    Exploratory laparotomy, repair of duodenal ulcer, modified Carlos patch   • INGUINAL HERNIA REPAIR  09/13/2013    Open repair of an indirect left inguinal hernia. Left inguinal hernia.   • [place] INSJ TUNNELED CVC W/O SUBQ PORT/ AGE 6 YR/> N/A 5/10/2017    Procedure: TUNNELED CENTRAL VENOUS CATHETER  WITH SUBCUTANEOUS  PORT,FLUOROSCOPIC GUIDANCE ;  Surgeon: Angel Hernandez MD;  Location: [facility] OR;  Service: Cardiothoracic   • THORACOTOMY Left 4/19/2017    Procedure: THORACOTOMY LEFT WITH PULMONARY RESECTION AND BRONCHOSCOPY WITH ENDO ;  Surgeon: Angel Hernandez MD;  Location: [facility] OR;  Service:        ALLERGIES:    No Known Allergies    REVIEW OF SYSTEMS:      CONSTITUTIONAL:  No fever, chills, or night sweats.     HEENT:  No epistaxis, mouth sores, or difficulty swallowing.    RESPIRATORY:  No new shortness of breath or cough at present.    CARDIOVASCULAR:  No chest pain or palpitations.    GASTROINTESTINAL:  No abdominal pain, nausea, vomiting, or blood in the stool.    GENITOURINARY:  No dysuria or hematuria.    MUSCULOSKELETAL:  No any new back pain or arthralgias.     NEUROLOGICAL:  No tingling or numbness. No new headache or dizziness.     LYMPHATICS:  Denies any abnormal swollen and anywhere in the body.    SKIN:  Denies any new skin rash.    PHYSICAL EXAMINATION:      VITAL SIGNS:  /99   Pulse 111   Temp 98 °F (36.7 °C) (Temporal Artery )   Resp 18   Ht 172 cm (67.72\") Comment: Manual  Wt 58.2 kg (128 lb 4.9 oz)   BMI 19.67 kg/m²     GENERAL:  Not in any distress.    HEENT:  Normocephalic, Atraumatic.Mild Conjunctival pallor. No icterus. No Facial Asymmetry noted.    NECK:  No adenopathy. No JVD.    RESPIRATORY:  Fair air entry bilateral. No rhonchi or " wheezing.    CARDIOVASCULAR:  S1, S2. Regular rate and rhythm. No murmur or gallop appreciated.    ABDOMEN:  Soft, obese, nontender. Bowel sounds present in all four quadrants.  No organomegaly appreciated.    EXTREMITIES:  No edema.No Calf Tenderness.    NEUROLOGIC:  Alert, awake and oriented ×3.      SKIN : No new skin lesion identified  DIAGNOSTIC DATA:    Glucose   Date Value Ref Range Status   04/04/2018 96 60 - 100 mg/dL Final     Glucose, Arterial   Date Value Ref Range Status   04/19/2017 176 mmol/L Final     Sodium   Date Value Ref Range Status   04/04/2018 144 137 - 145 mmol/L Final     Potassium   Date Value Ref Range Status   04/04/2018 3.7 3.5 - 5.1 mmol/L Final     CO2   Date Value Ref Range Status   04/04/2018 25.0 22.0 - 31.0 mmol/L Final     Chloride   Date Value Ref Range Status   04/04/2018 101 95 - 110 mmol/L Final     Anion Gap   Date Value Ref Range Status   04/04/2018 18.0 (H) 5.0 - 15.0 mmol/L Final     Creatinine   Date Value Ref Range Status   04/04/2018 0.52 (L) 0.70 - 1.30 mg/dL Final     BUN   Date Value Ref Range Status   04/04/2018 15 7 - 21 mg/dL Final     BUN/Creatinine Ratio   Date Value Ref Range Status   04/04/2018 28.8 (H) 7.0 - 25.0 Final     Calcium   Date Value Ref Range Status   04/04/2018 10.0 8.4 - 10.2 mg/dL Final     eGFR Non  Amer   Date Value Ref Range Status   04/04/2018 164 (H) 56 - 130 mL/min/1.73 Final     Alkaline Phosphatase   Date Value Ref Range Status   04/04/2018 106 38 - 126 U/L Final     Total Protein   Date Value Ref Range Status   04/04/2018 8.2 6.3 - 8.6 g/dL Final     ALT (SGPT)   Date Value Ref Range Status   04/04/2018 32 21 - 72 U/L Final     AST (SGOT)   Date Value Ref Range Status   04/04/2018 25 17 - 59 U/L Final     Total Bilirubin   Date Value Ref Range Status   04/04/2018 0.7 0.2 - 1.3 mg/dL Final     Albumin   Date Value Ref Range Status   04/04/2018 4.50 3.40 - 4.80 g/dL Final     Globulin   Date Value Ref Range Status   04/04/2018 3.7  (H) 2.3 - 3.5 gm/dL Final     A/G Ratio   Date Value Ref Range Status   04/04/2018 1.2 1.1 - 1.8 g/dL Final     Lab Results   Component Value Date    WBC 11.57 (H) 04/04/2018    HGB 14.2 04/04/2018    HCT 41.4 04/04/2018    MCV 94.7 04/04/2018     04/04/2018     Lab Results   Component Value Date    NEUTROABS 9.08 (H) 04/04/2018    IRON 12 (L) 05/03/2017    TIBC 322 05/03/2017    LABIRON 4 (L) 05/03/2017    FERRITIN 56.80 05/03/2017    [id number] 465 10/17/2017    FOLATE >20.00 10/17/2017     No results found for: , LABCA2, AFPTM, HCGQUANT, , CHROMGRNA, [id number], CEA, REFLABREPO]      RADIOLOGY DATA : CT of chest, abdomen and pelvis with contrast done on March 8, 2018:    IMPRESSION:  1. Postoperative changes from left upper lobectomy with volume  loss and associated post treatment/radiation changes in the left  lung apex. There is new posterior airspace disease in the extreme  left apex which is probably related to prior radiation therapy,  less likely pneumonia. Small scattered pulmonary nodules in the  left lung apex are partially obscured by this airspace disease  and the progression of volume loss although these appear grossly  stable.  2. Many of the small 2 to 3 mm pulmonary nodules in the right  lung are unchanged. There are three new small nodules described  above which are indeterminate and attention on follow-up is  recommended.  3. Probable radiation necrosis involving multiple anterior left  ribs. However, there may also be a component of rib destruction  due to tumor. There is increased thickening of the soft tissues  of the chest wall this location. The soft tissue thickening has  progressed with small areas of decreased attenuation are noted in  the soft tissue. These could represent areas of necrosis or  fluid. This could be in part due to a post radiation therapy  changes although when correlating with the PET/CT and PET/CT  report, this is also suspicious for tumor extension  or  involvement of the chest wall.   4. No convincing evidence of metastatic disease involving the  abdomen or pelvis.  No images are attached to the encounter.      ASSESSMENT AND PLAN:     1. Squamous cell cancer of left upper lobe, stage IIB, T3 N0 M0: Patient is status post surgical excision with positive pleural margin.  As per NCCN guidelines patient received concurrent chemoradiation with weekly carboplatin and Taxol.  Patient received concurrent chemoradiation with  weekly carboplatin and Taxol with radiation  From June 05,2017 until 07/17/2017.  CT scan done on December 1, 2017 shows enlarged left-sided precarinal lymph node as well as thickening of the left pleura eroding into the left rib cage worrisome for recurrence of cancer.  CT done on December 29, 2017 shows left chest wall recurrence with involvement of pleura, left treat this without any evidence of distance metastasis.  His cancer is stage IV because of the involvement of chest wall and pleura and ribs.  After last clinic visit. PDl 1 testing was done which showed small positivity of 20%.  Foundation 1 testing showed positivity for PTEN loss which Everolimus can be used.  All other mutation that shows positivity are currently not a surgical.  There is no other targeted FDA approved treatment can be used.  Patient was started on treatment with Keytruda on January 10, 2018.  CT scan done on March 8, 2018 after 3 sounds of chemotherapy with Keytruda showed stable disease.  Plan is to continue with Keytruda and after cycle #6 repeat CT imaging studies. This was discussed with patient..     2.  Left rib involvement secondary to local recurrence and progression: Treatment with Xgeva was discussed with patient side effect of right jaw including osteonecrosis of jaw, hypocalcemia, mouth sores were discussed.  Patient does need full mouth extraction, he has not done it yet.  We will hold off Xgeva until he gets dental work done.      3.  Pain management:  .  He is using Percocet 10/325 every 4 hours with moderate pain relief.  He will need refill on Percocet today.  Patient was counseled about the importance of bowel regimen to prevent constipation.  Prescription for morphine sulfate 30 mg by mouth twice a day with 60 tablet was given on  March 14, 2018.     4. Health maintenance, unfortunately pt is still smoking despite smoking cessation counseling in past.      This document has been signed by CIPRIANO Wise on April 4, 2018 3:13 PM

## 2018-04-25 NOTE — PROGRESS NOTES
DATE OF VISIT: 4/25/2018      REASON FOR VISIT:  Squamous cell cancer of left lung, stage IV      HISTORY OF PRESENT ILLNESS:    58-year-old male with a past medical history significant for duodenal ulcer, extensive nicotine addiction with more than 45-pack-year smoking history, history of alcohol abuse was seen in consultation on May 3, 2017 for newly diagnosed stage IIB squamous cell cancer of Of left lung.  Patient finished concurrent chemoradiation on July 17, 2017.  Patient had a CT of chest done on December 1, 2017 showed recurrence involving left chest wall region making it stage IV.  In view of PDL 1 showing positivity about 20%, patient was started on Keytruda January 10, 2018.  Patient is here to get cycle 5 of Keytruda today.    States pain in left chest wall region is somewhat improved.    Denies any bleeding.  Denies any fever or new cough.  Denies any diarrhea or any new skin lesion.      PAST MEDICAL HISTORY:    Past Medical History:   Diagnosis Date   • Anemia    • Aortic stenosis, mild    • Arthritis    • Chronic bronchitis    • Duodenal ulcer disease    • Inguinal hernia, left    • Lung cancer    • Mitral valve regurgitation        SOCIAL HISTORY:    Social History   Substance Use Topics   • Smoking status: Current Every Day Smoker     Packs/day: 1.00     Years: 40.00     Types: Cigarettes   • Smokeless tobacco: Former User      Comment: Reduced at half a pack per day and weanning off   • Alcohol use 8.4 oz/week     14 Cans of beer per week      Comment: 6 pk/dly       Surgical History :  Past Surgical History:   Procedure Laterality Date   • APPENDECTOMY      open   • BRONCHOSCOPY N/A 3/8/2017    Procedure: BRONCHOSCOPY with possible bronchoalveolar lavage, +/- brush, +/- needle biopsy, +/- endobronchial biopsy;  Surgeon: Candis Lr MD;  Location: [facility] ENDOSCOPY;  Service:    • EXPLORATORY LAPAROTOMY  11/17/2014    Exploratory laparotomy, repair of duodenal ulcer, modified Carlos patch   •  "INGUINAL HERNIA REPAIR  09/13/2013    Open repair of an indirect left inguinal hernia. Left inguinal hernia.   • CT INSJ TUNNELED CVC W/O SUBQ PORT/ AGE 6 YR/> N/A 5/10/2017    Procedure: TUNNELED CENTRAL VENOUS CATHETER  WITH SUBCUTANEOUS  PORT,FLUOROSCOPIC GUIDANCE ;  Surgeon: Angel Hernandez MD;  Location: [facility];  Service: Cardiothoracic   • THORACOTOMY Left 4/19/2017    Procedure: THORACOTOMY LEFT WITH PULMONARY RESECTION AND BRONCHOSCOPY WITH ENDO ;  Surgeon: Angel Hernandez MD;  Location: [facility];  Service:        ALLERGIES:    No Known Allergies    FAMILY HISTORY:  Family History   Problem Relation Age of Onset   • Asthma Father    • Cancer Father    • Diabetes Father    • Hypertension Father    • Alcohol abuse Mother    • Heart failure Brother          REVIEW OF SYSTEMS:      CONSTITUTIONAL:  Positive for fatigue, denies any recent worsening. Denies any fever, chills or weight loss.      HEENT: No epistaxis, mouth sores or difficulty swallowing.     RESPIRATORY: Denies any shortness of breath or cough today.     CARDIOVASCULAR: States pain in left chest wall is somewhat improved.  No palpitation.     GASTROINTESTINAL: No abdominal pain nausea, vomiting or blood in the stool.     GENITOURINARY: No Dysuria or Hematuria.     MUSCULOSKELETAL: No new back pain or arthralgia..     LYMPHATICS: Denies any abnormal swollen glands anywhere in the body.     NEUROLOGICAL : No tingling or numbness. No headache or dizziness. No seizures or balance problems.     SKIN: No new skin lesions.         PHYSICAL EXAMINATION:      VITAL SIGNS:  /89   Pulse 94   Temp 98.7 °F (37.1 °C) (Temporal Artery )   Resp 18   Ht 172 cm (67.72\")   Wt 59.5 kg (131 lb 3.2 oz)   BMI 20.12 kg/m²      ECOG performance status : 1    GENERAL:  Not in any distress.    HEENT:  Normocephalic, Atraumatic.Mild Conjunctival pallor. No icterus. Extraocular Movements Intact. No Facial Asymmetry noted.    NECK:  No " adenopathy. No JVD.    RESPIRATORY:  Fair air entry bilateral. No rhonchi or wheezing.  Mild erythema of left chest wall.    CARDIOVASCULAR:  S1, S2. Regular rate and rhythm. No murmur or gallop appreciated.    ABDOMEN:  Soft,  nontender. Bowel sounds present in all four quadrants.  No organomegaly appreciated.    EXTREMITIES:  No edema.No Calf Tenderness.    NEUROLOGIC:  Alert, awake and oriented ×3.  No  Motor or sensory deficit appreciated. Cranial Nerves 2-12 grossly intact.    SKIN: No new skin lesions    LYMPHATICS: No new enlarged lymph node in neck or supra-clavicular area.            DIAGNOSTIC DATA:    Glucose   Date Value Ref Range Status   04/25/2018 117 (H) 60 - 100 mg/dL Final     Glucose, Arterial   Date Value Ref Range Status   04/19/2017 176 mmol/L Final     Sodium   Date Value Ref Range Status   04/25/2018 141 137 - 145 mmol/L Final     Potassium   Date Value Ref Range Status   04/25/2018 3.9 3.5 - 5.1 mmol/L Final     CO2   Date Value Ref Range Status   04/25/2018 25.0 22.0 - 31.0 mmol/L Final     Chloride   Date Value Ref Range Status   04/25/2018 102 95 - 110 mmol/L Final     Anion Gap   Date Value Ref Range Status   04/25/2018 14.0 5.0 - 15.0 mmol/L Final     Creatinine   Date Value Ref Range Status   04/25/2018 0.48 (L) 0.70 - 1.30 mg/dL Final     BUN   Date Value Ref Range Status   04/25/2018 8 7 - 21 mg/dL Final     BUN/Creatinine Ratio   Date Value Ref Range Status   04/25/2018 16.7 7.0 - 25.0 Final     Calcium   Date Value Ref Range Status   04/25/2018 9.0 8.4 - 10.2 mg/dL Final     eGFR Non  Amer   Date Value Ref Range Status   04/25/2018 180 (H) 56 - 130 mL/min/1.73 Final     Alkaline Phosphatase   Date Value Ref Range Status   04/25/2018 102 38 - 126 U/L Final     Total Protein   Date Value Ref Range Status   04/25/2018 7.5 6.3 - 8.6 g/dL Final     ALT (SGPT)   Date Value Ref Range Status   04/25/2018 28 21 - 72 U/L Final     AST (SGOT)   Date Value Ref Range Status   04/25/2018  26 17 - 59 U/L Final     Total Bilirubin   Date Value Ref Range Status   04/25/2018 0.3 0.2 - 1.3 mg/dL Final     Albumin   Date Value Ref Range Status   04/25/2018 4.00 3.40 - 4.80 g/dL Final     Globulin   Date Value Ref Range Status   04/25/2018 3.5 2.3 - 3.5 gm/dL Final     A/G Ratio   Date Value Ref Range Status   04/25/2018 1.1 1.1 - 1.8 g/dL Final     Lab Results   Component Value Date    WBC 8.06 04/25/2018    HGB 13.4 (L) 04/25/2018    HCT 38.4 (L) 04/25/2018    MCV 93.7 04/25/2018     04/25/2018     Lab Results   Component Value Date    NEUTROABS 6.06 04/25/2018    IRON 12 (L) 05/03/2017    TIBC 322 05/03/2017    LABIRON 4 (L) 05/03/2017    FERRITIN 56.80 05/03/2017    [id number] 465 10/17/2017    FOLATE >20.00 10/17/2017         PATHOLOGY:  Foundation 1 testing done on 26 December 2017 showed:  Genomic alteration identified includes:   PTEN loss  BCORL1 loos exon 8-12  CDKN@A/B loss  KMT@C(MLL3)G638  PRKC! Amplification  STAT4 truncation intron 9  TERC amplification  [id number]    No mutation identified in EGFR, KRAS, RET, ALK,MET, ERBB2, BRAF, ROS1          PDL 1 testing on tumor shows 20% positivity.          Pathology :  Pathology Report from April 19, 2017 showed:       SPECIMEN   Specimen   Lobe(s) of lung   Lobes of Lung   Upper lobe   Procedure   Lobectomy   Specimen Laterality   Left   TUMOR   Primary Tumor Site   Upper lobe   Histologic Type   Squamous cell carcinoma   Histologic Grade   G2: Moderately differentiated   Tumor Size   Greatest dimension (cm): 8 cm   Additional Dimension (cm)   7 cm       5 cm   Tumor Focality   Unifocal   Visceral Pleura Invasion   Present   Tumor Extension   Parietal pleura   Lymph-Vascular Invasion   Not identified   MARGINS   Bronchial Margin   Uninvolved by invasive carcinoma and carcinoma in situ   Vascular Margin   Uninvolved by invasive carcinoma   Parenchymal Margin   Involved by invasive carcinoma   LYMPH NODES   Regional Lymph Nodes       Number of  Lymph Nodes Examined   Specify number: 6   Gene Stations Examined   5: Subaortic/ aortopulmonary (AP) / AP window       11L: Interlobar   Lymph Node Involvement   No nodes involved   STAGE (pTNM)   Primary Tumor (pT)   pT3: Tumor greater than 7 cm in greatest dimension; or Tumor of any size that directly invades any of the following: parietal pleural chest wall (including superior sulcus tumors), diaphragm, phrenic nerve, mediastinal pleura, parietal pericardium; or Tumor of any size in the main bronchus less than 2 cm distal to the petar but without involvement of the petar; or Tumor of any size associated with atelectasis or obstructive pneumonitis of the entire lung; or Tumors of any size with separate tmor nodule(s) in same lobe   Regional Lymph Nodes (pN)   pN0: No regional lymph node metastasis   ADDITIONAL FINDINGS   Additional Pathologic Finding(s)   Other (specify): Fungal hyphae consistent with Aspergillus species in the cavity contents   .          RADIOLOGY DATA :  CT of chest, abdomen and pelvis with contrast done on March 8, 2018 was reviewed with radiologist Dr. Vences, discussed with patient, it showed:  IMPRESSION:  1. Postoperative changes from left upper lobectomy with volume  loss and associated post treatment/radiation changes in the left  lung apex. There is new posterior airspace disease in the extreme  left apex which is probably related to prior radiation therapy,  less likely pneumonia. Small scattered pulmonary nodules in the  left lung apex are partially obscured by this airspace disease  and the progression of volume loss although these appear grossly  stable.  2. Many of the small 2 to 3 mm pulmonary nodules in the right  lung are unchanged. There are three new small nodules described  above which are indeterminate and attention on follow-up is  recommended.  3. Probable radiation necrosis involving multiple anterior left  ribs. However, there may also be a component of rib  destruction  due to tumor. There is increased thickening of the soft tissues  of the chest wall this location. The soft tissue thickening has  progressed with small areas of decreased attenuation are noted in  the soft tissue. These could represent areas of necrosis or  fluid. This could be in part due to a post radiation therapy  changes although when correlating with the PET/CT and PET/CT  report, this is also suspicious for tumor extension or  involvement of the chest wall.   4. No convincing evidence of metastatic disease involving the  abdomen or pelvis.    PET CT done on December 29, 2017 was reviewed, discussed with patient, it showed:  IMPRESSION:  CONCLUSION:  1.  Mildly enlarging left lung apex pleural base mass lesion with  abnormal FDG activity suspicious for Pancoast tumor which is  invading into the anterior chest wall and partially eroding  through the anterior left second third and fourth ribs. No  current evidence of distant metastatic disease.         PET/CT done on May 22, 2017 showed:  IMPRESSION:  CONCLUSION:  1. Large Pancoast tumor involving left apex with significant  involvement of the periphery, pleura, portions of the chest wall  and contiguous involvement, destruction of at least one of the  anterior left upper ribs.     PET/CT imaging study is otherwise unremarkable. No evidence of  any distant metastases.          ASSESSMENT AND PLAN:      1.  Squamous cell cancer of left upper lobe, stage IIB, T3 N0 M0: Patient is status post surgical excision with positive pleural margin.  As per NCCN guidelines patient received concurrent chemoradiation with weekly carboplatin and Taxol.  Patient received concurrent chemoradiation with  weekly carboplatin and Taxol with radiation  From June 05,2017 until 07/17/2017.  CT scan done on December 1, 2017 shows enlarged left-sided precarinal lymph node as well as thickening of the left pleura eroding into the left rib cage worrisome for recurrence of  cancer.  CT done on December 29, 2017 shows left chest wall recurrence with involvement of pleura, left treat this without any evidence of distance metastasis.  His cancer is stage IV because of the involvement of chest wall and pleura and ribs.  After last clinic visit. PDl 1 testing was done which showed small positivity of 20%.  Foundation 1 testing showed positivity for PTEN loss which Everolimus can be used.  All other mutation that shows positivity are currently , There is no other targeted FDA approved treatment can be used.  Patient was started on treatment with Keytruda on January 10, 2018.  CT scan done on March 8, 2018 after 3 sounds of chemotherapy with Keytruda showed stable disease.  We will go ahead with cycle 6 of keytruda today.  We will get restaging CT of chest, abdomen and pelvis with contrast prior to next clinic visit in 3 weeks.  Further treatment recommendation will depend on the result of CT scan which was discussed with patient.    2.  Left rib involvement secondary to local recurrence and progression: Treatment with Xgeva was discussed with patient side effect of right jaw including osteonecrosis of jaw, hypocalcemia, mouth sores were discussed.  Patient does need full mouth extraction, he has not done it yet.  We will hold off Xgeva until he gets dental work done.  Importance of seeing dentist and getting dental work done was discussed with patient again today.    3.  Pain management: .  He is using Percocet 10/325 every 4 hours with moderate pain relief.  We will give him prescription for Percocet 10/325 every 4 hours with 180 tablets on April 4, 2018.  Patient was counseled about the importance of bowel regimen to prevent constipation.  Prescription for morphine sulfate 30 mg by mouth twice a day with 60 tablet has been provided today on April 25, 2018.    4.  macrocytosis: Patient was found to have MCV of 93 today .  Patient does admit to drinking 2-3 beer on a daily basis.  He remains  on vitamin B12 thousand micrograms by mouth daily.  Again it was recommended to stay away from alcohol completely.    5.Pulmonary aspergillosis: Patient had a fungus ball consistent with Aspergillus on a cavitary lesion of lung status postsurgical excision.     6. History of duodenal ulcer     7. Health maintenance: Patient smoking intermittently, he was counseled about smoking cessation.  About 4 minutes were spent for smoking cessation counseling.   He remains full code.              Gualberto Dowd MD  4/25/2018  9:01 AM        EMR Dragon/Transcription disclaimer:   Much of this encounter note is an electronic transcription/translation of spoken language to printed text. The electronic translation of spoken language may permit erroneous, or at times, nonsensical words or phrases to be inadvertently transcribed; Although I have reviewed the note for such errors, some may still exist.

## 2018-05-16 NOTE — PROGRESS NOTES
DATE OF VISIT: 5/16/2018      REASON FOR VISIT:  Squamous cell cancer of left lung, stage IV      HISTORY OF PRESENT ILLNESS:    58-year-old male with a past medical history significant for duodenal ulcer, extensive nicotine addiction with more than 45-pack-year smoking history, history of alcohol abuse was seen in consultation on May 3, 2017 for newly diagnosed stage IIB squamous cell cancer of Of left lung.  Patient finished concurrent chemoradiation on July 17, 2017.  Patient had a CT of chest done on December 1, 2017 showed recurrence involving left chest wall region making it stage IV.  In view of PDL 1 showing positivity about 20%, patient was started on Keytruda January 10, 2018.  Patient is here to get cycle 7 of Keytruda today.  Patient had a restaging CT of chest, abdomen and pelvis with contrast done on May 9, 2018.   States pain in left chest wall region is somewhat improved.    Denies any bleeding.  Denies any fever or new cough.  Denies any diarrhea or any new skin lesion.      PAST MEDICAL HISTORY:    Past Medical History:   Diagnosis Date   • Anemia    • Aortic stenosis, mild    • Arthritis    • Chronic bronchitis    • Duodenal ulcer disease    • Inguinal hernia, left    • Lung cancer    • Mitral valve regurgitation        SOCIAL HISTORY:    Social History   Substance Use Topics   • Smoking status: Current Every Day Smoker     Packs/day: 1.00     Years: 40.00     Types: Cigarettes   • Smokeless tobacco: Former User      Comment: Reduced at half a pack per day and weanning off   • Alcohol use 8.4 oz/week     14 Cans of beer per week      Comment: 6 pk/dly       Surgical History :  Past Surgical History:   Procedure Laterality Date   • APPENDECTOMY      open   • BRONCHOSCOPY N/A 3/8/2017    Procedure: BRONCHOSCOPY with possible bronchoalveolar lavage, +/- brush, +/- needle biopsy, +/- endobronchial biopsy;  Surgeon: Candis Lr MD;  Location: [facility] ENDOSCOPY;  Service:    • EXPLORATORY LAPAROTOMY  " 11/17/2014    Exploratory laparotomy, repair of duodenal ulcer, modified Carlos patch   • INGUINAL HERNIA REPAIR  09/13/2013    Open repair of an indirect left inguinal hernia. Left inguinal hernia.   • MD INSJ TUNNELED CVC W/O SUBQ PORT/ AGE 6 YR/> N/A 5/10/2017    Procedure: TUNNELED CENTRAL VENOUS CATHETER  WITH SUBCUTANEOUS  PORT,FLUOROSCOPIC GUIDANCE ;  Surgeon: Angel Hernandez MD;  Location: [facility] OR;  Service: Cardiothoracic   • THORACOTOMY Left 4/19/2017    Procedure: THORACOTOMY LEFT WITH PULMONARY RESECTION AND BRONCHOSCOPY WITH ENDO ;  Surgeon: Angel Hernandez MD;  Location: [facility] OR;  Service:        ALLERGIES:    No Known Allergies    FAMILY HISTORY:  Family History   Problem Relation Age of Onset   • Asthma Father    • Cancer Father    • Diabetes Father    • Hypertension Father    • Alcohol abuse Mother    • Heart failure Brother          REVIEW OF SYSTEMS:      CONSTITUTIONAL:  Positive for fatigue, denies any recent worsening. Complains of poor appetite. Positive for 5 pound weight loss since last treatment.Denies any fever, chills or weight loss.      HEENT: No epistaxis, mouth sores or difficulty swallowing.     RESPIRATORY: Denies any shortness of breath or cough today.     CARDIOVASCULAR: States pain in left chest wall is somewhat improved.  No palpitation.     GASTROINTESTINAL: No abdominal pain nausea, vomiting or blood in the stool.     GENITOURINARY: No Dysuria or Hematuria.     MUSCULOSKELETAL: No new back pain or arthralgia..     LYMPHATICS: Denies any abnormal swollen glands anywhere in the body.     NEUROLOGICAL : No tingling or numbness. No headache or dizziness. No seizures or balance problems.     SKIN: No new skin lesions.           PHYSICAL EXAMINATION:      VITAL SIGNS:  /99   Pulse 103   Temp 98.7 °F (37.1 °C) (Temporal Artery )   Resp 18   Ht 172.7 cm (67.99\")   Wt 57.2 kg (126 lb 3.2 oz)   BMI 19.19 kg/m²      ECOG performance status : " 1    GENERAL:  Not in any distress.    HEENT:  Normocephalic, Atraumatic.Mild Conjunctival pallor. No icterus. Extraocular Movements Intact. No Facial Asymmetry noted.    NECK:  No adenopathy. No JVD.    RESPIRATORY:  Fair air entry bilateral. No rhonchi or wheezing.  Mild erythema of left chest wall.    CARDIOVASCULAR:  S1, S2. Regular rate and rhythm. No murmur or gallop appreciated.    ABDOMEN:  Soft,  nontender. Bowel sounds present in all four quadrants.  No organomegaly appreciated.    EXTREMITIES:  No edema.No Calf Tenderness.    NEUROLOGIC:  Alert, awake and oriented ×3.  No  Motor or sensory deficit appreciated. Cranial Nerves 2-12 grossly intact.    SKIN: No new skin lesions    LYMPHATICS: No new enlarged lymph node in neck or supra-clavicular area.            DIAGNOSTIC DATA:    Glucose   Date Value Ref Range Status   05/16/2018 106 (H) 60 - 100 mg/dL Final     Glucose, Arterial   Date Value Ref Range Status   04/19/2017 176 mmol/L Final     Sodium   Date Value Ref Range Status   05/16/2018 141 137 - 145 mmol/L Final     Potassium   Date Value Ref Range Status   05/16/2018 4.1 3.5 - 5.1 mmol/L Final     CO2   Date Value Ref Range Status   05/16/2018 27.0 22.0 - 31.0 mmol/L Final     Chloride   Date Value Ref Range Status   05/16/2018 101 95 - 110 mmol/L Final     Anion Gap   Date Value Ref Range Status   05/16/2018 13.0 5.0 - 15.0 mmol/L Final     Creatinine   Date Value Ref Range Status   05/16/2018 0.43 (L) 0.70 - 1.30 mg/dL Final     BUN   Date Value Ref Range Status   05/16/2018 8 7 - 21 mg/dL Final     BUN/Creatinine Ratio   Date Value Ref Range Status   05/16/2018 18.6 7.0 - 25.0 Final     Calcium   Date Value Ref Range Status   05/16/2018 9.5 8.4 - 10.2 mg/dL Final     eGFR Non  Amer   Date Value Ref Range Status   05/16/2018 204 (H) 56 - 130 mL/min/1.73 Final     Alkaline Phosphatase   Date Value Ref Range Status   05/16/2018 119 38 - 126 U/L Final     Total Protein   Date Value Ref Range  Status   05/16/2018 7.9 6.3 - 8.6 g/dL Final     ALT (SGPT)   Date Value Ref Range Status   05/16/2018 27 21 - 72 U/L Final     AST (SGOT)   Date Value Ref Range Status   05/16/2018 28 17 - 59 U/L Final     Total Bilirubin   Date Value Ref Range Status   05/16/2018 0.5 0.2 - 1.3 mg/dL Final     Albumin   Date Value Ref Range Status   05/16/2018 4.30 3.40 - 4.80 g/dL Final     Globulin   Date Value Ref Range Status   05/16/2018 3.6 (H) 2.3 - 3.5 gm/dL Final     A/G Ratio   Date Value Ref Range Status   05/16/2018 1.2 1.1 - 1.8 g/dL Final     Lab Results   Component Value Date    WBC 9.60 05/16/2018    HGB 13.4 (L) 05/16/2018    HCT 39.0 05/16/2018    MCV 92.2 05/16/2018     (H) 05/16/2018     Lab Results   Component Value Date    NEUTROABS 7.55 05/16/2018    IRON 12 (L) 05/03/2017    TIBC 322 05/03/2017    LABIRON 4 (L) 05/03/2017    FERRITIN 56.80 05/03/2017    YNLDTREZ79 465 10/17/2017    FOLATE >20.00 10/17/2017         PATHOLOGY:  Foundation 1 testing done on 26 December 2017 showed:  Genomic alteration identified includes:   PTEN loss  BCORL1 loos exon 8-12  CDKN@A/B loss  KMT@C(MLL3)G638  PRKC! Amplification  STAT4 truncation intron 9  TERC amplification  TP53 [id number]    No mutation identified in EGFR, KRAS, RET, ALK,MET, ERBB2, BRAF, ROS1          PDL 1 testing on tumor shows 20% positivity.          Pathology :  Pathology Report from April 19, 2017 showed:       SPECIMEN   Specimen   Lobe(s) of lung   Lobes of Lung   Upper lobe   Procedure   Lobectomy   Specimen Laterality   Left   TUMOR   Primary Tumor Site   Upper lobe   Histologic Type   Squamous cell carcinoma   Histologic Grade   G2: Moderately differentiated   Tumor Size   Greatest dimension (cm): 8 cm   Additional Dimension (cm)   7 cm       5 cm   Tumor Focality   Unifocal   Visceral Pleura Invasion   Present   Tumor Extension   Parietal pleura   Lymph-Vascular Invasion   Not identified   MARGINS   Bronchial Margin   Uninvolved by invasive  carcinoma and carcinoma in situ   Vascular Margin   Uninvolved by invasive carcinoma   Parenchymal Margin   Involved by invasive carcinoma   LYMPH NODES   Regional Lymph Nodes       Number of Lymph Nodes Examined   Specify number: 6   Gene Stations Examined   5: Subaortic/ aortopulmonary (AP) / AP window       11L: Interlobar   Lymph Node Involvement   No nodes involved   STAGE (pTNM)   Primary Tumor (pT)   pT3: Tumor greater than 7 cm in greatest dimension; or Tumor of any size that directly invades any of the following: parietal pleural chest wall (including superior sulcus tumors), diaphragm, phrenic nerve, mediastinal pleura, parietal pericardium; or Tumor of any size in the main bronchus less than 2 cm distal to the petar but without involvement of the petar; or Tumor of any size associated with atelectasis or obstructive pneumonitis of the entire lung; or Tumors of any size with separate tmor nodule(s) in same lobe   Regional Lymph Nodes (pN)   pN0: No regional lymph node metastasis   ADDITIONAL FINDINGS   Additional Pathologic Finding(s)   Other (specify): Fungal hyphae consistent with Aspergillus species in the cavity contents   .          RADIOLOGY DATA :  CT of chest, abdomen and pelvis with contrast done on May 9, 2018 was reviewed, discussed with patient, it showed:  IMPRESSION:  CONCLUSION: Evidence of prior left upper lobectomy. Loss of  volume and shift the heart and mediastinum to the left. Left  upper lobe infiltrative changes with bronchiectasis. This is  probably post therapeutic, radiation changes. This is similar to  CT of March 8, 2018, but increased since PET/CT examination.  Chronic pleural thickening left apex and destructive lesions left  anterior upper ribs unchanged since prior PET/CT and prior CT  examination probably a combination of radiation necrosis left  ribs and treated metastases.     Emphysematous changes right lung. New subtle tree-in-bud type  infiltrative changes right  upper lobe right lower lobe.  Micronodules seen on prior CT examination the right lung are not  identified on today's exam.     Degenerative changes thoracic and lumbar spine. 1 cm anterior  spondylolisthesis of L5 with respect to S1 secondary to facet  arthrosis and pars defects, spondylolysis. CT abdomen pelvis  otherwise remarkable. No evidence of any metastatic disease in  the abdomen or pelvis.        CT of chest, abdomen and pelvis with contrast done on March 8, 2018 was reviewed with radiologist Dr. Vences, discussed with patient, it showed:  IMPRESSION:  1. Postoperative changes from left upper lobectomy with volume  loss and associated post treatment/radiation changes in the left  lung apex. There is new posterior airspace disease in the extreme  left apex which is probably related to prior radiation therapy,  less likely pneumonia. Small scattered pulmonary nodules in the  left lung apex are partially obscured by this airspace disease  and the progression of volume loss although these appear grossly  stable.  2. Many of the small 2 to 3 mm pulmonary nodules in the right  lung are unchanged. There are three new small nodules described  above which are indeterminate and attention on follow-up is  recommended.  3. Probable radiation necrosis involving multiple anterior left  ribs. However, there may also be a component of rib destruction  due to tumor. There is increased thickening of the soft tissues  of the chest wall this location. The soft tissue thickening has  progressed with small areas of decreased attenuation are noted in  the soft tissue. These could represent areas of necrosis or  fluid. This could be in part due to a post radiation therapy  changes although when correlating with the PET/CT and PET/CT  report, this is also suspicious for tumor extension or  involvement of the chest wall.   4. No convincing evidence of metastatic disease involving the  abdomen or pelvis.    PET CT done on December  29, 2017 was reviewed, discussed with patient, it showed:  IMPRESSION:  CONCLUSION:  1.  Mildly enlarging left lung apex pleural base mass lesion with  abnormal FDG activity suspicious for Pancoast tumor which is  invading into the anterior chest wall and partially eroding  through the anterior left second third and fourth ribs. No  current evidence of distant metastatic disease.         PET/CT done on May 22, 2017 showed:  IMPRESSION:  CONCLUSION:  1. Large Pancoast tumor involving left apex with significant  involvement of the periphery, pleura, portions of the chest wall  and contiguous involvement, destruction of at least one of the  anterior left upper ribs.     PET/CT imaging study is otherwise unremarkable. No evidence of  any distant metastases.          ASSESSMENT AND PLAN:      1.  Squamous cell cancer of left upper lobe, stage IIB, T3 N0 M0: Patient is status post surgical excision with positive pleural margin.  As per NCCN guidelines patient received concurrent chemoradiation with weekly carboplatin and Taxol.  Patient received concurrent chemoradiation with  weekly carboplatin and Taxol with radiation  From June 05,2017 until 07/17/2017.  CT scan done on December 1, 2017 shows enlarged left-sided precarinal lymph node as well as thickening of the left pleura eroding into the left rib cage worrisome for recurrence of cancer.  CT done on December 29, 2017 shows left chest wall recurrence with involvement of pleura, left treat this without any evidence of distance metastasis.  His cancer is stage IV because of the involvement of chest wall and pleura and ribs. PDl 1 testing was done which showed small positivity of 20%.  Foundation 1 testing showed positivity for PTEN loss which Everolimus can be used.  All other mutation that shows positivity are currently , There is no other targeted FDA approved treatment can be used.  Patient was started on treatment with Keytruda on January 10, 2018.  CT scan done on  May 19, 2018 after 6 cycles of crit today shows stable disease.  At this point we will continue with keytruda.  We will go ahead with cycle 7 of Keytruda today.  Will do restaging CT chest,abd ,pelvis with cycle 9 of keytruda.    2.  Left rib involvement secondary to local recurrence and progression: Treatment with Xgeva was discussed with patient side effect of right jaw including osteonecrosis of jaw, hypocalcemia, mouth sores were discussed.  Patient does need full mouth extraction, he has not done it yet.  We will hold off Xgeva until he gets dental work done.  Importance of seeing dentist and getting dental work done was discussed with patient again today.    3.  Pain management: .  He is using Percocet 10/325 every 4 hours with moderate pain relief.   Patient was counseled about the importance of bowel regimen to prevent constipation.  Prescription for morphine sulfate 30 mg by mouth twice a day with 60 tablet has been provided  on April 25, 2018.  Prescription for Percocet 10/325 with 180 tablets has been provided today on May 16, 2018.    4.  macrocytosis: Patient was found to have MCV of 93 today .  Patient does admit to drinking 2-3 beer on a daily basis.  He remains on vitamin B12 thousand micrograms by mouth daily.  Again it was recommended to stay away from alcohol completely.    5.Pulmonary aspergillosis: Patient had a fungus ball consistent with Aspergillus on a cavitary lesion of lung status postsurgical excision.     6. History of duodenal ulcer     7. Health maintenance: Patient smoking intermittently, he was counseled about smoking cessation.  About 4 minutes were spent for smoking cessation counseling.       8.BMI:Patient's Body mass index is 19.19 kg/m². BMI is lower than reference range.  Patient was counseled about small frequent meals to prevent further weight loss.    9.  Advance care planning: For now patient remains full code and is able to make all his decision.  Patient has health care  surrogate mentioned on chart.              Gualberto Dowd MD  5/16/2018  9:18 AM        EMR Dragon/Transcription disclaimer:   Much of this encounter note is an electronic transcription/translation of spoken language to printed text. The electronic translation of spoken language may permit erroneous, or at times, nonsensical words or phrases to be inadvertently transcribed; Although I have reviewed the note for such errors, some may still exist.

## 2018-06-06 PROBLEM — T82.898A OTHER COMPLICATION DUE TO VENOUS ACCESS DEVICE: Status: ACTIVE | Noted: 2018-01-01

## 2018-06-07 NOTE — TELEPHONE ENCOUNTER
----- Message from Gualberto Dowd MD sent at 6/6/2018  7:38 PM CDT -----  Please let patient know that I have sent prescription for ferrous sulfate to his pharmacy.  Thank you

## 2018-06-07 NOTE — PROGRESS NOTES
DATE OF VISIT: 6/6/2018      REASON FOR VISIT:  Squamous cell cancer of left lung, stage IV , Anemia      HISTORY OF PRESENT ILLNESS:    58-year-old male with a past medical history significant for duodenal ulcer, extensive nicotine addiction with more than 45-pack-year smoking history, history of alcohol abuse was seen in consultation on May 3, 2017 for newly diagnosed stage IIB squamous cell cancer of Of left lung.  Patient finished concurrent chemoradiation on July 17, 2017.  Patient had a CT of chest done on December 1, 2017 showed recurrence involving left chest wall region making it stage IV.  In view of PDL 1 showing positivity about 20%, patient was started on Keytruda January 10, 2018.  Patient is here to get cycle 8 of Keytruda today.  Complains of headache today.  Complains of intermittent worsening chest pain with activity on left side.  Denies any new lymph node enlargement.   Denies any bleeding.  Denies any fever or new cough.  Denies any diarrhea or any new skin lesion.      PAST MEDICAL HISTORY:    Past Medical History:   Diagnosis Date   • Anemia    • Aortic stenosis, mild    • Arthritis    • Chronic bronchitis    • Duodenal ulcer disease    • Inguinal hernia, left    • Lung cancer    • Mitral valve regurgitation        SOCIAL HISTORY:    Social History   Substance Use Topics   • Smoking status: Current Every Day Smoker     Packs/day: 1.00     Years: 40.00     Types: Cigarettes   • Smokeless tobacco: Former User      Comment: Reduced at half a pack per day and weanning off   • Alcohol use 8.4 oz/week     14 Cans of beer per week      Comment: 6 pk/dly       Surgical History :  Past Surgical History:   Procedure Laterality Date   • APPENDECTOMY      open   • BRONCHOSCOPY N/A 3/8/2017    Procedure: BRONCHOSCOPY with possible bronchoalveolar lavage, +/- brush, +/- needle biopsy, +/- endobronchial biopsy;  Surgeon: Candis Lr MD;  Location: [facility] ENDOSCOPY;  Service:    • EXPLORATORY LAPAROTOMY   "11/17/2014    Exploratory laparotomy, repair of duodenal ulcer, modified Carlos patch   • INGUINAL HERNIA REPAIR  09/13/2013    Open repair of an indirect left inguinal hernia. Left inguinal hernia.   • LA INSJ TUNNELED CVC W/O SUBQ PORT/ AGE 6 YR/> N/A 5/10/2017    Procedure: TUNNELED CENTRAL VENOUS CATHETER  WITH SUBCUTANEOUS  PORT,FLUOROSCOPIC GUIDANCE ;  Surgeon: Angel Hernandez MD;  Location: [facility] OR;  Service: Cardiothoracic   • THORACOTOMY Left 4/19/2017    Procedure: THORACOTOMY LEFT WITH PULMONARY RESECTION AND BRONCHOSCOPY WITH ENDO ;  Surgeon: Angel Hernandez MD;  Location: [facility] OR;  Service:        ALLERGIES:    No Known Allergies    FAMILY HISTORY:  Family History   Problem Relation Age of Onset   • Asthma Father    • Cancer Father    • Diabetes Father    • Hypertension Father    • Alcohol abuse Mother    • Heart failure Brother          REVIEW OF SYSTEMS:      CONSTITUTIONAL:  Positive for fatigue, denies any recent worsening. Complains of poor appetite.  Has gained 2 pounds since last clinic visit.Denies any fever, chills .      HEENT: No epistaxis, mouth sores or difficulty swallowing.     RESPIRATORY: Denies any shortness of breath or cough today.     CARDIOVASCULAR: States pain in left chest wall gets worse with activity.  No palpitation.     GASTROINTESTINAL: No abdominal pain nausea, vomiting or blood in the stool.     GENITOURINARY: No Dysuria or Hematuria.     MUSCULOSKELETAL: No new back pain or arthralgia..     LYMPHATICS: Denies any abnormal swollen glands anywhere in the body.     NEUROLOGICAL : Complains of headache today.  No tingling or numbness. No dizziness. No seizures or balance problems.     SKIN: No new skin lesions.           PHYSICAL EXAMINATION:      VITAL SIGNS:  /91   Pulse 82   Temp 99 °F (37.2 °C) (Temporal Artery )   Resp 18   Ht 172 cm (67.72\")   Wt 58.3 kg (128 lb 9.6 oz)   BMI 19.72 kg/m²      ECOG performance status : 1    GENERAL:  Not " in any distress.    HEENT:  Normocephalic, Atraumatic.Mild Conjunctival pallor. No icterus. Extraocular Movements Intact. No Facial Asymmetry noted.    NECK:  No adenopathy. No JVD.    RESPIRATORY:  Fair air entry bilateral. No rhonchi or wheezing.  Mild erythema of left chest wall.    CARDIOVASCULAR:  S1, S2. Regular rate and rhythm. No murmur or gallop appreciated.    ABDOMEN:  Soft,  nontender. Bowel sounds present in all four quadrants.  No organomegaly appreciated.    EXTREMITIES:  No edema.No Calf Tenderness.    NEUROLOGIC:  Alert, awake and oriented ×3.  No  Motor or sensory deficit appreciated. Cranial Nerves 2-12 grossly intact.    SKIN: No new skin lesions    LYMPHATICS: No new enlarged lymph node in neck or supra-clavicular area.            DIAGNOSTIC DATA:    Glucose   Date Value Ref Range Status   06/06/2018 88 60 - 100 mg/dL Final     Glucose, Arterial   Date Value Ref Range Status   04/19/2017 176 mmol/L Final     Sodium   Date Value Ref Range Status   06/06/2018 142 137 - 145 mmol/L Final     Potassium   Date Value Ref Range Status   06/06/2018 3.7 3.5 - 5.1 mmol/L Final     CO2   Date Value Ref Range Status   06/06/2018 23.0 22.0 - 31.0 mmol/L Final     Chloride   Date Value Ref Range Status   06/06/2018 107 95 - 110 mmol/L Final     Anion Gap   Date Value Ref Range Status   06/06/2018 12.0 5.0 - 15.0 mmol/L Final     Creatinine   Date Value Ref Range Status   06/06/2018 0.39 (L) 0.70 - 1.30 mg/dL Final     BUN   Date Value Ref Range Status   06/06/2018 10 7 - 21 mg/dL Final     BUN/Creatinine Ratio   Date Value Ref Range Status   06/06/2018 25.6 (H) 7.0 - 25.0 Final     Calcium   Date Value Ref Range Status   06/06/2018 8.5 8.4 - 10.2 mg/dL Final     eGFR Non  Amer   Date Value Ref Range Status   06/06/2018 228 (H) 56 - 130 mL/min/1.73 Final     Alkaline Phosphatase   Date Value Ref Range Status   06/06/2018 96 38 - 126 U/L Final     Total Protein   Date Value Ref Range Status   06/06/2018  7.4 6.3 - 8.6 g/dL Final     ALT (SGPT)   Date Value Ref Range Status   06/06/2018 13 (L) 21 - 72 U/L Final     AST (SGOT)   Date Value Ref Range Status   06/06/2018 18 17 - 59 U/L Final     Total Bilirubin   Date Value Ref Range Status   06/06/2018 0.3 0.2 - 1.3 mg/dL Final     Albumin   Date Value Ref Range Status   06/06/2018 4.00 3.40 - 4.80 g/dL Final     Globulin   Date Value Ref Range Status   06/06/2018 3.4 2.3 - 3.5 gm/dL Final     A/G Ratio   Date Value Ref Range Status   06/06/2018 1.2 1.1 - 1.8 g/dL Final     Lab Results   Component Value Date    WBC 11.54 (H) 06/06/2018    HGB 12.5 (L) 06/06/2018    HCT 36.7 (L) 06/06/2018    MCV 93.4 06/06/2018     06/06/2018     Lab Results   Component Value Date    NEUTROABS 9.75 (H) 06/06/2018    IRON 27 (L) 06/06/2018    TIBC 298 06/06/2018    LABIRON 9 (L) 06/06/2018    FERRITIN 120.00 06/06/2018    WOSYRXRM15 620 06/06/2018    FOLATE >20.00 06/06/2018         PATHOLOGY:  Foundation 1 testing done on 26 December 2017 showed:  Genomic alteration identified includes:   PTEN loss  BCORL1 loos exon 8-12  CDKN@A/B loss  KMT@C(MLL3)G638  PRKC! Amplification  STAT4 truncation intron 9  TERC amplification  [id number]    No mutation identified in EGFR, KRAS, RET, ALK,MET, ERBB2, BRAF, ROS1          PDL 1 testing on tumor shows 20% positivity.          Pathology :  Pathology Report from April 19, 2017 showed:       SPECIMEN   Specimen   Lobe(s) of lung   Lobes of Lung   Upper lobe   Procedure   Lobectomy   Specimen Laterality   Left   TUMOR   Primary Tumor Site   Upper lobe   Histologic Type   Squamous cell carcinoma   Histologic Grade   G2: Moderately differentiated   Tumor Size   Greatest dimension (cm): 8 cm   Additional Dimension (cm)   7 cm       5 cm   Tumor Focality   Unifocal   Visceral Pleura Invasion   Present   Tumor Extension   Parietal pleura   Lymph-Vascular Invasion   Not identified   MARGINS   Bronchial Margin   Uninvolved by invasive carcinoma and  carcinoma in situ   Vascular Margin   Uninvolved by invasive carcinoma   Parenchymal Margin   Involved by invasive carcinoma   LYMPH NODES   Regional Lymph Nodes       Number of Lymph Nodes Examined   Specify number: 6   Gene Stations Examined   5: Subaortic/ aortopulmonary (AP) / AP window       11L: Interlobar   Lymph Node Involvement   No nodes involved   STAGE (pTNM)   Primary Tumor (pT)   pT3: Tumor greater than 7 cm in greatest dimension; or Tumor of any size that directly invades any of the following: parietal pleural chest wall (including superior sulcus tumors), diaphragm, phrenic nerve, mediastinal pleura, parietal pericardium; or Tumor of any size in the main bronchus less than 2 cm distal to the petar but without involvement of the petar; or Tumor of any size associated with atelectasis or obstructive pneumonitis of the entire lung; or Tumors of any size with separate tmor nodule(s) in same lobe   Regional Lymph Nodes (pN)   pN0: No regional lymph node metastasis   ADDITIONAL FINDINGS   Additional Pathologic Finding(s)   Other (specify): Fungal hyphae consistent with Aspergillus species in the cavity contents   .          RADIOLOGY DATA :  CT of chest, abdomen and pelvis with contrast done on May 9, 2018 was reviewed, discussed with patient, it showed:  IMPRESSION:  CONCLUSION: Evidence of prior left upper lobectomy. Loss of  volume and shift the heart and mediastinum to the left. Left  upper lobe infiltrative changes with bronchiectasis. This is  probably post therapeutic, radiation changes. This is similar to  CT of March 8, 2018, but increased since PET/CT examination.  Chronic pleural thickening left apex and destructive lesions left  anterior upper ribs unchanged since prior PET/CT and prior CT  examination probably a combination of radiation necrosis left  ribs and treated metastases.     Emphysematous changes right lung. New subtle tree-in-bud type  infiltrative changes right upper lobe right  lower lobe.  Micronodules seen on prior CT examination the right lung are not  identified on today's exam.     Degenerative changes thoracic and lumbar spine. 1 cm anterior  spondylolisthesis of L5 with respect to S1 secondary to facet  arthrosis and pars defects, spondylolysis. CT abdomen pelvis  otherwise remarkable. No evidence of any metastatic disease in  the abdomen or pelvis.        CT of chest, abdomen and pelvis with contrast done on March 8, 2018 was reviewed with radiologist Dr. Vences, discussed with patient, it showed:  IMPRESSION:  1. Postoperative changes from left upper lobectomy with volume  loss and associated post treatment/radiation changes in the left  lung apex. There is new posterior airspace disease in the extreme  left apex which is probably related to prior radiation therapy,  less likely pneumonia. Small scattered pulmonary nodules in the  left lung apex are partially obscured by this airspace disease  and the progression of volume loss although these appear grossly  stable.  2. Many of the small 2 to 3 mm pulmonary nodules in the right  lung are unchanged. There are three new small nodules described  above which are indeterminate and attention on follow-up is  recommended.  3. Probable radiation necrosis involving multiple anterior left  ribs. However, there may also be a component of rib destruction  due to tumor. There is increased thickening of the soft tissues  of the chest wall this location. The soft tissue thickening has  progressed with small areas of decreased attenuation are noted in  the soft tissue. These could represent areas of necrosis or  fluid. This could be in part due to a post radiation therapy  changes although when correlating with the PET/CT and PET/CT  report, this is also suspicious for tumor extension or  involvement of the chest wall.   4. No convincing evidence of metastatic disease involving the  abdomen or pelvis.    PET CT done on December 29, 2017 was  reviewed, discussed with patient, it showed:  IMPRESSION:  CONCLUSION:  1.  Mildly enlarging left lung apex pleural base mass lesion with  abnormal FDG activity suspicious for Pancoast tumor which is  invading into the anterior chest wall and partially eroding  through the anterior left second third and fourth ribs. No  current evidence of distant metastatic disease.         PET/CT done on May 22, 2017 showed:  IMPRESSION:  CONCLUSION:  1. Large Pancoast tumor involving left apex with significant  involvement of the periphery, pleura, portions of the chest wall  and contiguous involvement, destruction of at least one of the  anterior left upper ribs.     PET/CT imaging study is otherwise unremarkable. No evidence of  any distant metastases.          ASSESSMENT AND PLAN:      1.  Squamous cell cancer of left upper lobe, stage IIB, T3 N0 M0: Patient is status post surgical excision with positive pleural margin.  As per NCCN guidelines patient received concurrent chemoradiation with weekly carboplatin and Taxol.  Patient received concurrent chemoradiation with  weekly carboplatin and Taxol with radiation  From June 05,2017 until 07/17/2017.  CT scan done on December 1, 2017 shows enlarged left-sided precarinal lymph node as well as thickening of the left pleura eroding into the left rib cage worrisome for recurrence of cancer.  CT done on December 29, 2017 shows left chest wall recurrence with involvement of pleura, left treat this without any evidence of distance metastasis.  His cancer is stage IV because of the involvement of chest wall and pleura and ribs. PDl 1 testing was done which showed small positivity of 20%.  Foundation 1 testing showed positivity for PTEN loss which Everolimus can be used.  All other mutation that shows positivity are currently , There is no other targeted FDA approved treatment can be used.  Patient was started on treatment with Keytruda on January 10, 2018.  CT scan done on May 19, 2018  after 6 cycles of Keytruda shows stable disease.  At this point we will continue with keytruda.  We will go ahead with cycle 8 of Keytruda today.  Will do restaging CT chest,abd ,pelvis with cycle 9 of keytruda.    2.  Left rib involvement secondary to local recurrence and progression: Treatment with Xgeva was discussed with patient side effect of right jaw including osteonecrosis of jaw, hypocalcemia, mouth sores were discussed.  Patient does need full mouth extraction, he has not done it yet.  We will hold off Xgeva until he gets dental work done.  Importance of seeing dentist and getting dental work done was discussed with patient again today.    3.  Pain management: .  He is using Percocet 10/325 every 4 hours with moderate pain relief.   Patient was counseled about the importance of bowel regimen to prevent constipation.  Prescription for morphine sulfate 30 mg by mouth twice a day with 60 tablet has been provided  Today on 06/06/18.  Prescription for Percocet 10/325 with 180 tablets has been provided  on May 16, 2018.    4.  macrocytosis: Patient was found to have MCV of 93 today .  Patient does admit to drinking 2-3 beer on a daily basis.  He remains on vitamin B12 thousand micrograms by mouth daily.  Again it was recommended to stay away from alcohol completely.    5.  Anemia: Most likely anemia of chronic disease secondary to underlying malignancy.  Patient's hemoglobin is 12.5 today.  Anemia workup done earlier today shows component of iron deficiency with iron saturation of 9%.  We'll send a prescription for ferrous sulfate 1 tablet by mouth daily to his pharmacy today.    6.  Hypertension: Patient's blood pressure is elevated at 179/91 today.  We will start him on amlodipine 5 mg by mouth daily.  Prescription has been sent to his pharmacy today on June 6, 2018 1 month supply with no refills.  Patient has been referred to family medicine clinic for further management of his blood pressure.    7.Pulmonary  aspergillosis: Patient had a fungus ball consistent with Aspergillus on a cavitary lesion of lung status postsurgical excision.     8. History of duodenal ulcer     9. Health maintenance: Patient smoking intermittently, he was counseled about smoking cessation.  About 4 minutes were spent for smoking cessation counseling.       10.BMI:Patient's Body mass index is 19.72 kg/m². BMI is lower than reference range.  Patient was counseled about small frequent meals to prevent further weight loss.    11.  Advance care planning: For now patient remains full code and is able to make all his decision.  Patient has health care surrogate mentioned on chart.              Gualberto Dowd MD  6/6/2018  7:01 PM        EMR Dragon/Transcription disclaimer:   Much of this encounter note is an electronic transcription/translation of spoken language to printed text. The electronic translation of spoken language may permit erroneous, or at times, nonsensical words or phrases to be inadvertently transcribed; Although I have reviewed the note for such errors, some may still exist.

## 2018-06-27 NOTE — PROGRESS NOTES
DATE OF VISIT: 6/27/2018      REASON FOR VISIT:  Squamous cell cancer of left lung, stage IV , Anemia      HISTORY OF PRESENT ILLNESS:    58-year-old male with a past medical history significant for duodenal ulcer, extensive nicotine addiction with more than 45-pack-year smoking history, history of alcohol abuse was seen in consultation on May 3, 2017 for newly diagnosed stage IIB squamous cell cancer of Of left lung.  Patient finished concurrent chemoradiation on July 17, 2017.  Patient had a CT of chest done on December 1, 2017 showed recurrence involving left chest wall region making it stage IV.  In view of PDL 1 showing positivity about 20%, patient was started on Keytruda January 10, 2018.  Patient is here to get cycle 9 of Keytruda today.   Complains of intermittent worsening of  chest pain with activity on left side.  Denies any new lymph node enlargement.   Denies any bleeding.  Denies any fever or new cough.  Denies any diarrhea or any new skin lesion.      PAST MEDICAL HISTORY:    Past Medical History:   Diagnosis Date   • Anemia    • Aortic stenosis, mild    • Arthritis    • Chronic bronchitis    • Duodenal ulcer disease    • Inguinal hernia, left    • Lung cancer    • Mitral valve regurgitation        SOCIAL HISTORY:    Social History   Substance Use Topics   • Smoking status: Current Every Day Smoker     Packs/day: 1.00     Years: 40.00     Types: Cigarettes   • Smokeless tobacco: Former User      Comment: Reduced at half a pack per day and weanning off   • Alcohol use 8.4 oz/week     14 Cans of beer per week      Comment: 6 pk/dly       Surgical History :  Past Surgical History:   Procedure Laterality Date   • APPENDECTOMY      open   • BRONCHOSCOPY N/A 3/8/2017    Procedure: BRONCHOSCOPY with possible bronchoalveolar lavage, +/- brush, +/- needle biopsy, +/- endobronchial biopsy;  Surgeon: Candis Lr MD;  Location: [facility] ENDOSCOPY;  Service:    • EXPLORATORY LAPAROTOMY  11/17/2014     "Exploratory laparotomy, repair of duodenal ulcer, modified Carlos patch   • INGUINAL HERNIA REPAIR  09/13/2013    Open repair of an indirect left inguinal hernia. Left inguinal hernia.   • SD INSJ TUNNELED CVC W/O SUBQ PORT/ AGE 6 YR/> N/A 5/10/2017    Procedure: TUNNELED CENTRAL VENOUS CATHETER  WITH SUBCUTANEOUS  PORT,FLUOROSCOPIC GUIDANCE ;  Surgeon: Angel Hernandez MD;  Location: [facility] OR;  Service: Cardiothoracic   • THORACOTOMY Left 4/19/2017    Procedure: THORACOTOMY LEFT WITH PULMONARY RESECTION AND BRONCHOSCOPY WITH ENDO ;  Surgeon: Angel Hernandez MD;  Location: [facility];  Service:        ALLERGIES:    No Known Allergies    FAMILY HISTORY:  Family History   Problem Relation Age of Onset   • Asthma Father    • Cancer Father    • Diabetes Father    • Hypertension Father    • Alcohol abuse Mother    • Heart failure Brother          REVIEW OF SYSTEMS:      CONSTITUTIONAL:  Positive for fatigue, denies any recent worsening.States his appetite is improved. No recent weight loss.Denies any fever, chills .      HEENT: No epistaxis, mouth sores or difficulty swallowing.     RESPIRATORY: Denies any shortness of breath or cough today.     CARDIOVASCULAR: States pain in left chest wall gets worse with activity.  No palpitation.     GASTROINTESTINAL: No abdominal pain nausea, vomiting or blood in the stool.     GENITOURINARY: No Dysuria or Hematuria.     MUSCULOSKELETAL: No new back pain or arthralgia..     LYMPHATICS: Denies any abnormal swollen glands anywhere in the body.     NEUROLOGICAL : No tingling or numbness. No headache or dizziness. No seizures or balance problems.     SKIN: No new skin lesions.           PHYSICAL EXAMINATION:      VITAL SIGNS:  /89   Pulse 88   Temp 98.3 °F (36.8 °C) (Temporal Artery )   Resp 18   Ht 172 cm (67.72\")   Wt 58.2 kg (128 lb 3.2 oz)   BMI 19.66 kg/m²      ECOG performance status : 1    GENERAL:  Not in any distress.    HEENT:  Normocephalic, " Atraumatic.Mild Conjunctival pallor. No icterus. Extraocular Movements Intact. No Facial Asymmetry noted.    NECK:  No adenopathy. No JVD.    RESPIRATORY:  Fair air entry bilateral. No rhonchi or wheezing.  Mild erythema of left chest wall.    CARDIOVASCULAR:  S1, S2. Regular rate and rhythm. No murmur or gallop appreciated.    ABDOMEN:  Soft,  nontender. Bowel sounds present in all four quadrants.  No organomegaly appreciated.    EXTREMITIES:  No edema.No Calf Tenderness.    NEUROLOGIC:  Alert, awake and oriented ×3.  No  Motor or sensory deficit appreciated. Cranial Nerves 2-12 grossly intact.    SKIN: No new skin lesions    LYMPHATICS: No new enlarged lymph node in neck or supra-clavicular area.            DIAGNOSTIC DATA:    Glucose   Date Value Ref Range Status   06/27/2018 107 (H) 60 - 100 mg/dL Final     Glucose, Arterial   Date Value Ref Range Status   04/19/2017 176 mmol/L Final     Sodium   Date Value Ref Range Status   06/27/2018 140 137 - 145 mmol/L Final     Potassium   Date Value Ref Range Status   06/27/2018 4.1 3.5 - 5.1 mmol/L Final     CO2   Date Value Ref Range Status   06/27/2018 28.0 22.0 - 31.0 mmol/L Final     Chloride   Date Value Ref Range Status   06/27/2018 104 95 - 110 mmol/L Final     Anion Gap   Date Value Ref Range Status   06/27/2018 8.0 5.0 - 15.0 mmol/L Final     Creatinine   Date Value Ref Range Status   06/27/2018 0.59 (L) 0.70 - 1.30 mg/dL Final     BUN   Date Value Ref Range Status   06/27/2018 12 7 - 21 mg/dL Final     BUN/Creatinine Ratio   Date Value Ref Range Status   06/27/2018 20.3 7.0 - 25.0 Final     Calcium   Date Value Ref Range Status   06/27/2018 9.1 8.4 - 10.2 mg/dL Final     eGFR Non  Amer   Date Value Ref Range Status   06/27/2018 142 (H) 56 - 130 mL/min/1.73 Final     Alkaline Phosphatase   Date Value Ref Range Status   06/27/2018 93 38 - 126 U/L Final     Total Protein   Date Value Ref Range Status   06/27/2018 7.4 6.3 - 8.6 g/dL Final     ALT (SGPT)    Date Value Ref Range Status   06/27/2018 20 (L) 21 - 72 U/L Final     AST (SGOT)   Date Value Ref Range Status   06/27/2018 26 17 - 59 U/L Final     Total Bilirubin   Date Value Ref Range Status   06/27/2018 0.2 0.2 - 1.3 mg/dL Final     Albumin   Date Value Ref Range Status   06/27/2018 4.20 3.40 - 4.80 g/dL Final     Globulin   Date Value Ref Range Status   06/27/2018 3.2 2.3 - 3.5 gm/dL Final     A/G Ratio   Date Value Ref Range Status   06/27/2018 1.3 1.1 - 1.8 g/dL Final     Lab Results   Component Value Date    WBC 11.10 (H) 06/27/2018    HGB 13.1 (L) 06/27/2018    HCT 39.0 06/27/2018    MCV 94.0 06/27/2018     06/27/2018     Lab Results   Component Value Date    NEUTROABS 8.32 06/27/2018    IRON 27 (L) 06/06/2018    TIBC 298 06/06/2018    LABIRON 9 (L) 06/06/2018    FERRITIN 120.00 06/06/2018    TJUCLQUH31 620 06/06/2018    FOLATE >20.00 06/06/2018         PATHOLOGY:  Foundation 1 testing done on 26 December 2017 showed:  Genomic alteration identified includes:   PTEN loss  BCORL1 loos exon 8-12  CDKN@A/B loss  KMT@C(MLL3)G638  PRKC! Amplification  STAT4 truncation intron 9  TERC amplification  [id number]    No mutation identified in EGFR, KRAS, RET, ALK,MET, ERBB2, BRAF, ROS1          PDL 1 testing on tumor shows 20% positivity.          Pathology :  Pathology Report from April 19, 2017 showed:       SPECIMEN   Specimen   Lobe(s) of lung   Lobes of Lung   Upper lobe   Procedure   Lobectomy   Specimen Laterality   Left   TUMOR   Primary Tumor Site   Upper lobe   Histologic Type   Squamous cell carcinoma   Histologic Grade   G2: Moderately differentiated   Tumor Size   Greatest dimension (cm): 8 cm   Additional Dimension (cm)   7 cm       5 cm   Tumor Focality   Unifocal   Visceral Pleura Invasion   Present   Tumor Extension   Parietal pleura   Lymph-Vascular Invasion   Not identified   MARGINS   Bronchial Margin   Uninvolved by invasive carcinoma and carcinoma in situ   Vascular Margin   Uninvolved  by invasive carcinoma   Parenchymal Margin   Involved by invasive carcinoma   LYMPH NODES   Regional Lymph Nodes       Number of Lymph Nodes Examined   Specify number: 6   Gene Stations Examined   5: Subaortic/ aortopulmonary (AP) / AP window       11L: Interlobar   Lymph Node Involvement   No nodes involved   STAGE (pTNM)   Primary Tumor (pT)   pT3: Tumor greater than 7 cm in greatest dimension; or Tumor of any size that directly invades any of the following: parietal pleural chest wall (including superior sulcus tumors), diaphragm, phrenic nerve, mediastinal pleura, parietal pericardium; or Tumor of any size in the main bronchus less than 2 cm distal to the petar but without involvement of the petar; or Tumor of any size associated with atelectasis or obstructive pneumonitis of the entire lung; or Tumors of any size with separate tmor nodule(s) in same lobe   Regional Lymph Nodes (pN)   pN0: No regional lymph node metastasis   ADDITIONAL FINDINGS   Additional Pathologic Finding(s)   Other (specify): Fungal hyphae consistent with Aspergillus species in the cavity contents   .          RADIOLOGY DATA :  CT of chest, abdomen and pelvis with contrast done on May 9, 2018 was reviewed, discussed with patient, it showed:  IMPRESSION:  CONCLUSION: Evidence of prior left upper lobectomy. Loss of  volume and shift the heart and mediastinum to the left. Left  upper lobe infiltrative changes with bronchiectasis. This is  probably post therapeutic, radiation changes. This is similar to  CT of March 8, 2018, but increased since PET/CT examination.  Chronic pleural thickening left apex and destructive lesions left  anterior upper ribs unchanged since prior PET/CT and prior CT  examination probably a combination of radiation necrosis left  ribs and treated metastases.     Emphysematous changes right lung. New subtle tree-in-bud type  infiltrative changes right upper lobe right lower lobe.  Micronodules seen on prior CT  examination the right lung are not  identified on today's exam.     Degenerative changes thoracic and lumbar spine. 1 cm anterior  spondylolisthesis of L5 with respect to S1 secondary to facet  arthrosis and pars defects, spondylolysis. CT abdomen pelvis  otherwise remarkable. No evidence of any metastatic disease in  the abdomen or pelvis.        CT of chest, abdomen and pelvis with contrast done on March 8, 2018 was reviewed with radiologist Dr. Vences, discussed with patient, it showed:  IMPRESSION:  1. Postoperative changes from left upper lobectomy with volume  loss and associated post treatment/radiation changes in the left  lung apex. There is new posterior airspace disease in the extreme  left apex which is probably related to prior radiation therapy,  less likely pneumonia. Small scattered pulmonary nodules in the  left lung apex are partially obscured by this airspace disease  and the progression of volume loss although these appear grossly  stable.  2. Many of the small 2 to 3 mm pulmonary nodules in the right  lung are unchanged. There are three new small nodules described  above which are indeterminate and attention on follow-up is  recommended.  3. Probable radiation necrosis involving multiple anterior left  ribs. However, there may also be a component of rib destruction  due to tumor. There is increased thickening of the soft tissues  of the chest wall this location. The soft tissue thickening has  progressed with small areas of decreased attenuation are noted in  the soft tissue. These could represent areas of necrosis or  fluid. This could be in part due to a post radiation therapy  changes although when correlating with the PET/CT and PET/CT  report, this is also suspicious for tumor extension or  involvement of the chest wall.   4. No convincing evidence of metastatic disease involving the  abdomen or pelvis.    PET CT done on December 29, 2017 was reviewed, discussed with patient, it  showed:  IMPRESSION:  CONCLUSION:  1.  Mildly enlarging left lung apex pleural base mass lesion with  abnormal FDG activity suspicious for Pancoast tumor which is  invading into the anterior chest wall and partially eroding  through the anterior left second third and fourth ribs. No  current evidence of distant metastatic disease.         PET/CT done on May 22, 2017 showed:  IMPRESSION:  CONCLUSION:  1. Large Pancoast tumor involving left apex with significant  involvement of the periphery, pleura, portions of the chest wall  and contiguous involvement, destruction of at least one of the  anterior left upper ribs.     PET/CT imaging study is otherwise unremarkable. No evidence of  any distant metastases.          ASSESSMENT AND PLAN:      1.  Squamous cell cancer of left upper lobe, stage IIB, T3 N0 M0: Patient is status post surgical excision with positive pleural margin.  As per NCCN guidelines patient received concurrent chemoradiation with weekly carboplatin and Taxol.  Patient received concurrent chemoradiation with  weekly carboplatin and Taxol with radiation  From June 05,2017 until 07/17/2017.  CT scan done on December 1, 2017 shows enlarged left-sided precarinal lymph node as well as thickening of the left pleura eroding into the left rib cage worrisome for recurrence of cancer.  CT done on December 29, 2017 shows left chest wall recurrence with involvement of pleura, left treat this without any evidence of distance metastasis.  His cancer is stage IV because of the involvement of chest wall and pleura and ribs. PDl 1 testing was done which showed small positivity of 20%.  Foundation 1 testing showed positivity for PTEN loss which Everolimus can be used.  All other mutation that shows positivity are currently , There is no other targeted FDA approved treatment can be used.  Patient was started on treatment with Keytruda on January 10, 2018.  CT scan done on May 19, 2018 after 6 cycles of Keytruda shows  stable disease.    -Will go ahead with cycle 9 of Keytruda today on June 27, 2018, we will do restaging CT of chest, abdomen and pelvis with contrast prior to next clinic visit in 3 weeks.  This recommendation were discussed with patient.    2.  Left rib involvement secondary to local recurrence and progression: Treatment with Xgeva was discussed with patient side effect of right jaw including osteonecrosis of jaw, hypocalcemia, mouth sores were discussed.  Patient does need full mouth extraction, he has not done it yet.  We will hold off Xgeva until he gets dental work done.  Importance of seeing dentist and getting dental work done was discussed with patient again today.    3.  Pain management: .  He is using Percocet 10/325 every 4 hours with moderate pain relief.   Patient was counseled about the importance of bowel regimen to prevent constipation.  Prescription for morphine sulfate 30 mg by mouth twice a day with 60 tablet has been provided  on 06/06/18.  Prescription for Percocet 10/325 with 180 tablets has been provided today on June 27, 2018.    4.  macrocytosis: Patient was found to have MCV of 93 today .  Patient does admit to drinking 2-3 beer on a daily basis.  He remains on vitamin B12 thousand micrograms by mouth daily.  Again it was recommended to stay away from alcohol completely.    5.  Anemia: Most likely anemia of chronic disease secondary to underlying malignancy.  Patient's hemoglobin is 13.1 today.  Anemia workup done recently  showed component of iron deficiency with iron saturation of 9%.  She has been started on ferrous sulfate 1 tablet by mouth daily.    6.  Hypertension: Patient's blood pressure is elevated at 165/89 today.  She is currently on amlodipine 5 mg by mouth daily.  Patient has an appointment with Dr. Stone, was Encouraged to keep that appointment for further management of high blood pressure.    7.Pulmonary aspergillosis: Patient had a fungus ball consistent with Aspergillus  on a cavitary lesion of lung status postsurgical excision.     8. History of duodenal ulcer     9. Health maintenance: Patient smoking intermittently, he was counseled about smoking cessation.  About 4 minutes were spent for smoking cessation counseling.       10.BMI:Patient's Body mass index is 19.66 kg/m². BMI is lower than reference range.  Patient was counseled about small frequent meals to prevent further weight loss.    11.  Advance care planning: For now patient remains full code and is able to make all his decision.  Patient has health care surrogate mentioned on chart.              Gualberto Dowd MD  6/27/2018  8:35 AM        EMR Dragon/Transcription disclaimer:   Much of this encounter note is an electronic transcription/translation of spoken language to printed text. The electronic translation of spoken language may permit erroneous, or at times, nonsensical words or phrases to be inadvertently transcribed; Although I have reviewed the note for such errors, some may still exist.

## 2018-06-27 NOTE — PATIENT INSTRUCTIONS
Patient Instructions for CT Scan    · Your CT scan is being done without any oral contrast.  Your CT scan may be done with IV contrast, if you have an allergy to iodine--please tell your nurse.    · Do not eat or drink 4 hours prior to scan.     · You may take your medications with sips of water, except DO NOT take your diabetic pill the morning of the test.    Arrive at the Outpatient Surgery entrance at [facility] 20 minutes prior to appointment time.    You will receive a phone call with an appointment for your CT scan.  Please call our office, if someone does not contact you with 3 days.    Shanae Patel RN  June 27, 2018  8:36 AM              CT Scan  A CT scan (computed tomography scan) is an imaging scan. It uses X-rays and a computer to make detailed pictures of different areas inside the body. A CT scan can give more information than a regular X-ray exam. A CT scan provides data about internal organs, soft tissue structures, blood vessels, and bones.  In this procedure, the pictures will be taken in a large machine that has an opening (CT scanner).  Tell a health care provider about:  · Any allergies you have.  · All medicines you are taking, including vitamins, herbs, eye drops, creams, and over-the-counter medicines.  · Any blood disorders you have.  · Any surgeries you have had.  · Any medical conditions you have.  · Whether you are pregnant or may be pregnant.  What are the risks?  Generally, this is a safe procedure. However, problems may occur, including:  · An allergic reaction to dyes.  · Development of cancer from excessive exposure to radiation from multiple CT scans. This is rare.    What happens before the procedure?  Staying hydrated  Follow instructions from your health care provider about hydration, which may include:  · Up to 2 hours before the procedure - you may continue to drink clear liquids, such as water, clear fruit juice, black coffee, and plain tea.    Eating  and drinking restrictions  Follow instructions from your health care provider about eating and drinking, which may include:  · 24 hours before the procedure - stop drinking caffeinated beverages, such as energy drinks, tea, soda, coffee, and hot chocolate.  · 8 hours before the procedure - stop eating heavy meals or foods such as meat, fried foods, or fatty foods.  · 6 hours before the procedure - stop eating light meals or foods, such as toast or cereal.  · 6 hours before the procedure - stop drinking milk or drinks that contain milk.  · 2 hours before the procedure - stop drinking clear liquids.    General instructions  · Remove any jewelry.  · Ask your health care provider about changing or stopping your regular medicines. This is especially important if you are taking diabetes medicines or blood thinners.  What happens during the procedure?  · You will lie on a table with your arms above your head.  · An IV tube may be inserted into one of your veins.  · The contrast dye may be injected into the IV tube. You may feel warm or have a metallic taste in your mouth.  · The table you will be lying on will move into the CT scanner.  · You will be able to see, hear, and talk to the person running the machine while you are in it. Follow that person's instructions.  · The CT scanner will move around you to take pictures. Do not move while it is scanning. Staying still helps the scanner to get a good image.  · When the best possible pictures have been taken, the machine will be turned off. The table will be moved out of the machine.  · The IV tube will be removed.  The procedure may vary among health care providers and hospitals.  What happens after the procedure?  · It is up to you to get the results of your procedure. Ask your health care provider, or the department that is doing the procedure, when your results will be ready.  Summary  · A CT scan is an imaging scan.  · A CT scan uses X-rays and a computer to make  detailed pictures of different areas of your body.  · Follow instructions from your health care provider about eating and drinking before the procedure.  · You will be able to see, hear, and talk to the person running the machine while you are in it. Follow that person's instructions.  This information is not intended to replace advice given to you by your health care provider. Make sure you discuss any questions you have with your health care provider.  Document Released: 01/25/2006 Document Revised: 01/20/2018 Document Reviewed: 01/20/2018  [employer] Interactive Patient Education © 2018 [employer]

## 2018-07-09 PROBLEM — I10 ESSENTIAL HYPERTENSION: Status: ACTIVE | Noted: 2018-01-01

## 2018-07-09 NOTE — PROGRESS NOTES
Subjective:     Tino Peres is a 58 y.o. male who presents for initial evaluation of HTN.    New concerns: Patient presents to establish care with new physician. He has a history of lung cancer, that he is currently getting chemotherapy for at the [facility]. He is here to establish with a PCP, and to follow up on blood pressure issues he was having. He has a history of uncontrolled HTN, and was recently started on Lisinopril. Patient also states that he smokes a half pack of cigarettes a day. He states that he used to smoke more, but has cut down in the past year. He understands that continuing to smoke can cause further deterioration to his health and was counseled on the need to cut down further and quit. He states that he is working on it and wants to do this without medical assistance. In addition, patient states that he drinks around 2-3 cans of beer daily. He was also counseled to work on cutting down the amount that he drinks alcohol.     Evaluation:   Home blood pressure readings: Patient states that he has not checked his blood pressure at home.    Blood pressure often elevated in physician office: Yes   Onset of symptoms: several months   Duration of symptoms: several months   Symptoms:   Headache: no   Visual disturbance: no   Fatigue: yes   Dyspnea: yes   Orthopnea: no   Edema: no   Chest pain: yes   Palpitations: yes   Diaphoresis: no    Risk Factors:   Tobacco use, Sedentary lifestyle, FHx: HTN    Comorbid Conditions:   None    The following portions of the patient's history were reviewed and updated as appropriate: allergies, current medications, past family history, past medical history, past social history, past surgical history and problem list.    Preventative:  Over the past 2 weeks, have you felt down, depressed, or hopeless?No   Over the past 2 weeks, have you felt little interest or pleasure in doing things?No  Clinical depression screening refused by patient.No     On  osteoporosis therapy?Not Indicated     Past Medical Hx:  Past Medical History:   Diagnosis Date   • Anemia    • Aortic stenosis, mild    • Arthritis    • Chronic bronchitis (CMS/HCC)    • Duodenal ulcer disease    • Inguinal hernia, left    • Lung cancer (CMS/HCC)    • Mitral valve regurgitation        Past Surgical Hx:  Past Surgical History:   Procedure Laterality Date   • APPENDECTOMY      open   • BRONCHOSCOPY N/A 3/8/2017    Procedure: BRONCHOSCOPY with possible bronchoalveolar lavage, +/- brush, +/- needle biopsy, +/- endobronchial biopsy;  Surgeon: Candis Lr MD;  Location: [facility] ENDOSCOPY;  Service:    • EXPLORATORY LAPAROTOMY  11/17/2014    Exploratory laparotomy, repair of duodenal ulcer, modified Carlos patch   • INGUINAL HERNIA REPAIR  09/13/2013    Open repair of an indirect left inguinal hernia. Left inguinal hernia.   • IL INSJ TUNNELED CVC W/O SUBQ PORT/ AGE 6 YR/> N/A 5/10/2017    Procedure: TUNNELED CENTRAL VENOUS CATHETER  WITH SUBCUTANEOUS  PORT,FLUOROSCOPIC GUIDANCE ;  Surgeon: Angel Hernandez MD;  Location: [facility] OR;  Service: Cardiothoracic   • THORACOTOMY Left 4/19/2017    Procedure: THORACOTOMY LEFT WITH PULMONARY RESECTION AND BRONCHOSCOPY WITH ENDO ;  Surgeon: Angel Hernandez MD;  Location: [facility] OR;  Service:        Health Maintenance:  Health Maintenance   Topic Date Due   • ANNUAL PHYSICAL  03/22/1963   • PNEUMOCOCCAL VACCINE (19-64 MEDIUM RISK) (1 of 1 - PPSV23) 03/22/1980   • TDAP/TD VACCINES (1 - Tdap) 03/22/1980   • ZOSTER VACCINE (1 of 2) 03/22/2011   • COLONOSCOPY  04/04/2017   • INFLUENZA VACCINE  08/01/2018   • HEPATITIS C SCREENING  Completed   • LUNG CANCER SCREENING  Excluded       Current Meds:    Current Outpatient Prescriptions:   •  amLODIPine (NORVASC) 5 MG tablet, Take 1 tablet by mouth Daily., Disp: 30 tablet, Rfl: 2  •  ferrous sulfate 324 (65 Fe) MG tablet delayed-release EC tablet, Take 1 tablet by mouth Daily With Breakfast., Disp: 30  tablet, Rfl: 1  •  FLUARIX QUADRIVALENT 0.5 ML suspension prefilled syringe injection, TO BE ADMINISTERED BY PHARMACIST FOR IMMUNIZATION, Disp: , Rfl: 0  •  ibuprofen (ADVIL,MOTRIN) 200 MG tablet, Take 2 tablets by mouth Every 12 (Twelve) Hours As Needed for Mild Pain (1-3) (Postop pain). (Patient taking differently: Take 400 mg by mouth As Needed for Mild Pain  (Postop pain).), Disp: 30 tablet, Rfl: 0  •  Morphine (MS CONTIN) 30 MG 12 hr tablet, Take 1 tablet by mouth 2 (Two) Times a Day., Disp: 60 tablet, Rfl: 0  •  Multiple Vitamins-Minerals (MULTIVITAMIN ADULT PO), Take 1 tablet by mouth Daily., Disp: , Rfl:   •  oxyCODONE-acetaminophen (PERCOCET)  MG per tablet, Take 1 tablet by mouth Every 4 (Four) Hours As Needed for Moderate Pain  or Severe Pain ., Disp: 180 tablet, Rfl: 0  •  vitamin B-12 (CYANOCOBALAMIN) 1000 MCG tablet, Take 1 tablet by mouth Daily., Disp: 90 tablet, Rfl: 1    Allergies:  Patient has no known allergies.    Family Hx:  Family History   Problem Relation Age of Onset   • Asthma Father    • Cancer Father    • Diabetes Father    • Hypertension Father    • Alcohol abuse Mother    • Heart failure Brother         Social History:  Social History     Social History   • Marital status:      Spouse name: N/A   • Number of children: 2   • Years of education: 14     Occupational History   • production      Social History Main Topics   • Smoking status: Current Every Day Smoker     Packs/day: 1.00     Years: 40.00     Types: Cigarettes   • Smokeless tobacco: Former User      Comment: Reduced at half a pack per day and weanning off   • Alcohol use 8.4 oz/week     14 Cans of beer per week      Comment: 6 pk/dly   • Drug use: No   • Sexual activity: Defer     Other Topics Concern   • Not on file     Social History Narrative    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  Lives alone in [place].  Drinks a 6 pack daily.       Review of Systems  Review of Systems  "  Constitutional: Negative for appetite change, chills and fever.   HENT: Negative for congestion, ear pain, rhinorrhea, sneezing and sore throat.    Respiratory: Negative for cough and shortness of breath.    Cardiovascular: Negative for chest pain, palpitations and leg swelling.   Gastrointestinal: Negative for diarrhea, nausea and vomiting.   Endocrine: Negative for polyphagia and polyuria.   Musculoskeletal: Negative for back pain and neck pain.   Skin: Negative for color change and rash.   Neurological: Negative for dizziness, syncope, weakness and headaches.   Psychiatric/Behavioral: Negative for agitation, confusion and dysphoric mood.     Hypertension ROS: taking medications as instructed, no medication side effects noted, no TIA's, no chest pain on exertion, no dyspnea on exertion and no swelling of ankles.    Objective:     /80 (BP Location: Right arm)   Pulse 88   Ht 172 cm (67.72\")   Wt 57.6 kg (127 lb)   SpO2 95%   BMI 19.47 kg/m²   Physical Exam   Constitutional: He is oriented to person, place, and time. He appears well-developed and well-nourished.   Cardiovascular: Normal rate, regular rhythm and normal heart sounds.  Exam reveals no gallop and no friction rub.    No murmur heard.  Pulmonary/Chest: Effort normal and breath sounds normal. No respiratory distress. He has no wheezes. He has no rales.   Abdominal: Soft. Bowel sounds are normal. There is no tenderness.   Musculoskeletal: Normal range of motion. He exhibits no edema.   Neurological: He is alert and oriented to person, place, and time.   Skin: Skin is warm and dry.   Psychiatric: He has a normal mood and affect. His behavior is normal.   Vitals reviewed.           Assessment:     Hypertension stable, no significant medication side effects noted, needs further observation and needs to quit smoking.  1. Encounter to establish care with new doctor    2. Essential hypertension    3. Tobacco use disorder    4. Malignant neoplasm of " upper lobe of left lung (CMS/HCC)    5. Alcohol use disorder (CMS/HCC)    6. Iron deficiency anemia, unspecified iron deficiency anemia type         Plan:   1.  Rx changes: Continue with Amlodipine. Informed patient to keep a log of his blood pressure several times a week.   2.  Education:    EKG ordered: no   Presented an overview of HTN, expected course, considerations, risk factors, and exacerbation prevention.   Discussed treatment options for HTN: yes   Recommended restricted dietary Na intake: yes   Recommended increased in dietary K intake: yes   Discussed patient action plan for HTN: yes  3.  Compliance at present is estimated to be good. Efforts to improve compliance (if necessary) will be directed at increased exercise.  4.  Follow up: 3 months    GOALS:  Improve blood pressure  BARRIERS TO GOALS:  Smoking and alcohol use can limit improvement.      Preventative:  Male Preventative: Exercises regularly  up to date and documented   Recommended:none  Smoking cessation counseling was provided. A total of 3 minutes was spent on smoking cessation counseling.   regular (moderate)  eat more fruits and vegetables, decrease soda or juice intake, increase water intake, increase physical activity, reduce screen time, reduce portion size, cut out extra servings, reduce fast food intake, family to eat at dinner table more often, keep TV off during meals, plan meals, eat breakfast and have 3 meals a day    RISK SCORE: 4

## 2018-08-08 PROBLEM — R94.6 ABNORMAL THYROID FUNCTION TEST: Status: ACTIVE | Noted: 2018-01-01

## 2018-08-08 NOTE — PROGRESS NOTES
DATE OF VISIT: 8/8/2018      REASON FOR VISIT:  Squamous cell cancer of left lung, stage IV , Anemia      HISTORY OF PRESENT ILLNESS:    58-year-old male with a past medical history significant for duodenal ulcer, extensive nicotine addiction with more than 45-pack-year smoking history, history of alcohol abuse was seen in consultation on May 3, 2017 for newly diagnosed stage IIB squamous cell cancer of Of left lung.  Patient finished concurrent chemoradiation on July 17, 2017.  Patient had a CT of chest done on December 1, 2017 showed recurrence involving left chest wall region making it stage IV.  In view of PDL 1 showing positivity about 20%, patient was started on Keytruda January 10, 2018.  Patient is here to get cycle 11 of Keytruda today.    States his appetite is improved.  Denies any new lymph node enlargement.   Denies any bleeding.  Denies any fever or new cough.  Denies any diarrhea or any new skin lesion.      PAST MEDICAL HISTORY:    Past Medical History:   Diagnosis Date   • Anemia    • Aortic stenosis, mild    • Arthritis    • Chronic bronchitis (CMS/HCC)    • Duodenal ulcer disease    • Inguinal hernia, left    • Lung cancer (CMS/HCC)    • Mitral valve regurgitation        SOCIAL HISTORY:    Social History   Substance Use Topics   • Smoking status: Current Every Day Smoker     Packs/day: 1.00     Years: 40.00     Types: Cigarettes   • Smokeless tobacco: Former User      Comment: Reduced at half a pack per day and weanning off   • Alcohol use 8.4 oz/week     14 Cans of beer per week      Comment: 6 pk/dly       Surgical History :  Past Surgical History:   Procedure Laterality Date   • APPENDECTOMY      open   • BRONCHOSCOPY N/A 3/8/2017    Procedure: BRONCHOSCOPY with possible bronchoalveolar lavage, +/- brush, +/- needle biopsy, +/- endobronchial biopsy;  Surgeon: Candis Lr MD;  Location: [facility] ENDOSCOPY;  Service:    • EXPLORATORY LAPAROTOMY  11/17/2014    Exploratory laparotomy, repair of  "duodenal ulcer, modified Carlos patch   • INGUINAL HERNIA REPAIR  09/13/2013    Open repair of an indirect left inguinal hernia. Left inguinal hernia.   • VA INSJ TUNNELED CVC W/O SUBQ PORT/ AGE 6 YR/> N/A 5/10/2017    Procedure: TUNNELED CENTRAL VENOUS CATHETER  WITH SUBCUTANEOUS  PORT,FLUOROSCOPIC GUIDANCE ;  Surgeon: Angel Hernandez MD;  Location: [facility];  Service: Cardiothoracic   • THORACOTOMY Left 4/19/2017    Procedure: THORACOTOMY LEFT WITH PULMONARY RESECTION AND BRONCHOSCOPY WITH ENDO ;  Surgeon: Angel Hernandez MD;  Location: [facility];  Service:        ALLERGIES:    No Known Allergies    FAMILY HISTORY:  Family History   Problem Relation Age of Onset   • Asthma Father    • Cancer Father    • Diabetes Father    • Hypertension Father    • Alcohol abuse Mother    • Heart failure Brother          REVIEW OF SYSTEMS:      CONSTITUTIONAL:  Positive for fatigue, denies any recent worsening.States his appetite is improved. Has lost 1 pound since last clinic visit.Denies any fever, chills .      HEENT: No epistaxis, mouth sores or difficulty swallowing.     RESPIRATORY: Denies any shortness of breath or cough today.     CARDIOVASCULAR: States pain in left chest wall pain gets worse with activity.  No palpitation.     GASTROINTESTINAL: No abdominal pain nausea, vomiting or blood in the stool.     GENITOURINARY: No Dysuria or Hematuria.     MUSCULOSKELETAL: Complains of intermittent chest wall discomfort secondary to work.  No new back pain or arthralgia..     LYMPHATICS: Denies any abnormal swollen glands anywhere in the body.     NEUROLOGICAL : No tingling or numbness. No headache or dizziness. No seizures or balance problems.     SKIN: No new skin lesions.           PHYSICAL EXAMINATION:      VITAL SIGNS:  /83   Pulse 88   Temp 97.8 °F (36.6 °C)   Resp 18   Ht 175.3 cm (69\")   Wt 57.6 kg (127 lb)   BMI 18.75 kg/m²      ECOG performance status : 1    GENERAL:  Not in any " distress.    HEENT:  Normocephalic, Atraumatic.Mild Conjunctival pallor. No icterus. Extraocular Movements Intact. No Facial Asymmetry noted.    NECK:  No adenopathy. No JVD.    RESPIRATORY:  Fair air entry bilateral. No rhonchi or wheezing.  Mild erythema of left chest wall.    CARDIOVASCULAR:  S1, S2. Regular rate and rhythm. No murmur or gallop appreciated.    ABDOMEN:  Soft,  nontender. Bowel sounds present in all four quadrants.  No hepatosplenomegaly appreciated.    MUSCULOSKELTAL:  No edema.No Calf Tenderness.Normal range of motion.    NEUROLOGIC:  Alert, awake and oriented ×3.  No  Motor or sensory deficit appreciated. Cranial Nerves 2-12 grossly intact.    SKIN: No new skin lesions    LYMPHATICS: No new enlarged lymph node in neck or supra-clavicular area.            DIAGNOSTIC DATA:    Glucose   Date Value Ref Range Status   08/08/2018 111 (H) 60 - 100 mg/dL Final     Glucose, Arterial   Date Value Ref Range Status   04/19/2017 176 mmol/L Final     Sodium   Date Value Ref Range Status   08/08/2018 137 137 - 145 mmol/L Final     Potassium   Date Value Ref Range Status   08/08/2018 4.1 3.5 - 5.1 mmol/L Final     CO2   Date Value Ref Range Status   08/08/2018 27.0 22.0 - 31.0 mmol/L Final     Chloride   Date Value Ref Range Status   08/08/2018 104 95 - 110 mmol/L Final     Anion Gap   Date Value Ref Range Status   08/08/2018 6.0 5.0 - 15.0 mmol/L Final     Creatinine   Date Value Ref Range Status   08/08/2018 0.49 (L) 0.70 - 1.30 mg/dL Final     BUN   Date Value Ref Range Status   08/08/2018 14 7 - 21 mg/dL Final     BUN/Creatinine Ratio   Date Value Ref Range Status   08/08/2018 28.6 (H) 7.0 - 25.0 Final     Calcium   Date Value Ref Range Status   08/08/2018 8.9 8.4 - 10.2 mg/dL Final     eGFR Non  Amer   Date Value Ref Range Status   08/08/2018 175 (H) 56 - 130 mL/min/1.73 Final     Alkaline Phosphatase   Date Value Ref Range Status   08/08/2018 80 38 - 126 U/L Final     Total Protein   Date Value  Ref Range Status   08/08/2018 7.5 6.3 - 8.6 g/dL Final     ALT (SGPT)   Date Value Ref Range Status   08/08/2018 23 21 - 72 U/L Final     AST (SGOT)   Date Value Ref Range Status   08/08/2018 25 17 - 59 U/L Final     Total Bilirubin   Date Value Ref Range Status   08/08/2018 0.4 0.2 - 1.3 mg/dL Final     Albumin   Date Value Ref Range Status   08/08/2018 4.20 3.40 - 4.80 g/dL Final     Globulin   Date Value Ref Range Status   08/08/2018 3.3 2.3 - 3.5 gm/dL Final     Lab Results   Component Value Date    WBC 10.43 (H) 08/08/2018    HGB 12.7 (L) 08/08/2018    HCT 37.7 (L) 08/08/2018    MCV 94.0 08/08/2018     08/08/2018     Lab Results   Component Value Date    NEUTROABS 8.19 08/08/2018    IRON 27 (L) 06/06/2018    TIBC 298 06/06/2018    LABIRON 9 (L) 06/06/2018    FERRITIN 120.00 06/06/2018    GAGEEELV01 620 06/06/2018    FOLATE >20.00 06/06/2018         PATHOLOGY:  Foundation 1 testing done on 26 December 2017 showed:  Genomic alteration identified includes:   PTEN loss  BCORL1 loos exon 8-12  CDKN@A/B loss  KMT@C(MLL3)G638  PRKC! Amplification  STAT4 truncation intron 9  TERC amplification  [id number]    No mutation identified in EGFR, KRAS, RET, ALK,MET, ERBB2, BRAF, ROS1          PDL 1 testing on tumor shows 20% positivity.          Pathology :  Pathology Report from April 19, 2017 showed:       SPECIMEN   Specimen   Lobe(s) of lung   Lobes of Lung   Upper lobe   Procedure   Lobectomy   Specimen Laterality   Left   TUMOR   Primary Tumor Site   Upper lobe   Histologic Type   Squamous cell carcinoma   Histologic Grade   G2: Moderately differentiated   Tumor Size   Greatest dimension (cm): 8 cm   Additional Dimension (cm)   7 cm       5 cm   Tumor Focality   Unifocal   Visceral Pleura Invasion   Present   Tumor Extension   Parietal pleura   Lymph-Vascular Invasion   Not identified   MARGINS   Bronchial Margin   Uninvolved by invasive carcinoma and carcinoma in situ   Vascular Margin   Uninvolved by invasive  carcinoma   Parenchymal Margin   Involved by invasive carcinoma   LYMPH NODES   Regional Lymph Nodes       Number of Lymph Nodes Examined   Specify number: 6   Gene Stations Examined   5: Subaortic/ aortopulmonary (AP) / AP window       11L: Interlobar   Lymph Node Involvement   No nodes involved   STAGE (pTNM)   Primary Tumor (pT)   pT3: Tumor greater than 7 cm in greatest dimension; or Tumor of any size that directly invades any of the following: parietal pleural chest wall (including superior sulcus tumors), diaphragm, phrenic nerve, mediastinal pleura, parietal pericardium; or Tumor of any size in the main bronchus less than 2 cm distal to the petar but without involvement of the petar; or Tumor of any size associated with atelectasis or obstructive pneumonitis of the entire lung; or Tumors of any size with separate tmor nodule(s) in same lobe   Regional Lymph Nodes (pN)   pN0: No regional lymph node metastasis   ADDITIONAL FINDINGS   Additional Pathologic Finding(s)   Other (specify): Fungal hyphae consistent with Aspergillus species in the cavity contents   .          RADIOLOGY DATA :  CT of chest, abdomen and pelvis with contrast done on May 9, 2018 was reviewed, discussed with patient, it showed:  IMPRESSION:  CONCLUSION: Evidence of prior left upper lobectomy. Loss of  volume and shift the heart and mediastinum to the left. Left  upper lobe infiltrative changes with bronchiectasis. This is  probably post therapeutic, radiation changes. This is similar to  CT of March 8, 2018, but increased since PET/CT examination.  Chronic pleural thickening left apex and destructive lesions left  anterior upper ribs unchanged since prior PET/CT and prior CT  examination probably a combination of radiation necrosis left  ribs and treated metastases.     Emphysematous changes right lung. New subtle tree-in-bud type  infiltrative changes right upper lobe right lower lobe.  Micronodules seen on prior CT examination the  right lung are not  identified on today's exam.     Degenerative changes thoracic and lumbar spine. 1 cm anterior  spondylolisthesis of L5 with respect to S1 secondary to facet  arthrosis and pars defects, spondylolysis. CT abdomen pelvis  otherwise remarkable. No evidence of any metastatic disease in  the abdomen or pelvis.        CT of chest, abdomen and pelvis with contrast done on March 8, 2018 was reviewed with radiologist Dr. Vences, discussed with patient, it showed:  IMPRESSION:  1. Postoperative changes from left upper lobectomy with volume  loss and associated post treatment/radiation changes in the left  lung apex. There is new posterior airspace disease in the extreme  left apex which is probably related to prior radiation therapy,  less likely pneumonia. Small scattered pulmonary nodules in the  left lung apex are partially obscured by this airspace disease  and the progression of volume loss although these appear grossly  stable.  2. Many of the small 2 to 3 mm pulmonary nodules in the right  lung are unchanged. There are three new small nodules described  above which are indeterminate and attention on follow-up is  recommended.  3. Probable radiation necrosis involving multiple anterior left  ribs. However, there may also be a component of rib destruction  due to tumor. There is increased thickening of the soft tissues  of the chest wall this location. The soft tissue thickening has  progressed with small areas of decreased attenuation are noted in  the soft tissue. These could represent areas of necrosis or  fluid. This could be in part due to a post radiation therapy  changes although when correlating with the PET/CT and PET/CT  report, this is also suspicious for tumor extension or  involvement of the chest wall.   4. No convincing evidence of metastatic disease involving the  abdomen or pelvis.    PET CT done on December 29, 2017 was reviewed, discussed with patient, it  showed:  IMPRESSION:  CONCLUSION:  1.  Mildly enlarging left lung apex pleural base mass lesion with  abnormal FDG activity suspicious for Pancoast tumor which is  invading into the anterior chest wall and partially eroding  through the anterior left second third and fourth ribs. No  current evidence of distant metastatic disease.         PET/CT done on May 22, 2017 showed:  IMPRESSION:  CONCLUSION:  1. Large Pancoast tumor involving left apex with significant  involvement of the periphery, pleura, portions of the chest wall  and contiguous involvement, destruction of at least one of the  anterior left upper ribs.     PET/CT imaging study is otherwise unremarkable. No evidence of  any distant metastases.          ASSESSMENT AND PLAN:      1.  Squamous cell cancer of left upper lobe, stage IIB, T3 N0 M0: Patient is status post surgical excision with positive pleural margin.  As per NCCN guidelines patient received concurrent chemoradiation with weekly carboplatin and Taxol.  Patient received concurrent chemoradiation with  weekly carboplatin and Taxol with radiation  From June 05,2017 until 07/17/2017.  CT scan done on December 1, 2017 shows enlarged left-sided precarinal lymph node as well as thickening of the left pleura eroding into the left rib cage worrisome for recurrence of cancer.  CT done on December 29, 2017 shows left chest wall recurrence with involvement of pleura, left treat this without any evidence of distance metastasis.  His cancer is stage IV because of the involvement of chest wall and pleura and ribs. PDl 1 testing was done which showed small positivity of 20%.  Foundation 1 testing showed positivity for PTEN loss which Everolimus can be used.  All other mutation that shows positivity are currently , There is no other targeted FDA approved treatment can be used.  -Patient was started on treatment with Keytruda on January 10, 2018.   -CT scan done on July 17, 2018 after cycle 9 of Keytruda showed  stable disease involving left chest wall region.  There was no evidence of any new metastatic focus.  -We will go ahead with cycle 11 of Keytruda today.  Plan would be to repeat CT of chest, abdomen and pelvis with contrast after cycle 12 of Keytruda which was discussed with patient.    2.  Left rib involvement secondary to local recurrence and progression: Treatment with Xgeva was discussed with patient side effect of right jaw including osteonecrosis of jaw, hypocalcemia, mouth sores were discussed.  Patient does need full mouth extraction, he has not done it yet.  We will hold off Xgeva until he gets dental work done.  Importance of seeing dentist and getting dental work done was discussed with patient again today.    3.  Pain management: .  He is using Percocet 10/325 every 4 hours with moderate pain relief.   Patient was counseled about the importance of bowel regimen to prevent constipation.  Prescription for morphine sulfate 30 mg by mouth twice a day with 60 tablet has been provided  on 07/18/18.  Prescription for Percocet 10/325 with 180 tablets has been provided  on August 6, 2018.    4.  macrocytosis: Patient was found to have MCV of 94 today .  Patient does admit to drinking 2-3 beer on a daily basis.  He remains on vitamin B12 thousand micrograms by mouth daily.  Again it was recommended to stay away from alcohol completely.    5.  Anemia: Most likely anemia of chronic disease secondary to underlying malignancy.  Patient's hemoglobin is 12.7 today.  Anemia workup done recently  showed component of iron deficiency with iron saturation of 9%.  She has been started on ferrous sulfate 1 tablet by mouth daily.    6.  Hypertension:Being followed by Dr. Stone .    7.Pulmonary aspergillosis: Patient had a fungus ball consistent with Aspergillus on a cavitary lesion of lung status postsurgical excision.     8. History of duodenal ulcer    9.  Abnormal thyroid function test: Thyroid function test done prior to  cycle 10 shows T4 was borderline low at 0.67 with normal TSH.  At this point we'll continue with clinical monitoring.  Patient is scheduled to get another thyroid function test with cycle 13 of Keytruda.  If it remains persistently abnormal, we will refer him to endocrine clinic which was discussed with patient today.     10. Health maintenance: Patient smokes 1-2 cigarettes per day, he was counseled about smoking cessation.  About 4 minutes were spent for smoking cessation counseling.       10.BMI:Patient's Body mass index is 18.75 kg/m². BMI is lower than reference range.  Patient was counseled about small frequent meals to prevent further weight loss.    11.  Advance care planning: For now patient remains full code and is able to make all his decision.  Patient has health care surrogate mentioned on chart.              Gualberto Dowd MD  8/8/2018  8:59 AM        EMR Dragon/Transcription disclaimer:   Much of this encounter note is an electronic transcription/translation of spoken language to printed text. The electronic translation of spoken language may permit erroneous, or at times, nonsensical words or phrases to be inadvertently transcribed; Although I have reviewed the note for such errors, some may still exist.

## 2018-08-29 PROBLEM — D72.829 LEUKOCYTOSIS: Status: ACTIVE | Noted: 2018-01-01

## 2018-08-29 NOTE — PROGRESS NOTES
DATE OF VISIT: 8/29/2018      REASON FOR VISIT:  Squamous cell cancer of left lung, stage IV , Anemia      HISTORY OF PRESENT ILLNESS:    58-year-old male with a past medical history significant for duodenal ulcer, extensive nicotine addiction with more than 45-pack-year smoking history, history of alcohol abuse was seen in consultation on May 3, 2017 for newly diagnosed stage IIB squamous cell cancer of Of left lung.  Patient finished concurrent chemoradiation on July 17, 2017.  Patient had a CT of chest done on December 1, 2017 showed recurrence involving left chest wall region making it stage IV.  In view of PDL 1 showing positivity about 20%, patient was started on Keytruda January 10, 2018.  Patient is here to get cycle 12 of Keytruda today.    States his appetite is improved.  Denies any new lymph node enlargement.   Denies any bleeding.  Denies any fever or new cough.  Denies any diarrhea or any new skin lesion.      PAST MEDICAL HISTORY:    Past Medical History:   Diagnosis Date   • Anemia    • Aortic stenosis, mild    • Arthritis    • Chronic bronchitis (CMS/HCC)    • Duodenal ulcer disease    • Inguinal hernia, left    • Lung cancer (CMS/HCC)    • Mitral valve regurgitation        SOCIAL HISTORY:    Social History   Substance Use Topics   • Smoking status: Current Every Day Smoker     Packs/day: 1.00     Years: 40.00     Types: Cigarettes   • Smokeless tobacco: Former User      Comment: Reduced at half a pack per day and weanning off   • Alcohol use 8.4 oz/week     14 Cans of beer per week      Comment: 6 pk/dly       Surgical History :  Past Surgical History:   Procedure Laterality Date   • APPENDECTOMY      open   • BRONCHOSCOPY N/A 3/8/2017    Procedure: BRONCHOSCOPY with possible bronchoalveolar lavage, +/- brush, +/- needle biopsy, +/- endobronchial biopsy;  Surgeon: Candis Lr MD;  Location: [facility] ENDOSCOPY;  Service:    • EXPLORATORY LAPAROTOMY  11/17/2014    Exploratory laparotomy, repair of  duodenal ulcer, modified Carlos patch   • INGUINAL HERNIA REPAIR  09/13/2013    Open repair of an indirect left inguinal hernia. Left inguinal hernia.   • GA INSJ TUNNELED CVC W/O SUBQ PORT/ AGE 6 YR/> N/A 5/10/2017    Procedure: TUNNELED CENTRAL VENOUS CATHETER  WITH SUBCUTANEOUS  PORT,FLUOROSCOPIC GUIDANCE ;  Surgeon: Angel Hernandez MD;  Location: [facility];  Service: Cardiothoracic   • THORACOTOMY Left 4/19/2017    Procedure: THORACOTOMY LEFT WITH PULMONARY RESECTION AND BRONCHOSCOPY WITH ENDO ;  Surgeon: Angel Hernandez MD;  Location: [facility];  Service:        ALLERGIES:    No Known Allergies    FAMILY HISTORY:  Family History   Problem Relation Age of Onset   • Asthma Father    • Cancer Father    • Diabetes Father    • Hypertension Father    • Alcohol abuse Mother    • Heart failure Brother          REVIEW OF SYSTEMS:      CONSTITUTIONAL:  Positive for fatigue, denies any recent worsening.States his appetite is improved. Has lost 1 pound since last clinic visit.Denies any fever, chills .      HEENT: No epistaxis, mouth sores or difficulty swallowing.     RESPIRATORY: Denies any shortness of breath or cough today.     CARDIOVASCULAR: States pain in left chest wall pain gets worse with activity.  No palpitation.     GASTROINTESTINAL: No abdominal pain nausea, vomiting or blood in the stool.     GENITOURINARY: No Dysuria or Hematuria.     MUSCULOSKELETAL: Complains of intermittent chest wall discomfort secondary to work.  No new back pain or arthralgia..     LYMPHATICS: Denies any abnormal swollen glands anywhere in the body.     NEUROLOGICAL : No tingling or numbness. No headache or dizziness. No seizures or balance problems.     SKIN: No new skin lesions.           PHYSICAL EXAMINATION:      VITAL SIGNS:  /82   Pulse 108   Temp 97.3 °F (36.3 °C) (Temporal Artery )   Resp 18   Wt 57.5 kg (126 lb 12.8 oz)   BMI 18.73 kg/m²      ECOG performance status : 1    GENERAL:  Not in any  distress.    HEENT:  Normocephalic, Atraumatic.Mild Conjunctival pallor. No icterus. Extraocular Movements Intact. No Facial Asymmetry noted.    NECK:  No adenopathy. No JVD.    RESPIRATORY:  Fair air entry bilateral. No rhonchi or wheezing.  Mild erythema of left chest wall.    CARDIOVASCULAR:  S1, S2. Regular rate and rhythm. No murmur or gallop appreciated.    ABDOMEN:  Soft,  nontender. Bowel sounds present in all four quadrants.  No hepatosplenomegaly appreciated.    MUSCULOSKELTAL:  No edema.No Calf Tenderness.Normal range of motion.    NEUROLOGIC:  Alert, awake and oriented ×3.  No  Motor or sensory deficit appreciated. Cranial Nerves 2-12 grossly intact.    SKIN: No new skin lesions    LYMPHATICS: No new enlarged lymph node in neck or supra-clavicular area.            DIAGNOSTIC DATA:    Glucose   Date Value Ref Range Status   08/29/2018 89 60 - 100 mg/dL Final     Glucose, Arterial   Date Value Ref Range Status   04/19/2017 176 mmol/L Final     Sodium   Date Value Ref Range Status   08/29/2018 141 137 - 145 mmol/L Final     Potassium   Date Value Ref Range Status   08/29/2018 3.9 3.5 - 5.1 mmol/L Final     CO2   Date Value Ref Range Status   08/29/2018 23.0 22.0 - 31.0 mmol/L Final     Chloride   Date Value Ref Range Status   08/29/2018 108 95 - 110 mmol/L Final     Anion Gap   Date Value Ref Range Status   08/29/2018 10.0 5.0 - 15.0 mmol/L Final     Creatinine   Date Value Ref Range Status   08/29/2018 0.51 (L) 0.70 - 1.30 mg/dL Final     BUN   Date Value Ref Range Status   08/29/2018 15 7 - 21 mg/dL Final     BUN/Creatinine Ratio   Date Value Ref Range Status   08/29/2018 29.4 (H) 7.0 - 25.0 Final     Calcium   Date Value Ref Range Status   08/29/2018 9.0 8.4 - 10.2 mg/dL Final     eGFR Non  Amer   Date Value Ref Range Status   08/29/2018 168 (H) 56 - 130 mL/min/1.73 Final     Alkaline Phosphatase   Date Value Ref Range Status   08/29/2018 90 38 - 126 U/L Final     Total Protein   Date Value Ref  Range Status   08/29/2018 7.5 6.3 - 8.6 g/dL Final     ALT (SGPT)   Date Value Ref Range Status   08/29/2018 26 21 - 72 U/L Final     AST (SGOT)   Date Value Ref Range Status   08/29/2018 25 17 - 59 U/L Final     Total Bilirubin   Date Value Ref Range Status   08/29/2018 0.5 0.2 - 1.3 mg/dL Final     Albumin   Date Value Ref Range Status   08/29/2018 4.10 3.40 - 4.80 g/dL Final     Globulin   Date Value Ref Range Status   08/29/2018 3.4 2.3 - 3.5 gm/dL Final     Lab Results   Component Value Date    WBC 10.32 (H) 08/29/2018    HGB 13.0 (L) 08/29/2018    HCT 38.3 (L) 08/29/2018    MCV 94.1 08/29/2018     08/29/2018     Lab Results   Component Value Date    NEUTROABS 7.92 08/29/2018    IRON 27 (L) 06/06/2018    TIBC 298 06/06/2018    LABIRON 9 (L) 06/06/2018    FERRITIN 120.00 06/06/2018    RYTEFGJM43 620 06/06/2018    FOLATE >20.00 06/06/2018         PATHOLOGY:  Foundation 1 testing done on 26 December 2017 showed:  Genomic alteration identified includes:   PTEN loss  BCORL1 loos exon 8-12  CDKN@A/B loss  KMT@C(MLL3)G638  PRKC! Amplification  STAT4 truncation intron 9  TERC amplification  [id number]    No mutation identified in EGFR, KRAS, RET, ALK,MET, ERBB2, BRAF, ROS1          PDL 1 testing on tumor shows 20% positivity.          Pathology :  Pathology Report from April 19, 2017 showed:       SPECIMEN   Specimen   Lobe(s) of lung   Lobes of Lung   Upper lobe   Procedure   Lobectomy   Specimen Laterality   Left   TUMOR   Primary Tumor Site   Upper lobe   Histologic Type   Squamous cell carcinoma   Histologic Grade   G2: Moderately differentiated   Tumor Size   Greatest dimension (cm): 8 cm   Additional Dimension (cm)   7 cm       5 cm   Tumor Focality   Unifocal   Visceral Pleura Invasion   Present   Tumor Extension   Parietal pleura   Lymph-Vascular Invasion   Not identified   MARGINS   Bronchial Margin   Uninvolved by invasive carcinoma and carcinoma in situ   Vascular Margin   Uninvolved by invasive  carcinoma   Parenchymal Margin   Involved by invasive carcinoma   LYMPH NODES   Regional Lymph Nodes       Number of Lymph Nodes Examined   Specify number: 6   Gene Stations Examined   5: Subaortic/ aortopulmonary (AP) / AP window       11L: Interlobar   Lymph Node Involvement   No nodes involved   STAGE (pTNM)   Primary Tumor (pT)   pT3: Tumor greater than 7 cm in greatest dimension; or Tumor of any size that directly invades any of the following: parietal pleural chest wall (including superior sulcus tumors), diaphragm, phrenic nerve, mediastinal pleura, parietal pericardium; or Tumor of any size in the main bronchus less than 2 cm distal to the petar but without involvement of the petar; or Tumor of any size associated with atelectasis or obstructive pneumonitis of the entire lung; or Tumors of any size with separate tmor nodule(s) in same lobe   Regional Lymph Nodes (pN)   pN0: No regional lymph node metastasis   ADDITIONAL FINDINGS   Additional Pathologic Finding(s)   Other (specify): Fungal hyphae consistent with Aspergillus species in the cavity contents   .          RADIOLOGY DATA :  CT of chest, abdomen and pelvis with contrast done on May 9, 2018 was reviewed, discussed with patient, it showed:  IMPRESSION:  CONCLUSION: Evidence of prior left upper lobectomy. Loss of  volume and shift the heart and mediastinum to the left. Left  upper lobe infiltrative changes with bronchiectasis. This is  probably post therapeutic, radiation changes. This is similar to  CT of March 8, 2018, but increased since PET/CT examination.  Chronic pleural thickening left apex and destructive lesions left  anterior upper ribs unchanged since prior PET/CT and prior CT  examination probably a combination of radiation necrosis left  ribs and treated metastases.     Emphysematous changes right lung. New subtle tree-in-bud type  infiltrative changes right upper lobe right lower lobe.  Micronodules seen on prior CT examination the  right lung are not  identified on today's exam.     Degenerative changes thoracic and lumbar spine. 1 cm anterior  spondylolisthesis of L5 with respect to S1 secondary to facet  arthrosis and pars defects, spondylolysis. CT abdomen pelvis  otherwise remarkable. No evidence of any metastatic disease in  the abdomen or pelvis.        CT of chest, abdomen and pelvis with contrast done on March 8, 2018 was reviewed with radiologist Dr. Vences, discussed with patient, it showed:  IMPRESSION:  1. Postoperative changes from left upper lobectomy with volume  loss and associated post treatment/radiation changes in the left  lung apex. There is new posterior airspace disease in the extreme  left apex which is probably related to prior radiation therapy,  less likely pneumonia. Small scattered pulmonary nodules in the  left lung apex are partially obscured by this airspace disease  and the progression of volume loss although these appear grossly  stable.  2. Many of the small 2 to 3 mm pulmonary nodules in the right  lung are unchanged. There are three new small nodules described  above which are indeterminate and attention on follow-up is  recommended.  3. Probable radiation necrosis involving multiple anterior left  ribs. However, there may also be a component of rib destruction  due to tumor. There is increased thickening of the soft tissues  of the chest wall this location. The soft tissue thickening has  progressed with small areas of decreased attenuation are noted in  the soft tissue. These could represent areas of necrosis or  fluid. This could be in part due to a post radiation therapy  changes although when correlating with the PET/CT and PET/CT  report, this is also suspicious for tumor extension or  involvement of the chest wall.   4. No convincing evidence of metastatic disease involving the  abdomen or pelvis.    PET CT done on December 29, 2017 was reviewed, discussed with patient, it  showed:  IMPRESSION:  CONCLUSION:  1.  Mildly enlarging left lung apex pleural base mass lesion with  abnormal FDG activity suspicious for Pancoast tumor which is  invading into the anterior chest wall and partially eroding  through the anterior left second third and fourth ribs. No  current evidence of distant metastatic disease.         PET/CT done on May 22, 2017 showed:  IMPRESSION:  CONCLUSION:  1. Large Pancoast tumor involving left apex with significant  involvement of the periphery, pleura, portions of the chest wall  and contiguous involvement, destruction of at least one of the  anterior left upper ribs.     PET/CT imaging study is otherwise unremarkable. No evidence of  any distant metastases.          ASSESSMENT AND PLAN:      1.  Squamous cell cancer of left upper lobe, stage IIB, T3 N0 M0: Patient is status post surgical excision with positive pleural margin.  As per NCCN guidelines patient received concurrent chemoradiation with weekly carboplatin and Taxol.  Patient received concurrent chemoradiation with  weekly carboplatin and Taxol with radiation  From June 05,2017 until 07/17/2017.  CT scan done on December 1, 2017 shows enlarged left-sided precarinal lymph node as well as thickening of the left pleura eroding into the left rib cage worrisome for recurrence of cancer.  CT done on December 29, 2017 shows left chest wall recurrence with involvement of pleura, left treat this without any evidence of distance metastasis.  His cancer is stage IV because of the involvement of chest wall and pleura and ribs. PDl 1 testing was done which showed small positivity of 20%.  Foundation 1 testing showed positivity for PTEN loss which Everolimus can be used.  All other mutation that shows positivity are currently , There is no other targeted FDA approved treatment can be used.  -Patient was started on treatment with Keytruda on January 10, 2018.   -CT scan done on July 17, 2018 after cycle 9 of Keytruda showed  stable disease involving left chest wall region.  There was no evidence of any new metastatic focus.  -We will go ahead with cycle 12 of Keytruda today.    -We will get restaging CT of chest, abdomen and pelvis with contrast prior to next clinic visit in 3 weeks.  This recommendation were discussed with patient.    2.  Left rib involvement secondary to local recurrence and progression: Treatment with Xgeva was discussed with patient side effect of right jaw including osteonecrosis of jaw, hypocalcemia, mouth sores were discussed.  Patient does need full mouth extraction, he has not done it yet.  We will hold off Xgeva until he gets dental work done.  Importance of seeing dentist and getting dental work done was discussed with patient again today.    3.  Pain management: .  He is using Percocet 10/325 every 4 hours with moderate pain relief.   Patient was counseled about the importance of bowel regimen to prevent constipation.  Prescription for morphine sulfate 30 mg by mouth twice a day with 60 tablet has been provided  on 07/18/18.  Prescription for Percocet 10/325 with 180 tablets has been provided  on August 6, 2018.    4.  macrocytosis: Patient was found to have MCV of 94 today .  Patient does admit to drinking 2-3 beer on a daily basis.  He remains on vitamin B12 thousand micrograms by mouth daily.  Again it was recommended to stay away from alcohol completely.    5.  Anemia: Most likely anemia of chronic disease secondary to underlying malignancy.  Patient's hemoglobin is 13.1 today.  Anemia workup done recently  showed component of iron deficiency with iron saturation of 9%.  She has been started on ferrous sulfate 1 tablet by mouth daily.    6.  Hypertension:Being followed by Dr. Stone .    7.Pulmonary aspergillosis: Patient had a fungus ball consistent with Aspergillus on a cavitary lesion of lung status postsurgical excision.     8.Leukocytosis: mostly reactive.Monitor with cbc for now.    9.  Abnormal  thyroid function test: Thyroid function test done prior to cycle 10 shows T4 was borderline low at 0.67 with normal TSH.  At this point we'll continue with clinical monitoring.  Patient is scheduled to get another thyroid function test with cycle 13 of Keytruda.  If it remains persistently abnormal, we will refer him to endocrine clinic which was discussed with patient today.     10. Health maintenance: Patient smokes intermittently, he was counseled about smoking cessation.  About 5 minutes were spent for smoking cessation counseling.       10.BMI:Patient's Body mass index is 18.73 kg/m². BMI is lower than reference range.  Patient was counseled about small frequent meals to prevent further weight loss.    11.  Advance care planning: For now patient remains full code and is able to make all his decision.  Patient has health care surrogate mentioned on chart.              Gualberto Dowd MD  8/29/2018  8:29 AM        EMR Dragon/Transcription disclaimer:   Much of this encounter note is an electronic transcription/translation of spoken language to printed text. The electronic translation of spoken language may permit erroneous, or at times, nonsensical words or phrases to be inadvertently transcribed; Although I have reviewed the note for such errors, some may still exist.

## 2018-09-19 NOTE — PROGRESS NOTES
DATE OF VISIT: 9/19/2018      REASON FOR VISIT:  Squamous cell cancer of left lung, stage IV , Anemia      HISTORY OF PRESENT ILLNESS:    58-year-old male with a past medical history significant for duodenal ulcer, extensive nicotine addiction with more than 45-pack-year smoking history, history of alcohol abuse was seen in consultation on May 3, 2017 for newly diagnosed stage IIB squamous cell cancer of Of left lung.  Patient finished concurrent chemoradiation on July 17, 2017.  Patient had a CT of chest done on December 1, 2017 showed recurrence involving left chest wall region making it stage IV.  In view of PDL 1 showing positivity about 20%, patient was started on Keytruda January 10, 2018.  Patient is here to get cycle 13 of Keytruda today.  Patient had a CT of chest, abdomen and pelvis with contrast done on September 12, 2018, he is here to discuss the result of CT scan and further recommendation.   States his appetite is improved.  Denies any new lymph node enlargement.   Denies any bleeding.  Denies any fever or new cough.  Denies any diarrhea or any new skin lesion.      PAST MEDICAL HISTORY:    Past Medical History:   Diagnosis Date   • Anemia    • Aortic stenosis, mild    • Arthritis    • Chronic bronchitis (CMS/HCC)    • Duodenal ulcer disease    • Inguinal hernia, left    • Lung cancer (CMS/HCC)    • Mitral valve regurgitation        SOCIAL HISTORY:    Social History   Substance Use Topics   • Smoking status: Current Every Day Smoker     Packs/day: 1.00     Years: 40.00     Types: Cigarettes   • Smokeless tobacco: Former User      Comment: Reduced at half a pack per day and weanning off   • Alcohol use 8.4 oz/week     14 Cans of beer per week      Comment: 6 pk/dly       Surgical History :  Past Surgical History:   Procedure Laterality Date   • APPENDECTOMY      open   • BRONCHOSCOPY N/A 3/8/2017    Procedure: BRONCHOSCOPY with possible bronchoalveolar lavage, +/- brush, +/- needle biopsy, +/-  endobronchial biopsy;  Surgeon: Candis Lr MD;  Location: [facility] ENDOSCOPY;  Service:    • EXPLORATORY LAPAROTOMY  11/17/2014    Exploratory laparotomy, repair of duodenal ulcer, modified Carlos patch   • INGUINAL HERNIA REPAIR  09/13/2013    Open repair of an indirect left inguinal hernia. Left inguinal hernia.   • FL INSJ TUNNELED CVC W/O SUBQ PORT/ AGE 6 YR/> N/A 5/10/2017    Procedure: TUNNELED CENTRAL VENOUS CATHETER  WITH SUBCUTANEOUS  PORT,FLUOROSCOPIC GUIDANCE ;  Surgeon: Angel Hernandez MD;  Location: [facility] OR;  Service: Cardiothoracic   • THORACOTOMY Left 4/19/2017    Procedure: THORACOTOMY LEFT WITH PULMONARY RESECTION AND BRONCHOSCOPY WITH ENDO ;  Surgeon: Angel Hernandez MD;  Location: [facility] OR;  Service:        ALLERGIES:    No Known Allergies    FAMILY HISTORY:  Family History   Problem Relation Age of Onset   • Asthma Father    • Cancer Father    • Diabetes Father    • Hypertension Father    • Alcohol abuse Mother    • Heart failure Brother          REVIEW OF SYSTEMS:      CONSTITUTIONAL:  Positive for fatigue, denies any recent worsening.States his appetite is improved. Has gained 1 pound since last clinic visit.Denies any fever, chills .      HEENT: No epistaxis, mouth sores or difficulty swallowing.     RESPIRATORY: Denies any shortness of breath or cough today.     CARDIOVASCULAR: Still complains of pain on left chest wall.  No palpitation.     GASTROINTESTINAL: No abdominal pain nausea, vomiting or blood in the stool.     GENITOURINARY: No Dysuria or Hematuria.     MUSCULOSKELETAL: Complains of intermittent chest wall discomfort secondary to work.  No new back pain or arthralgia..     LYMPHATICS: Denies any abnormal swollen glands anywhere in the body.     NEUROLOGICAL : No tingling or numbness. No headache or dizziness. No seizures or balance problems.     SKIN: No new skin lesions.           PHYSICAL EXAMINATION:      VITAL SIGNS:  /78   Pulse 79   Temp 98 °F  "(36.7 °C) (Temporal Artery )   Resp 18   Ht 172.2 cm (67.8\")   Wt 57.8 kg (127 lb 6.4 oz)   SpO2 98%   BMI 19.49 kg/m²      ECOG performance status : 1    GENERAL:  Not in any distress.    HEENT:  Normocephalic, Atraumatic.Mild Conjunctival pallor. No icterus. Extraocular Movements Intact. No Facial Asymmetry noted.    NECK:  No adenopathy. No JVD.    RESPIRATORY:  Fair air entry bilateral. No rhonchi or wheezing.  Mild erythema of left chest wall.    CARDIOVASCULAR:  S1, S2. Regular rate and rhythm. No murmur or gallop appreciated.    ABDOMEN:  Soft,  nontender. Bowel sounds present in all four quadrants.  No hepatosplenomegaly appreciated.    MUSCULOSKELTAL:  No edema.No Calf Tenderness.Normal range of motion.    NEUROLOGIC:  Alert, awake and oriented ×3.  No  Motor or sensory deficit appreciated. Cranial Nerves 2-12 grossly intact.    SKIN: No new skin lesions    LYMPHATICS: No new enlarged lymph node in neck or supra-clavicular area.            DIAGNOSTIC DATA:    Glucose   Date Value Ref Range Status   09/19/2018 143 (H) 60 - 100 mg/dL Final     Glucose, Arterial   Date Value Ref Range Status   04/19/2017 176 mmol/L Final     Sodium   Date Value Ref Range Status   09/19/2018 137 137 - 145 mmol/L Final     Potassium   Date Value Ref Range Status   09/19/2018 4.0 3.5 - 5.1 mmol/L Final     CO2   Date Value Ref Range Status   09/19/2018 29.0 22.0 - 31.0 mmol/L Final     Chloride   Date Value Ref Range Status   09/19/2018 99 95 - 110 mmol/L Final     Anion Gap   Date Value Ref Range Status   09/19/2018 9.0 5.0 - 15.0 mmol/L Final     Creatinine   Date Value Ref Range Status   09/19/2018 0.49 (L) 0.70 - 1.30 mg/dL Final     BUN   Date Value Ref Range Status   09/19/2018 16 7 - 21 mg/dL Final     BUN/Creatinine Ratio   Date Value Ref Range Status   09/19/2018 32.7 (H) 7.0 - 25.0 Final     Calcium   Date Value Ref Range Status   09/19/2018 8.8 8.4 - 10.2 mg/dL Final     eGFR Non  Amer   Date Value Ref " Range Status   09/19/2018 175 (H) 56 - 130 mL/min/1.73 Final     Alkaline Phosphatase   Date Value Ref Range Status   09/19/2018 100 38 - 126 U/L Final     Total Protein   Date Value Ref Range Status   09/19/2018 7.2 6.3 - 8.6 g/dL Final     ALT (SGPT)   Date Value Ref Range Status   09/19/2018 16 (L) 21 - 72 U/L Final     AST (SGOT)   Date Value Ref Range Status   09/19/2018 25 17 - 59 U/L Final     Total Bilirubin   Date Value Ref Range Status   09/19/2018 0.3 0.2 - 1.3 mg/dL Final     Albumin   Date Value Ref Range Status   09/19/2018 3.90 3.40 - 4.80 g/dL Final     Globulin   Date Value Ref Range Status   09/19/2018 3.3 2.3 - 3.5 gm/dL Final     Lab Results   Component Value Date    WBC 9.63 09/19/2018    HGB 12.4 (L) 09/19/2018    HCT 36.1 (L) 09/19/2018    MCV 95.8 09/19/2018     09/19/2018     Lab Results   Component Value Date    NEUTROABS 7.10 09/19/2018    IRON 27 (L) 06/06/2018    TIBC 298 06/06/2018    LABIRON 9 (L) 06/06/2018    FERRITIN 120.00 06/06/2018    QDIPIXSB11 620 06/06/2018    FOLATE >20.00 06/06/2018     Component      Latest Ref Rng & Units 3/14/2018 5/16/2018 7/18/2018 9/19/2018   Free T4      0.78 - 2.19 ng/dL 0.83 0.78 0.67 (L) 0.79           PATHOLOGY:  Foundation 1 testing done on 26 December 2017 showed:  Genomic alteration identified includes:   PTEN loss  BCORL1 loos exon 8-12  CDKN@A/B loss  KMT@C(MLL3)G638  PRKC! Amplification  STAT4 truncation intron 9  TERC amplification  [id number]    No mutation identified in EGFR, KRAS, RET, ALK,MET, ERBB2, BRAF, ROS1          PDL 1 testing on tumor shows 20% positivity.          Pathology :  Pathology Report from April 19, 2017 showed:       SPECIMEN   Specimen   Lobe(s) of lung   Lobes of Lung   Upper lobe   Procedure   Lobectomy   Specimen Laterality   Left   TUMOR   Primary Tumor Site   Upper lobe   Histologic Type   Squamous cell carcinoma   Histologic Grade   G2: Moderately differentiated   Tumor Size   Greatest dimension (cm): 8  cm   Additional Dimension (cm)   7 cm       5 cm   Tumor Focality   Unifocal   Visceral Pleura Invasion   Present   Tumor Extension   Parietal pleura   Lymph-Vascular Invasion   Not identified   MARGINS   Bronchial Margin   Uninvolved by invasive carcinoma and carcinoma in situ   Vascular Margin   Uninvolved by invasive carcinoma   Parenchymal Margin   Involved by invasive carcinoma   LYMPH NODES   Regional Lymph Nodes       Number of Lymph Nodes Examined   Specify number: 6   Gene Stations Examined   5: Subaortic/ aortopulmonary (AP) / AP window       11L: Interlobar   Lymph Node Involvement   No nodes involved   STAGE (pTNM)   Primary Tumor (pT)   pT3: Tumor greater than 7 cm in greatest dimension; or Tumor of any size that directly invades any of the following: parietal pleural chest wall (including superior sulcus tumors), diaphragm, phrenic nerve, mediastinal pleura, parietal pericardium; or Tumor of any size in the main bronchus less than 2 cm distal to the petar but without involvement of the petar; or Tumor of any size associated with atelectasis or obstructive pneumonitis of the entire lung; or Tumors of any size with separate tmor nodule(s) in same lobe   Regional Lymph Nodes (pN)   pN0: No regional lymph node metastasis   ADDITIONAL FINDINGS   Additional Pathologic Finding(s)   Other (specify): Fungal hyphae consistent with Aspergillus species in the cavity contents   .          RADIOLOGY DATA :  CT of chest, abdomen and pelvis with contrast done on September 12, 2018 was reviewed, discussed with patient, it showed:  CHEST: Emphysematous changes of the lungs are noted. There is  stable volume loss on the left with evidence of prior left upper  lobectomy. There are stable likely postradiation changes in the  superior segment of the left lower lobe which involves the left  lung apex now. There is no pleural or pericardial effusion. The  lungs are otherwise clear. There is again noted lytic  destruction  of portions of the left first through fourth ribs  anterolaterally. There is stable soft tissue density and  low-density in and around the ribs along the left upper lateral  pleural space and left chest wall. It is unknown if any of this  is active tumor or all treated disease. Follow-up PET/CT could  better evaluate. Overall the size of this appears not  significantly changed. Couple of borderline sized precarinal  lymph nodes are noted that appears stable. No new or worsening  thoracic adenopathy is noted. Minimal bilateral gynecomastia is  noted. No definite bony metastatic lesion of the thorax is noted  otherwise.     ABDOMEN: The solid abdominal organs are unremarkable. Vascular  calcifications are noted. There is no abdominal adenopathy. There  is no free fluid or free air within the abdomen. The abdominal  portion of the GI tract is unremarkable.     Pelvis: There is no free fluid in the pelvis. There is no pelvic  adenopathy. Pelvic portion of the GI tract is unremarkable.  Degenerative changes are noted in the lower lumbar spine. There  are bilateral pars defects at L5 with grade 1 spondylolisthesis  at L5-S1. No other bony abnormality is noted.     IMPRESSION:  Stable examination. If clinically indicated PET/CT  could better evaluate the left upper lateral chest wall for  active versus treated disease.    CT of chest, abdomen and pelvis with contrast done on May 9, 2018 was reviewed, discussed with patient, it showed:  IMPRESSION:  CONCLUSION: Evidence of prior left upper lobectomy. Loss of  volume and shift the heart and mediastinum to the left. Left  upper lobe infiltrative changes with bronchiectasis. This is  probably post therapeutic, radiation changes. This is similar to  CT of March 8, 2018, but increased since PET/CT examination.  Chronic pleural thickening left apex and destructive lesions left  anterior upper ribs unchanged since prior PET/CT and prior CT  examination probably a  combination of radiation necrosis left  ribs and treated metastases.     Emphysematous changes right lung. New subtle tree-in-bud type  infiltrative changes right upper lobe right lower lobe.  Micronodules seen on prior CT examination the right lung are not  identified on today's exam.     Degenerative changes thoracic and lumbar spine. 1 cm anterior  spondylolisthesis of L5 with respect to S1 secondary to facet  arthrosis and pars defects, spondylolysis. CT abdomen pelvis  otherwise remarkable. No evidence of any metastatic disease in  the abdomen or pelvis.        PET CT done on December 29, 2017 was reviewed, discussed with patient, it showed:  IMPRESSION:  CONCLUSION:  1.  Mildly enlarging left lung apex pleural base mass lesion with  abnormal FDG activity suspicious for Pancoast tumor which is  invading into the anterior chest wall and partially eroding  through the anterior left second third and fourth ribs. No  current evidence of distant metastatic disease.         PET/CT done on May 22, 2017 showed:  IMPRESSION:  CONCLUSION:  1. Large Pancoast tumor involving left apex with significant  involvement of the periphery, pleura, portions of the chest wall  and contiguous involvement, destruction of at least one of the  anterior left upper ribs.     PET/CT imaging study is otherwise unremarkable. No evidence of  any distant metastases.          ASSESSMENT AND PLAN:      1.  Squamous cell cancer of left upper lobe, stage IIB, T3 N0 M0: Patient is status post surgical excision with positive pleural margin.  As per NCCN guidelines patient received concurrent chemoradiation with weekly carboplatin and Taxol.  Patient received concurrent chemoradiation with  weekly carboplatin and Taxol with radiation  From June 05,2017 until 07/17/2017.  CT scan done on December 1, 2017 shows enlarged left-sided precarinal lymph node as well as thickening of the left pleura eroding into the left rib cage worrisome for recurrence of  cancer.  CT done on December 29, 2017 shows left chest wall recurrence with involvement of pleura, left treat this without any evidence of distance metastasis.  His cancer is stage IV because of the involvement of chest wall and pleura and ribs. PDl 1 testing was done which showed small positivity of 20%.  Foundation 1 testing showed positivity for PTEN loss which Everolimus can be used.  All other mutation that shows positivity are currently , There is no other targeted FDA approved treatment can be used.  -Patient was started on treatment with Keytruda on January 10, 2018.   -CT scan done on July 17, 2018 after cycle 9 of Keytruda showed stable disease involving left chest wall region.  There was no evidence of any new metastatic focus.  -CT scan of chest, abdomen and pelvis with contrast done on September 12, 2018 after cycle plan of Keytruda showed stable disease involving left chest wall region.  There was no evidence of any new metastatic focus.  Result of CT scan were discussed with patient.  -We will go ahead with cycle 13 of Keytruda today.    -We will get restaging CT of chest, abdomen and pelvis with contrast after cycle 16 for restaging purpose.  This recommendation were discussed with patient.    2.  Left rib involvement secondary to local recurrence and progression: Treatment with Xgeva was discussed with patient side effect of right jaw including osteonecrosis of jaw, hypocalcemia, mouth sores were discussed.  Patient does need full mouth extraction, he has not done it yet.  We will hold off Xgeva until he gets dental work done.  Importance of seeing dentist and getting dental work done was discussed with patient again today.    3.  Pain management: .  He is using Percocet 10/325 every 4 hours with moderate pain relief.   Patient was counseled about the importance of bowel regimen to prevent constipation.  Prescription for morphine sulfate 30 mg by mouth twice a day with 60 tablet has been provided   on 09/17/18.  Prescription for Percocet 10/325 with 180 tablets has been provided  on 09/04/2018.    4.  macrocytosis: Patient was found to have MCV of 94 today .  Patient does admit to drinking 2-3 beer on a daily basis.  He remains on vitamin B12 thousand micrograms by mouth daily.  Again it was recommended to stay away from alcohol completely.    5.  Anemia: Most likely anemia of chronic disease secondary to underlying malignancy.  Patient's hemoglobin is 12.4today.  Anemia workup done recently  showed component of iron deficiency with iron saturation of 9%.  She has been started on ferrous sulfate 1 tablet by mouth daily.    6.  Hypertension:Being followed by Dr. Stone .    7.Pulmonary aspergillosis: Patient had a fungus ball consistent with Aspergillus on a cavitary lesion of lung status postsurgical excision.     8.Leukocytosis: mostly reactive.Monitor with cbc for now.    9.  Abnormal thyroid function test: Thyroid function test done prior to cycle 10 shows T4 was borderline low at 0.67 with normal TSH.    -Free T4 done today on September 19, 2018 prior to cycle 13 is normal at 0.79.  We will monitor clinically for now.     10. Health maintenance: Patient smokes intermittently, he was counseled about smoking cessation.  About 5 minutes were spent for smoking cessation counseling.       10.BMI:Patient's Body mass index is 19.49 kg/m². BMI is lower than reference range.  Patient was counseled about small frequent meals to prevent further weight loss.    11.  Advance care planning: For now patient remains full code and is able to make all his decision.  Patient has health care surrogate mentioned on chart.              Gualberto Dowd MD  9/19/2018  9:53 AM        EMR Dragon/Transcription disclaimer:   Much of this encounter note is an electronic transcription/translation of spoken language to printed text. The electronic translation of spoken language may permit erroneous, or at times, nonsensical words or  phrases to be inadvertently transcribed; Although I have reviewed the note for such errors, some may still exist.

## 2018-10-10 NOTE — PROGRESS NOTES
DATE OF VISIT: 10/10/2018      REASON FOR VISIT:  Squamous cell cancer of left lung, stage IV , Anemia      HISTORY OF PRESENT ILLNESS:    58-year-old male with a past medical history significant for duodenal ulcer, extensive nicotine addiction with more than 45-pack-year smoking history, history of alcohol abuse was seen in consultation on May 3, 2017 for newly diagnosed stage IIB squamous cell cancer of Of left lung.  Patient finished concurrent chemoradiation on July 17, 2017.  Patient had a CT of chest done on December 1, 2017 showed recurrence involving left chest wall region making it stage IV.  In view of PDL 1 showing positivity about 20%, patient was started on Keytruda January 10, 2018.  Patient is here to get cycle 14 of Keytruda today. Denies any new lymph node enlargement.   Denies any bleeding.  Denies any fever or new cough.  Denies any diarrhea or any new skin lesion.      PAST MEDICAL HISTORY:    Past Medical History:   Diagnosis Date   • Anemia    • Aortic stenosis, mild    • Arthritis    • Chronic bronchitis (CMS/HCC)    • Duodenal ulcer disease    • Inguinal hernia, left    • Lung cancer (CMS/HCC)    • Mitral valve regurgitation        SOCIAL HISTORY:    Social History   Substance Use Topics   • Smoking status: Current Every Day Smoker     Packs/day: 1.00     Years: 40.00     Types: Cigarettes   • Smokeless tobacco: Former User      Comment: Reduced at half a pack per day and weanning off   • Alcohol use 8.4 oz/week     14 Cans of beer per week      Comment: 6 pk/dly       Surgical History :  Past Surgical History:   Procedure Laterality Date   • APPENDECTOMY      open   • BRONCHOSCOPY N/A 3/8/2017    Procedure: BRONCHOSCOPY with possible bronchoalveolar lavage, +/- brush, +/- needle biopsy, +/- endobronchial biopsy;  Surgeon: Candis Lr MD;  Location: [facility] ENDOSCOPY;  Service:    • EXPLORATORY LAPAROTOMY  11/17/2014    Exploratory laparotomy, repair of duodenal ulcer, modified Carlos  "patch   • INGUINAL HERNIA REPAIR  09/13/2013    Open repair of an indirect left inguinal hernia. Left inguinal hernia.   • OH INSJ TUNNELED CVC W/O SUBQ PORT/ AGE 6 YR/> N/A 5/10/2017    Procedure: TUNNELED CENTRAL VENOUS CATHETER  WITH SUBCUTANEOUS  PORT,FLUOROSCOPIC GUIDANCE ;  Surgeon: Angel Hernandez MD;  Location: [facility];  Service: Cardiothoracic   • THORACOTOMY Left 4/19/2017    Procedure: THORACOTOMY LEFT WITH PULMONARY RESECTION AND BRONCHOSCOPY WITH ENDO ;  Surgeon: Angel Hernandez MD;  Location: [facility];  Service:        ALLERGIES:    No Known Allergies    FAMILY HISTORY:  Family History   Problem Relation Age of Onset   • Asthma Father    • Cancer Father    • Diabetes Father    • Hypertension Father    • Alcohol abuse Mother    • Heart failure Brother          REVIEW OF SYSTEMS:      CONSTITUTIONAL:  Positive for fatigue, denies any recent worsening.States his appetite is improved. Has gained 2 pound since last clinic visit.Denies any fever, chills .      HEENT: No epistaxis, mouth sores or difficulty swallowing.     RESPIRATORY: Denies any shortness of breath or cough today.     CARDIOVASCULAR: Still complains of pain on left chest wall.  No palpitation.     GASTROINTESTINAL: No abdominal pain nausea, vomiting or blood in the stool.     GENITOURINARY: No Dysuria or Hematuria.     MUSCULOSKELETAL: Complains of intermittent chest wall discomfort secondary to work.  No new back pain or arthralgia..     LYMPHATICS: Denies any abnormal swollen glands anywhere in the body.     NEUROLOGICAL : No tingling or numbness. No headache or dizziness. No seizures or balance problems.     SKIN: No new skin lesions.           PHYSICAL EXAMINATION:      VITAL SIGNS:  /87   Pulse 87   Temp 98.8 °F (37.1 °C) (Temporal Artery )   Resp 16   Ht 172.7 cm (67.99\")   Wt 58.8 kg (129 lb 9.6 oz)   SpO2 98%   BMI 19.71 kg/m²      ECOG performance status : 1    GENERAL:  Not in any " distress.    HEENT:  Normocephalic, Atraumatic.Mild Conjunctival pallor. No icterus. Extraocular Movements Intact. No Facial Asymmetry noted.    NECK:  No adenopathy. No JVD.    RESPIRATORY:  Fair air entry bilateral. No rhonchi or wheezing.  Mild erythema of left chest wall.    CARDIOVASCULAR:  S1, S2. Regular rate and rhythm. No murmur or gallop appreciated.    ABDOMEN:  Soft,  nontender. Bowel sounds present in all four quadrants.  No hepatosplenomegaly appreciated.    MUSCULOSKELTAL:  No edema.No Calf Tenderness.Normal range of motion.    NEUROLOGIC:  Alert, awake and oriented ×3.  No  Motor or sensory deficit appreciated. Cranial Nerves 2-12 grossly intact.    SKIN: No new skin lesions    LYMPHATICS: No new enlarged lymph node in neck or supra-clavicular area.            DIAGNOSTIC DATA:    Glucose   Date Value Ref Range Status   10/10/2018 137 (H) 60 - 100 mg/dL Final     Glucose, Arterial   Date Value Ref Range Status   04/19/2017 176 mmol/L Final     Sodium   Date Value Ref Range Status   10/10/2018 137 137 - 145 mmol/L Final     Potassium   Date Value Ref Range Status   10/10/2018 4.2 3.5 - 5.1 mmol/L Final     CO2   Date Value Ref Range Status   10/10/2018 28.0 22.0 - 31.0 mmol/L Final     Chloride   Date Value Ref Range Status   10/10/2018 101 95 - 110 mmol/L Final     Anion Gap   Date Value Ref Range Status   10/10/2018 8.0 5.0 - 15.0 mmol/L Final     Creatinine   Date Value Ref Range Status   10/10/2018 0.50 (L) 0.70 - 1.30 mg/dL Final     BUN   Date Value Ref Range Status   10/10/2018 11 7 - 21 mg/dL Final     BUN/Creatinine Ratio   Date Value Ref Range Status   10/10/2018 22.0 7.0 - 25.0 Final     Calcium   Date Value Ref Range Status   10/10/2018 8.8 8.4 - 10.2 mg/dL Final     eGFR Non  Amer   Date Value Ref Range Status   10/10/2018 171 (H) 56 - 130 mL/min/1.73 Final     Alkaline Phosphatase   Date Value Ref Range Status   10/10/2018 97 38 - 126 U/L Final     Total Protein   Date Value Ref  Range Status   10/10/2018 7.3 6.3 - 8.6 g/dL Final     ALT (SGPT)   Date Value Ref Range Status   10/10/2018 25 21 - 72 U/L Final     AST (SGOT)   Date Value Ref Range Status   10/10/2018 38 17 - 59 U/L Final     Total Bilirubin   Date Value Ref Range Status   10/10/2018 0.2 0.2 - 1.3 mg/dL Final     Albumin   Date Value Ref Range Status   10/10/2018 3.90 3.40 - 4.80 g/dL Final     Globulin   Date Value Ref Range Status   10/10/2018 3.4 2.3 - 3.5 gm/dL Final     Lab Results   Component Value Date    WBC 10.06 (H) 10/10/2018    HGB 12.9 (L) 10/10/2018    HCT 38.2 (L) 10/10/2018    MCV 95.5 10/10/2018     10/10/2018     Lab Results   Component Value Date    NEUTROABS 7.72 10/10/2018    IRON 27 (L) 06/06/2018    TIBC 298 06/06/2018    LABIRON 9 (L) 06/06/2018    FERRITIN 120.00 06/06/2018    [id number] 620 06/06/2018    FOLATE >20.00 06/06/2018     Component      Latest Ref Rng & Units 3/14/2018 5/16/2018 7/18/2018 9/19/2018   Free T4      0.78 - 2.19 ng/dL 0.83 0.78 0.67 (L) 0.79           PATHOLOGY:  Foundation 1 testing done on 26 December 2017 showed:  Genomic alteration identified includes:   PTEN loss  BCORL1 loos exon 8-12  CDKN@A/B loss  KMT@C(MLL3)G638  PRKC! Amplification  STAT4 truncation intron 9  TERC amplification  [id number]    No mutation identified in EGFR, KRAS, RET, ALK,MET, ERBB2, BRAF, ROS1          PDL 1 testing on tumor shows 20% positivity.          Pathology :  Pathology Report from April 19, 2017 showed:       SPECIMEN   Specimen   Lobe(s) of lung   Lobes of Lung   Upper lobe   Procedure   Lobectomy   Specimen Laterality   Left   TUMOR   Primary Tumor Site   Upper lobe   Histologic Type   Squamous cell carcinoma   Histologic Grade   G2: Moderately differentiated   Tumor Size   Greatest dimension (cm): 8 cm   Additional Dimension (cm)   7 cm       5 cm   Tumor Focality   Unifocal   Visceral Pleura Invasion   Present   Tumor Extension   Parietal pleura   Lymph-Vascular Invasion   Not  identified   MARGINS   Bronchial Margin   Uninvolved by invasive carcinoma and carcinoma in situ   Vascular Margin   Uninvolved by invasive carcinoma   Parenchymal Margin   Involved by invasive carcinoma   LYMPH NODES   Regional Lymph Nodes       Number of Lymph Nodes Examined   Specify number: 6   Gene Stations Examined   5: Subaortic/ aortopulmonary (AP) / AP window       11L: Interlobar   Lymph Node Involvement   No nodes involved   STAGE (pTNM)   Primary Tumor (pT)   pT3: Tumor greater than 7 cm in greatest dimension; or Tumor of any size that directly invades any of the following: parietal pleural chest wall (including superior sulcus tumors), diaphragm, phrenic nerve, mediastinal pleura, parietal pericardium; or Tumor of any size in the main bronchus less than 2 cm distal to the petar but without involvement of the petar; or Tumor of any size associated with atelectasis or obstructive pneumonitis of the entire lung; or Tumors of any size with separate tmor nodule(s) in same lobe   Regional Lymph Nodes (pN)   pN0: No regional lymph node metastasis   ADDITIONAL FINDINGS   Additional Pathologic Finding(s)   Other (specify): Fungal hyphae consistent with Aspergillus species in the cavity contents   .          RADIOLOGY DATA :  CT of chest, abdomen and pelvis with contrast done on September 12, 2018 was reviewed, discussed with patient, it showed:  CHEST: Emphysematous changes of the lungs are noted. There is  stable volume loss on the left with evidence of prior left upper  lobectomy. There are stable likely postradiation changes in the  superior segment of the left lower lobe which involves the left  lung apex now. There is no pleural or pericardial effusion. The  lungs are otherwise clear. There is again noted lytic destruction  of portions of the left first through fourth ribs  anterolaterally. There is stable soft tissue density and  low-density in and around the ribs along the left upper lateral  pleural  space and left chest wall. It is unknown if any of this  is active tumor or all treated disease. Follow-up PET/CT could  better evaluate. Overall the size of this appears not  significantly changed. Couple of borderline sized precarinal  lymph nodes are noted that appears stable. No new or worsening  thoracic adenopathy is noted. Minimal bilateral gynecomastia is  noted. No definite bony metastatic lesion of the thorax is noted  otherwise.     ABDOMEN: The solid abdominal organs are unremarkable. Vascular  calcifications are noted. There is no abdominal adenopathy. There  is no free fluid or free air within the abdomen. The abdominal  portion of the GI tract is unremarkable.     Pelvis: There is no free fluid in the pelvis. There is no pelvic  adenopathy. Pelvic portion of the GI tract is unremarkable.  Degenerative changes are noted in the lower lumbar spine. There  are bilateral pars defects at L5 with grade 1 spondylolisthesis  at L5-S1. No other bony abnormality is noted.     IMPRESSION:  Stable examination. If clinically indicated PET/CT  could better evaluate the left upper lateral chest wall for  active versus treated disease.    CT of chest, abdomen and pelvis with contrast done on May 9, 2018 was reviewed, discussed with patient, it showed:  IMPRESSION:  CONCLUSION: Evidence of prior left upper lobectomy. Loss of  volume and shift the heart and mediastinum to the left. Left  upper lobe infiltrative changes with bronchiectasis. This is  probably post therapeutic, radiation changes. This is similar to  CT of March 8, 2018, but increased since PET/CT examination.  Chronic pleural thickening left apex and destructive lesions left  anterior upper ribs unchanged since prior PET/CT and prior CT  examination probably a combination of radiation necrosis left  ribs and treated metastases.     Emphysematous changes right lung. New subtle tree-in-bud type  infiltrative changes right upper lobe right lower  lobe.  Micronodules seen on prior CT examination the right lung are not  identified on today's exam.     Degenerative changes thoracic and lumbar spine. 1 cm anterior  spondylolisthesis of L5 with respect to S1 secondary to facet  arthrosis and pars defects, spondylolysis. CT abdomen pelvis  otherwise remarkable. No evidence of any metastatic disease in  the abdomen or pelvis.        PET CT done on December 29, 2017 was reviewed, discussed with patient, it showed:  IMPRESSION:  CONCLUSION:  1.  Mildly enlarging left lung apex pleural base mass lesion with  abnormal FDG activity suspicious for Pancoast tumor which is  invading into the anterior chest wall and partially eroding  through the anterior left second third and fourth ribs. No  current evidence of distant metastatic disease.         PET/CT done on May 22, 2017 showed:  IMPRESSION:  CONCLUSION:  1. Large Pancoast tumor involving left apex with significant  involvement of the periphery, pleura, portions of the chest wall  and contiguous involvement, destruction of at least one of the  anterior left upper ribs.     PET/CT imaging study is otherwise unremarkable. No evidence of  any distant metastases.          ASSESSMENT AND PLAN:      1.  Squamous cell cancer of left upper lobe, stage IIB, T3 N0 M0: Patient is status post surgical excision with positive pleural margin.  As per NCCN guidelines patient received concurrent chemoradiation with weekly carboplatin and Taxol.  Patient received concurrent chemoradiation with  weekly carboplatin and Taxol with radiation  From June 05,2017 until 07/17/2017.  CT scan done on December 1, 2017 shows enlarged left-sided precarinal lymph node as well as thickening of the left pleura eroding into the left rib cage worrisome for recurrence of cancer.  CT done on December 29, 2017 shows left chest wall recurrence with involvement of pleura, left treat this without any evidence of distance metastasis.  His cancer is stage IV  because of the involvement of chest wall and pleura and ribs. PDl 1 testing was done which showed small positivity of 20%.  Foundation 1 testing showed positivity for PTEN loss which Everolimus can be used.  All other mutation that shows positivity are currently , There is no other targeted FDA approved treatment can be used.  -Patient was started on treatment with Keytruda on January 10, 2018.   -CT scan done on July 17, 2018 after cycle 9 of Keytruda showed stable disease involving left chest wall region.  There was no evidence of any new metastatic focus.  -CT scan of chest, abdomen and pelvis with contrast done on September 12, 2018 after cycle plan of Keytruda showed stable disease involving left chest wall region.  There was no evidence of any new metastatic focus.  Result of CT scan were discussed with patient.  -We will go ahead with cycle 14 of Keytruda today.    -We will get restaging CT of chest, abdomen and pelvis with contrast after cycle 16 for restaging purpose.  This recommendation were discussed with patient.    2.  Left rib involvement secondary to local recurrence and progression: Treatment with Xgeva was discussed with patient side effect of right jaw including osteonecrosis of jaw, hypocalcemia, mouth sores were discussed.  Patient does need full mouth extraction, he has not done it yet.  We will hold off Xgeva until he gets dental work done.  Importance of seeing dentist and getting dental work done was discussed with patient again today.    3.  Pain management: .  He is using Percocet 10/325 every 4 hours with moderate pain relief.   Patient was counseled about the importance of bowel regimen to prevent constipation.  Prescription for morphine sulfate 30 mg by mouth twice a day with 60 tablet has been provided  on 09/17/18.  Prescription for Percocet 10/325 with 180 tablets has been provided  on 10/03/2018.    4.  macrocytosis: Patient was found to have MCV of 94 today .  Patient does admit to  drinking 2-3 beer on a daily basis.  He remains on vitamin B12 thousand micrograms by mouth daily.  Again it was recommended to stay away from alcohol completely.    5.  Anemia: Most likely anemia of chronic disease secondary to underlying malignancy.  Patient's hemoglobin is 12.9 today.  Anemia workup done recently  showed component of iron deficiency with iron saturation of 9%.  She has been started on ferrous sulfate 1 tablet by mouth daily.    6.  Hypertension:Being followed by Dr. Stone .    7.Pulmonary aspergillosis: Patient had a fungus ball consistent with Aspergillus on a cavitary lesion of lung status postsurgical excision.     8.Leukocytosis: mostly reactive.Monitor with cbc for now.    9.  Abnormal thyroid function test: Thyroid function test done prior to cycle 10 shows T4 was borderline low at 0.67 with normal TSH.    -Free T4 done on September 19, 2018 prior to cycle 13 is normal at 0.79.  We will monitor clinically for now.     10. Health maintenance: Patient smokes intermittently, he was counseled about smoking cessation.  About 5 minutes were spent for smoking cessation counseling.       10.BMI:Patient's Body mass index is 19.71 kg/m². BMI is lower than reference range.  Patient was counseled about small frequent meals to prevent further weight loss.    11.  Advance care planning: For now patient remains full code and is able to make all his decision.  Patient has health care surrogate mentioned on chart.              Gualberto Dowd MD  10/10/2018  8:31 AM        EMR Dragon/Transcription disclaimer:   Much of this encounter note is an electronic transcription/translation of spoken language to printed text. The electronic translation of spoken language may permit erroneous, or at times, nonsensical words or phrases to be inadvertently transcribed; Although I have reviewed the note for such errors, some may still exist.

## 2018-10-16 NOTE — TELEPHONE ENCOUNTER
----- Message from Gualberto Dowd MD sent at 10/16/2018  8:16 AM CDT -----  Regarding: FW: Prescription Question  Contact: [phone]  Prescription sent. Thanks    ----- Message -----  From: Wero Barriga RN  Sent: 10/16/2018   7:48 AM  To: Gualberto Dowd MD  Subject: FW: Prescription Question                        Please see below    ----- Message -----  From: Tino Peres  Sent: 10/15/2018   7:26 PM  To: [facility] [place]  Subject: Prescription Question                            Dr. Dowd, would you please refill my prescription for morphine sulfate. Thank you sir. See you on Halloween.

## 2018-10-16 NOTE — TELEPHONE ENCOUNTER
Left message for pt that dr rice has sent in his prescription to his pharmacy and if he has any further questions to call us back.

## 2018-10-31 PROBLEM — Z45.2 ENCOUNTER FOR VENOUS ACCESS DEVICE CARE: Status: ACTIVE | Noted: 2018-01-01

## 2018-10-31 NOTE — PROGRESS NOTES
DATE OF VISIT: 10/31/2018      REASON FOR VISIT:  Squamous cell cancer of left lung, stage IV , Anemia      HISTORY OF PRESENT ILLNESS:    58-year-old male with a past medical history significant for duodenal ulcer, extensive nicotine addiction with more than 45-pack-year smoking history, history of alcohol abuse was seen in consultation on May 3, 2017 for newly diagnosed stage IIB squamous cell cancer of Of left lung.  Patient finished concurrent chemoradiation on July 17, 2017.  Patient had a CT of chest done on December 1, 2017 showed recurrence involving left chest wall region making it stage IV.  In view of PDL 1 showing positivity about 20%, patient was started on Keytruda January 10, 2018.  Patient is here to get cycle 15 of Keytruda today. Denies any new lymph node enlargement.   Denies any bleeding.  Denies any fever or new cough.  Denies any diarrhea or any new skin lesion.      PAST MEDICAL HISTORY:    Past Medical History:   Diagnosis Date   • Anemia    • Aortic stenosis, mild    • Arthritis    • Chronic bronchitis (CMS/HCC)    • Duodenal ulcer disease    • Inguinal hernia, left    • Lung cancer (CMS/HCC)    • Mitral valve regurgitation        SOCIAL HISTORY:    Social History   Substance Use Topics   • Smoking status: Current Every Day Smoker     Packs/day: 1.00     Years: 40.00     Types: Cigarettes   • Smokeless tobacco: Former User      Comment: Reduced at half a pack per day and weanning off   • Alcohol use 8.4 oz/week     14 Cans of beer per week      Comment: 6 pk/dly       Surgical History :  Past Surgical History:   Procedure Laterality Date   • APPENDECTOMY      open   • BRONCHOSCOPY N/A 3/8/2017    Procedure: BRONCHOSCOPY with possible bronchoalveolar lavage, +/- brush, +/- needle biopsy, +/- endobronchial biopsy;  Surgeon: Candis Lr MD;  Location: [facility] ENDOSCOPY;  Service:    • EXPLORATORY LAPAROTOMY  11/17/2014    Exploratory laparotomy, repair of duodenal ulcer, modified Carlos  "patch   • INGUINAL HERNIA REPAIR  09/13/2013    Open repair of an indirect left inguinal hernia. Left inguinal hernia.   • DC INSJ TUNNELED CVC W/O SUBQ PORT/ AGE 6 YR/> N/A 5/10/2017    Procedure: TUNNELED CENTRAL VENOUS CATHETER  WITH SUBCUTANEOUS  PORT,FLUOROSCOPIC GUIDANCE ;  Surgeon: Angel Hernandez MD;  Location: [facility];  Service: Cardiothoracic   • THORACOTOMY Left 4/19/2017    Procedure: THORACOTOMY LEFT WITH PULMONARY RESECTION AND BRONCHOSCOPY WITH ENDO ;  Surgeon: Angel Hernandez MD;  Location: [facility];  Service:        ALLERGIES:    No Known Allergies    FAMILY HISTORY:  Family History   Problem Relation Age of Onset   • Asthma Father    • Cancer Father    • Diabetes Father    • Hypertension Father    • Alcohol abuse Mother    • Heart failure Brother          REVIEW OF SYSTEMS:      CONSTITUTIONAL:  Positive for fatigue, denies any recent worsening.States his appetite is improved. Has lost 5 pounds since last clinic visit.Denies any fever, chills .      HEENT: No epistaxis, mouth sores or difficulty swallowing.     RESPIRATORY: Denies any shortness of breath or cough today.     CARDIOVASCULAR: Still complains of pain on left chest wall.  No palpitation.     GASTROINTESTINAL: No abdominal pain nausea, vomiting or blood in the stool.     GENITOURINARY: No Dysuria or Hematuria.     MUSCULOSKELETAL: Complains of intermittent chest wall discomfort secondary to work.  No new back pain or arthralgia..     LYMPHATICS: Denies any abnormal swollen glands anywhere in the body.     NEUROLOGICAL : No tingling or numbness. No headache or dizziness. No seizures or balance problems.     SKIN: No new skin lesions.           PHYSICAL EXAMINATION:      VITAL SIGNS:  /91   Pulse 91   Temp 98.2 °F (36.8 °C) (Temporal Artery )   Resp 18   Ht 172.7 cm (67.99\")   Wt 56.4 kg (124 lb 6.4 oz)   SpO2 98%   BMI 18.92 kg/m²      ECOG performance status : 1    GENERAL:  Not in any distress.    HEENT: "  Normocephalic, Atraumatic.Mild Conjunctival pallor. No icterus. Extraocular Movements Intact. No Facial Asymmetry noted.    NECK:  No adenopathy. No JVD.    RESPIRATORY:  Fair air entry bilateral. No rhonchi or wheezing.  Mild erythema of left chest wall.    CARDIOVASCULAR:  S1, S2. Regular rate and rhythm. No murmur or gallop appreciated.    ABDOMEN:  Soft,  nontender. Bowel sounds present in all four quadrants.  No hepatosplenomegaly appreciated.    MUSCULOSKELTAL:  No edema.No Calf Tenderness.Normal range of motion.    NEUROLOGIC:  Alert, awake and oriented ×3.  No  Motor or sensory deficit appreciated. Cranial Nerves 2-12 grossly intact.    SKIN: No new skin lesions    LYMPHATICS: No new enlarged lymph node in neck or supra-clavicular area.            DIAGNOSTIC DATA:    Glucose   Date Value Ref Range Status   10/31/2018 120 (H) 60 - 100 mg/dL Final     Glucose, Arterial   Date Value Ref Range Status   04/19/2017 176 mmol/L Final     Sodium   Date Value Ref Range Status   10/31/2018 138 137 - 145 mmol/L Final     Potassium   Date Value Ref Range Status   10/31/2018 3.6 3.5 - 5.1 mmol/L Final     CO2   Date Value Ref Range Status   10/31/2018 25.0 22.0 - 31.0 mmol/L Final     Chloride   Date Value Ref Range Status   10/31/2018 105 95 - 110 mmol/L Final     Anion Gap   Date Value Ref Range Status   10/31/2018 8.0 5.0 - 15.0 mmol/L Final     Creatinine   Date Value Ref Range Status   10/31/2018 0.54 (L) 0.70 - 1.30 mg/dL Final     BUN   Date Value Ref Range Status   10/31/2018 14 7 - 21 mg/dL Final     BUN/Creatinine Ratio   Date Value Ref Range Status   10/31/2018 25.9 (H) 7.0 - 25.0 Final     Calcium   Date Value Ref Range Status   10/31/2018 9.3 8.4 - 10.2 mg/dL Final     eGFR Non  Amer   Date Value Ref Range Status   10/31/2018 157 (H) 56 - 130 mL/min/1.73 Final     Alkaline Phosphatase   Date Value Ref Range Status   10/31/2018 100 38 - 126 U/L Final     Total Protein   Date Value Ref Range Status    10/31/2018 7.5 6.3 - 8.6 g/dL Final     ALT (SGPT)   Date Value Ref Range Status   10/31/2018 27 21 - 72 U/L Final     AST (SGOT)   Date Value Ref Range Status   10/31/2018 30 17 - 59 U/L Final     Total Bilirubin   Date Value Ref Range Status   10/31/2018 0.5 0.2 - 1.3 mg/dL Final     Albumin   Date Value Ref Range Status   10/31/2018 4.00 3.40 - 4.80 g/dL Final     Globulin   Date Value Ref Range Status   10/31/2018 3.5 2.3 - 3.5 gm/dL Final     Lab Results   Component Value Date    WBC 10.72 (H) 10/31/2018    HGB 13.0 (L) 10/31/2018    HCT 37.6 (L) 10/31/2018    MCV 91.9 10/31/2018     10/31/2018     Lab Results   Component Value Date    NEUTROABS 8.35 10/31/2018    IRON 27 (L) 06/06/2018    TIBC 298 06/06/2018    LABIRON 9 (L) 06/06/2018    FERRITIN 120.00 06/06/2018    [id number] 620 06/06/2018    FOLATE >20.00 06/06/2018     Component      Latest Ref Rng & Units 3/14/2018 5/16/2018 7/18/2018 9/19/2018   Free T4      0.78 - 2.19 ng/dL 0.83 0.78 0.67 (L) 0.79           PATHOLOGY:  Foundation 1 testing done on 26 December 2017 showed:  Genomic alteration identified includes:   PTEN loss  BCORL1 loos exon 8-12  CDKN@A/B loss  KMT@C(MLL3)G638  PRKC! Amplification  STAT4 truncation intron 9  TERC amplification  [id number]    No mutation identified in EGFR, KRAS, RET, ALK,MET, ERBB2, BRAF, ROS1          PDL 1 testing on tumor shows 20% positivity.          Pathology :  Pathology Report from April 19, 2017 showed:       SPECIMEN   Specimen   Lobe(s) of lung   Lobes of Lung   Upper lobe   Procedure   Lobectomy   Specimen Laterality   Left   TUMOR   Primary Tumor Site   Upper lobe   Histologic Type   Squamous cell carcinoma   Histologic Grade   G2: Moderately differentiated   Tumor Size   Greatest dimension (cm): 8 cm   Additional Dimension (cm)   7 cm       5 cm   Tumor Focality   Unifocal   Visceral Pleura Invasion   Present   Tumor Extension   Parietal pleura   Lymph-Vascular Invasion   Not identified    MARGINS   Bronchial Margin   Uninvolved by invasive carcinoma and carcinoma in situ   Vascular Margin   Uninvolved by invasive carcinoma   Parenchymal Margin   Involved by invasive carcinoma   LYMPH NODES   Regional Lymph Nodes       Number of Lymph Nodes Examined   Specify number: 6   Gene Stations Examined   5: Subaortic/ aortopulmonary (AP) / AP window       11L: Interlobar   Lymph Node Involvement   No nodes involved   STAGE (pTNM)   Primary Tumor (pT)   pT3: Tumor greater than 7 cm in greatest dimension; or Tumor of any size that directly invades any of the following: parietal pleural chest wall (including superior sulcus tumors), diaphragm, phrenic nerve, mediastinal pleura, parietal pericardium; or Tumor of any size in the main bronchus less than 2 cm distal to the petar but without involvement of the petar; or Tumor of any size associated with atelectasis or obstructive pneumonitis of the entire lung; or Tumors of any size with separate tmor nodule(s) in same lobe   Regional Lymph Nodes (pN)   pN0: No regional lymph node metastasis   ADDITIONAL FINDINGS   Additional Pathologic Finding(s)   Other (specify): Fungal hyphae consistent with Aspergillus species in the cavity contents   .          RADIOLOGY DATA :  CT of chest, abdomen and pelvis with contrast done on September 12, 2018 was reviewed, discussed with patient, it showed:  CHEST: Emphysematous changes of the lungs are noted. There is  stable volume loss on the left with evidence of prior left upper  lobectomy. There are stable likely postradiation changes in the  superior segment of the left lower lobe which involves the left  lung apex now. There is no pleural or pericardial effusion. The  lungs are otherwise clear. There is again noted lytic destruction  of portions of the left first through fourth ribs  anterolaterally. There is stable soft tissue density and  low-density in and around the ribs along the left upper lateral  pleural space and  left chest wall. It is unknown if any of this  is active tumor or all treated disease. Follow-up PET/CT could  better evaluate. Overall the size of this appears not  significantly changed. Couple of borderline sized precarinal  lymph nodes are noted that appears stable. No new or worsening  thoracic adenopathy is noted. Minimal bilateral gynecomastia is  noted. No definite bony metastatic lesion of the thorax is noted  otherwise.     ABDOMEN: The solid abdominal organs are unremarkable. Vascular  calcifications are noted. There is no abdominal adenopathy. There  is no free fluid or free air within the abdomen. The abdominal  portion of the GI tract is unremarkable.     Pelvis: There is no free fluid in the pelvis. There is no pelvic  adenopathy. Pelvic portion of the GI tract is unremarkable.  Degenerative changes are noted in the lower lumbar spine. There  are bilateral pars defects at L5 with grade 1 spondylolisthesis  at L5-S1. No other bony abnormality is noted.     IMPRESSION:  Stable examination. If clinically indicated PET/CT  could better evaluate the left upper lateral chest wall for  active versus treated disease.    CT of chest, abdomen and pelvis with contrast done on May 9, 2018 was reviewed, discussed with patient, it showed:  IMPRESSION:  CONCLUSION: Evidence of prior left upper lobectomy. Loss of  volume and shift the heart and mediastinum to the left. Left  upper lobe infiltrative changes with bronchiectasis. This is  probably post therapeutic, radiation changes. This is similar to  CT of March 8, 2018, but increased since PET/CT examination.  Chronic pleural thickening left apex and destructive lesions left  anterior upper ribs unchanged since prior PET/CT and prior CT  examination probably a combination of radiation necrosis left  ribs and treated metastases.     Emphysematous changes right lung. New subtle tree-in-bud type  infiltrative changes right upper lobe right lower lobe.  Micronodules  seen on prior CT examination the right lung are not  identified on today's exam.     Degenerative changes thoracic and lumbar spine. 1 cm anterior  spondylolisthesis of L5 with respect to S1 secondary to facet  arthrosis and pars defects, spondylolysis. CT abdomen pelvis  otherwise remarkable. No evidence of any metastatic disease in  the abdomen or pelvis.        PET CT done on December 29, 2017 was reviewed, discussed with patient, it showed:  IMPRESSION:  CONCLUSION:  1.  Mildly enlarging left lung apex pleural base mass lesion with  abnormal FDG activity suspicious for Pancoast tumor which is  invading into the anterior chest wall and partially eroding  through the anterior left second third and fourth ribs. No  current evidence of distant metastatic disease.         PET/CT done on May 22, 2017 showed:  IMPRESSION:  CONCLUSION:  1. Large Pancoast tumor involving left apex with significant  involvement of the periphery, pleura, portions of the chest wall  and contiguous involvement, destruction of at least one of the  anterior left upper ribs.     PET/CT imaging study is otherwise unremarkable. No evidence of  any distant metastases.          ASSESSMENT AND PLAN:      1.  Squamous cell cancer of left upper lobe, stage IIB, T3 N0 M0: Patient is status post surgical excision with positive pleural margin.  As per NCCN guidelines patient received concurrent chemoradiation with weekly carboplatin and Taxol.  Patient received concurrent chemoradiation with  weekly carboplatin and Taxol with radiation  From June 05,2017 until 07/17/2017.  CT scan done on December 1, 2017 shows enlarged left-sided precarinal lymph node as well as thickening of the left pleura eroding into the left rib cage worrisome for recurrence of cancer.  CT done on December 29, 2017 shows left chest wall recurrence with involvement of pleura, left treat this without any evidence of distance metastasis.  His cancer is stage IV because of the  involvement of chest wall and pleura and ribs. PDl 1 testing was done which showed small positivity of 20%.  Foundation 1 testing showed positivity for PTEN loss which Everolimus can be used.  All other mutation that shows positivity are currently , There is no other targeted FDA approved treatment can be used.  -Patient was started on treatment with Keytruda on January 10, 2018.   -CT scan done on July 17, 2018 after cycle 9 of Keytruda showed stable disease involving left chest wall region.  There was no evidence of any new metastatic focus.  -CT scan of chest, abdomen and pelvis with contrast done on September 12, 2018 after cycle plan of Keytruda showed stable disease involving left chest wall region.  There was no evidence of any new metastatic focus.  Result of CT scan were discussed with patient.  -We will go ahead with cycle 15 of Keytruda today.    -We will get restaging CT of chest, abdomen and pelvis with contrast after cycle 16 for restaging purpose.  This recommendation were discussed with patient.    2.  Left rib involvement secondary to local recurrence and progression: Treatment with Xgeva was discussed with patient side effect of right jaw including osteonecrosis of jaw, hypocalcemia, mouth sores were discussed.  Patient does need full mouth extraction, he has not done it yet.  We will hold off Xgeva until he gets dental work done.  Importance of seeing dentist and getting dental work done was discussed with patient again today.    3.  Pain management: .  He is using Percocet 10/325 every 4 hours with moderate pain relief.   Patient was counseled about the importance of bowel regimen to prevent constipation.  Prescription for morphine sulfate 30 mg by mouth twice a day with 60 tablet has been provided  on 10/16/18.  Prescription for Percocet 10/325 with 180 tablets has been provided  on 10/31/2018.    4.  macrocytosis: Patient was found to have MCV of 91 today .  Patient does admit to drinking 2-3  beer on a daily basis.  He remains on vitamin B12 thousand micrograms by mouth daily.  Again it was recommended to stay away from alcohol completely.    5.  Anemia: Most likely anemia of chronic disease secondary to underlying malignancy.  Patient's hemoglobin is 13.0 today.  Anemia workup done recently  showed component of iron deficiency with iron saturation of 9%.  She has been started on ferrous sulfate 1 tablet by mouth daily.    6.  Hypertension:Being followed by Dr. Stone .    7.Pulmonary aspergillosis: Patient had a fungus ball consistent with Aspergillus on a cavitary lesion of lung status postsurgical excision.     8.Leukocytosis: mostly reactive.Monitor with cbc for now.    9.  Abnormal thyroid function test: Thyroid function test done prior to cycle 10 shows T4 was borderline low at 0.67 with normal TSH.    -Free T4 done on September 19, 2018 prior to cycle 13 is normal at 0.79.  We will monitor clinically for now.     10. Health maintenance: Patient smokes intermittently, he was counseled about smoking cessation.  About 5 minutes were spent for smoking cessation counseling.       10.BMI:Patient's Body mass index is 18.92 kg/m². BMI is lower than reference range.  Patient was counseled about small frequent meals to prevent further weight loss.    11.  Advance care planning: For now patient remains full code and is able to make all his decision.  Patient has health care surrogate mentioned on chart.              Gualberto Dowd MD  10/31/2018  8:31 AM        EMR Dragon/Transcription disclaimer:   Much of this encounter note is an electronic transcription/translation of spoken language to printed text. The electronic translation of spoken language may permit erroneous, or at times, nonsensical words or phrases to be inadvertently transcribed; Although I have reviewed the note for such errors, some may still exist.

## 2018-11-21 NOTE — PROGRESS NOTES
DATE OF VISIT: 11/21/2018      REASON FOR VISIT:  Squamous cell cancer of left lung, stage IV , Anemia      HISTORY OF PRESENT ILLNESS:    58-year-old male with a past medical history significant for duodenal ulcer, extensive nicotine addiction with more than 45-pack-year smoking history, history of alcohol abuse was seen in consultation on May 3, 2017 for newly diagnosed stage IIB squamous cell cancer of Of left lung.  Patient finished concurrent chemoradiation on July 17, 2017.  Patient had a CT of chest done on December 1, 2017 showed recurrence involving left chest wall region making it stage IV.  In view of PDL 1 showing positivity about 20%, patient was started on Keytruda January 10, 2018.  Patient is here to get cycle 16 of Keytruda today. Denies any new lymph node enlargement.   Denies any bleeding.  Denies any fever or new cough.  Denies any diarrhea or any new skin lesion.      PAST MEDICAL HISTORY:    Past Medical History:   Diagnosis Date   • Anemia    • Aortic stenosis, mild    • Arthritis    • Chronic bronchitis (CMS/HCC)    • Duodenal ulcer disease    • Inguinal hernia, left    • Lung cancer (CMS/HCC)    • Mitral valve regurgitation        SOCIAL HISTORY:    Social History     Tobacco Use   • Smoking status: Current Every Day Smoker     Packs/day: 1.00     Years: 40.00     Pack years: 40.00     Types: Cigarettes   • Smokeless tobacco: Former User   • Tobacco comment: Reduced at half a pack per day and weanning off   Substance Use Topics   • Alcohol use: Yes     Alcohol/week: 8.4 oz     Types: 14 Cans of beer per week     Comment: 6 pk/dly   • Drug use: No       Surgical History :  Past Surgical History:   Procedure Laterality Date   • APPENDECTOMY      open   • EXPLORATORY LAPAROTOMY  11/17/2014    Exploratory laparotomy, repair of duodenal ulcer, modified Carlos patch   • INGUINAL HERNIA REPAIR  09/13/2013    Open repair of an indirect left inguinal hernia. Left inguinal hernia.       ALLERGIES:   "  No Known Allergies    FAMILY HISTORY:  Family History   Problem Relation Age of Onset   • Asthma Father    • Cancer Father    • Diabetes Father    • Hypertension Father    • Alcohol abuse Mother    • Heart failure Brother          REVIEW OF SYSTEMS:      CONSTITUTIONAL:  Positive for fatigue, denies any recent worsening.States his appetite is improved. Has gained 10 pounds since last clinic visit.Denies any fever, chills .      HEENT: No epistaxis, mouth sores or difficulty swallowing.     RESPIRATORY: Denies any shortness of breath or cough today.     CARDIOVASCULAR: Still complains of pain on left chest wall.  No palpitation.     GASTROINTESTINAL: No abdominal pain nausea, vomiting or blood in the stool.     GENITOURINARY: No Dysuria or Hematuria.     MUSCULOSKELETAL: Complains of intermittent chest wall discomfort secondary to work.  No new back pain or arthralgia..     LYMPHATICS: Denies any abnormal swollen glands anywhere in the body.     NEUROLOGICAL : No tingling or numbness. No headache or dizziness. No seizures or balance problems.     SKIN: No new skin lesions.           PHYSICAL EXAMINATION:      VITAL SIGNS:  /70   Pulse 91   Temp 98.4 °F (36.9 °C) (Temporal)   Resp 18   Ht 172.7 cm (67.99\")   Wt 61.1 kg (134 lb 9.6 oz)   SpO2 97%   BMI 20.47 kg/m²      ECOG performance status : 1    GENERAL:  Not in any distress.    HEENT:  Normocephalic, Atraumatic.Mild Conjunctival pallor. No icterus. Extraocular Movements Intact. No Facial Asymmetry noted.    NECK:  No adenopathy. No JVD.    RESPIRATORY:  Fair air entry bilateral. No rhonchi or wheezing.  Mild erythema of left chest wall.    CARDIOVASCULAR:  S1, S2. Regular rate and rhythm. No murmur or gallop appreciated.    ABDOMEN:  Soft,  nontender. Bowel sounds present in all four quadrants.  No hepatosplenomegaly appreciated.    MUSCULOSKELTAL:  No edema.No Calf Tenderness.Normal range of motion.    NEUROLOGIC:  Alert, awake and oriented ×3.  No "  Motor or sensory deficit appreciated. Cranial Nerves 2-12 grossly intact.    SKIN: No new skin lesions    LYMPHATICS: No new enlarged lymph node in neck or supra-clavicular area.            DIAGNOSTIC DATA:    Glucose   Date Value Ref Range Status   11/21/2018 128 (H) 60 - 100 mg/dL Final     Glucose, Arterial   Date Value Ref Range Status   04/19/2017 176 mmol/L Final     Sodium   Date Value Ref Range Status   11/21/2018 138 137 - 145 mmol/L Final     Potassium   Date Value Ref Range Status   11/21/2018 4.0 3.5 - 5.1 mmol/L Final     CO2   Date Value Ref Range Status   11/21/2018 31.0 22.0 - 31.0 mmol/L Final     Chloride   Date Value Ref Range Status   11/21/2018 97 95 - 110 mmol/L Final     Anion Gap   Date Value Ref Range Status   11/21/2018 10.0 5.0 - 15.0 mmol/L Final     Creatinine   Date Value Ref Range Status   11/21/2018 0.48 (L) 0.70 - 1.30 mg/dL Final     BUN   Date Value Ref Range Status   11/21/2018 10 7 - 21 mg/dL Final     BUN/Creatinine Ratio   Date Value Ref Range Status   11/21/2018 20.8 7.0 - 25.0 Final     Calcium   Date Value Ref Range Status   11/21/2018 9.0 8.4 - 10.2 mg/dL Final     eGFR Non  Amer   Date Value Ref Range Status   11/21/2018 180 (H) 56 - 130 mL/min/1.73 Final     Alkaline Phosphatase   Date Value Ref Range Status   11/21/2018 101 38 - 126 U/L Final     Total Protein   Date Value Ref Range Status   11/21/2018 7.1 6.3 - 8.6 g/dL Final     ALT (SGPT)   Date Value Ref Range Status   11/21/2018 17 (L) 21 - 72 U/L Final     AST (SGOT)   Date Value Ref Range Status   11/21/2018 33 17 - 59 U/L Final     Total Bilirubin   Date Value Ref Range Status   11/21/2018 0.3 0.2 - 1.3 mg/dL Final     Albumin   Date Value Ref Range Status   11/21/2018 4.00 3.40 - 4.80 g/dL Final     Globulin   Date Value Ref Range Status   11/21/2018 3.1 2.3 - 3.5 gm/dL Final     Lab Results   Component Value Date    WBC 8.89 11/21/2018    HGB 12.3 (L) 11/21/2018    HCT 36.5 (L) 11/21/2018    MCV 92.2  11/21/2018     11/21/2018     Lab Results   Component Value Date    NEUTROABS 6.40 11/21/2018    IRON 17 (L) 11/21/2018    TIBC 284 11/21/2018    LABIRON 6 (L) 11/21/2018    FERRITIN 73.00 11/21/2018    SCBRQBCQ48 487 11/21/2018    FOLATE >20.00 11/21/2018     Component      Latest Ref Rng & Units 3/14/2018 5/16/2018 7/18/2018 9/19/2018   Free T4      0.78 - 2.19 ng/dL 0.83 0.78 0.67 (L) 0.79           PATHOLOGY:  Foundation 1 testing done on 26 December 2017 showed:  Genomic alteration identified includes:   PTEN loss  BCORL1 loos exon 8-12  CDKN@A/B loss  KMT@C(MLL3)G638  PRKC! Amplification  STAT4 truncation intron 9  TERC amplification  [id number]    No mutation identified in EGFR, KRAS, RET, ALK,MET, ERBB2, BRAF, ROS1          PDL 1 testing on tumor shows 20% positivity.          Pathology :  Pathology Report from April 19, 2017 showed:       SPECIMEN   Specimen   Lobe(s) of lung   Lobes of Lung   Upper lobe   Procedure   Lobectomy   Specimen Laterality   Left   TUMOR   Primary Tumor Site   Upper lobe   Histologic Type   Squamous cell carcinoma   Histologic Grade   G2: Moderately differentiated   Tumor Size   Greatest dimension (cm): 8 cm   Additional Dimension (cm)   7 cm       5 cm   Tumor Focality   Unifocal   Visceral Pleura Invasion   Present   Tumor Extension   Parietal pleura   Lymph-Vascular Invasion   Not identified   MARGINS   Bronchial Margin   Uninvolved by invasive carcinoma and carcinoma in situ   Vascular Margin   Uninvolved by invasive carcinoma   Parenchymal Margin   Involved by invasive carcinoma   LYMPH NODES   Regional Lymph Nodes       Number of Lymph Nodes Examined   Specify number: 6   Gene Stations Examined   5: Subaortic/ aortopulmonary (AP) / AP window       11L: Interlobar   Lymph Node Involvement   No nodes involved   STAGE (pTNM)   Primary Tumor (pT)   pT3: Tumor greater than 7 cm in greatest dimension; or Tumor of any size that directly invades any of the following:  parietal pleural chest wall (including superior sulcus tumors), diaphragm, phrenic nerve, mediastinal pleura, parietal pericardium; or Tumor of any size in the main bronchus less than 2 cm distal to the petar but without involvement of the petar; or Tumor of any size associated with atelectasis or obstructive pneumonitis of the entire lung; or Tumors of any size with separate tmor nodule(s) in same lobe   Regional Lymph Nodes (pN)   pN0: No regional lymph node metastasis   ADDITIONAL FINDINGS   Additional Pathologic Finding(s)   Other (specify): Fungal hyphae consistent with Aspergillus species in the cavity contents   .          RADIOLOGY DATA :  CT of chest, abdomen and pelvis with contrast done on September 12, 2018 was reviewed, discussed with patient, it showed:  CHEST: Emphysematous changes of the lungs are noted. There is  stable volume loss on the left with evidence of prior left upper  lobectomy. There are stable likely postradiation changes in the  superior segment of the left lower lobe which involves the left  lung apex now. There is no pleural or pericardial effusion. The  lungs are otherwise clear. There is again noted lytic destruction  of portions of the left first through fourth ribs  anterolaterally. There is stable soft tissue density and  low-density in and around the ribs along the left upper lateral  pleural space and left chest wall. It is unknown if any of this  is active tumor or all treated disease. Follow-up PET/CT could  better evaluate. Overall the size of this appears not  significantly changed. Couple of borderline sized precarinal  lymph nodes are noted that appears stable. No new or worsening  thoracic adenopathy is noted. Minimal bilateral gynecomastia is  noted. No definite bony metastatic lesion of the thorax is noted  otherwise.     ABDOMEN: The solid abdominal organs are unremarkable. Vascular  calcifications are noted. There is no abdominal adenopathy. There  is no free fluid  or free air within the abdomen. The abdominal  portion of the GI tract is unremarkable.     Pelvis: There is no free fluid in the pelvis. There is no pelvic  adenopathy. Pelvic portion of the GI tract is unremarkable.  Degenerative changes are noted in the lower lumbar spine. There  are bilateral pars defects at L5 with grade 1 spondylolisthesis  at L5-S1. No other bony abnormality is noted.     IMPRESSION:  Stable examination. If clinically indicated PET/CT  could better evaluate the left upper lateral chest wall for  active versus treated disease.    CT of chest, abdomen and pelvis with contrast done on May 9, 2018 was reviewed, discussed with patient, it showed:  IMPRESSION:  CONCLUSION: Evidence of prior left upper lobectomy. Loss of  volume and shift the heart and mediastinum to the left. Left  upper lobe infiltrative changes with bronchiectasis. This is  probably post therapeutic, radiation changes. This is similar to  CT of March 8, 2018, but increased since PET/CT examination.  Chronic pleural thickening left apex and destructive lesions left  anterior upper ribs unchanged since prior PET/CT and prior CT  examination probably a combination of radiation necrosis left  ribs and treated metastases.     Emphysematous changes right lung. New subtle tree-in-bud type  infiltrative changes right upper lobe right lower lobe.  Micronodules seen on prior CT examination the right lung are not  identified on today's exam.     Degenerative changes thoracic and lumbar spine. 1 cm anterior  spondylolisthesis of L5 with respect to S1 secondary to facet  arthrosis and pars defects, spondylolysis. CT abdomen pelvis  otherwise remarkable. No evidence of any metastatic disease in  the abdomen or pelvis.        PET CT done on December 29, 2017 was reviewed, discussed with patient, it showed:  IMPRESSION:  CONCLUSION:  1.  Mildly enlarging left lung apex pleural base mass lesion with  abnormal FDG activity suspicious for Pancoast  tumor which is  invading into the anterior chest wall and partially eroding  through the anterior left second third and fourth ribs. No  current evidence of distant metastatic disease.         PET/CT done on May 22, 2017 showed:  IMPRESSION:  CONCLUSION:  1. Large Pancoast tumor involving left apex with significant  involvement of the periphery, pleura, portions of the chest wall  and contiguous involvement, destruction of at least one of the  anterior left upper ribs.     PET/CT imaging study is otherwise unremarkable. No evidence of  any distant metastases.          ASSESSMENT AND PLAN:      1.  Squamous cell cancer of left upper lobe, stage IIB, T3 N0 M0: Patient is status post surgical excision with positive pleural margin.  As per NCCN guidelines patient received concurrent chemoradiation with weekly carboplatin and Taxol.  Patient received concurrent chemoradiation with  weekly carboplatin and Taxol with radiation  From June 05,2017 until 07/17/2017.  CT scan done on December 1, 2017 shows enlarged left-sided precarinal lymph node as well as thickening of the left pleura eroding into the left rib cage worrisome for recurrence of cancer.  CT done on December 29, 2017 shows left chest wall recurrence with involvement of pleura, left treat this without any evidence of distance metastasis.  His cancer is stage IV because of the involvement of chest wall and pleura and ribs. PDl 1 testing was done which showed small positivity of 20%.  Foundation 1 testing showed positivity for PTEN loss which Everolimus can be used.  All other mutation that shows positivity are currently , There is no other targeted FDA approved treatment can be used.  -Patient was started on treatment with Keytruda on January 10, 2018.   -CT scan done on July 17, 2018 after cycle 9 of Keytruda showed stable disease involving left chest wall region.  There was no evidence of any new metastatic focus.  -CT scan of chest, abdomen and pelvis with  contrast done on September 12, 2018 after cycle plan of Keytruda showed stable disease involving left chest wall region.  There was no evidence of any new metastatic focus.  Result of CT scan were discussed with patient.  -We will go ahead with cycle 16 of Keytruda today.    -We will get restaging CT of chest, abdomen and pelvis with contrast prior to next clinic visit in 3 weeks for restaging purpose.  This recommendation were discussed with patient.    2.  Left rib involvement secondary to local recurrence and progression: Treatment with Xgeva was discussed with patient side effect of right jaw including osteonecrosis of jaw, hypocalcemia, mouth sores were discussed.  Patient does need full mouth extraction, he has not done it yet.  We will hold off Xgeva until he gets dental work done.  Importance of seeing dentist and getting dental work done was discussed with patient again today.    3.  Pain management: .  He is using Percocet 10/325 every 4 hours with moderate pain relief.   Patient was counseled about the importance of bowel regimen to prevent constipation.  Prescription for morphine sulfate 30 mg by mouth twice a day with 60 tablet has been provided  on 11/16/18.  Prescription for Percocet 10/325 with 180 tablets has been provided  on 10/31/2018.    4.  Anemia: Most likely anemia of chronic disease secondary to underlying malignancy.  Patient's hemoglobin is 12.2 today.    -Anemia workup from today on November 21, 2018 shows component of iron deficiency with iron saturation of 6% vitamin B12 is also 487.  -Patient is currently on ferrous sulfate 1 tablet by mouth daily.  We will also start him on vitamin B12 thousand micrograms by mouth daily.  Prescription has been sent to his pharmacy today on November 21, 2018.    5.  Hypertension:Being followed by Dr. Stone .    6.Pulmonary aspergillosis: Patient had a fungus ball consistent with Aspergillus on a cavitary lesion of lung status postsurgical  excision.     7.Leukocytosis: mostly reactive.Monitor with cbc for now.    8.  Abnormal thyroid function test: Thyroid function test done prior to cycle 10 shows T4 was borderline low at 0.67 with normal TSH.    -Free T4 done on September 19, 2018 prior to cycle 16 is normal .  We will monitor clinically for now.     9. Health maintenance: Patient smokes intermittently, he was counseled about smoking cessation.  About 5 minutes were spent for smoking cessation counseling.       10.BMI:Patient's Body mass index is 20.47 kg/m². BMI is in reference range.  Patient was counseled about small frequent meals to prevent further weight loss.    11.  Advance care planning: For now patient remains full code and is able to make all his decision.  Patient has health care surrogate mentioned on chart.              Gualberto Dowd MD  11/21/2018  3:11 PM        EMR Dragon/Transcription disclaimer:   Much of this encounter note is an electronic transcription/translation of spoken language to printed text. The electronic translation of spoken language may permit erroneous, or at times, nonsensical words or phrases to be inadvertently transcribed; Although I have reviewed the note for such errors, some may still exist.

## 2018-11-26 NOTE — TELEPHONE ENCOUNTER
----- Message from Gualberto Dowd MD sent at 11/21/2018  3:16 PM CST -----  Please let patient know, he needs to continue taking ferrous sulfate 1 tablet by mouth daily.  I have also sent prescription for vitamin B12 to his pharmacy.  Thank you

## 2018-12-12 NOTE — PROGRESS NOTES
DATE OF VISIT: 12/12/2018      REASON FOR VISIT:  Squamous cell cancer of left lung, stage IV , Anemia      HISTORY OF PRESENT ILLNESS:    58-year-old male with a past medical history significant for duodenal ulcer, extensive nicotine addiction with more than 45-pack-year smoking history, history of alcohol abuse was seen in consultation on May 3, 2017 for newly diagnosed stage IIB squamous cell cancer of Of left lung.  Patient finished concurrent chemoradiation on July 17, 2017.  Patient had a CT of chest done on December 1, 2017 showed recurrence involving left chest wall region making it stage IV.  In view of PDL 1 showing positivity about 20%, patient was started on Keytruda January 10, 2018.  Patient is here to get cycle 17 of Keytruda today.  Patient had a CT scan of chest, abdomen and pelvis with contrast done on December 7, 2018, is here to discuss the result of CT scan and further recommendation.  Complains of worsening chest pain on left chest wall.  Denies any new lymph node enlargement.   Denies any bleeding.  Denies any fever or new cough.  Denies any diarrhea or any new skin lesion.      PAST MEDICAL HISTORY:    Past Medical History:   Diagnosis Date   • Anemia    • Aortic stenosis, mild    • Arthritis    • Chronic bronchitis (CMS/HCC)    • Duodenal ulcer disease    • Inguinal hernia, left    • Lung cancer (CMS/HCC)    • Mitral valve regurgitation        SOCIAL HISTORY:    Social History     Tobacco Use   • Smoking status: Current Every Day Smoker     Packs/day: 1.00     Years: 40.00     Pack years: 40.00     Types: Cigarettes   • Smokeless tobacco: Former User   • Tobacco comment: Reduced at half a pack per day and weanning off   Substance Use Topics   • Alcohol use: Yes     Alcohol/week: 8.4 oz     Types: 14 Cans of beer per week     Comment: 6 pk/dly   • Drug use: No       Surgical History :  Past Surgical History:   Procedure Laterality Date   • APPENDECTOMY      open   • EXPLORATORY LAPAROTOMY   "11/17/2014    Exploratory laparotomy, repair of duodenal ulcer, modified Carlos patch   • INGUINAL HERNIA REPAIR  09/13/2013    Open repair of an indirect left inguinal hernia. Left inguinal hernia.       ALLERGIES:    No Known Allergies    FAMILY HISTORY:  Family History   Problem Relation Age of Onset   • Asthma Father    • Cancer Father    • Diabetes Father    • Hypertension Father    • Alcohol abuse Mother    • Heart failure Brother          REVIEW OF SYSTEMS:      CONSTITUTIONAL:  Positive for fatigue, denies any recent worsening.States his appetite is improved. Has lost 3 pounds since last clinic visit.Denies any fever, chills .      HEENT: No epistaxis, mouth sores or difficulty swallowing.     RESPIRATORY: Denies any shortness of breath or cough today.     CARDIOVASCULAR: Complains of worsening pain on left chest wall.  No palpitation.     GASTROINTESTINAL: No abdominal pain nausea, vomiting or blood in the stool.     GENITOURINARY: No Dysuria or Hematuria.     MUSCULOSKELETAL: Complains of intermittent chest wall discomfort secondary to work.  No new back pain or arthralgia..     LYMPHATICS: Denies any abnormal swollen glands anywhere in the body.     NEUROLOGICAL : No tingling or numbness. No headache or dizziness. No seizures or balance problems.     SKIN: No new skin lesions.           PHYSICAL EXAMINATION:      VITAL SIGNS:  /93   Pulse 98   Temp 98.8 °F (37.1 °C) (Temporal)   Resp 18   Ht 172.7 cm (67.99\")   Wt 59.7 kg (131 lb 9.6 oz)   SpO2 96%   BMI 20.01 kg/m²      ECOG performance status : 1    GENERAL:  Not in any distress.    HEENT:  Normocephalic, Atraumatic.Mild Conjunctival pallor. No icterus. Extraocular Movements Intact. No Facial Asymmetry noted.    NECK:  No adenopathy. No JVD.    RESPIRATORY:  Fair air entry bilateral. No rhonchi or wheezing.  Mild erythema of left chest wall.    CARDIOVASCULAR:  S1, S2. Regular rate and rhythm. No murmur or gallop appreciated.    ABDOMEN:  " Soft,  nontender. Bowel sounds present in all four quadrants.  No hepatosplenomegaly appreciated.    MUSCULOSKELTAL:  No edema.No Calf Tenderness.Decreased range of motion.    NEUROLOGIC:  Alert, awake and oriented ×3.  No  Motor or sensory deficit appreciated. Cranial Nerves 2-12 grossly intact.    SKIN: No new skin lesions    LYMPHATICS: No new enlarged lymph node in neck or supra-clavicular area.            DIAGNOSTIC DATA:    Glucose   Date Value Ref Range Status   12/12/2018 94 60 - 100 mg/dL Final     Glucose, Arterial   Date Value Ref Range Status   04/19/2017 176 mmol/L Final     Sodium   Date Value Ref Range Status   12/12/2018 136 (L) 137 - 145 mmol/L Final     Potassium   Date Value Ref Range Status   12/12/2018 4.1 3.5 - 5.1 mmol/L Final     CO2   Date Value Ref Range Status   12/12/2018 27.0 22.0 - 31.0 mmol/L Final     Chloride   Date Value Ref Range Status   12/12/2018 98 95 - 110 mmol/L Final     Anion Gap   Date Value Ref Range Status   12/12/2018 11.0 5.0 - 15.0 mmol/L Final     Creatinine   Date Value Ref Range Status   12/12/2018 0.58 (L) 0.70 - 1.30 mg/dL Final     BUN   Date Value Ref Range Status   12/12/2018 11 7 - 21 mg/dL Final     BUN/Creatinine Ratio   Date Value Ref Range Status   12/12/2018 19.0 7.0 - 25.0 Final     Calcium   Date Value Ref Range Status   12/12/2018 8.8 8.4 - 10.2 mg/dL Final     eGFR Non  Amer   Date Value Ref Range Status   12/12/2018 144 (H) 56 - 130 mL/min/1.73 Final     Alkaline Phosphatase   Date Value Ref Range Status   12/12/2018 102 38 - 126 U/L Final     Total Protein   Date Value Ref Range Status   12/12/2018 7.6 6.3 - 8.6 g/dL Final     ALT (SGPT)   Date Value Ref Range Status   12/12/2018 19 (L) 21 - 72 U/L Final     AST (SGOT)   Date Value Ref Range Status   12/12/2018 30 17 - 59 U/L Final     Total Bilirubin   Date Value Ref Range Status   12/12/2018 0.4 0.2 - 1.3 mg/dL Final     Albumin   Date Value Ref Range Status   12/12/2018 4.40 3.40 - 4.80  g/dL Final     Globulin   Date Value Ref Range Status   12/12/2018 3.2 2.3 - 3.5 gm/dL Final     Lab Results   Component Value Date    WBC 11.59 (H) 12/12/2018    HGB 12.5 (L) 12/12/2018    HCT 36.7 (L) 12/12/2018    MCV 90.4 12/12/2018     12/12/2018     Lab Results   Component Value Date    NEUTROABS 9.15 (H) 12/12/2018    IRON 17 (L) 11/21/2018    TIBC 284 11/21/2018    LABIRON 6 (L) 11/21/2018    FERRITIN 73.00 11/21/2018    XFFHQYQG27 487 11/21/2018    FOLATE >20.00 11/21/2018     Component      Latest Ref Rng & Units 3/14/2018 5/16/2018 7/18/2018 9/19/2018   Free T4      0.78 - 2.19 ng/dL 0.83 0.78 0.67 (L) 0.79           PATHOLOGY:  Foundation 1 testing done on 26 December 2017 showed:  Genomic alteration identified includes:   PTEN loss  BCORL1 loos exon 8-12  CDKN@A/B loss  KMT@C(MLL3)G638  PRKC! Amplification  STAT4 truncation intron 9  TERC amplification  [id number]    No mutation identified in EGFR, KRAS, RET, ALK,MET, ERBB2, BRAF, ROS1          PDL 1 testing on tumor shows 20% positivity.          Pathology :  Pathology Report from April 19, 2017 showed:       SPECIMEN   Specimen   Lobe(s) of lung   Lobes of Lung   Upper lobe   Procedure   Lobectomy   Specimen Laterality   Left   TUMOR   Primary Tumor Site   Upper lobe   Histologic Type   Squamous cell carcinoma   Histologic Grade   G2: Moderately differentiated   Tumor Size   Greatest dimension (cm): 8 cm   Additional Dimension (cm)   7 cm       5 cm   Tumor Focality   Unifocal   Visceral Pleura Invasion   Present   Tumor Extension   Parietal pleura   Lymph-Vascular Invasion   Not identified   MARGINS   Bronchial Margin   Uninvolved by invasive carcinoma and carcinoma in situ   Vascular Margin   Uninvolved by invasive carcinoma   Parenchymal Margin   Involved by invasive carcinoma   LYMPH NODES   Regional Lymph Nodes       Number of Lymph Nodes Examined   Specify number: 6   Gene Stations Examined   5: Subaortic/ aortopulmonary (AP) / AP window        11L: Interlobar   Lymph Node Involvement   No nodes involved   STAGE (pTNM)   Primary Tumor (pT)   pT3: Tumor greater than 7 cm in greatest dimension; or Tumor of any size that directly invades any of the following: parietal pleural chest wall (including superior sulcus tumors), diaphragm, phrenic nerve, mediastinal pleura, parietal pericardium; or Tumor of any size in the main bronchus less than 2 cm distal to the petar but without involvement of the petar; or Tumor of any size associated with atelectasis or obstructive pneumonitis of the entire lung; or Tumors of any size with separate tmor nodule(s) in same lobe   Regional Lymph Nodes (pN)   pN0: No regional lymph node metastasis   ADDITIONAL FINDINGS   Additional Pathologic Finding(s)   Other (specify): Fungal hyphae consistent with Aspergillus species in the cavity contents   .          RADIOLOGY DATA :  CT of chest, abdomen and pelvis with contrast done on December 7, 2018 was reviewed, discussed with patient, it showed:  IMPRESSION:  CONCLUSION:     1. Increased size of left upper lobe and apical soft tissue mass  density with destruction of portions of left lateral first  through sixth ribs, increased in severity. Likely tumor  recurrence and/or extension of tumor into the bony structures.  PET CT suggested for further evaluation if indicated.  2. Stable extension of soft tissue mass noted through the left  lateral chest wall into the left subpectoral area.  3. Ill-defined oval nodular density left lower lobe posterior  laterally with differential including scarring as well as  metastasis.  4.  Other changes as above.        CT of chest, abdomen and pelvis with contrast done on September 12, 2018 was reviewed, discussed with patient, it showed:  CHEST: Emphysematous changes of the lungs are noted. There is  stable volume loss on the left with evidence of prior left upper  lobectomy. There are stable likely postradiation changes in the  superior  segment of the left lower lobe which involves the left  lung apex now. There is no pleural or pericardial effusion. The  lungs are otherwise clear. There is again noted lytic destruction  of portions of the left first through fourth ribs  anterolaterally. There is stable soft tissue density and  low-density in and around the ribs along the left upper lateral  pleural space and left chest wall. It is unknown if any of this  is active tumor or all treated disease. Follow-up PET/CT could  better evaluate. Overall the size of this appears not  significantly changed. Couple of borderline sized precarinal  lymph nodes are noted that appears stable. No new or worsening  thoracic adenopathy is noted. Minimal bilateral gynecomastia is  noted. No definite bony metastatic lesion of the thorax is noted  otherwise.     ABDOMEN: The solid abdominal organs are unremarkable. Vascular  calcifications are noted. There is no abdominal adenopathy. There  is no free fluid or free air within the abdomen. The abdominal  portion of the GI tract is unremarkable.     Pelvis: There is no free fluid in the pelvis. There is no pelvic  adenopathy. Pelvic portion of the GI tract is unremarkable.  Degenerative changes are noted in the lower lumbar spine. There  are bilateral pars defects at L5 with grade 1 spondylolisthesis  at L5-S1. No other bony abnormality is noted.     IMPRESSION:  Stable examination. If clinically indicated PET/CT  could better evaluate the left upper lateral chest wall for  active versus treated disease.    CT of chest, abdomen and pelvis with contrast done on May 9, 2018 was reviewed, discussed with patient, it showed:  IMPRESSION:  CONCLUSION: Evidence of prior left upper lobectomy. Loss of  volume and shift the heart and mediastinum to the left. Left  upper lobe infiltrative changes with bronchiectasis. This is  probably post therapeutic, radiation changes. This is similar to  CT of March 8, 2018, but increased since  PET/CT examination.  Chronic pleural thickening left apex and destructive lesions left  anterior upper ribs unchanged since prior PET/CT and prior CT  examination probably a combination of radiation necrosis left  ribs and treated metastases.     Emphysematous changes right lung. New subtle tree-in-bud type  infiltrative changes right upper lobe right lower lobe.  Micronodules seen on prior CT examination the right lung are not  identified on today's exam.     Degenerative changes thoracic and lumbar spine. 1 cm anterior  spondylolisthesis of L5 with respect to S1 secondary to facet  arthrosis and pars defects, spondylolysis. CT abdomen pelvis  otherwise remarkable. No evidence of any metastatic disease in  the abdomen or pelvis.        PET CT done on December 29, 2017 was reviewed, discussed with patient, it showed:  IMPRESSION:  CONCLUSION:  1.  Mildly enlarging left lung apex pleural base mass lesion with  abnormal FDG activity suspicious for Pancoast tumor which is  invading into the anterior chest wall and partially eroding  through the anterior left second third and fourth ribs. No  current evidence of distant metastatic disease.         PET/CT done on May 22, 2017 showed:  IMPRESSION:  CONCLUSION:  1. Large Pancoast tumor involving left apex with significant  involvement of the periphery, pleura, portions of the chest wall  and contiguous involvement, destruction of at least one of the  anterior left upper ribs.     PET/CT imaging study is otherwise unremarkable. No evidence of  any distant metastases.          ASSESSMENT AND PLAN:      1.  Squamous cell cancer of left upper lobe, stage IIB, T3 N0 M0: Patient is status post surgical excision with positive pleural margin.  As per NCCN guidelines patient received concurrent chemoradiation with weekly carboplatin and Taxol.  Patient received concurrent chemoradiation with  weekly carboplatin and Taxol with radiation  From June 05,2017 until 07/17/2017.  CT scan  done on December 1, 2017 shows enlarged left-sided precarinal lymph node as well as thickening of the left pleura eroding into the left rib cage worrisome for recurrence of cancer.  CT done on December 29, 2017 shows left chest wall recurrence with involvement of pleura, left treat this without any evidence of distance metastasis.  His cancer is stage IV because of the involvement of chest wall and pleura and ribs. PDl 1 testing was done which showed small positivity of 20%.  Foundation 1 testing showed positivity for PTEN loss which Everolimus can be used.  All other mutation that shows positivity are currently , There is no other targeted FDA approved treatment can be used.  -Patient was started on treatment with Keytruda on January 10, 2018.   -CT scan done on July 17, 2018 after cycle 9 of Keytruda showed stable disease involving left chest wall region.  There was no evidence of any new metastatic focus.  -CT scan of chest, abdomen and pelvis with contrast done on September 12, 2018 after cycle 12 of Keytruda showed stable disease involving left chest wall region.  There was no evidence of any new metastatic focus.  Result of CT scan were discussed with patient.  -CT scan of chest, abdomen and pelvis with contrast done on December 7, 2018 after cycle 16 of Keytruda showed worsening fullness in left upper lobe along with worsening rib destruction as well as new nodule in left lower lobe concerning for progression of metastatic disease.  We will get a PET/CT done next week.  If PET/CT shows increased uptake in the area of fullness, he will need a change of treatment to conventional chemotherapy which was discussed with patient.  -We will ask patient to return to clinic in 2 weeks after PET/CT.    2.  Left rib involvement secondary to local recurrence and progression: Treatment with Xgeva was discussed with patient side effect of right jaw including osteonecrosis of jaw, hypocalcemia, mouth sores were discussed.   Patient does need full mouth extraction, he has not done it yet.  We will hold off Xgeva until he gets dental work done.  Importance of seeing dentist and getting dental work done was discussed with patient again today.    3.  Pain management: .  He is using Percocet 10/325 every 4 hours with moderate pain relief.   Patient was counseled about the importance of bowel regimen to prevent constipation.  Prescription for morphine sulfate 30 mg by mouth twice a day with 60 tablet has been provided  on 12/12/18.  Prescription for Percocet 10/325 with 180 tablets has been provided  on 10/31/2018.    4.  Anemia: Most likely anemia of chronic disease secondary to underlying malignancy.  Patient's hemoglobin is 12.5 today.    -Anemia workup from today on November 21, 2018 shows component of iron deficiency with iron saturation of 6% vitamin B12 is also 487.  -Patient is currently on ferrous sulfate 1 tablet by mouth daily.  We will also start him on vitamin B12 thousand micrograms by mouth daily.  Prescription has been sent to his pharmacy  on November 21, 2018.    5.  Hypertension:Being followed by Dr. Stone .    6.Pulmonary aspergillosis: Patient had a fungus ball consistent with Aspergillus on a cavitary lesion of lung status postsurgical excision.     7.Leukocytosis: mostly reactive.Monitor with cbc for now.    8.  Abnormal thyroid function test: Thyroid function test done prior to cycle 10 shows T4 was borderline low at 0.67 with normal TSH.    -Free T4 done on September 19, 2018 prior to cycle 16 is normal .  We will monitor clinically for now.     9. Health maintenance: Patient smokes intermittently, he was counseled about smoking cessation.  About 5 minutes were spent for smoking cessation counseling.       10.BMI:Patient's Body mass index is 20.01 kg/m². BMI is in reference range.  Patient was counseled about small frequent meals to prevent further weight loss.    11.  Advance care planning: For now patient remains  full code and is able to make all his decision.  Patient has health care surrogate mentioned on chart.              Gualberto Dowd MD  12/12/2018  8:25 AM        EMR Dragon/Transcription disclaimer:   Much of this encounter note is an electronic transcription/translation of spoken language to printed text. The electronic translation of spoken language may permit erroneous, or at times, nonsensical words or phrases to be inadvertently transcribed; Although I have reviewed the note for such errors, some may still exist.

## 2018-12-12 NOTE — PATIENT INSTRUCTIONS
INSTRUCTIONS FOR YOUR PET/CT EXAM AT [facility]    Report to Same Day Surgery the day of your appointment for your PET/CT scan.    DO NOT EAT, DRINK, SMOKE (THIS INCLUDES E-CIGARETTES), DIP OR CHEW AFTER MIDNIGHT THE MORNING OF YOUR EXAM.    EXCEPT, please drink 3-4 glasses of PLAIN tap water before your arrival at Same Day Surgery.  You may drink two (2) - 20 ounce bottles of unflavored bottled water before your arrival in place of the tap water.     You may take your morning medication with plain water EXCEPT YOUR DIABETIC MEDICATION.  DO NOT TAKE ANY DIABETIC MEDICATIONS THAT MORNING.    On Sunday, please eat High Protein/Low Carbohydrate diet.  For example: Protein: Chicken, Beef, Pork, Cheese, or Turkey.  You may have as much of these as you like.  Please limit Carbohydrates: Sodas, breads, pastas, gravy, cereals, chips, cakes, and cookies.    Please wear comfortable clothing.  Due to imaging, please do not wear overalls, coveralls or any clothing with metal on the shirt.    The test will take approximately 2 to 2 1/2 hours.  Upon arrival in the Radiology Department, you will be asked to fill out a questionnaire, the technologist will come and get you and escort you to the mobile PET unit.  The technologist will start an IV on you and check your blood sugar and if your blood sugar is within sufficient range do the exam, you will receive an injection of the liquid radiation.  The injection will not make you feel any different than you feel now.  It will not make you feel good but it will not make you feel bad either.  The injection must circulate for 45 minutes to an hour.  You will then lie on an imaging table and be scanned from your head to mid-thigh.  Images will take approximately 30 minutes.    A technologist will call you on the Friday before your exam to go over your instructions.  Please make sure your physician has a good phone number to contact you.  If you do not hear from [facility] Nuclear Medicine Department by 2 p.m. On the Friday before your exam, please call [phone] or [phone].    If you need to reschedule your exam, please call the above phone numbers to reschedule your PET/CT exam.    YOU ARE BEING SCHEDULED FOR A PET/CT SCAN.  YOU SHOULD RECEIVE A PHONE CALL WITH YOUR APPOINTMENT WITHIN 3 DAYS.  IF YOU DO NOT RECEIVE A PHONE CALL WITH YOU APPOINTMENT, PLEASE CALL THE [facility] LUC[facility] [phone].  THANK YOU!    Shanae Patel RN  December 12, 2018  8:39 AM

## 2018-12-27 NOTE — PATIENT INSTRUCTIONS
Docetaxel injection  What is this medicine?  DOCETAXEL (shine se TAX el) is a chemotherapy drug. It targets fast dividing cells, like cancer cells, and causes these cells to die. This medicine is used to treat many types of cancers like breast cancer, certain stomach cancers, head and neck cancer, lung cancer, and prostate cancer.  This medicine may be used for other purposes; ask your health care provider or pharmacist if you have questions.  COMMON BRAND NAME(S): Adryandulceelvira Taxotermere  What should I tell my health care provider before I take this medicine?  They need to know if you have any of these conditions:  -infection (especially a virus infection such as chickenpox, cold sores, or herpes)  -liver disease  -low blood counts, like low white cell, platelet, or red cell counts  -an unusual or allergic reaction to docetaxel, polysorbate 80, other chemotherapy agents, other medicines, foods, dyes, or preservatives  -pregnant or trying to get pregnant  -breast-feeding  How should I use this medicine?  This drug is given as an infusion into a vein. It is administered in a hospital or clinic by a specially trained health care professional.  Talk to your pediatrician regarding the use of this medicine in children. Special care may be needed.  Overdosage: If you think you have taken too much of this medicine contact a poison control center or emergency room at once.  NOTE: This medicine is only for you. Do not share this medicine with others.  What if I miss a dose?  It is important not to miss your dose. Call your doctor or health care professional if you are unable to keep an appointment.  What may interact with this medicine?  -cyclosporine  -erythromycin  -ketoconazole  -medicines to increase blood counts like filgrastim, pegfilgrastim, sargramostim  -vaccines  Talk to your doctor or health care professional before taking any of these medicines:  -acetaminophen  -aspirin  -ibuprofen  -ketoprofen  -naproxen  This list  may not describe all possible interactions. Give your health care provider a list of all the medicines, herbs, non-prescription drugs, or dietary supplements you use. Also tell them if you smoke, drink alcohol, or use illegal drugs. Some items may interact with your medicine.  What should I watch for while using this medicine?  Your condition will be monitored carefully while you are receiving this medicine. You will need important blood work done while you are taking this medicine.  This drug may make you feel generally unwell. This is not uncommon, as chemotherapy can affect healthy cells as well as cancer cells. Report any side effects. Continue your course of treatment even though you feel ill unless your doctor tells you to stop.  In some cases, you may be given additional medicines to help with side effects. Follow all directions for their use.  Call your doctor or health care professional for advice if you get a fever, chills or sore throat, or other symptoms of a cold or flu. Do not treat yourself. This drug decreases your body's ability to fight infections. Try to avoid being around people who are sick.  This medicine may increase your risk to bruise or bleed. Call your doctor or health care professional if you notice any unusual bleeding.  This medicine may contain alcohol in the product. You may get drowsy or dizzy. Do not drive, use machinery, or do anything that needs mental alertness until you know how this medicine affects you. Do not stand or sit up quickly, especially if you are an older patient. This reduces the risk of dizzy or fainting spells. Avoid alcoholic drinks.  Do not become pregnant while taking this medicine. Women should inform their doctor if they wish to become pregnant or think they might be pregnant. There is a potential for serious side effects to an unborn child. Talk to your health care professional or pharmacist for more information. Do not breast-feed an infant while taking  this medicine.  What side effects may I notice from receiving this medicine?  Side effects that you should report to your doctor or health care professional as soon as possible:  -allergic reactions like skin rash, itching or hives, swelling of the face, lips, or tongue  -low blood counts - This drug may decrease the number of white blood cells, red blood cells and platelets. You may be at increased risk for infections and bleeding.  -signs of infection - fever or chills, cough, sore throat, pain or difficulty passing urine  -signs of decreased platelets or bleeding - bruising, pinpoint red spots on the skin, black, tarry stools, nosebleeds  -signs of decreased red blood cells - unusually weak or tired, fainting spells, lightheadedness  -breathing problems  -fast or irregular heartbeat  -low blood pressure  -mouth sores  -nausea and vomiting  -pain, swelling, redness or irritation at the injection site  -pain, tingling, numbness in the hands or feet  -swelling of the ankle, feet, hands  -weight gain  Side effects that usually do not require medical attention (report to your doctor or health care professional if they continue or are bothersome):  -bone pain  -complete hair loss including hair on your head, underarms, pubic hair, eyebrows, and eyelashes  -diarrhea  -excessive tearing  -changes in the color of fingernails  -loosening of the fingernails  -nausea  -muscle pain  -red flush to skin  -sweating  -weak or tired  This list may not describe all possible side effects. Call your doctor for medical advice about side effects. You may report side effects to FDA at 2-238-FDA-6712.  Where should I keep my medicine?  This drug is given in a hospital or clinic and will not be stored at home.  NOTE: This sheet is a summary. It may not cover all possible information. If you have questions about this medicine, talk to your doctor, pharmacist, or health care provider.  © 2018 [employer] (2017-01-19 12:32:56)

## 2018-12-28 NOTE — PROGRESS NOTES
DATE OF VISIT: 12/27/2018      REASON FOR VISIT:  Squamous cell cancer of left lung, stage IV , Anemia      HISTORY OF PRESENT ILLNESS:    58-year-old male with a past medical history significant for duodenal ulcer, extensive nicotine addiction with more than 45-pack-year smoking history, history of alcohol abuse was seen in consultation on May 3, 2017 for newly diagnosed stage IIB squamous cell cancer of Of left lung.  Patient finished concurrent chemoradiation on July 17, 2017.  Patient had a CT of chest done on December 1, 2017 showed recurrence involving left chest wall region making it stage IV.  In view of PDL 1 showing positivity about 20%, patient was started on Keytruda January 10, 2018. Patient had a CT scan of chest, abdomen and pelvis with contrast done on December 7, 2018, after cycle 16 of Keytruda that shows increasing fullness in left upper lobe.  Subsequently patient underwent PET CT on December 22, 2018.  He is here to discuss the result of PET/CT and further recommendation.  Complains of swelling of left upper extremity.  Complains of worsening chest pain on left chest wall.  Denies any new lymph node enlargement.   Denies any bleeding.  Denies any fever or new cough.  Denies any diarrhea or any new skin lesion.      PAST MEDICAL HISTORY:    Past Medical History:   Diagnosis Date   • Anemia    • Aortic stenosis, mild    • Arthritis    • Chronic bronchitis (CMS/HCC)    • Duodenal ulcer disease    • Inguinal hernia, left    • Lung cancer (CMS/HCC)    • Mitral valve regurgitation        SOCIAL HISTORY:    Social History     Tobacco Use   • Smoking status: Current Every Day Smoker     Packs/day: 1.00     Years: 40.00     Pack years: 40.00     Types: Cigarettes   • Smokeless tobacco: Former User   • Tobacco comment: Reduced at half a pack per day and weanning off   Substance Use Topics   • Alcohol use: Yes     Alcohol/week: 8.4 oz     Types: 14 Cans of beer per week     Comment: 6 pk/dly   • Drug use:  No       Surgical History :  Past Surgical History:   Procedure Laterality Date   • APPENDECTOMY      open   • BRONCHOSCOPY N/A 3/8/2017    Procedure: BRONCHOSCOPY with possible bronchoalveolar lavage, +/- brush, +/- needle biopsy, +/- endobronchial biopsy;  Surgeon: Candis Lr MD;  Location: [facility] ENDOSCOPY;  Service:    • EXPLORATORY LAPAROTOMY  11/17/2014    Exploratory laparotomy, repair of duodenal ulcer, modified Carlos patch   • INGUINAL HERNIA REPAIR  09/13/2013    Open repair of an indirect left inguinal hernia. Left inguinal hernia.   • [place] INSJ TUNNELED CVC W/O SUBQ PORT/ AGE 6 YR/> N/A 5/10/2017    Procedure: TUNNELED CENTRAL VENOUS CATHETER  WITH SUBCUTANEOUS  PORT,FLUOROSCOPIC GUIDANCE ;  Surgeon: Angel Hernandez MD;  Location: [facility] OR;  Service: Cardiothoracic   • THORACOTOMY Left 4/19/2017    Procedure: THORACOTOMY LEFT WITH PULMONARY RESECTION AND BRONCHOSCOPY WITH ENDO ;  Surgeon: Angel Hernandez MD;  Location: [facility] OR;  Service:        ALLERGIES:    No Known Allergies    FAMILY HISTORY:  Family History   Problem Relation Age of Onset   • Asthma Father    • Cancer Father    • Diabetes Father    • Hypertension Father    • Alcohol abuse Mother    • Heart failure Brother          REVIEW OF SYSTEMS:      CONSTITUTIONAL:  Positive for fatigue, denies any recent worsening.States his appetite is improved. Has gained 4 pounds since last clinic visit.Denies any fever, chills .      HEENT: No epistaxis, mouth sores or difficulty swallowing.     RESPIRATORY: Denies any shortness of breath or cough today.     CARDIOVASCULAR: Complains of worsening pain on left chest wall.  No palpitation.     GASTROINTESTINAL: No abdominal pain nausea, vomiting or blood in the stool.     GENITOURINARY: No Dysuria or Hematuria.     MUSCULOSKELETAL: Complains of intermittent chest wall discomfort secondary to work.  Complains of swelling of left upper extremity.  No new back pain or  "arthralgia..     LYMPHATICS: Denies any abnormal swollen glands anywhere in the body.     NEUROLOGICAL : No tingling or numbness. No headache or dizziness. No seizures or balance problems.     SKIN: No new skin lesions.           PHYSICAL EXAMINATION:      VITAL SIGNS:  BP (!) 191/90 Comment: \"stressed about test results\"  Pulse 108   Temp 97.9 °F (36.6 °C) (Temporal)   Resp 18   Wt 61.3 kg (135 lb 3.2 oz)   BMI 20.56 kg/m²      ECOG performance status : 1    GENERAL:  Not in any distress.    HEENT:  Normocephalic, Atraumatic.Mild Conjunctival pallor. No icterus. Extraocular Movements Intact. No Facial Asymmetry noted.    NECK:  No adenopathy. No JVD.    RESPIRATORY:  Fair air entry bilateral. No rhonchi or wheezing.  Mild erythema of left chest wall.    CARDIOVASCULAR:  S1, S2. Regular rate and rhythm. No murmur or gallop appreciated.    ABDOMEN:  Soft,  nontender. Bowel sounds present in all four quadrants.  No hepatosplenomegaly appreciated.    MUSCULOSKELTAL:  Left upper extremity appears swollen as compared to right upper extremity.  There is palpable fullness and mass and left axillary area.No Calf Tenderness.Decreased range of motion.    NEUROLOGIC:  Alert, awake and oriented ×3.  No  Motor or sensory deficit appreciated. Cranial Nerves 2-12 grossly intact.    SKIN: No new skin lesions    LYMPHATICS: No new enlarged lymph node in neck or supra-clavicular area.            DIAGNOSTIC DATA:    Glucose   Date Value Ref Range Status   12/12/2018 94 60 - 100 mg/dL Final     Glucose, Arterial   Date Value Ref Range Status   04/19/2017 176 mmol/L Final     Sodium   Date Value Ref Range Status   12/12/2018 136 (L) 137 - 145 mmol/L Final     Potassium   Date Value Ref Range Status   12/12/2018 4.1 3.5 - 5.1 mmol/L Final     CO2   Date Value Ref Range Status   12/12/2018 27.0 22.0 - 31.0 mmol/L Final     Chloride   Date Value Ref Range Status   12/12/2018 98 95 - 110 mmol/L Final     Anion Gap   Date Value Ref " Range Status   12/12/2018 11.0 5.0 - 15.0 mmol/L Final     Creatinine   Date Value Ref Range Status   12/12/2018 0.58 (L) 0.70 - 1.30 mg/dL Final     BUN   Date Value Ref Range Status   12/12/2018 11 7 - 21 mg/dL Final     BUN/Creatinine Ratio   Date Value Ref Range Status   12/12/2018 19.0 7.0 - 25.0 Final     Calcium   Date Value Ref Range Status   12/12/2018 8.8 8.4 - 10.2 mg/dL Final     eGFR Non  Amer   Date Value Ref Range Status   12/12/2018 144 (H) 56 - 130 mL/min/1.73 Final     Alkaline Phosphatase   Date Value Ref Range Status   12/12/2018 102 38 - 126 U/L Final     Total Protein   Date Value Ref Range Status   12/12/2018 7.6 6.3 - 8.6 g/dL Final     ALT (SGPT)   Date Value Ref Range Status   12/12/2018 19 (L) 21 - 72 U/L Final     AST (SGOT)   Date Value Ref Range Status   12/12/2018 30 17 - 59 U/L Final     Total Bilirubin   Date Value Ref Range Status   12/12/2018 0.4 0.2 - 1.3 mg/dL Final     Albumin   Date Value Ref Range Status   12/12/2018 4.40 3.40 - 4.80 g/dL Final     Globulin   Date Value Ref Range Status   12/12/2018 3.2 2.3 - 3.5 gm/dL Final     Lab Results   Component Value Date    WBC 11.59 (H) 12/12/2018    HGB 12.5 (L) 12/12/2018    HCT 36.7 (L) 12/12/2018    MCV 90.4 12/12/2018     12/12/2018     Lab Results   Component Value Date    NEUTROABS 9.15 (H) 12/12/2018    IRON 17 (L) 11/21/2018    TIBC 284 11/21/2018    LABIRON 6 (L) 11/21/2018    FERRITIN 73.00 11/21/2018    [id number] 487 11/21/2018    FOLATE >20.00 11/21/2018     Component      Latest Ref Rng & Units 3/14/2018 5/16/2018 7/18/2018 9/19/2018   Free T4      0.78 - 2.19 ng/dL 0.83 0.78 0.67 (L) 0.79           PATHOLOGY:  Foundation 1 testing done on 26 December 2017 showed:  Genomic alteration identified includes:   PTEN loss  BCORL1 loos exon 8-12  CDKN@A/B loss  KMT@C(MLL3)G638  PRKC! Amplification  STAT4 truncation intron 9  TERC amplification  TP53 [id number]    No mutation identified in EGFR, KRAS, RET, ALK,MET,  ERBB2, BRAF, ROS1          PDL 1 testing on tumor shows 20% positivity.          Pathology :  Pathology Report from April 19, 2017 showed:       SPECIMEN   Specimen   Lobe(s) of lung   Lobes of Lung   Upper lobe   Procedure   Lobectomy   Specimen Laterality   Left   TUMOR   Primary Tumor Site   Upper lobe   Histologic Type   Squamous cell carcinoma   Histologic Grade   G2: Moderately differentiated   Tumor Size   Greatest dimension (cm): 8 cm   Additional Dimension (cm)   7 cm       5 cm   Tumor Focality   Unifocal   Visceral Pleura Invasion   Present   Tumor Extension   Parietal pleura   Lymph-Vascular Invasion   Not identified   MARGINS   Bronchial Margin   Uninvolved by invasive carcinoma and carcinoma in situ   Vascular Margin   Uninvolved by invasive carcinoma   Parenchymal Margin   Involved by invasive carcinoma   LYMPH NODES   Regional Lymph Nodes       Number of Lymph Nodes Examined   Specify number: 6   Gene Stations Examined   5: Subaortic/ aortopulmonary (AP) / AP window       11L: Interlobar   Lymph Node Involvement   No nodes involved   STAGE (pTNM)   Primary Tumor (pT)   pT3: Tumor greater than 7 cm in greatest dimension; or Tumor of any size that directly invades any of the following: parietal pleural chest wall (including superior sulcus tumors), diaphragm, phrenic nerve, mediastinal pleura, parietal pericardium; or Tumor of any size in the main bronchus less than 2 cm distal to the petar but without involvement of the petar; or Tumor of any size associated with atelectasis or obstructive pneumonitis of the entire lung; or Tumors of any size with separate tmor nodule(s) in same lobe   Regional Lymph Nodes (pN)   pN0: No regional lymph node metastasis   ADDITIONAL FINDINGS   Additional Pathologic Finding(s)   Other (specify): Fungal hyphae consistent with Aspergillus species in the cavity contents   .          RADIOLOGY DATA :  PET/CT done on December 22, 2018 was reviewed, discussed with patient  and his family, it showed:  HEAD and NECK:           - Scintigraphic:  physiologic distribution of radiotracer              - Anatomic:    no discernable pathologic findings        THORAX:           - Scintigraphic:   -  Soft tissue mass in the superior left hemithorax, SUV max, 8.7       -Anterior mediastinal soft tissue density, SUV max 4.7        - Anatomic:   - Soft tissue mass in the superior left hemithorax, measuring  approximately 8.7 x 6.4 x 4.4 cm displaying direct chest wall  invasion, rib destruction, and extrapleural extension.    - Anterior mediastinal soft tissue density measuring  approximately 2.6 cm.                               ABDOMEN:             - Scintigraphic:  physiologic distribution of radiotracer              - Anatomic:    no discernable pathologic findings                                PELVIS:            - Scintigraphic:  physiologic distribution of radiotracer          - Anatomic:    no discernable pathologic findings                                           OSSEOUS / MISC:                                          - Scintigraphic:  physiologic distribution of radiotracer           - Anatomic:    no discernable pathologic findings                             IMPRESSION:  CONCLUSION:            1.  Scintigraphic/anatomic findings in the left hemithorax  consistent with disease recurrence with active mediastinal  adenopathy.                                      CT of chest, abdomen and pelvis with contrast done on December 7, 2018 was reviewed, discussed with patient, it showed:  IMPRESSION:  CONCLUSION:     1. Increased size of left upper lobe and apical soft tissue mass  density with destruction of portions of left lateral first  through sixth ribs, increased in severity. Likely tumor  recurrence and/or extension of tumor into the bony structures.  PET CT suggested for further evaluation if indicated.  2. Stable extension of soft tissue mass noted through the left  lateral chest wall  into the left subpectoral area.  3. Ill-defined oval nodular density left lower lobe posterior  laterally with differential including scarring as well as  metastasis.  4.  Other changes as above.            PET CT done on December 29, 2017 was reviewed, discussed with patient, it showed:  IMPRESSION:  CONCLUSION:  1.  Mildly enlarging left lung apex pleural base mass lesion with  abnormal FDG activity suspicious for Pancoast tumor which is  invading into the anterior chest wall and partially eroding  through the anterior left second third and fourth ribs. No  current evidence of distant metastatic disease.         PET/CT done on May 22, 2017 showed:  IMPRESSION:  CONCLUSION:  1. Large Pancoast tumor involving left apex with significant  involvement of the periphery, pleura, portions of the chest wall  and contiguous involvement, destruction of at least one of the  anterior left upper ribs.     PET/CT imaging study is otherwise unremarkable. No evidence of  any distant metastases.          ASSESSMENT AND PLAN:      1.  Squamous cell cancer of left upper lobe, stage IIB, T3 N0 M0: Patient is status post surgical excision with positive pleural margin.  As per NCCN guidelines patient received concurrent chemoradiation with weekly carboplatin and Taxol.  Patient received concurrent chemoradiation with  weekly carboplatin and Taxol with radiation  From June 05,2017 until 07/17/2017.  CT scan done on December 1, 2017 shows enlarged left-sided precarinal lymph node as well as thickening of the left pleura eroding into the left rib cage worrisome for recurrence of cancer.  CT done on December 29, 2017 shows left chest wall recurrence with involvement of pleura, left treat this without any evidence of distance metastasis.  His cancer is stage IV because of the involvement of chest wall and pleura and ribs. PDl 1 testing was done which showed small positivity of 20%.  Foundation 1 testing showed positivity for PTEN loss which  Everolimus can be used.  All other mutation that shows positivity are currently , There is no other targeted FDA approved treatment can be used.    -Patient received 16 cycles of Keytruda from January 10, 2018 until November 21, 2018.  -CT scan of chest, abdomen and pelvis with contrast done on December 7, 2018 after cycle 16 of Keytruda showed worsening fullness in left upper lobe along with worsening rib destruction as well as new nodule in left lower lobe concerning for progression of metastatic disease.    -PET/CT done on December 22, 2018 shows progression of disease with extrapleural extension as well as mediastinal adenopathy.  Result of CT scan were discussed with patient and his family.  -Since CT scan is showing progression of disease, recommend changing treatment to single agent Taxotere.  -Chemotherapy side effects  Including but not limited to risk of low blood counts, risk of infection, need for blood transfusion,risk of bleeding, risk of kidney failure, diarrhea, nausea,vomitting ,risk of neuropathy and risk of allergic reaction which can be life-threatening were discussed with patient and family. We will also provide them some information today to read about the chemotherapy.  -Plan is to start Taxotere next week.  -Patient does understand that his cancer is progressing which is potentially life-threatening.    2.  Left rib involvement secondary to local recurrence and progression: Treatment with Xgeva was discussed with patient side effect of right jaw including osteonecrosis of jaw, hypocalcemia, mouth sores were discussed.  Patient does need full mouth extraction, he has not done it yet.  We will hold off Xgeva until he gets dental work done.  Importance of seeing dentist and getting dental work done was discussed with patient again today.    3.  Pain management: .  He is using Percocet 10/325 every 4 hours with moderate pain relief.   Patient was counseled about the importance of bowel regimen to  prevent constipation.  Prescription for morphine sulfate 30 mg by mouth twice a day with 60 tablet has been provided  on 12/12/18.  Prescription for Percocet 10/325 with 180 tablets has been provided  on 12/27/2018.    4.  Anemia: Most likely anemia of chronic disease secondary to underlying malignancy.  Patient's hemoglobin is 12.5 today.    -Anemia workup from today on November 21, 2018 shows component of iron deficiency with iron saturation of 6% vitamin B12 is also 487.  -Patient is currently on ferrous sulfate 1 tablet by mouth daily.  We will also start him on vitamin B12 thousand micrograms by mouth daily.  Prescription has been sent to his pharmacy  on November 21, 2018.    5.  Hypertension:Being followed by Dr. Stone .    6.Pulmonary aspergillosis: Patient had a fungus ball consistent with Aspergillus on a cavitary lesion of lung status postsurgical excision.     7.Leukocytosis: mostly reactive.Monitor with cbc for now.    8.  Abnormal thyroid function test: Thyroid function test done prior to cycle 10 shows T4 was borderline low at 0.67 with normal TSH.    -Free T4 done on September 19, 2018 prior to cycle 16 is normal .  We will monitor clinically for now.     9. Health maintenance: Patient smokes intermittently, he was counseled about smoking cessation.  About 5 minutes were spent for smoking cessation counseling.       10.BMI:Patient's Body mass index is 20.56 kg/m². BMI is in reference range.  Patient was counseled about small frequent meals to prevent further weight loss.    11.  Advance care planning: For now patient remains full code and is able to make all his decision.  Patient has health care surrogate mentioned on chart.              Gualberto Dowd MD  12/27/2018  6:24 PM        EMR Dragon/Transcription disclaimer:   Much of this encounter note is an electronic transcription/translation of spoken language to printed text. The electronic translation of spoken language may permit erroneous, or at  times, nonsensical words or phrases to be inadvertently transcribed; Although I have reviewed the note for such errors, some may still exist.

## 2019-01-01 ENCOUNTER — LAB (OUTPATIENT)
Dept: ONCOLOGY | Facility: HOSPITAL | Age: 59
End: 2019-01-01

## 2019-01-01 ENCOUNTER — OFFICE VISIT (OUTPATIENT)
Dept: ONCOLOGY | Facility: CLINIC | Age: 59
End: 2019-01-01

## 2019-01-01 ENCOUNTER — DOCUMENTATION (OUTPATIENT)
Dept: NUTRITION | Facility: HOSPITAL | Age: 59
End: 2019-01-01

## 2019-01-01 ENCOUNTER — APPOINTMENT (OUTPATIENT)
Dept: ONCOLOGY | Facility: HOSPITAL | Age: 59
End: 2019-01-01

## 2019-01-01 ENCOUNTER — INFUSION (OUTPATIENT)
Dept: ONCOLOGY | Facility: HOSPITAL | Age: 59
End: 2019-01-01

## 2019-01-01 ENCOUNTER — TELEPHONE (OUTPATIENT)
Dept: ONCOLOGY | Facility: HOSPITAL | Age: 59
End: 2019-01-01

## 2019-01-01 ENCOUNTER — HOSPITAL ENCOUNTER (OUTPATIENT)
Dept: CT IMAGING | Facility: HOSPITAL | Age: 59
Discharge: HOME | End: 2019-02-28
Admitting: INTERNAL MEDICINE

## 2019-01-01 ENCOUNTER — HOSPITAL ENCOUNTER (OUTPATIENT)
Dept: ULTRASOUND IMAGING | Facility: HOSPITAL | Age: 59
Discharge: HOME | End: 2019-02-15
Admitting: INTERNAL MEDICINE

## 2019-01-01 ENCOUNTER — APPOINTMENT (OUTPATIENT)
Dept: CT IMAGING | Facility: HOSPITAL | Age: 59
End: 2019-01-01

## 2019-01-01 ENCOUNTER — HOSPITAL ENCOUNTER (OUTPATIENT)
Dept: ULTRASOUND IMAGING | Facility: HOSPITAL | Age: 59
Discharge: HOME | End: 2019-03-08
Admitting: INTERNAL MEDICINE

## 2019-01-01 VITALS
HEART RATE: 107 BPM | BODY MASS INDEX: 20.61 KG/M2 | OXYGEN SATURATION: 94 % | SYSTOLIC BLOOD PRESSURE: 123 MMHG | WEIGHT: 136 LBS | TEMPERATURE: 99.1 F | HEIGHT: 68 IN | RESPIRATION RATE: 20 BRPM | DIASTOLIC BLOOD PRESSURE: 75 MMHG

## 2019-01-01 VITALS
OXYGEN SATURATION: 93 % | TEMPERATURE: 96.8 F | DIASTOLIC BLOOD PRESSURE: 71 MMHG | RESPIRATION RATE: 18 BRPM | HEART RATE: 112 BPM | SYSTOLIC BLOOD PRESSURE: 141 MMHG

## 2019-01-01 VITALS
DIASTOLIC BLOOD PRESSURE: 65 MMHG | RESPIRATION RATE: 18 BRPM | WEIGHT: 136.2 LBS | HEIGHT: 68 IN | SYSTOLIC BLOOD PRESSURE: 127 MMHG | OXYGEN SATURATION: 98 % | TEMPERATURE: 98 F | HEART RATE: 100 BPM | BODY MASS INDEX: 20.64 KG/M2

## 2019-01-01 VITALS
OXYGEN SATURATION: 96 % | RESPIRATION RATE: 18 BRPM | SYSTOLIC BLOOD PRESSURE: 134 MMHG | DIASTOLIC BLOOD PRESSURE: 83 MMHG | TEMPERATURE: 97.6 F | HEART RATE: 121 BPM | HEIGHT: 68 IN | BODY MASS INDEX: 18.07 KG/M2 | WEIGHT: 119.2 LBS

## 2019-01-01 VITALS — HEART RATE: 102 BPM | DIASTOLIC BLOOD PRESSURE: 77 MMHG | SYSTOLIC BLOOD PRESSURE: 129 MMHG | TEMPERATURE: 98.7 F

## 2019-01-01 VITALS
OXYGEN SATURATION: 99 % | RESPIRATION RATE: 18 BRPM | HEIGHT: 68 IN | BODY MASS INDEX: 20.28 KG/M2 | SYSTOLIC BLOOD PRESSURE: 146 MMHG | TEMPERATURE: 98 F | WEIGHT: 133.8 LBS | DIASTOLIC BLOOD PRESSURE: 75 MMHG | HEART RATE: 89 BPM

## 2019-01-01 VITALS
RESPIRATION RATE: 18 BRPM | OXYGEN SATURATION: 94 % | DIASTOLIC BLOOD PRESSURE: 87 MMHG | BODY MASS INDEX: 18.61 KG/M2 | HEART RATE: 88 BPM | HEIGHT: 68 IN | TEMPERATURE: 98.8 F | WEIGHT: 122.8 LBS | SYSTOLIC BLOOD PRESSURE: 115 MMHG

## 2019-01-01 DIAGNOSIS — C34.12 MALIGNANT NEOPLASM OF UPPER LOBE OF LEFT LUNG (HCC): ICD-10-CM

## 2019-01-01 DIAGNOSIS — C79.51 BONE METASTASIS: ICD-10-CM

## 2019-01-01 DIAGNOSIS — I82.622 ACUTE DEEP VEIN THROMBOSIS (DVT) OF BRACHIAL VEIN OF LEFT UPPER EXTREMITY (HCC): ICD-10-CM

## 2019-01-01 DIAGNOSIS — D50.9 IRON DEFICIENCY ANEMIA, UNSPECIFIED IRON DEFICIENCY ANEMIA TYPE: ICD-10-CM

## 2019-01-01 DIAGNOSIS — D75.839 THROMBOCYTOSIS: ICD-10-CM

## 2019-01-01 DIAGNOSIS — D50.9 IRON DEFICIENCY ANEMIA, UNSPECIFIED IRON DEFICIENCY ANEMIA TYPE: Primary | ICD-10-CM

## 2019-01-01 DIAGNOSIS — C34.12 MALIGNANT NEOPLASM OF UPPER LOBE OF LEFT LUNG (HCC): Primary | ICD-10-CM

## 2019-01-01 DIAGNOSIS — M79.89 SWELLING OF LEFT UPPER EXTREMITY: Primary | ICD-10-CM

## 2019-01-01 DIAGNOSIS — C79.51 BONE METASTASIS: Primary | ICD-10-CM

## 2019-01-01 DIAGNOSIS — M79.89 SWELLING OF LEFT UPPER EXTREMITY: ICD-10-CM

## 2019-01-01 DIAGNOSIS — R93.2 ABNORMAL GALL BLADDER DIAGNOSTIC IMAGING: ICD-10-CM

## 2019-01-01 DIAGNOSIS — Z85.118 HX OF CANCER OF LUNG: ICD-10-CM

## 2019-01-01 DIAGNOSIS — Z45.2 ENCOUNTER FOR VENOUS ACCESS DEVICE CARE: ICD-10-CM

## 2019-01-01 LAB
ALBUMIN SERPL-MCNC: 3.4 G/DL (ref 3.5–5.2)
ALBUMIN SERPL-MCNC: 3.5 G/DL (ref 3.4–4.8)
ALBUMIN SERPL-MCNC: 3.6 G/DL (ref 3.4–4.8)
ALBUMIN SERPL-MCNC: 3.8 G/DL (ref 3.4–4.8)
ALBUMIN SERPL-MCNC: 3.9 G/DL (ref 3.4–4.8)
ALBUMIN SERPL-MCNC: 4 G/DL (ref 3.4–4.8)
ALBUMIN SERPL-MCNC: 4 G/DL (ref 3.4–4.8)
ALBUMIN/GLOB SERPL: 1.1 G/DL
ALBUMIN/GLOB SERPL: 1.1 G/DL (ref 1.1–1.8)
ALBUMIN/GLOB SERPL: 1.1 G/DL (ref 1.1–1.8)
ALBUMIN/GLOB SERPL: 1.2 G/DL (ref 1.1–1.8)
ALBUMIN/GLOB SERPL: 1.5 G/DL (ref 1.1–1.8)
ALP SERPL-CCNC: 106 U/L (ref 38–126)
ALP SERPL-CCNC: 106 U/L (ref 38–126)
ALP SERPL-CCNC: 108 U/L (ref 38–126)
ALP SERPL-CCNC: 114 U/L (ref 39–117)
ALP SERPL-CCNC: 84 U/L (ref 38–126)
ALP SERPL-CCNC: 85 U/L (ref 38–126)
ALP SERPL-CCNC: 96 U/L (ref 38–126)
ALT SERPL W P-5'-P-CCNC: 10 U/L (ref 21–72)
ALT SERPL W P-5'-P-CCNC: 11 U/L (ref 21–72)
ALT SERPL W P-5'-P-CCNC: 12 U/L (ref 1–41)
ALT SERPL W P-5'-P-CCNC: 12 U/L (ref 21–72)
ALT SERPL W P-5'-P-CCNC: 8 U/L (ref 21–72)
ALT SERPL W P-5'-P-CCNC: 9 U/L (ref 21–72)
ALT SERPL W P-5'-P-CCNC: <6 U/L (ref 21–72)
ANION GAP SERPL CALCULATED.3IONS-SCNC: 10 MMOL/L
ANION GAP SERPL CALCULATED.3IONS-SCNC: 13 MMOL/L (ref 5–15)
ANION GAP SERPL CALCULATED.3IONS-SCNC: 8 MMOL/L (ref 5–15)
ANION GAP SERPL CALCULATED.3IONS-SCNC: 9 MMOL/L (ref 5–15)
ANION GAP SERPL CALCULATED.3IONS-SCNC: 9 MMOL/L (ref 5–15)
AST SERPL-CCNC: 16 U/L (ref 17–59)
AST SERPL-CCNC: 16 U/L (ref 1–40)
AST SERPL-CCNC: 17 U/L (ref 17–59)
AST SERPL-CCNC: 21 U/L (ref 17–59)
AST SERPL-CCNC: 22 U/L (ref 17–59)
AST SERPL-CCNC: 23 U/L (ref 17–59)
AST SERPL-CCNC: 26 U/L (ref 17–59)
BASOPHILS # BLD AUTO: 0.01 10*3/MM3 (ref 0–0.2)
BASOPHILS # BLD AUTO: 0.01 10*3/MM3 (ref 0–0.2)
BASOPHILS # BLD AUTO: 0.05 10*3/MM3 (ref 0–0.2)
BASOPHILS # BLD AUTO: 0.05 10*3/MM3 (ref 0–0.2)
BASOPHILS # BLD AUTO: 0.1 10*3/MM3 (ref 0–0.2)
BASOPHILS # BLD AUTO: 0.11 10*3/MM3 (ref 0–0.2)
BASOPHILS NFR BLD AUTO: 0.1 % (ref 0–1.5)
BASOPHILS NFR BLD AUTO: 0.1 % (ref 0–2)
BASOPHILS NFR BLD AUTO: 0.4 % (ref 0–2)
BASOPHILS NFR BLD AUTO: 0.6 % (ref 0–1.5)
BASOPHILS NFR BLD AUTO: 0.7 % (ref 0–1.5)
BASOPHILS NFR BLD AUTO: 0.9 % (ref 0–1.5)
BILIRUB SERPL-MCNC: 0.2 MG/DL (ref 0.2–1.2)
BILIRUB SERPL-MCNC: 0.2 MG/DL (ref 0.2–1.3)
BILIRUB SERPL-MCNC: 0.2 MG/DL (ref 0.2–1.3)
BILIRUB SERPL-MCNC: 0.3 MG/DL (ref 0.2–1.3)
BILIRUB SERPL-MCNC: 0.3 MG/DL (ref 0.2–1.3)
BILIRUB SERPL-MCNC: 0.4 MG/DL (ref 0.2–1.3)
BILIRUB SERPL-MCNC: 0.4 MG/DL (ref 0.2–1.3)
BUN BLD-MCNC: 11 MG/DL (ref 7–21)
BUN BLD-MCNC: 12 MG/DL (ref 7–21)
BUN BLD-MCNC: 14 MG/DL (ref 7–21)
BUN BLD-MCNC: 20 MG/DL (ref 7–21)
BUN BLD-MCNC: 7 MG/DL (ref 6–20)
BUN BLD-MCNC: 8 MG/DL (ref 7–21)
BUN BLD-MCNC: 9 MG/DL (ref 7–21)
BUN/CREAT SERPL: 11.5 (ref 7–25)
BUN/CREAT SERPL: 18 (ref 7–25)
BUN/CREAT SERPL: 18.6 (ref 7–25)
BUN/CREAT SERPL: 20 (ref 7–25)
BUN/CREAT SERPL: 23.1 (ref 7–25)
BUN/CREAT SERPL: 29.2 (ref 7–25)
BUN/CREAT SERPL: 37.7 (ref 7–25)
CALCIUM SPEC-SCNC: 8.7 MG/DL (ref 8.4–10.2)
CALCIUM SPEC-SCNC: 8.9 MG/DL (ref 8.4–10.2)
CALCIUM SPEC-SCNC: 9 MG/DL (ref 8.4–10.2)
CALCIUM SPEC-SCNC: 9 MG/DL (ref 8.6–10.5)
CALCIUM SPEC-SCNC: 9.3 MG/DL (ref 8.4–10.2)
CALCIUM SPEC-SCNC: 9.4 MG/DL (ref 8.4–10.2)
CALCIUM SPEC-SCNC: 9.6 MG/DL (ref 8.4–10.2)
CHLORIDE SERPL-SCNC: 102 MMOL/L (ref 95–110)
CHLORIDE SERPL-SCNC: 103 MMOL/L (ref 95–110)
CHLORIDE SERPL-SCNC: 104 MMOL/L (ref 95–110)
CHLORIDE SERPL-SCNC: 97 MMOL/L (ref 95–110)
CHLORIDE SERPL-SCNC: 98 MMOL/L (ref 95–110)
CHLORIDE SERPL-SCNC: 98 MMOL/L (ref 95–110)
CHLORIDE SERPL-SCNC: 98 MMOL/L (ref 98–107)
CO2 SERPL-SCNC: 23 MMOL/L (ref 22–31)
CO2 SERPL-SCNC: 25 MMOL/L (ref 22–31)
CO2 SERPL-SCNC: 26 MMOL/L (ref 22–31)
CO2 SERPL-SCNC: 26 MMOL/L (ref 22–31)
CO2 SERPL-SCNC: 29 MMOL/L (ref 22–29)
CO2 SERPL-SCNC: 29 MMOL/L (ref 22–31)
CO2 SERPL-SCNC: 29 MMOL/L (ref 22–31)
CREAT BLD-MCNC: 0.43 MG/DL (ref 0.7–1.3)
CREAT BLD-MCNC: 0.45 MG/DL (ref 0.7–1.3)
CREAT BLD-MCNC: 0.48 MG/DL (ref 0.7–1.3)
CREAT BLD-MCNC: 0.52 MG/DL (ref 0.7–1.3)
CREAT BLD-MCNC: 0.53 MG/DL (ref 0.7–1.3)
CREAT BLD-MCNC: 0.61 MG/DL (ref 0.76–1.27)
CREAT BLD-MCNC: 0.61 MG/DL (ref 0.7–1.3)
DEPRECATED RDW RBC AUTO: 51 FL (ref 35.1–43.9)
DEPRECATED RDW RBC AUTO: 52.7 FL (ref 37–54)
DEPRECATED RDW RBC AUTO: 53.1 FL (ref 37–54)
DEPRECATED RDW RBC AUTO: 54.1 FL (ref 35.1–43.9)
DEPRECATED RDW RBC AUTO: 55.2 FL (ref 37–54)
DEPRECATED RDW RBC AUTO: 56.5 FL (ref 37–54)
DEPRECATED RDW RBC AUTO: 57.1 FL (ref 37–54)
EOSINOPHIL # BLD AUTO: 0 10*3/MM3 (ref 0–0.4)
EOSINOPHIL # BLD AUTO: 0 10*3/MM3 (ref 0–0.7)
EOSINOPHIL # BLD AUTO: 0.1 10*3/MM3 (ref 0–0.4)
EOSINOPHIL # BLD AUTO: 0.18 10*3/MM3 (ref 0–0.7)
EOSINOPHIL # BLD AUTO: 0.22 10*3/MM3 (ref 0–0.4)
EOSINOPHIL # BLD AUTO: 0.28 10*3/MM3 (ref 0–0.4)
EOSINOPHIL NFR BLD AUTO: 0 % (ref 0.3–6.2)
EOSINOPHIL NFR BLD AUTO: 0 % (ref 0–7)
EOSINOPHIL NFR BLD AUTO: 0.8 % (ref 0.3–6.2)
EOSINOPHIL NFR BLD AUTO: 1.5 % (ref 0–7)
EOSINOPHIL NFR BLD AUTO: 1.6 % (ref 0.3–6.2)
EOSINOPHIL NFR BLD AUTO: 3.3 % (ref 0.3–6.2)
ERYTHROCYTE [DISTWIDTH] IN BLOOD BY AUTOMATED COUNT: 15.6 % (ref 11.5–14.5)
ERYTHROCYTE [DISTWIDTH] IN BLOOD BY AUTOMATED COUNT: 16.4 % (ref 11.5–14.5)
ERYTHROCYTE [DISTWIDTH] IN BLOOD BY AUTOMATED COUNT: 16.8 % (ref 12.3–15.4)
ERYTHROCYTE [DISTWIDTH] IN BLOOD BY AUTOMATED COUNT: 17 % (ref 12.3–15.4)
ERYTHROCYTE [DISTWIDTH] IN BLOOD BY AUTOMATED COUNT: 17.4 % (ref 12.3–15.4)
ERYTHROCYTE [DISTWIDTH] IN BLOOD BY AUTOMATED COUNT: 17.6 % (ref 12.3–15.4)
ERYTHROCYTE [DISTWIDTH] IN BLOOD BY AUTOMATED COUNT: 18.7 % (ref 12.3–15.4)
FERRITIN SERPL-MCNC: 157 NG/ML (ref 17.9–464)
FERRITIN SERPL-MCNC: 402.8 NG/ML (ref 30–400)
FOLATE SERPL-MCNC: 17.5 NG/ML (ref 4.78–24.2)
FOLATE SERPL-MCNC: >20 NG/ML (ref 2.76–21)
GFR SERPL CREATININE-BSD FRML MDRD: 136 ML/MIN/1.73
GFR SERPL CREATININE-BSD FRML MDRD: 136 ML/MIN/1.73 (ref 56–130)
GFR SERPL CREATININE-BSD FRML MDRD: 160 ML/MIN/1.73 (ref 56–130)
GFR SERPL CREATININE-BSD FRML MDRD: 163 ML/MIN/1.73 (ref 56–130)
GFR SERPL CREATININE-BSD FRML MDRD: 180 ML/MIN/1.73 (ref 56–130)
GFR SERPL CREATININE-BSD FRML MDRD: 194 ML/MIN/1.73 (ref 56–130)
GFR SERPL CREATININE-BSD FRML MDRD: 204 ML/MIN/1.73 (ref 56–130)
GLOBULIN UR ELPH-MCNC: 2.6 GM/DL (ref 2.3–3.5)
GLOBULIN UR ELPH-MCNC: 2.9 GM/DL (ref 2.3–3.5)
GLOBULIN UR ELPH-MCNC: 3.1 GM/DL (ref 2.3–3.5)
GLOBULIN UR ELPH-MCNC: 3.2 GM/DL
GLOBULIN UR ELPH-MCNC: 3.3 GM/DL (ref 2.3–3.5)
GLOBULIN UR ELPH-MCNC: 3.4 GM/DL (ref 2.3–3.5)
GLOBULIN UR ELPH-MCNC: 3.5 GM/DL (ref 2.3–3.5)
GLUCOSE BLD-MCNC: 110 MG/DL (ref 60–100)
GLUCOSE BLD-MCNC: 115 MG/DL (ref 60–100)
GLUCOSE BLD-MCNC: 119 MG/DL (ref 60–100)
GLUCOSE BLD-MCNC: 125 MG/DL (ref 60–100)
GLUCOSE BLD-MCNC: 155 MG/DL (ref 65–99)
GLUCOSE BLD-MCNC: 173 MG/DL (ref 60–100)
GLUCOSE BLD-MCNC: 195 MG/DL (ref 60–100)
HCT VFR BLD AUTO: 29.5 % (ref 37.5–51)
HCT VFR BLD AUTO: 31.3 % (ref 37.5–51)
HCT VFR BLD AUTO: 34.1 % (ref 37.5–51)
HCT VFR BLD AUTO: 34.2 % (ref 37.5–51)
HCT VFR BLD AUTO: 35.1 % (ref 39–49)
HCT VFR BLD AUTO: 36.1 % (ref 39–49)
HCT VFR BLD AUTO: 37.6 % (ref 37.5–51)
HGB BLD-MCNC: 10.2 G/DL (ref 13–17.7)
HGB BLD-MCNC: 11.2 G/DL (ref 13–17.7)
HGB BLD-MCNC: 11.5 G/DL (ref 13–17.7)
HGB BLD-MCNC: 11.9 G/DL (ref 13.7–17.3)
HGB BLD-MCNC: 11.9 G/DL (ref 13.7–17.3)
HGB BLD-MCNC: 12.3 G/DL (ref 13–17.7)
HGB BLD-MCNC: 9.3 G/DL (ref 13–17.7)
IMM GRANULOCYTES # BLD AUTO: 0.03 10*3/MM3 (ref 0–0.02)
IMM GRANULOCYTES # BLD AUTO: 0.04 10*3/MM3 (ref 0–0.05)
IMM GRANULOCYTES # BLD AUTO: 0.04 10*3/MM3 (ref 0–0.05)
IMM GRANULOCYTES # BLD AUTO: 0.05 10*3/MM3 (ref 0–0.02)
IMM GRANULOCYTES # BLD AUTO: 0.05 10*3/MM3 (ref 0–0.05)
IMM GRANULOCYTES # BLD AUTO: 0.05 10*3/MM3 (ref 0–0.05)
IMM GRANULOCYTES NFR BLD AUTO: 0.2 % (ref 0–0.5)
IMM GRANULOCYTES NFR BLD AUTO: 0.3 % (ref 0–0.5)
IMM GRANULOCYTES NFR BLD AUTO: 0.3 % (ref 0–0.5)
IMM GRANULOCYTES NFR BLD AUTO: 0.4 % (ref 0–0.5)
IMM GRANULOCYTES NFR BLD AUTO: 0.5 % (ref 0–0.5)
IMM GRANULOCYTES NFR BLD AUTO: 0.6 % (ref 0–0.5)
IRON 24H UR-MRATE: 24 MCG/DL (ref 49–181)
IRON 24H UR-MRATE: 29 MCG/DL (ref 59–158)
IRON SATN MFR SERPL: 10 % (ref 20–55)
IRON SATN MFR SERPL: 12 % (ref 20–50)
LYMPHOCYTES # BLD AUTO: 0.65 10*3/MM3 (ref 0.7–3.1)
LYMPHOCYTES # BLD AUTO: 0.71 10*3/MM3 (ref 0.6–4.2)
LYMPHOCYTES # BLD AUTO: 0.8 10*3/MM3 (ref 0.7–3.1)
LYMPHOCYTES # BLD AUTO: 0.81 10*3/MM3 (ref 0.7–3.1)
LYMPHOCYTES # BLD AUTO: 0.83 10*3/MM3 (ref 0.7–3.1)
LYMPHOCYTES # BLD AUTO: 0.92 10*3/MM3 (ref 0.6–4.2)
LYMPHOCYTES # BLD MANUAL: 0.26 10*3/MM3 (ref 0.7–3.1)
LYMPHOCYTES NFR BLD AUTO: 5.4 % (ref 19.6–45.3)
LYMPHOCYTES NFR BLD AUTO: 5.8 % (ref 10–50)
LYMPHOCYTES NFR BLD AUTO: 6.2 % (ref 19.6–45.3)
LYMPHOCYTES NFR BLD AUTO: 7 % (ref 19.6–45.3)
LYMPHOCYTES NFR BLD AUTO: 8.4 % (ref 10–50)
LYMPHOCYTES NFR BLD AUTO: 9.6 % (ref 19.6–45.3)
LYMPHOCYTES NFR BLD MANUAL: 18 % (ref 5–12)
LYMPHOCYTES NFR BLD MANUAL: 3 % (ref 19.6–45.3)
MAGNESIUM SERPL-MCNC: 1.6 MG/DL (ref 1.6–2.6)
MAGNESIUM SERPL-MCNC: 1.8 MG/DL (ref 1.6–2.3)
MCH RBC QN AUTO: 27.4 PG (ref 26.6–33)
MCH RBC QN AUTO: 28 PG (ref 26.6–33)
MCH RBC QN AUTO: 28.5 PG (ref 26.6–33)
MCH RBC QN AUTO: 28.8 PG (ref 26.6–33)
MCH RBC QN AUTO: 29.3 PG (ref 26.6–33)
MCH RBC QN AUTO: 29.7 PG (ref 26.5–34)
MCH RBC QN AUTO: 30.3 PG (ref 26.5–34)
MCHC RBC AUTO-ENTMCNC: 31.5 G/DL (ref 31.5–35.7)
MCHC RBC AUTO-ENTMCNC: 32.6 G/DL (ref 31.5–35.7)
MCHC RBC AUTO-ENTMCNC: 32.7 G/DL (ref 31.5–35.7)
MCHC RBC AUTO-ENTMCNC: 32.7 G/DL (ref 31.5–35.7)
MCHC RBC AUTO-ENTMCNC: 33 G/DL (ref 31.5–36.3)
MCHC RBC AUTO-ENTMCNC: 33.7 G/DL (ref 31.5–35.7)
MCHC RBC AUTO-ENTMCNC: 33.9 G/DL (ref 31.5–36.3)
MCV RBC AUTO: 85.5 FL (ref 79–97)
MCV RBC AUTO: 87 FL (ref 79–97)
MCV RBC AUTO: 88.4 FL (ref 79–97)
MCV RBC AUTO: 89.3 FL (ref 80–98)
MCV RBC AUTO: 90 FL (ref 80–98)
MONOCYTES # BLD AUTO: 0.33 10*3/MM3 (ref 0.1–0.9)
MONOCYTES # BLD AUTO: 0.49 10*3/MM3 (ref 0–0.9)
MONOCYTES # BLD AUTO: 0.81 10*3/MM3 (ref 0.1–0.9)
MONOCYTES # BLD AUTO: 1.11 10*3/MM3 (ref 0–0.9)
MONOCYTES # BLD AUTO: 1.48 10*3/MM3 (ref 0.1–0.9)
MONOCYTES # BLD AUTO: 1.54 10*3/MM3 (ref 0.1–0.9)
MONOCYTES # BLD AUTO: 1.61 10*3/MM3 (ref 0.1–0.9)
MONOCYTES NFR BLD AUTO: 12.1 % (ref 5–12)
MONOCYTES NFR BLD AUTO: 12.4 % (ref 5–12)
MONOCYTES NFR BLD AUTO: 3.9 % (ref 5–12)
MONOCYTES NFR BLD AUTO: 4.5 % (ref 0–12)
MONOCYTES NFR BLD AUTO: 7 % (ref 5–12)
MONOCYTES NFR BLD AUTO: 9.1 % (ref 0–12)
NEUTROPHILS # BLD AUTO: 10.14 10*3/MM3 (ref 2–8.6)
NEUTROPHILS # BLD AUTO: 10.53 10*3/MM3 (ref 1.4–7)
NEUTROPHILS # BLD AUTO: 6.75 10*3/MM3 (ref 1.4–7)
NEUTROPHILS # BLD AUTO: 6.85 10*3/MM3 (ref 1.4–7)
NEUTROPHILS # BLD AUTO: 9.46 10*3/MM3 (ref 2–8.6)
NEUTROPHILS # BLD AUTO: 9.57 10*3/MM3 (ref 1.4–7)
NEUTROPHILS # BLD AUTO: 9.97 10*3/MM3 (ref 1.4–7)
NEUTROPHILS NFR BLD AUTO: 79 % (ref 42.7–76)
NEUTROPHILS NFR BLD AUTO: 80.2 % (ref 42.7–76)
NEUTROPHILS NFR BLD AUTO: 82 % (ref 42.7–76)
NEUTROPHILS NFR BLD AUTO: 83 % (ref 37–80)
NEUTROPHILS NFR BLD AUTO: 85.6 % (ref 42.7–76)
NEUTROPHILS NFR BLD AUTO: 86.5 % (ref 37–80)
NEUTROPHILS NFR BLD MANUAL: 79 % (ref 42.7–76)
NRBC BLD AUTO-RTO: 0 /100 WBC (ref 0–0)
PLATELET # BLD AUTO: 408 10*3/MM3 (ref 140–450)
PLATELET # BLD AUTO: 411 10*3/MM3 (ref 150–450)
PLATELET # BLD AUTO: 509 10*3/MM3 (ref 150–450)
PLATELET # BLD AUTO: 548 10*3/MM3 (ref 140–450)
PLATELET # BLD AUTO: 548 10*3/MM3 (ref 140–450)
PLATELET # BLD AUTO: 554 10*3/MM3 (ref 140–450)
PLATELET # BLD AUTO: 855 10*3/MM3 (ref 140–450)
PMV BLD AUTO: 7.9 FL (ref 8–12)
PMV BLD AUTO: 8 FL (ref 6–12)
PMV BLD AUTO: 8.1 FL (ref 6–12)
PMV BLD AUTO: 8.4 FL (ref 6–12)
PMV BLD AUTO: 8.5 FL (ref 6–12)
PMV BLD AUTO: 8.6 FL (ref 8–12)
PMV BLD AUTO: 8.8 FL (ref 6–12)
POTASSIUM BLD-SCNC: 3.6 MMOL/L (ref 3.5–5.1)
POTASSIUM BLD-SCNC: 3.7 MMOL/L (ref 3.5–5.1)
POTASSIUM BLD-SCNC: 3.8 MMOL/L (ref 3.5–5.1)
POTASSIUM BLD-SCNC: 3.8 MMOL/L (ref 3.5–5.1)
POTASSIUM BLD-SCNC: 3.8 MMOL/L (ref 3.5–5.2)
POTASSIUM BLD-SCNC: 4.1 MMOL/L (ref 3.5–5.1)
POTASSIUM BLD-SCNC: 4.2 MMOL/L (ref 3.5–5.1)
PROT SERPL-MCNC: 6.4 G/DL (ref 6.3–8.6)
PROT SERPL-MCNC: 6.5 G/DL (ref 6.3–8.6)
PROT SERPL-MCNC: 6.6 G/DL (ref 6–8.5)
PROT SERPL-MCNC: 6.7 G/DL (ref 6.3–8.6)
PROT SERPL-MCNC: 7.2 G/DL (ref 6.3–8.6)
PROT SERPL-MCNC: 7.3 G/DL (ref 6.3–8.6)
PROT SERPL-MCNC: 7.5 G/DL (ref 6.3–8.6)
RBC # BLD AUTO: 3.39 10*6/MM3 (ref 4.14–5.8)
RBC # BLD AUTO: 3.54 10*6/MM3 (ref 4.14–5.8)
RBC # BLD AUTO: 3.92 10*6/MM3 (ref 4.14–5.8)
RBC # BLD AUTO: 3.93 10*6/MM3 (ref 4.37–5.74)
RBC # BLD AUTO: 4 10*6/MM3 (ref 4.14–5.8)
RBC # BLD AUTO: 4.01 10*6/MM3 (ref 4.37–5.74)
RBC # BLD AUTO: 4.32 10*6/MM3 (ref 4.14–5.8)
RBC MORPH BLD: NORMAL
SMALL PLATELETS BLD QL SMEAR: ABNORMAL
SODIUM BLD-SCNC: 133 MMOL/L (ref 137–145)
SODIUM BLD-SCNC: 134 MMOL/L (ref 137–145)
SODIUM BLD-SCNC: 135 MMOL/L (ref 137–145)
SODIUM BLD-SCNC: 136 MMOL/L (ref 137–145)
SODIUM BLD-SCNC: 137 MMOL/L (ref 136–145)
SODIUM BLD-SCNC: 137 MMOL/L (ref 137–145)
SODIUM BLD-SCNC: 140 MMOL/L (ref 137–145)
TIBC SERPL-MCNC: 240 MCG/DL (ref 298–536)
TIBC SERPL-MCNC: 248 MCG/DL (ref 261–462)
TRANSFERRIN SERPL-MCNC: 161 MG/DL (ref 200–360)
VIT B12 BLD-MCNC: 604 PG/ML (ref 239–931)
VIT B12 BLD-MCNC: 793 PG/ML (ref 211–946)
WBC MORPH BLD: NORMAL
WBC NRBC COR # BLD: 10.93 10*3/MM3 (ref 3.2–9.8)
WBC NRBC COR # BLD: 11.64 10*3/MM3 (ref 3.4–10.8)
WBC NRBC COR # BLD: 11.95 10*3/MM3 (ref 3.4–10.8)
WBC NRBC COR # BLD: 12.22 10*3/MM3 (ref 3.2–9.8)
WBC NRBC COR # BLD: 13.34 10*3/MM3 (ref 3.4–10.8)
WBC NRBC COR # BLD: 8.36 10*3/MM3 (ref 3.4–10.8)
WBC NRBC COR # BLD: 8.55 10*3/MM3 (ref 3.4–10.8)

## 2019-01-01 PROCEDURE — 85025 COMPLETE CBC W/AUTO DIFF WBC: CPT

## 2019-01-01 PROCEDURE — 96413 CHEMO IV INFUSION 1 HR: CPT | Performed by: INTERNAL MEDICINE

## 2019-01-01 PROCEDURE — 25010000002 MAGNESIUM SULFATE PER 500 MG OF MAGNESIUM: Performed by: INTERNAL MEDICINE

## 2019-01-01 PROCEDURE — 80053 COMPREHEN METABOLIC PANEL: CPT

## 2019-01-01 PROCEDURE — 25010000002 PALONOSETRON PER 25 MCG: Performed by: INTERNAL MEDICINE

## 2019-01-01 PROCEDURE — 82746 ASSAY OF FOLIC ACID SERUM: CPT | Performed by: INTERNAL MEDICINE

## 2019-01-01 PROCEDURE — G8420 CALC BMI NORM PARAMETERS: HCPCS | Performed by: INTERNAL MEDICINE

## 2019-01-01 PROCEDURE — 96361 HYDRATE IV INFUSION ADD-ON: CPT | Performed by: INTERNAL MEDICINE

## 2019-01-01 PROCEDURE — 74177 CT ABD & PELVIS W/CONTRAST: CPT

## 2019-01-01 PROCEDURE — 25010000002 GEMCITABINE 1 GM/26.3ML SOLUTION 26.3 ML VIAL: Performed by: INTERNAL MEDICINE

## 2019-01-01 PROCEDURE — 96375 TX/PRO/DX INJ NEW DRUG ADDON: CPT | Performed by: INTERNAL MEDICINE

## 2019-01-01 PROCEDURE — 36415 COLL VENOUS BLD VENIPUNCTURE: CPT | Performed by: INTERNAL MEDICINE

## 2019-01-01 PROCEDURE — 71260 CT THORAX DX C+: CPT

## 2019-01-01 PROCEDURE — 25010000002 CISPLATIN PER 50 MG: Performed by: INTERNAL MEDICINE

## 2019-01-01 PROCEDURE — 96417 CHEMO IV INFUS EACH ADDL SEQ: CPT | Performed by: INTERNAL MEDICINE

## 2019-01-01 PROCEDURE — 99406 BEHAV CHNG SMOKING 3-10 MIN: CPT | Performed by: INTERNAL MEDICINE

## 2019-01-01 PROCEDURE — 36591 DRAW BLOOD OFF VENOUS DEVICE: CPT | Performed by: INTERNAL MEDICINE

## 2019-01-01 PROCEDURE — 93971 EXTREMITY STUDY: CPT

## 2019-01-01 PROCEDURE — 1123F ACP DISCUSS/DSCN MKR DOCD: CPT | Performed by: INTERNAL MEDICINE

## 2019-01-01 PROCEDURE — 96367 TX/PROPH/DG ADDL SEQ IV INF: CPT | Performed by: INTERNAL MEDICINE

## 2019-01-01 PROCEDURE — 25010000003 DEXAMETHASONE SODIUM PHOSPHATE 100 MG/10ML SOLUTION: Performed by: INTERNAL MEDICINE

## 2019-01-01 PROCEDURE — 83735 ASSAY OF MAGNESIUM: CPT

## 2019-01-01 PROCEDURE — 25010000002 DOCETAXEL 20 MG/ML SOLUTION 8 ML VIAL: Performed by: INTERNAL MEDICINE

## 2019-01-01 PROCEDURE — 99215 OFFICE O/P EST HI 40 MIN: CPT | Performed by: INTERNAL MEDICINE

## 2019-01-01 PROCEDURE — 83550 IRON BINDING TEST: CPT | Performed by: INTERNAL MEDICINE

## 2019-01-01 PROCEDURE — 82728 ASSAY OF FERRITIN: CPT | Performed by: INTERNAL MEDICINE

## 2019-01-01 PROCEDURE — 25010000002 GEMCITABINE 200 MG/5.26ML SOLUTION 5.26 ML VIAL: Performed by: INTERNAL MEDICINE

## 2019-01-01 PROCEDURE — 82607 VITAMIN B-12: CPT | Performed by: INTERNAL MEDICINE

## 2019-01-01 PROCEDURE — 99214 OFFICE O/P EST MOD 30 MIN: CPT | Performed by: INTERNAL MEDICINE

## 2019-01-01 PROCEDURE — G8418 CALC BMI BLW LOW PARAM F/U: HCPCS | Performed by: INTERNAL MEDICINE

## 2019-01-01 PROCEDURE — 25010000002 FOSAPREPITANT PER 1 MG: Performed by: INTERNAL MEDICINE

## 2019-01-01 PROCEDURE — 25010000002 IOPAMIDOL 61 % SOLUTION: Performed by: INTERNAL MEDICINE

## 2019-01-01 PROCEDURE — 84466 ASSAY OF TRANSFERRIN: CPT | Performed by: INTERNAL MEDICINE

## 2019-01-01 PROCEDURE — 25010000002 POTASSIUM CHLORIDE PER 2 MEQ OF POTASSIUM: Performed by: INTERNAL MEDICINE

## 2019-01-01 PROCEDURE — 85007 BL SMEAR W/DIFF WBC COUNT: CPT

## 2019-01-01 PROCEDURE — 83540 ASSAY OF IRON: CPT | Performed by: INTERNAL MEDICINE

## 2019-01-01 PROCEDURE — 76705 ECHO EXAM OF ABDOMEN: CPT

## 2019-01-01 PROCEDURE — G0463 HOSPITAL OUTPT CLINIC VISIT: HCPCS | Performed by: INTERNAL MEDICINE

## 2019-01-01 RX ORDER — DRONABINOL 5 MG/1
5 CAPSULE ORAL
Qty: 60 CAPSULE | Refills: 0 | Status: SHIPPED | OUTPATIENT
Start: 2019-01-01

## 2019-01-01 RX ORDER — SODIUM CHLORIDE 0.9 % (FLUSH) 0.9 %
10 SYRINGE (ML) INJECTION AS NEEDED
Status: DISCONTINUED | OUTPATIENT
Start: 2019-01-01 | End: 2019-01-01 | Stop reason: HOSPADM

## 2019-01-01 RX ORDER — MORPHINE SULFATE 60 MG/1
60 TABLET, FILM COATED, EXTENDED RELEASE ORAL 2 TIMES DAILY
Qty: 60 TABLET | Refills: 0 | Status: SHIPPED | OUTPATIENT
Start: 2019-01-01 | End: 2019-01-01 | Stop reason: SDUPTHER

## 2019-01-01 RX ORDER — SODIUM CHLORIDE 0.9 % (FLUSH) 0.9 %
10 SYRINGE (ML) INJECTION AS NEEDED
Status: CANCELLED | OUTPATIENT
Start: 2019-01-01

## 2019-01-01 RX ORDER — ONDANSETRON 4 MG/1
4 TABLET, FILM COATED ORAL 4 TIMES DAILY PRN
Qty: 40 TABLET | Refills: 3 | Status: SHIPPED | OUTPATIENT
Start: 2019-01-01

## 2019-01-01 RX ORDER — SODIUM CHLORIDE 9 MG/ML
250 INJECTION, SOLUTION INTRAVENOUS ONCE
Status: CANCELLED | OUTPATIENT
Start: 2019-01-01

## 2019-01-01 RX ORDER — PALONOSETRON 0.05 MG/ML
0.25 INJECTION, SOLUTION INTRAVENOUS ONCE
Status: COMPLETED | OUTPATIENT
Start: 2019-01-01 | End: 2019-01-01

## 2019-01-01 RX ORDER — MORPHINE SULFATE 30 MG/1
30 TABLET, FILM COATED, EXTENDED RELEASE ORAL 2 TIMES DAILY
Qty: 60 TABLET | Refills: 0 | Status: SHIPPED | OUTPATIENT
Start: 2019-01-01 | End: 2019-01-01 | Stop reason: DRUGHIGH

## 2019-01-01 RX ORDER — SODIUM CHLORIDE 9 MG/ML
250 INJECTION, SOLUTION INTRAVENOUS ONCE
Status: COMPLETED | OUTPATIENT
Start: 2019-01-01 | End: 2019-01-01

## 2019-01-01 RX ORDER — PALONOSETRON 0.05 MG/ML
0.25 INJECTION, SOLUTION INTRAVENOUS ONCE
Status: CANCELLED | OUTPATIENT
Start: 2019-01-01

## 2019-01-01 RX ORDER — OXYCODONE AND ACETAMINOPHEN 10; 325 MG/1; MG/1
1 TABLET ORAL EVERY 4 HOURS PRN
Qty: 180 TABLET | Refills: 0 | Status: SHIPPED | OUTPATIENT
Start: 2019-01-01 | End: 2019-01-01 | Stop reason: SDUPTHER

## 2019-01-01 RX ORDER — MORPHINE SULFATE 30 MG/1
30 TABLET, FILM COATED, EXTENDED RELEASE ORAL 2 TIMES DAILY
Qty: 60 TABLET | Refills: 0 | Status: SHIPPED | OUTPATIENT
Start: 2019-01-01 | End: 2019-01-01 | Stop reason: SDUPTHER

## 2019-01-01 RX ORDER — ONDANSETRON 4 MG/1
8 TABLET, FILM COATED ORAL 3 TIMES DAILY PRN
Qty: 40 TABLET | Refills: 3 | Status: SHIPPED | OUTPATIENT
Start: 2019-01-01 | End: 2019-01-01

## 2019-01-01 RX ORDER — OXYCODONE AND ACETAMINOPHEN 10; 325 MG/1; MG/1
1 TABLET ORAL EVERY 4 HOURS PRN
Qty: 180 TABLET | Refills: 0 | Status: SHIPPED | OUTPATIENT
Start: 2019-01-01

## 2019-01-01 RX ORDER — MORPHINE SULFATE 60 MG/1
60 TABLET, FILM COATED, EXTENDED RELEASE ORAL 2 TIMES DAILY
Qty: 60 TABLET | Refills: 0 | Status: SHIPPED | OUTPATIENT
Start: 2019-01-01

## 2019-01-01 RX ORDER — DEXAMETHASONE 4 MG/1
TABLET ORAL
Qty: 6 TABLET | Refills: 3 | Status: SHIPPED | OUTPATIENT
Start: 2019-01-01

## 2019-01-01 RX ADMIN — SODIUM CHLORIDE, PRESERVATIVE FREE 500 UNITS: 5 INJECTION INTRAVENOUS at 09:25

## 2019-01-01 RX ADMIN — DOCETAXEL 130 MG: 20 INJECTION, SOLUTION, CONCENTRATE INTRAVENOUS at 09:52

## 2019-01-01 RX ADMIN — DEXAMETHASONE SODIUM PHOSPHATE 12 MG: 10 INJECTION, SOLUTION INTRAMUSCULAR; INTRAVENOUS at 09:59

## 2019-01-01 RX ADMIN — SODIUM CHLORIDE, PRESERVATIVE FREE 10 ML: 5 INJECTION INTRAVENOUS at 16:15

## 2019-01-01 RX ADMIN — SODIUM CHLORIDE 250 ML: 9 INJECTION, SOLUTION INTRAVENOUS at 09:02

## 2019-01-01 RX ADMIN — SODIUM CHLORIDE, PRESERVATIVE FREE 10 ML: 5 INJECTION INTRAVENOUS at 11:09

## 2019-01-01 RX ADMIN — SODIUM CHLORIDE 250 ML: 9 INJECTION, SOLUTION INTRAVENOUS at 09:06

## 2019-01-01 RX ADMIN — SODIUM CHLORIDE, PRESERVATIVE FREE 10 ML: 5 INJECTION INTRAVENOUS at 08:12

## 2019-01-01 RX ADMIN — SODIUM CHLORIDE 100 ML: 9 INJECTION, SOLUTION INTRAVENOUS at 12:07

## 2019-01-01 RX ADMIN — SODIUM CHLORIDE 250 ML: 9 INJECTION, SOLUTION INTRAVENOUS at 09:20

## 2019-01-01 RX ADMIN — SODIUM CHLORIDE, PRESERVATIVE FREE 10 ML: 5 INJECTION INTRAVENOUS at 10:30

## 2019-01-01 RX ADMIN — GEMCITABINE 1700 MG: 38 INJECTION, SOLUTION INTRAVENOUS at 12:50

## 2019-01-01 RX ADMIN — SODIUM CHLORIDE, PRESERVATIVE FREE 10 ML: 5 INJECTION INTRAVENOUS at 16:23

## 2019-01-01 RX ADMIN — SODIUM CHLORIDE, PRESERVATIVE FREE 10 ML: 5 INJECTION INTRAVENOUS at 08:00

## 2019-01-01 RX ADMIN — POTASSIUM CHLORIDE 1000 ML: 2 INJECTION, SOLUTION, CONCENTRATE INTRAVENOUS at 14:34

## 2019-01-01 RX ADMIN — SODIUM CHLORIDE 250 ML: 9 INJECTION, SOLUTION INTRAVENOUS at 11:39

## 2019-01-01 RX ADMIN — DOCETAXEL 130 MG: 20 INJECTION, SOLUTION, CONCENTRATE INTRAVENOUS at 10:23

## 2019-01-01 RX ADMIN — SODIUM CHLORIDE, PRESERVATIVE FREE 10 ML: 5 INJECTION INTRAVENOUS at 09:08

## 2019-01-01 RX ADMIN — SODIUM CHLORIDE, PRESERVATIVE FREE 10 ML: 5 INJECTION INTRAVENOUS at 10:35

## 2019-01-01 RX ADMIN — SODIUM CHLORIDE, PRESERVATIVE FREE 10 ML: 5 INJECTION INTRAVENOUS at 09:25

## 2019-01-01 RX ADMIN — CISPLATIN 130 MG: 50 INJECTION, SOLUTION INTRAVENOUS at 13:28

## 2019-01-01 RX ADMIN — Medication 500 UNITS: at 10:35

## 2019-01-01 RX ADMIN — GEMCITABINE 1700 MG: 38 INJECTION, SOLUTION INTRAVENOUS at 12:49

## 2019-01-01 RX ADMIN — SODIUM CHLORIDE, PRESERVATIVE FREE 500 UNITS: 5 INJECTION INTRAVENOUS at 11:29

## 2019-01-01 RX ADMIN — Medication 500 UNITS: at 16:15

## 2019-01-01 RX ADMIN — DEXAMETHASONE SODIUM PHOSPHATE 12 MG: 10 INJECTION, SOLUTION INTRAMUSCULAR; INTRAVENOUS at 09:21

## 2019-01-01 RX ADMIN — PALONOSETRON HYDROCHLORIDE 0.25 MG: 0.25 INJECTION INTRAVENOUS at 11:06

## 2019-01-01 RX ADMIN — IOPAMIDOL 95 ML: 612 INJECTION, SOLUTION INTRAVENOUS at 08:17

## 2019-01-01 RX ADMIN — SODIUM CHLORIDE, PRESERVATIVE FREE 10 ML: 5 INJECTION INTRAVENOUS at 08:18

## 2019-01-01 RX ADMIN — SODIUM CHLORIDE, PRESERVATIVE FREE 500 UNITS: 5 INJECTION INTRAVENOUS at 10:30

## 2019-01-01 RX ADMIN — SODIUM CHLORIDE, PRESERVATIVE FREE 10 ML: 5 INJECTION INTRAVENOUS at 11:29

## 2019-01-01 RX ADMIN — GEMCITABINE 1700 MG: 38 INJECTION, SOLUTION INTRAVENOUS at 09:46

## 2019-01-01 RX ADMIN — SODIUM CHLORIDE 250 ML: 9 INJECTION, SOLUTION INTRAVENOUS at 10:16

## 2019-01-01 RX ADMIN — SODIUM CHLORIDE 100 ML: 9 INJECTION, SOLUTION INTRAVENOUS at 10:16

## 2019-01-01 RX ADMIN — SODIUM CHLORIDE, PRESERVATIVE FREE 10 ML: 5 INJECTION INTRAVENOUS at 08:23

## 2019-01-01 RX ADMIN — DEXAMETHASONE SODIUM PHOSPHATE 12 MG: 10 INJECTION, SOLUTION INTRAMUSCULAR; INTRAVENOUS at 09:25

## 2019-01-01 RX ADMIN — POTASSIUM CHLORIDE 1000 ML: 2 INJECTION, SOLUTION, CONCENTRATE INTRAVENOUS at 10:55

## 2019-01-01 RX ADMIN — DOCETAXEL 130 MG: 20 INJECTION, SOLUTION, CONCENTRATE INTRAVENOUS at 09:20

## 2019-01-01 RX ADMIN — POTASSIUM CHLORIDE 1000 ML: 2 INJECTION, SOLUTION, CONCENTRATE INTRAVENOUS at 09:40

## 2019-01-01 RX ADMIN — SODIUM CHLORIDE 250 ML: 9 INJECTION, SOLUTION INTRAVENOUS at 09:17

## 2019-01-01 RX ADMIN — SODIUM CHLORIDE, PRESERVATIVE FREE 500 UNITS: 5 INJECTION INTRAVENOUS at 11:10

## 2019-01-01 RX ADMIN — Medication 500 UNITS: at 16:23

## 2019-01-01 RX ADMIN — PALONOSETRON HYDROCHLORIDE 0.25 MG: 0.25 INJECTION INTRAVENOUS at 12:06

## 2019-01-01 RX ADMIN — DEXAMETHASONE SODIUM PHOSPHATE 12 MG: 10 INJECTION, SOLUTION INTRAMUSCULAR; INTRAVENOUS at 11:40

## 2019-01-01 RX ADMIN — DEXAMETHASONE SODIUM PHOSPHATE 12 MG: 10 INJECTION, SOLUTION INTRAMUSCULAR; INTRAVENOUS at 12:16

## 2019-01-01 RX ADMIN — CISPLATIN 130 MG: 50 INJECTION, SOLUTION INTRAVENOUS at 13:43

## 2019-01-01 RX ADMIN — POTASSIUM CHLORIDE 1000 ML: 2 INJECTION, SOLUTION, CONCENTRATE INTRAVENOUS at 15:01

## 2019-01-04 NOTE — PATIENT INSTRUCTIONS
Chemotherapy  Chemotherapy is the use of medicines to stop or slow the growth of cancer cells. Depending on the type and stage of your cancer, you may have chemotherapy to:  · Cure your cancer.  · Slow the progression of your cancer.  · Ease your cancer symptoms.  · Improve the benefits of radiation treatment.  · Shrink a tumor before surgery.  · Rid the body of cancer cells that remain after a tumor is surgically removed.    How is chemotherapy given?  Chemotherapy may be given:  · By mouth in liquid or pill form.  · Through a thin tube that is inserted into a vein or artery.  · By getting a shot.  · By rubbing a cream or ointment on your skin.  · Through liquids that are placed directly into various areas of the body, such as the abdomen, chest, or bladder.    How often is chemotherapy given?  Chemotherapy may be given continuously over time, or it may be given in cycles. For example, you may take the medicine for one week out of every month.  For how long will I need chemotherapy treatments?  The length of treatment depends on many factors, including:  · The type of cancer.  · Whether the cancer has spread.  · How you respond to the chemotherapy.  · Whether you develop side effects.    Some types of chemotherapy medicine are given only one time. Others are given for months, years, or for life.  What safety precautions must I take while on chemotherapy?  Chemotherapy medicines are very strong. They will be in all of your bodily fluids, including your urine, stool, saliva, sweat, tears, vaginal secretions, and semen. You must carefully follow some safety precautions to prevent harm to others while you are using these medicines. Here are some recommended precautions:  · Make sure that people who help care for you wear disposable gloves if they are going to come into contact with any of your bodily fluids. Women who are pregnant or breastfeeding should not handle any of your bodily fluids.  · Wash any clothes,  towels, and linens that may have your bodily fluids on them twice in a washing machine using very hot water.  · Dispose of adult diapers, tampons, and sanitary napkins by first sealing them in a plastic bag.  · Use a condom when having sex for at least 2 weeks after receiving your chemotherapy.  · Do not share beverages or food.  · Keep your chemotherapy medicines in their original bottles. Keep them in a high, safe location, away from children. Do not expose them to heat or moisture. Do not put them in containers with other types of medicines.  · Dispose of all wrappers for your chemotherapy medicines by sealing them in a separate plastic bag.  · Do not throw away extra medicine, and do not flush it down the toilet. Take medicine that you are not going to use to your health care provider's office where it can be disposed of properly.  · Follow your health care provider’s directions for the proper disposal of needles, IV tubing, and other medical supplies that have come into contact with your chemotherapy medicines.  · If you are issued a hazardous waste container, make sure you understand the directions for using it.  · Wash your hands thoroughly with warm water and soap after using the bathroom. Dry your hands with disposable paper towels.  · When using the toilet:  ? Flush it twice after each use, including after vomiting.  ? Close the lid of the toilet prior to flushing. This helps to avoid splashing.  ? Both men and women should sit to use the toilet. This helps avoid splashing.    What are the side effects of chemotherapy?  Side effects depend on a variety of factors, including:  · The specific type of chemotherapy medicine used.  · The dosage.  · How long the medicine is used for.  · Your overall health.    Some of the side effects you may experience include:  · Fatigue and decreased energy.  · Decreased appetite.  · Changes in your sense of smell or taste.  · Nausea.  · Vomiting.  · Constipation or  diarrhea.  · Hair loss.  · Increased susceptibility to infection.  · Easy bleeding.  · Mouth sores.  · Burning or tingling in the hands or feet.  · Memory problems.    This information is not intended to replace advice given to you by your health care provider. Make sure you discuss any questions you have with your health care provider.  Document Released: 10/14/2008 Document Revised: 07/07/2017 Document Reviewed: 05/26/2015  [employer] Patient Education © 2018 [employer]  Docetaxel injection  What is this medicine?  DOCETAXEL (shine se TAX el) is a chemotherapy drug. It targets fast dividing cells, like cancer cells, and causes these cells to die. This medicine is used to treat many types of cancers like breast cancer, certain stomach cancers, head and neck cancer, lung cancer, and prostate cancer.  This medicine may be used for other purposes; ask your health care provider or pharmacist if you have questions.  COMMON BRAND NAME(S): Tavia Taxotere  What should I tell my health care provider before I take this medicine?  They need to know if you have any of these conditions:  -infection (especially a virus infection such as chickenpox, cold sores, or herpes)  -liver disease  -low blood counts, like low white cell, platelet, or red cell counts  -an unusual or allergic reaction to docetaxel, polysorbate 80, other chemotherapy agents, other medicines, foods, dyes, or preservatives  -pregnant or trying to get pregnant  -breast-feeding  How should I use this medicine?  This drug is given as an infusion into a vein. It is administered in a hospital or clinic by a specially trained health care professional.  Talk to your pediatrician regarding the use of this medicine in children. Special care may be needed.  Overdosage: If you think you have taken too much of this medicine contact a poison control center or emergency room at once.  NOTE: This medicine is only for you. Do not share this medicine with  others.  What if I miss a dose?  It is important not to miss your dose. Call your doctor or health care professional if you are unable to keep an appointment.  What may interact with this medicine?  -cyclosporine  -erythromycin  -ketoconazole  -medicines to increase blood counts like filgrastim, pegfilgrastim, sargramostim  -vaccines  Talk to your doctor or health care professional before taking any of these medicines:  -acetaminophen  -aspirin  -ibuprofen  -ketoprofen  -naproxen  This list may not describe all possible interactions. Give your health care provider a list of all the medicines, herbs, non-prescription drugs, or dietary supplements you use. Also tell them if you smoke, drink alcohol, or use illegal drugs. Some items may interact with your medicine.  What should I watch for while using this medicine?  Your condition will be monitored carefully while you are receiving this medicine. You will need important blood work done while you are taking this medicine.  This drug may make you feel generally unwell. This is not uncommon, as chemotherapy can affect healthy cells as well as cancer cells. Report any side effects. Continue your course of treatment even though you feel ill unless your doctor tells you to stop.  In some cases, you may be given additional medicines to help with side effects. Follow all directions for their use.  Call your doctor or health care professional for advice if you get a fever, chills or sore throat, or other symptoms of a cold or flu. Do not treat yourself. This drug decreases your body's ability to fight infections. Try to avoid being around people who are sick.  This medicine may increase your risk to bruise or bleed. Call your doctor or health care professional if you notice any unusual bleeding.  This medicine may contain alcohol in the product. You may get drowsy or dizzy. Do not drive, use machinery, or do anything that needs mental alertness until you know how this medicine  affects you. Do not stand or sit up quickly, especially if you are an older patient. This reduces the risk of dizzy or fainting spells. Avoid alcoholic drinks.  Do not become pregnant while taking this medicine. Women should inform their doctor if they wish to become pregnant or think they might be pregnant. There is a potential for serious side effects to an unborn child. Talk to your health care professional or pharmacist for more information. Do not breast-feed an infant while taking this medicine.  What side effects may I notice from receiving this medicine?  Side effects that you should report to your doctor or health care professional as soon as possible:  -allergic reactions like skin rash, itching or hives, swelling of the face, lips, or tongue  -low blood counts - This drug may decrease the number of white blood cells, red blood cells and platelets. You may be at increased risk for infections and bleeding.  -signs of infection - fever or chills, cough, sore throat, pain or difficulty passing urine  -signs of decreased platelets or bleeding - bruising, pinpoint red spots on the skin, black, tarry stools, nosebleeds  -signs of decreased red blood cells - unusually weak or tired, fainting spells, lightheadedness  -breathing problems  -fast or irregular heartbeat  -low blood pressure  -mouth sores  -nausea and vomiting  -pain, swelling, redness or irritation at the injection site  -pain, tingling, numbness in the hands or feet  -swelling of the ankle, feet, hands  -weight gain  Side effects that usually do not require medical attention (report to your doctor or health care professional if they continue or are bothersome):  -bone pain  -complete hair loss including hair on your head, underarms, pubic hair, eyebrows, and eyelashes  -diarrhea  -excessive tearing  -changes in the color of fingernails  -loosening of the fingernails  -nausea  -muscle pain  -red flush to skin  -sweating  -weak or tired  This list may  not describe all possible side effects. Call your doctor for medical advice about side effects. You may report side effects to FDA at 6-055-THS-7304.  Where should I keep my medicine?  This drug is given in a hospital or clinic and will not be stored at home.  NOTE: This sheet is a summary. It may not cover all possible information. If you have questions about this medicine, talk to your doctor, pharmacist, or health care provider.  © 2018 [employer] (2017-01-19 12:32:56)

## 2019-01-04 NOTE — PROGRESS NOTES
"Adult Outpatient Nutrition  Assessment    Patient Name:  Tino Peres  YOB: 1960  MRN: [id number]    Assessment Date:  1/4/2019    Comments: Follow-up to reinforce nutrition needs. Wt 135.3 lb 12/27/18 (trending up). Pt stated \"I lose and gain it back\". [employer] Boost supplements. Does not need supplement samples/discount coupons--has adequate supply at home.                         Electronically signed by:  Kayla Lees RD  01/04/19 8:38 AM   "

## 2019-01-25 NOTE — PROGRESS NOTES
DATE OF VISIT: 1/25/2019      REASON FOR VISIT:  Squamous cell cancer of left lung, stage IV , Anemia ,thrombocytosis      HISTORY OF PRESENT ILLNESS:    58-year-old male with a past medical history significant for duodenal ulcer, extensive nicotine addiction with more than 45-pack-year smoking history, history of alcohol abuse was seen in consultation on May 3, 2017 for newly diagnosed stage IIB squamous cell cancer of Of left lung.  Patient finished concurrent chemoradiation on July 17, 2017.  Patient had a CT of chest done on December 1, 2017 showed recurrence involving left chest wall region making it stage IV.  In view of PDL 1 showing positivity about 20%, patient was started on Keytruda January 10, 2018. Patient had a CT scan of chest, abdomen and pelvis with contrast done on December 7, 2018, after cycle 16 of Keytruda that shows increasing fullness in left upper lobe.  Subsequently patient underwent PET CT on December 22, 2018 which showed progression of disease.  Subsequently treatment was changed to Taxotere on January 4, 2019.  Patient is here to get cycle 2 of Taxotere today.  Complains of swelling of left upper extremity.  Complains of treatment and returns swelling and discoloration of left chest wall.  Denies any tingling or numbness affecting upper or lower extremity.  Denies any new lymph node enlargement.   Denies any bleeding.  Denies any fever or new cough.  Denies any diarrhea or any new skin lesion.      PAST MEDICAL HISTORY:    Past Medical History:   Diagnosis Date   • Anemia    • Aortic stenosis, mild    • Arthritis    • Chronic bronchitis (CMS/HCC)    • Duodenal ulcer disease    • Inguinal hernia, left    • Lung cancer (CMS/HCC)    • Mitral valve regurgitation        SOCIAL HISTORY:    Social History     Tobacco Use   • Smoking status: Current Every Day Smoker     Packs/day: 1.00     Years: 40.00     Pack years: 40.00     Types: Cigarettes   • Smokeless tobacco: Former User   • Tobacco  comment: Reduced at half a pack per day and weanning off   Substance Use Topics   • Alcohol use: Yes     Alcohol/week: 8.4 oz     Types: 14 Cans of beer per week     Comment: 6 pk/dly   • Drug use: No       Surgical History :  Past Surgical History:   Procedure Laterality Date   • APPENDECTOMY      open   • BRONCHOSCOPY N/A 3/8/2017    Procedure: BRONCHOSCOPY with possible bronchoalveolar lavage, +/- brush, +/- needle biopsy, +/- endobronchial biopsy;  Surgeon: Candis Lr MD;  Location: [facility] ENDOSCOPY;  Service:    • EXPLORATORY LAPAROTOMY  11/17/2014    Exploratory laparotomy, repair of duodenal ulcer, modified Carlos patch   • INGUINAL HERNIA REPAIR  09/13/2013    Open repair of an indirect left inguinal hernia. Left inguinal hernia.   • [facility] INSJ TUNNELED CVC W/O SUBQ PORT/ AGE 5 YR/> N/A 5/10/2017    Procedure: TUNNELED CENTRAL VENOUS CATHETER  WITH SUBCUTANEOUS  PORT,FLUOROSCOPIC GUIDANCE ;  Surgeon: Angel Hernandez MD;  Location: [facility] OR;  Service: Cardiothoracic   • THORACOTOMY Left 4/19/2017    Procedure: THORACOTOMY LEFT WITH PULMONARY RESECTION AND BRONCHOSCOPY WITH ENDO ;  Surgeon: Angel Hernandez MD;  Location: [facility] OR;  Service:        ALLERGIES:    No Known Allergies    FAMILY HISTORY:  Family History   Problem Relation Age of Onset   • Asthma Father    • Cancer Father    • Diabetes Father    • Hypertension Father    • Alcohol abuse Mother    • Heart failure Brother          REVIEW OF SYSTEMS:      CONSTITUTIONAL:  Positive for fatigue, denies any recent worsening.States his appetite is improved. No recent weight change.Denies any fever, chills .      HEENT: No epistaxis, mouth sores or difficulty swallowing.     RESPIRATORY: Denies any shortness of breath or cough today.     CARDIOVASCULAR: Complains of worsening pain on left chest wall.  No palpitation.     GASTROINTESTINAL: No abdominal pain nausea, vomiting or blood in the stool.     GENITOURINARY: No Dysuria or  "Hematuria.     MUSCULOSKELETAL: Complains of intermittent chest wall discomfort secondary to work.  Complains of swelling of left upper extremity.  No new back pain or arthralgia..     LYMPHATICS: Denies any abnormal swollen glands anywhere in the body.     NEUROLOGICAL : No tingling or numbness. No headache or dizziness. No seizures or balance problems.     SKIN: Complains of discoloration of left chest wall.           PHYSICAL EXAMINATION:      VITAL SIGNS:  /65   Pulse 100   Temp 98 °F (36.7 °C) (Temporal)   Resp 18   Ht 172.7 cm (67.99\")   Wt 61.8 kg (136 lb 3.2 oz)   SpO2 98%   BMI 20.71 kg/m²      ECOG performance status : 1    GENERAL:  Not in any distress.    HEENT:  Normocephalic, Atraumatic.Mild Conjunctival pallor. No icterus. Extraocular Movements Intact. No Facial Asymmetry noted.    NECK:  No adenopathy. No JVD.    RESPIRATORY:  Fair air entry bilateral. No rhonchi or wheezing.  Purplish discoloration of left chest wall.    CARDIOVASCULAR:  S1, S2. Regular rate and rhythm. No murmur or gallop appreciated.    ABDOMEN:  Soft,  nontender. Bowel sounds present in all four quadrants.  No hepatosplenomegaly appreciated.    MUSCULOSKELTAL:  Left upper extremity appears swollen as compared to right upper extremity.  There is palpable fullness and mass and left axillary area.No Calf Tenderness.Decreased range of motion.    NEUROLOGIC:  Alert, awake and oriented ×3.  No  Motor or sensory deficit appreciated. Cranial Nerves 2-12 grossly intact.    SKIN: Purplish discoloration of left chest wall.    LYMPHATICS: No new enlarged lymph node in neck or supra-clavicular area.            DIAGNOSTIC DATA:    Glucose   Date Value Ref Range Status   01/04/2019 115 (H) 60 - 100 mg/dL Final     Glucose, Arterial   Date Value Ref Range Status   04/19/2017 176 mmol/L Final     Sodium   Date Value Ref Range Status   01/04/2019 140 137 - 145 mmol/L Final     Potassium   Date Value Ref Range Status   01/04/2019 4.1 " 3.5 - 5.1 mmol/L Final     CO2   Date Value Ref Range Status   01/04/2019 23.0 22.0 - 31.0 mmol/L Final     Chloride   Date Value Ref Range Status   01/04/2019 104 95 - 110 mmol/L Final     Anion Gap   Date Value Ref Range Status   01/04/2019 13.0 5.0 - 15.0 mmol/L Final     Creatinine   Date Value Ref Range Status   01/04/2019 0.53 (L) 0.70 - 1.30 mg/dL Final     BUN   Date Value Ref Range Status   01/04/2019 20 7 - 21 mg/dL Final     BUN/Creatinine Ratio   Date Value Ref Range Status   01/04/2019 37.7 (H) 7.0 - 25.0 Final     Calcium   Date Value Ref Range Status   01/04/2019 9.4 8.4 - 10.2 mg/dL Final     eGFR Non  Amer   Date Value Ref Range Status   01/04/2019 160 (H) 56 - 130 mL/min/1.73 Final     Alkaline Phosphatase   Date Value Ref Range Status   01/04/2019 106 38 - 126 U/L Final     Total Protein   Date Value Ref Range Status   01/04/2019 7.5 6.3 - 8.6 g/dL Final     ALT (SGPT)   Date Value Ref Range Status   01/04/2019 9 (L) 21 - 72 U/L Final     AST (SGOT)   Date Value Ref Range Status   01/04/2019 21 17 - 59 U/L Final     Total Bilirubin   Date Value Ref Range Status   01/04/2019 0.3 0.2 - 1.3 mg/dL Final     Albumin   Date Value Ref Range Status   01/04/2019 4.00 3.40 - 4.80 g/dL Final     Globulin   Date Value Ref Range Status   01/04/2019 3.5 2.3 - 3.5 gm/dL Final     Lab Results   Component Value Date    WBC 12.22 (H) 01/25/2019    HGB 11.9 (L) 01/25/2019    HCT 36.1 (L) 01/25/2019    MCV 90.0 01/25/2019     (H) 01/25/2019     Lab Results   Component Value Date    NEUTROABS 10.14 (H) 01/25/2019    IRON 17 (L) 11/21/2018    TIBC 284 11/21/2018    LABIRON 6 (L) 11/21/2018    FERRITIN 73.00 11/21/2018    [id number] 487 11/21/2018    FOLATE >20.00 11/21/2018     Component      Latest Ref Rng & Units 3/14/2018 5/16/2018 7/18/2018 9/19/2018   Free T4      0.78 - 2.19 ng/dL 0.83 0.78 0.67 (L) 0.79           PATHOLOGY:  Foundation 1 testing done on 26 December 2017 showed:  Genomic alteration  identified includes:   PTEN loss  BCORL1 loos exon 8-12  CDKN@A/B loss  KMT@C(MLL3)G638  PRKC! Amplification  STAT4 truncation intron 9  TERC amplification  TP53 [id number]    No mutation identified in EGFR, KRAS, RET, ALK,MET, ERBB2, BRAF, ROS1          PDL 1 testing on tumor shows 20% positivity.          Pathology :  Pathology Report from April 19, 2017 showed:       SPECIMEN   Specimen   Lobe(s) of lung   Lobes of Lung   Upper lobe   Procedure   Lobectomy   Specimen Laterality   Left   TUMOR   Primary Tumor Site   Upper lobe   Histologic Type   Squamous cell carcinoma   Histologic Grade   G2: Moderately differentiated   Tumor Size   Greatest dimension (cm): 8 cm   Additional Dimension (cm)   7 cm       5 cm   Tumor Focality   Unifocal   Visceral Pleura Invasion   Present   Tumor Extension   Parietal pleura   Lymph-Vascular Invasion   Not identified   MARGINS   Bronchial Margin   Uninvolved by invasive carcinoma and carcinoma in situ   Vascular Margin   Uninvolved by invasive carcinoma   Parenchymal Margin   Involved by invasive carcinoma   LYMPH NODES   Regional Lymph Nodes       Number of Lymph Nodes Examined   Specify number: 6   Gene Stations Examined   5: Subaortic/ aortopulmonary (AP) / AP window       11L: Interlobar   Lymph Node Involvement   No nodes involved   STAGE (pTNM)   Primary Tumor (pT)   pT3: Tumor greater than 7 cm in greatest dimension; or Tumor of any size that directly invades any of the following: parietal pleural chest wall (including superior sulcus tumors), diaphragm, phrenic nerve, mediastinal pleura, parietal pericardium; or Tumor of any size in the main bronchus less than 2 cm distal to the petar but without involvement of the petar; or Tumor of any size associated with atelectasis or obstructive pneumonitis of the entire lung; or Tumors of any size with separate tmor nodule(s) in same lobe   Regional Lymph Nodes (pN)   pN0: No regional lymph node metastasis   ADDITIONAL FINDINGS    Additional Pathologic Finding(s)   Other (specify): Fungal hyphae consistent with Aspergillus species in the cavity contents   .          RADIOLOGY DATA :  PET/CT done on December 22, 2018 was reviewed, discussed with patient and his family, it showed:  HEAD and NECK:           - Scintigraphic:  physiologic distribution of radiotracer              - Anatomic:    no discernable pathologic findings        THORAX:           - Scintigraphic:   -  Soft tissue mass in the superior left hemithorax, SUV max, 8.7       -Anterior mediastinal soft tissue density, SUV max 4.7        - Anatomic:   - Soft tissue mass in the superior left hemithorax, measuring  approximately 8.7 x 6.4 x 4.4 cm displaying direct chest wall  invasion, rib destruction, and extrapleural extension.    - Anterior mediastinal soft tissue density measuring  approximately 2.6 cm.                               ABDOMEN:             - Scintigraphic:  physiologic distribution of radiotracer              - Anatomic:    no discernable pathologic findings                                PELVIS:            - Scintigraphic:  physiologic distribution of radiotracer          - Anatomic:    no discernable pathologic findings                                           OSSEOUS / MISC:                                          - Scintigraphic:  physiologic distribution of radiotracer           - Anatomic:    no discernable pathologic findings                             IMPRESSION:  CONCLUSION:            1.  Scintigraphic/anatomic findings in the left hemithorax  consistent with disease recurrence with active mediastinal  adenopathy.                                      CT of chest, abdomen and pelvis with contrast done on December 7, 2018 was reviewed, discussed with patient, it showed:  IMPRESSION:  CONCLUSION:     1. Increased size of left upper lobe and apical soft tissue mass  density with destruction of portions of left lateral first  through sixth ribs, increased  in severity. Likely tumor  recurrence and/or extension of tumor into the bony structures.  PET CT suggested for further evaluation if indicated.  2. Stable extension of soft tissue mass noted through the left  lateral chest wall into the left subpectoral area.  3. Ill-defined oval nodular density left lower lobe posterior  laterally with differential including scarring as well as  metastasis.  4.  Other changes as above.            PET CT done on December 29, 2017 was reviewed, discussed with patient, it showed:  IMPRESSION:  CONCLUSION:  1.  Mildly enlarging left lung apex pleural base mass lesion with  abnormal FDG activity suspicious for Pancoast tumor which is  invading into the anterior chest wall and partially eroding  through the anterior left second third and fourth ribs. No  current evidence of distant metastatic disease.         PET/CT done on May 22, 2017 showed:  IMPRESSION:  CONCLUSION:  1. Large Pancoast tumor involving left apex with significant  involvement of the periphery, pleura, portions of the chest wall  and contiguous involvement, destruction of at least one of the  anterior left upper ribs.     PET/CT imaging study is otherwise unremarkable. No evidence of  any distant metastases.          ASSESSMENT AND PLAN:      1.  Squamous cell cancer of left upper lobe, stage IIB, T3 N0 M0: Patient is status post surgical excision with positive pleural margin.  As per NCCN guidelines patient received concurrent chemoradiation with weekly carboplatin and Taxol.  Patient received concurrent chemoradiation with  weekly carboplatin and Taxol with radiation  From June 05,2017 until 07/17/2017.  CT scan done on December 1, 2017 shows enlarged left-sided precarinal lymph node as well as thickening of the left pleura eroding into the left rib cage worrisome for recurrence of cancer.  CT done on December 29, 2017 shows left chest wall recurrence with involvement of pleura, left treat this without any evidence of  distance metastasis.  His cancer is stage IV because of the involvement of chest wall and pleura and ribs. PDl 1 testing was done which showed small positivity of 20%.  Foundation 1 testing showed positivity for PTEN loss which Everolimus can be used.  All other mutation that shows positivity are currently , There is no other targeted FDA approved treatment can be used.    -Patient received 16 cycles of Keytruda from January 10, 2018 until November 21, 2018.  -CT scan of chest, abdomen and pelvis with contrast done on December 7, 2018 after cycle 16 of Keytruda showed worsening fullness in left upper lobe along with worsening rib destruction as well as new nodule in left lower lobe concerning for progression of metastatic disease.    -PET/CT done on December 22, 2018 shows progression of disease with extrapleural extension as well as mediastinal adenopathy.  Result of CT scan were discussed with patient and his family.  -Patient was started on Taxotere on January 4, 2019.  -We will go ahead with cycle 2 of Taxotere today.  -Plan is to do restaging CT of chest, abdomen and pelvis with contrast after cycle 3 of Taxotere which was discussed with patient.    2.  Left rib involvement secondary to local recurrence and progression: Treatment with Xgeva was discussed with patient side effect of right jaw including osteonecrosis of jaw, hypocalcemia, mouth sores were discussed.  Patient does need full mouth extraction, he has not done it yet.  We will hold off Xgeva until he gets dental work done.  Importance of seeing dentist and getting dental work done was discussed with patient again today.    3.  Pain management: .  He is using Percocet 10/325 every 4 hours with moderate pain relief.   Patient was counseled about the importance of bowel regimen to prevent constipation.  Prescription for morphine sulfate 30 mg by mouth twice a day with 60 tablet has been provided  on 01/16/19.  Prescription for Percocet 10/325 with 180  tablets has been provided  on 01/23/19.    4.  Anemia: Most likely anemia of chronic disease secondary to underlying malignancy.  Patient's hemoglobin is 11.9 today.    -Anemia workup from today on November 21, 2018 shows component of iron deficiency with iron saturation of 6% vitamin B12 is also 487.  -Patient is currently on ferrous sulfate 1 tablet by mouth daily.  We will also start him on vitamin B12 thousand micrograms by mouth daily.  Prescription has been sent to his pharmacy  on November 21, 2018.  -Due to development of thrombocytosis and worsening of anemia, we will repeat anemia workup today on January 25, 2019.  If that is still persistent iron deficiency, he will benefit from intravenous Feraheme which was discussed with patient.    5.  Hypertension:Being followed by Dr. Stone .    6.Pulmonary aspergillosis: Patient had a fungus ball consistent with Aspergillus on a cavitary lesion of lung status postsurgical excision.     7.Leukocytosis: mostly reactive.Monitor with cbc for now.    8.  Thrombocytosis:  -Patient's platelet count is elevated at 509,000.  Most likely reactive due to underlying malignancy ± deficiency anemia.  We will monitored with CBC for now.     9. Health maintenance: Patient smokes intermittently, he was counseled about smoking cessation.  About 5 minutes were spent for smoking cessation counseling.       10.BMI:Patient's Body mass index is 20.71 kg/m². BMI is in reference range.  Patient was counseled about small frequent meals to prevent further weight loss.    11.  Advance care planning: For now patient remains full code and is able to make all his decision.  Patient has health care surrogate mentioned on chart.              uGalberto Dowd MD  1/25/2019  8:44 AM        EMR Dragon/Transcription disclaimer:   Much of this encounter note is an electronic transcription/translation of spoken language to printed text. The electronic translation of spoken language may permit erroneous,  or at times, nonsensical words or phrases to be inadvertently transcribed; Although I have reviewed the note for such errors, some may still exist.

## 2019-02-15 PROBLEM — M79.89 SWELLING OF LEFT UPPER EXTREMITY: Status: ACTIVE | Noted: 2019-01-01

## 2019-02-15 NOTE — PROGRESS NOTES
DATE OF VISIT: 2/15/2019      REASON FOR VISIT:  Squamous cell cancer of left lung, stage IV , Anemia ,thrombocytosis, Left upper extremity DVT      HISTORY OF PRESENT ILLNESS:    58-year-old male with a past medical history significant for duodenal ulcer, extensive nicotine addiction with more than 45-pack-year smoking history, history of alcohol abuse was seen in consultation on May 3, 2017 for newly diagnosed stage IIB squamous cell cancer of Of left lung.  Patient finished concurrent chemoradiation on July 17, 2017.  Patient had a CT of chest done on December 1, 2017 showed recurrence involving left chest wall region making it stage IV.  In view of PDL 1 showing positivity about 20%, patient was started on Keytruda January 10, 2018. Patient had a CT scan of chest, abdomen and pelvis with contrast done on December 7, 2018, after cycle 16 of Keytruda that shows increasing fullness in left upper lobe.  Subsequently patient underwent PET CT on December 22, 2018 which showed progression of disease.  Subsequently treatment was changed to Taxotere on January 4, 2019.  Patient is here to get cycle 3 of Taxotere today.  Complains of worsening pain and  swelling of left upper extremity.  Complains of  discoloration of left chest wall.  Denies any tingling or numbness affecting upper or lower extremity.  Denies any new lymph node enlargement.   Denies any bleeding.  Denies any fever or new cough.  Denies any diarrhea or any new skin lesion.      PAST MEDICAL HISTORY:    Past Medical History:   Diagnosis Date   • Anemia    • Aortic stenosis, mild    • Arthritis    • Chronic bronchitis (CMS/HCC)    • Duodenal ulcer disease    • Inguinal hernia, left    • Lung cancer (CMS/HCC)    • Mitral valve regurgitation        SOCIAL HISTORY:    Social History     Tobacco Use   • Smoking status: Current Every Day Smoker     Packs/day: 1.00     Years: 40.00     Pack years: 40.00     Types: Cigarettes   • Smokeless tobacco: Former User   •  Tobacco comment: Reduced at half a pack per day and weanning off   Substance Use Topics   • Alcohol use: Yes     Alcohol/week: 8.4 oz     Types: 14 Cans of beer per week     Comment: 6 pk/dly   • Drug use: No       Surgical History :  Past Surgical History:   Procedure Laterality Date   • APPENDECTOMY      open   • BRONCHOSCOPY N/A 3/8/2017    Procedure: BRONCHOSCOPY with possible bronchoalveolar lavage, +/- brush, +/- needle biopsy, +/- endobronchial biopsy;  Surgeon: Candis Lr MD;  Location: [facility] ENDOSCOPY;  Service:    • EXPLORATORY LAPAROTOMY  11/17/2014    Exploratory laparotomy, repair of duodenal ulcer, modified Carlos patch   • INGUINAL HERNIA REPAIR  09/13/2013    Open repair of an indirect left inguinal hernia. Left inguinal hernia.   • OH INSJ TUNNELED CVC W/O SUBQ PORT/ AGE 6 YR/> N/A 5/10/2017    Procedure: TUNNELED CENTRAL VENOUS CATHETER  WITH SUBCUTANEOUS  PORT,FLUOROSCOPIC GUIDANCE ;  Surgeon: Angel Hernandez MD;  Location: [facility] OR;  Service: Cardiothoracic   • THORACOTOMY Left 4/19/2017    Procedure: THORACOTOMY LEFT WITH PULMONARY RESECTION AND BRONCHOSCOPY WITH ENDO ;  Surgeon: Angel Hernandez MD;  Location: [facility] OR;  Service:        ALLERGIES:    No Known Allergies    FAMILY HISTORY:  Family History   Problem Relation Age of Onset   • Asthma Father    • Cancer Father    • Diabetes Father    • Hypertension Father    • Alcohol abuse Mother    • Heart failure Brother          REVIEW OF SYSTEMS:      CONSTITUTIONAL:  Positive for fatigue, denies any recent worsening.States his appetite is improved. No recent weight change.Denies any fever, chills .      HEENT: No epistaxis, mouth sores or difficulty swallowing.     RESPIRATORY: Denies any shortness of breath or cough today.     CARDIOVASCULAR: Complains of worsening pain on left chest wall.  No palpitation.     GASTROINTESTINAL: No abdominal pain nausea, vomiting or blood in the stool.     GENITOURINARY: No Dysuria or  "Hematuria.     MUSCULOSKELETAL: Complains of intermittent chest wall discomfort secondary to work.  Complains of worsening swelling pain of left upper extremity.  No new back pain or arthralgia..     LYMPHATICS: Denies any abnormal swollen glands anywhere in the body.     NEUROLOGICAL : No tingling or numbness. No headache or dizziness. No seizures or balance problems.     SKIN: Complains of discoloration of left chest wall.           PHYSICAL EXAMINATION:      VITAL SIGNS:  /75   Pulse 107   Temp 99.1 °F (37.3 °C) (Tympanic)   Resp 20   Ht 172.7 cm (67.99\")   Wt 61.7 kg (136 lb)   SpO2 94%   BMI 20.68 kg/m²      ECOG performance status : 1    GENERAL:  Not in any distress.    HEENT:  Normocephalic, Atraumatic.Mild Conjunctival pallor. No icterus. Extraocular Movements Intact. No Facial Asymmetry noted.    NECK:  No adenopathy. No JVD.    RESPIRATORY:  Fair air entry bilateral. No rhonchi or wheezing.  Purplish discoloration of left chest wall.    CARDIOVASCULAR:  S1, S2. Regular rate and rhythm. No murmur or gallop appreciated.    ABDOMEN:  Soft,  nontender. Bowel sounds present in all four quadrants.  No hepatosplenomegaly appreciated.    MUSCULOSKELTAL:  Left upper extremity appears swollen as compared to right upper extremity.  There is palpable fullness and mass and left axillary area.No Calf Tenderness.Decreased range of motion.    NEUROLOGIC:  Alert, awake and oriented ×3.  No  Motor or sensory deficit appreciated. Cranial Nerves 2-12 grossly intact.    SKIN: Purplish discoloration of left chest wall.    LYMPHATICS: No new enlarged lymph node in neck or supra-clavicular area.            DIAGNOSTIC DATA:    Glucose   Date Value Ref Range Status   02/15/2019 173 (H) 60 - 100 mg/dL Final     Glucose, Arterial   Date Value Ref Range Status   04/19/2017 176 mmol/L Final     Sodium   Date Value Ref Range Status   02/15/2019 136 (L) 137 - 145 mmol/L Final     Potassium   Date Value Ref Range Status "   02/15/2019 3.8 3.5 - 5.1 mmol/L Final     CO2   Date Value Ref Range Status   02/15/2019 25.0 22.0 - 31.0 mmol/L Final     Chloride   Date Value Ref Range Status   02/15/2019 102 95 - 110 mmol/L Final     Anion Gap   Date Value Ref Range Status   02/15/2019 9.0 5.0 - 15.0 mmol/L Final     Creatinine   Date Value Ref Range Status   02/15/2019 0.45 (L) 0.70 - 1.30 mg/dL Final     BUN   Date Value Ref Range Status   02/15/2019 9 7 - 21 mg/dL Final     BUN/Creatinine Ratio   Date Value Ref Range Status   02/15/2019 20.0 7.0 - 25.0 Final     Calcium   Date Value Ref Range Status   02/15/2019 8.9 8.4 - 10.2 mg/dL Final     eGFR Non  Amer   Date Value Ref Range Status   02/15/2019 194 (H) 56 - 130 mL/min/1.73 Final     Alkaline Phosphatase   Date Value Ref Range Status   02/15/2019 85 38 - 126 U/L Final     Total Protein   Date Value Ref Range Status   02/15/2019 6.7 6.3 - 8.6 g/dL Final     ALT (SGPT)   Date Value Ref Range Status   02/15/2019 12 (L) 21 - 72 U/L Final     AST (SGOT)   Date Value Ref Range Status   02/15/2019 17 17 - 59 U/L Final     Total Bilirubin   Date Value Ref Range Status   02/15/2019 0.2 0.2 - 1.3 mg/dL Final     Albumin   Date Value Ref Range Status   02/15/2019 3.60 3.40 - 4.80 g/dL Final     Globulin   Date Value Ref Range Status   02/15/2019 3.1 2.3 - 3.5 gm/dL Final     Lab Results   Component Value Date    WBC 13.34 (H) 02/15/2019    HGB 11.5 (L) 02/15/2019    HCT 34.1 (L) 02/15/2019    MCV 87.0 02/15/2019     (H) 02/15/2019     Lab Results   Component Value Date    NEUTROABS 10.53 (H) 02/15/2019    IRON 24 (L) 01/25/2019    TIBC 248 (L) 01/25/2019    LABIRON 10 (L) 01/25/2019    FERRITIN 157.00 01/25/2019    TZFUCPFQ03 604 01/25/2019    FOLATE >20.00 01/25/2019     Component      Latest Ref Rng & Units 3/14/2018 5/16/2018 7/18/2018 9/19/2018   Free T4      0.78 - 2.19 ng/dL 0.83 0.78 0.67 (L) 0.79           PATHOLOGY:  Foundation 1 testing done on 26 December 2017  showed:  Genomic alteration identified includes:   PTEN loss  BCORL1 loos exon 8-12  CDKN@A/B loss  KMT@C(MLL3)G638  PRKC! Amplification  STAT4 truncation intron 9  TERC amplification  TP53 [id number]    No mutation identified in EGFR, KRAS, RET, ALK,MET, ERBB2, BRAF, ROS1          PDL 1 testing on tumor shows 20% positivity.          Pathology :  Pathology Report from April 19, 2017 showed:       SPECIMEN   Specimen   Lobe(s) of lung   Lobes of Lung   Upper lobe   Procedure   Lobectomy   Specimen Laterality   Left   TUMOR   Primary Tumor Site   Upper lobe   Histologic Type   Squamous cell carcinoma   Histologic Grade   G2: Moderately differentiated   Tumor Size   Greatest dimension (cm): 8 cm   Additional Dimension (cm)   7 cm       5 cm   Tumor Focality   Unifocal   Visceral Pleura Invasion   Present   Tumor Extension   Parietal pleura   Lymph-Vascular Invasion   Not identified   MARGINS   Bronchial Margin   Uninvolved by invasive carcinoma and carcinoma in situ   Vascular Margin   Uninvolved by invasive carcinoma   Parenchymal Margin   Involved by invasive carcinoma   LYMPH NODES   Regional Lymph Nodes       Number of Lymph Nodes Examined   Specify number: 6   Gene Stations Examined   5: Subaortic/ aortopulmonary (AP) / AP window       11L: Interlobar   Lymph Node Involvement   No nodes involved   STAGE (pTNM)   Primary Tumor (pT)   pT3: Tumor greater than 7 cm in greatest dimension; or Tumor of any size that directly invades any of the following: parietal pleural chest wall (including superior sulcus tumors), diaphragm, phrenic nerve, mediastinal pleura, parietal pericardium; or Tumor of any size in the main bronchus less than 2 cm distal to the petar but without involvement of the petar; or Tumor of any size associated with atelectasis or obstructive pneumonitis of the entire lung; or Tumors of any size with separate tmor nodule(s) in same lobe   Regional Lymph Nodes (pN)   pN0: No regional lymph node metastasis    ADDITIONAL FINDINGS   Additional Pathologic Finding(s)   Other (specify): Fungal hyphae consistent with Aspergillus species in the cavity contents   .          RADIOLOGY DATA :  Doppler ultrasound of left upper extremity done on February 15, 2019 showed:  IMPRESSION:  CONCLUSION:   DVT visualized in the left internal jugular, subclavian,  axillary, and brachial veins.        PET/CT done on December 22, 2018 was reviewed, discussed with patient and his family, it showed:  HEAD and NECK:           - Scintigraphic:  physiologic distribution of radiotracer              - Anatomic:    no discernable pathologic findings        THORAX:           - Scintigraphic:   -  Soft tissue mass in the superior left hemithorax, SUV max, 8.7       -Anterior mediastinal soft tissue density, SUV max 4.7        - Anatomic:   - Soft tissue mass in the superior left hemithorax, measuring  approximately 8.7 x 6.4 x 4.4 cm displaying direct chest wall  invasion, rib destruction, and extrapleural extension.    - Anterior mediastinal soft tissue density measuring  approximately 2.6 cm.                               ABDOMEN:             - Scintigraphic:  physiologic distribution of radiotracer              - Anatomic:    no discernable pathologic findings                                PELVIS:            - Scintigraphic:  physiologic distribution of radiotracer          - Anatomic:    no discernable pathologic findings                                           OSSEOUS / MISC:                                          - Scintigraphic:  physiologic distribution of radiotracer           - Anatomic:    no discernable pathologic findings                             IMPRESSION:  CONCLUSION:            1.  Scintigraphic/anatomic findings in the left hemithorax  consistent with disease recurrence with active mediastinal  adenopathy.                                      CT of chest, abdomen and pelvis with contrast done on December 7, 2018 was reviewed,  discussed with patient, it showed:  IMPRESSION:  CONCLUSION:     1. Increased size of left upper lobe and apical soft tissue mass  density with destruction of portions of left lateral first  through sixth ribs, increased in severity. Likely tumor  recurrence and/or extension of tumor into the bony structures.  PET CT suggested for further evaluation if indicated.  2. Stable extension of soft tissue mass noted through the left  lateral chest wall into the left subpectoral area.  3. Ill-defined oval nodular density left lower lobe posterior  laterally with differential including scarring as well as  metastasis.  4.  Other changes as above.            PET CT done on December 29, 2017 was reviewed, discussed with patient, it showed:  IMPRESSION:  CONCLUSION:  1.  Mildly enlarging left lung apex pleural base mass lesion with  abnormal FDG activity suspicious for Pancoast tumor which is  invading into the anterior chest wall and partially eroding  through the anterior left second third and fourth ribs. No  current evidence of distant metastatic disease.         PET/CT done on May 22, 2017 showed:  IMPRESSION:  CONCLUSION:  1. Large Pancoast tumor involving left apex with significant  involvement of the periphery, pleura, portions of the chest wall  and contiguous involvement, destruction of at least one of the  anterior left upper ribs.     PET/CT imaging study is otherwise unremarkable. No evidence of  any distant metastases.          ASSESSMENT AND PLAN:      1.  Squamous cell cancer of left upper lobe, stage IIB, T3 N0 M0: Patient is status post surgical excision with positive pleural margin.  As per NCCN guidelines patient received concurrent chemoradiation with weekly carboplatin and Taxol.  Patient received concurrent chemoradiation with  weekly carboplatin and Taxol with radiation  From June 05,2017 until 07/17/2017.  CT scan done on December 1, 2017 shows enlarged left-sided precarinal lymph node as well as  thickening of the left pleura eroding into the left rib cage worrisome for recurrence of cancer.  CT done on December 29, 2017 shows left chest wall recurrence with involvement of pleura, left treat this without any evidence of distance metastasis.  His cancer is stage IV because of the involvement of chest wall and pleura and ribs. PDl 1 testing was done which showed small positivity of 20%.  Foundation 1 testing showed positivity for PTEN loss which Everolimus can be used.  All other mutation that shows positivity are currently , There is no other targeted FDA approved treatment can be used.    -Patient received 16 cycles of Keytruda from January 10, 2018 until November 21, 2018.  -CT scan of chest, abdomen and pelvis with contrast done on December 7, 2018 after cycle 16 of Keytruda showed worsening fullness in left upper lobe along with worsening rib destruction as well as new nodule in left lower lobe concerning for progression of metastatic disease.    -PET/CT done on December 22, 2018 shows progression of disease with extrapleural extension as well as mediastinal adenopathy.  Result of CT scan were discussed with patient and his family.  -Patient was started on Taxotere on January 4, 2019.  -We will go ahead with cycle 3 of Taxotere today.  -We will get restaging CT of chest, abdomen and pelvis with contrast prior to next clinic visit in 3 weeks.    2.  Left rib involvement secondary to local recurrence and progression: Treatment with Xgeva was discussed with patient side effect of right jaw including osteonecrosis of jaw, hypocalcemia, mouth sores were discussed.  Patient does need full mouth extraction, he has not done it yet.  We will hold off Xgeva until he gets dental work done.  Importance of seeing dentist and getting dental work done was discussed with patient again today.    3.  Pain management:   - He is using Percocet 10/325 every 4 hours with moderate pain relief.  He is currently using morphine 30  mg twice daily.  Complains of worsening pain and swelling of left upper extremity:  -We will increase morphine sulfate to 60 mg twice daily from today on February 15, 2019.  Will continue with Percocet 10/325 every 4 hours for now.  He was again counseled about bowel regimen.    4.  Left upper extremity DVT:( problem is new to me)  -Patient had a Doppler ultrasound done of left upper extremity today on February 15, 2019 that showed DVT involving internal jugular vein, subclavian vein and brachial vein.  Results of Doppler were discussed with patient.  -Treatment option consisting of Lovenox with Coumadin or Xarelto were discussed with the patient.  -He prefers to take Xarelto.  -Potential side effect of Xarelto including bleeding which could be life-threatening, not having availability of antidote was discussed with patient.  -Prescription for Xarelto 15 mg twice daily for 3 weeks has been sent to pharmacy today on February 15, 2019  -He was instructed to come to the emergency room if he starts having any bleeding problems.    5.  Anemia: Most likely anemia of chronic disease secondary to underlying malignancy.  Patient's hemoglobin is 11.5 today.    -Patient is currently on ferrous sulfate 1 tablet by mouth daily.  We will also start him on vitamin B12 thousand micrograms by mouth daily.     6.  Hypertension:Being followed by Dr. Stone .    7.Pulmonary aspergillosis: Patient had a fungus ball consistent with Aspergillus on a cavitary lesion of lung status postsurgical excision.     8.Leukocytosis: mostly reactive.Monitor with cbc for now.    9.  Thrombocytosis:  -Patient's platelet count is elevated at 554,000.  Most likely reactive due to underlying malignancy ± deficiency anemia.  We will monitored with CBC for now.     10. Health maintenance: Patient smokes intermittently, he was counseled about smoking cessation.  About 5 minutes were spent for smoking cessation counseling.       11.BMI:Patient's Body mass  index is 20.68 kg/m². BMI is in reference range.  Patient was counseled about small frequent meals to prevent further weight loss.    12.  Advance care planning: For now patient remains full code and is able to make all his decision.  Patient has health care surrogate mentioned on chart.              Gualberto Dwod MD  2/15/2019  5:24 PM        EMR Dragon/Transcription disclaimer:   Much of this encounter note is an electronic transcription/translation of spoken language to printed text. The electronic translation of spoken language may permit erroneous, or at times, nonsensical words or phrases to be inadvertently transcribed; Although I have reviewed the note for such errors, some may still exist.

## 2019-03-08 PROBLEM — I82.629 ACUTE DEEP VEIN THROMBOSIS (DVT) OF BRACHIAL VEIN (HCC): Status: ACTIVE | Noted: 2019-01-01

## 2019-03-08 PROBLEM — R93.2 ABNORMAL GALL BLADDER DIAGNOSTIC IMAGING: Status: ACTIVE | Noted: 2019-01-01

## 2019-03-08 NOTE — TELEPHONE ENCOUNTER
Left a voicemail regarding the information that I found about a possible clinical trial. There is no trial available here at this time. I instructed him to keep his appointment on 3/15/2018. I left my contact information if he had any quesitons or concerns.

## 2019-03-08 NOTE — PROGRESS NOTES
DATE OF VISIT: 3/8/2019      REASON FOR VISIT:  Squamous cell cancer of left lung, stage IV , Anemia ,thrombocytosis, Left upper extremity DVT, abnormal CT scan of gallbladder showing possible cholecystitis      HISTORY OF PRESENT ILLNESS:    58-year-old male with a past medical history significant for duodenal ulcer, extensive nicotine addiction with more than 45-pack-year smoking history, history of alcohol abuse was seen in consultation on May 3, 2017 for newly diagnosed stage IIB squamous cell cancer of Of left lung.  Patient finished concurrent chemoradiation on July 17, 2017.  Patient had a CT of chest done on December 1, 2017 showed recurrence involving left chest wall region making it stage IV.  In view of PDL 1 showing positivity about 20%, patient was started on Keytruda January 10, 2018. Patient had a CT scan of chest, abdomen and pelvis with contrast done on December 7, 2018, after cycle 16 of Keytruda that shows increasing fullness in left upper lobe.  Subsequently patient underwent PET CT on December 22, 2018 which showed progression of disease.  Subsequently treatment was changed to Taxotere on January 4, 2019.  Patient is here to get cycle 4 of Taxotere today.  Patient had restaging CT of chest, abdomen and pelvis with contrast done on February 20, 2019, patient is here to discuss the results of CT scan and further recommendation.  Complains of worsening pain and  swelling of left upper extremity.  Complains of  discoloration of left chest wall.  Denies any tingling or numbness affecting upper or lower extremity.  Denies any new lymph node enlargement.   Denies any bleeding.  Denies any fever or new cough.  Denies any diarrhea or any new skin lesion.      PAST MEDICAL HISTORY:    Past Medical History:   Diagnosis Date   • Anemia    • Aortic stenosis, mild    • Arthritis    • Chronic bronchitis (CMS/HCC)    • Duodenal ulcer disease    • Inguinal hernia, left    • Lung cancer (CMS/HCC)    • Mitral  valve regurgitation        SOCIAL HISTORY:    Social History     Tobacco Use   • Smoking status: Current Every Day Smoker     Packs/day: 1.00     Years: 40.00     Pack years: 40.00     Types: Cigarettes   • Smokeless tobacco: Former User   • Tobacco comment: Reduced at half a pack per day and weanning off   Substance Use Topics   • Alcohol use: Yes     Alcohol/week: 8.4 oz     Types: 14 Cans of beer per week     Comment: 6 pk/dly   • Drug use: No       Surgical History :  Past Surgical History:   Procedure Laterality Date   • APPENDECTOMY      open   • BRONCHOSCOPY N/A 3/8/2017    Procedure: BRONCHOSCOPY with possible bronchoalveolar lavage, +/- brush, +/- needle biopsy, +/- endobronchial biopsy;  Surgeon: Candis Lr MD;  Location: [facility] ENDOSCOPY;  Service:    • EXPLORATORY LAPAROTOMY  11/17/2014    Exploratory laparotomy, repair of duodenal ulcer, modified Carlos patch   • INGUINAL HERNIA REPAIR  09/13/2013    Open repair of an indirect left inguinal hernia. Left inguinal hernia.   • AR INSJ TUNNELED CVC W/O SUBQ PORT/ AGE 6 YR/> N/A 5/10/2017    Procedure: TUNNELED CENTRAL VENOUS CATHETER  WITH SUBCUTANEOUS  PORT,FLUOROSCOPIC GUIDANCE ;  Surgeon: Angel Hernandez MD;  Location: [facility] OR;  Service: Cardiothoracic   • THORACOTOMY Left 4/19/2017    Procedure: THORACOTOMY LEFT WITH PULMONARY RESECTION AND BRONCHOSCOPY WITH ENDO ;  Surgeon: Angel Hernandez MD;  Location: [facility] OR;  Service:        ALLERGIES:    No Known Allergies    FAMILY HISTORY:  Family History   Problem Relation Age of Onset   • Asthma Father    • Cancer Father    • Diabetes Father    • Hypertension Father    • Alcohol abuse Mother    • Heart failure Brother          REVIEW OF SYSTEMS:      CONSTITUTIONAL:  Positive for fatigue, denies any recent worsening.States his appetite is improved.  Has lost 3 pounds since last clinic visit.Denies any fever, chills .      HEENT: No epistaxis, mouth sores or difficulty  "swallowing.     RESPIRATORY: Denies any shortness of breath or cough today.     CARDIOVASCULAR: Complains of worsening pain on left chest wall.  No palpitation.     GASTROINTESTINAL: No abdominal pain nausea, vomiting or blood in the stool.     GENITOURINARY: No Dysuria or Hematuria.     MUSCULOSKELETAL: Complains of intermittent chest wall discomfort secondary to work.  Complains of worsening swelling pain of left upper extremity.  No new back pain or arthralgia..     LYMPHATICS: Denies any abnormal swollen glands anywhere in the body.     NEUROLOGICAL : No tingling or numbness. No headache or dizziness. No seizures or balance problems.     SKIN: Complains of discoloration of left chest wall.           PHYSICAL EXAMINATION:      VITAL SIGNS:  /75   Pulse 89   Temp 98 °F (36.7 °C) (Temporal)   Resp 18   Ht 172.7 cm (67.99\")   Wt 60.7 kg (133 lb 12.8 oz)   SpO2 99%   BMI 20.35 kg/m²      ECOG performance status : 1    GENERAL:  Not in any distress.    HEENT:  Normocephalic, Atraumatic.Mild Conjunctival pallor. No icterus. Extraocular Movements Intact. No Facial Asymmetry noted.    NECK:  No adenopathy. No JVD.    RESPIRATORY:  Fair air entry bilateral. No rhonchi or wheezing.  Purplish discoloration of left chest wall.    CARDIOVASCULAR:  S1, S2. Regular rate and rhythm. No murmur or gallop appreciated.    ABDOMEN:  Soft,  nontender. Bowel sounds present in all four quadrants.  No hepatosplenomegaly appreciated.    MUSCULOSKELTAL:  Left upper extremity appears swollen as compared to right upper extremity.  There is palpable fullness and mass and left axillary area.No Calf Tenderness.Decreased range of motion.    NEUROLOGIC:  Alert, awake and oriented ×3.  No  Motor or sensory deficit appreciated. Cranial Nerves 2-12 grossly intact.    SKIN: Purplish discoloration of left chest wall.    LYMPHATICS: No new enlarged lymph node in neck or supra-clavicular area.            DIAGNOSTIC DATA:    Glucose   Date " Value Ref Range Status   03/08/2019 195 (H) 60 - 100 mg/dL Final     Glucose, Arterial   Date Value Ref Range Status   04/19/2017 176 mmol/L Final     Sodium   Date Value Ref Range Status   03/08/2019 134 (L) 137 - 145 mmol/L Final     Potassium   Date Value Ref Range Status   03/08/2019 3.6 3.5 - 5.1 mmol/L Final     CO2   Date Value Ref Range Status   03/08/2019 29.0 22.0 - 31.0 mmol/L Final     Chloride   Date Value Ref Range Status   03/08/2019 97 95 - 110 mmol/L Final     Anion Gap   Date Value Ref Range Status   03/08/2019 8.0 5.0 - 15.0 mmol/L Final     Creatinine   Date Value Ref Range Status   03/08/2019 0.43 (L) 0.70 - 1.30 mg/dL Final     BUN   Date Value Ref Range Status   03/08/2019 8 7 - 21 mg/dL Final     BUN/Creatinine Ratio   Date Value Ref Range Status   03/08/2019 18.6 7.0 - 25.0 Final     Calcium   Date Value Ref Range Status   03/08/2019 8.7 8.4 - 10.2 mg/dL Final     eGFR Non  Amer   Date Value Ref Range Status   03/08/2019 204 (H) 56 - 130 mL/min/1.73 Final     Alkaline Phosphatase   Date Value Ref Range Status   03/08/2019 84 38 - 126 U/L Final     Total Protein   Date Value Ref Range Status   03/08/2019 6.4 6.3 - 8.6 g/dL Final     ALT (SGPT)   Date Value Ref Range Status   03/08/2019 <6 (L) 21 - 72 U/L Final     AST (SGOT)   Date Value Ref Range Status   03/08/2019 16 (L) 17 - 59 U/L Final     Total Bilirubin   Date Value Ref Range Status   03/08/2019 0.3 0.2 - 1.3 mg/dL Final     Albumin   Date Value Ref Range Status   03/08/2019 3.50 3.40 - 4.80 g/dL Final     Globulin   Date Value Ref Range Status   03/08/2019 2.9 2.3 - 3.5 gm/dL Final     Lab Results   Component Value Date    WBC 11.95 (H) 03/08/2019    HGB 10.2 (L) 03/08/2019    HCT 31.3 (L) 03/08/2019    MCV 88.4 03/08/2019     (H) 03/08/2019     Lab Results   Component Value Date    NEUTROABS 9.57 (H) 03/08/2019    IRON 24 (L) 01/25/2019    TIBC 248 (L) 01/25/2019    LABIRON 10 (L) 01/25/2019    FERRITIN 157.00  01/25/2019    [id number] 604 01/25/2019    FOLATE >20.00 01/25/2019     Component      Latest Ref Rng & Units 3/14/2018 5/16/2018 7/18/2018 9/19/2018   Free T4      0.78 - 2.19 ng/dL 0.83 0.78 0.67 (L) 0.79           PATHOLOGY:  Foundation 1 testing done on 26 December 2017 showed:  Genomic alteration identified includes:   PTEN loss  BCORL1 loos exon 8-12  CDKN@A/B loss  KMT@C(MLL3)G638  PRKC! Amplification  STAT4 truncation intron 9  TERC amplification  TP53 [id number]    No mutation identified in EGFR, KRAS, RET, ALK,MET, ERBB2, BRAF, ROS1          PDL 1 testing on tumor shows 20% positivity.          Pathology :  Pathology Report from April 19, 2017 showed:       SPECIMEN   Specimen   Lobe(s) of lung   Lobes of Lung   Upper lobe   Procedure   Lobectomy   Specimen Laterality   Left   TUMOR   Primary Tumor Site   Upper lobe   Histologic Type   Squamous cell carcinoma   Histologic Grade   G2: Moderately differentiated   Tumor Size   Greatest dimension (cm): 8 cm   Additional Dimension (cm)   7 cm       5 cm   Tumor Focality   Unifocal   Visceral Pleura Invasion   Present   Tumor Extension   Parietal pleura   Lymph-Vascular Invasion   Not identified   MARGINS   Bronchial Margin   Uninvolved by invasive carcinoma and carcinoma in situ   Vascular Margin   Uninvolved by invasive carcinoma   Parenchymal Margin   Involved by invasive carcinoma   LYMPH NODES   Regional Lymph Nodes       Number of Lymph Nodes Examined   Specify number: 6   Gene Stations Examined   5: Subaortic/ aortopulmonary (AP) / AP window       11L: Interlobar   Lymph Node Involvement   No nodes involved   STAGE (pTNM)   Primary Tumor (pT)   pT3: Tumor greater than 7 cm in greatest dimension; or Tumor of any size that directly invades any of the following: parietal pleural chest wall (including superior sulcus tumors), diaphragm, phrenic nerve, mediastinal pleura, parietal pericardium; or Tumor of any size in the main bronchus less than 2 cm distal to the  peatr but without involvement of the petar; or Tumor of any size associated with atelectasis or obstructive pneumonitis of the entire lung; or Tumors of any size with separate tmor nodule(s) in same lobe   Regional Lymph Nodes (pN)   pN0: No regional lymph node metastasis   ADDITIONAL FINDINGS   Additional Pathologic Finding(s)   Other (specify): Fungal hyphae consistent with Aspergillus species in the cavity contents   .          RADIOLOGY DATA :  CT of chest, abdomen and pelvis with contrast done on February 20, 2019 was reviewed, discussed with patient, it showed:  IMPRESSION:  1.Imaging of the mediastinal structures reveals left jugular vein  region of nonenhancement which may represent nonenhanced blood  flow versus thrombus. This could be further evaluate with  ultrasound if felt be clinically warranted.  2.Mild enlargement of shotty precarinal lymph node which measures  7.6 mm in greatest short axis diameter. Cannot exclude  microscopic metastatic disease.  3. Prevascular space multiple soft tissue foci suspicious for  invasion of left upper lung primary lung parenchymal neoplasm  into the mediastinum versus mediastinal metastatic disease  4. Unfavorable change with enlargement of left upper lobe poorly  enhancing lung parenchymal mass lesion which is highly suspicious  for primary lung carcinoma which is invading into and involving  the left superior lateral chest wall as well as posterior left  pectineus muscle. This suggests progression of neoplasm  involvement.  5. Stable bony destruction of the left first through 6 ribs  secondary to primary lung neoplasm with invasion of the chest  wall and/or metastatic disease.   6. Left lung base small focus of discoid atelectasis.   7. Mild centrilobular emphysematous changes.  8. Abnormal appearing gallbladder with thickened walls and  hyperdense material within the gallbladder. The hyperdense  material within the gallbladder may represent sludge  and/or  hemorrhage. With associated thickened walls cannot exclude acute  or chronic cholecystitis. Recommend clinical correlation.  9.Chronic L5 bilateral spondylolysis with associated grade 1  spondylolisthesis.         Doppler ultrasound of left upper extremity done on February 15, 2019 showed:  IMPRESSION:  CONCLUSION:   DVT visualized in the left internal jugular, subclavian,  axillary, and brachial veins.        PET/CT done on December 22, 2018 was reviewed, discussed with patient and his family, it showed:  HEAD and NECK:           - Scintigraphic:  physiologic distribution of radiotracer              - Anatomic:    no discernable pathologic findings        THORAX:           - Scintigraphic:   -  Soft tissue mass in the superior left hemithorax, SUV max, 8.7       -Anterior mediastinal soft tissue density, SUV max 4.7        - Anatomic:   - Soft tissue mass in the superior left hemithorax, measuring  approximately 8.7 x 6.4 x 4.4 cm displaying direct chest wall  invasion, rib destruction, and extrapleural extension.    - Anterior mediastinal soft tissue density measuring  approximately 2.6 cm.                               ABDOMEN:             - Scintigraphic:  physiologic distribution of radiotracer              - Anatomic:    no discernable pathologic findings                                PELVIS:            - Scintigraphic:  physiologic distribution of radiotracer          - Anatomic:    no discernable pathologic findings                                           OSSEOUS / MISC:                                          - Scintigraphic:  physiologic distribution of radiotracer           - Anatomic:    no discernable pathologic findings                             IMPRESSION:  CONCLUSION:            1.  Scintigraphic/anatomic findings in the left hemithorax  consistent with disease recurrence with active mediastinal  adenopathy.                                      CT of chest, abdomen and pelvis with  contrast done on December 7, 2018 was reviewed, discussed with patient, it showed:  IMPRESSION:  CONCLUSION:     1. Increased size of left upper lobe and apical soft tissue mass  density with destruction of portions of left lateral first  through sixth ribs, increased in severity. Likely tumor  recurrence and/or extension of tumor into the bony structures.  PET CT suggested for further evaluation if indicated.  2. Stable extension of soft tissue mass noted through the left  lateral chest wall into the left subpectoral area.  3. Ill-defined oval nodular density left lower lobe posterior  laterally with differential including scarring as well as  metastasis.  4.  Other changes as above.            PET CT done on December 29, 2017 was reviewed, discussed with patient, it showed:  IMPRESSION:  CONCLUSION:  1.  Mildly enlarging left lung apex pleural base mass lesion with  abnormal FDG activity suspicious for Pancoast tumor which is  invading into the anterior chest wall and partially eroding  through the anterior left second third and fourth ribs. No  current evidence of distant metastatic disease.         PET/CT done on May 22, 2017 showed:  IMPRESSION:  CONCLUSION:  1. Large Pancoast tumor involving left apex with significant  involvement of the periphery, pleura, portions of the chest wall  and contiguous involvement, destruction of at least one of the  anterior left upper ribs.     PET/CT imaging study is otherwise unremarkable. No evidence of  any distant metastases.          ASSESSMENT AND PLAN:      1.  Squamous cell cancer of left upper lobe, stage IIB, T3 N0 M0: Patient is status post surgical excision with positive pleural margin.  As per NCCN guidelines patient received concurrent chemoradiation with weekly carboplatin and Taxol.  Patient received concurrent chemoradiation with  weekly carboplatin and Taxol with radiation  From June 05,2017 until 07/17/2017.  CT scan done on December 1, 2017 shows enlarged  left-sided precarinal lymph node as well as thickening of the left pleura eroding into the left rib cage worrisome for recurrence of cancer.  CT done on December 29, 2017 shows left chest wall recurrence with involvement of pleura, left treat this without any evidence of distance metastasis.  His cancer is stage IV because of the involvement of chest wall and pleura and ribs. PDl 1 testing was done which showed small positivity of 20%.  Foundation 1 testing showed positivity for PTEN loss which Everolimus can be used.  All other mutation that shows positivity are currently , There is no other targeted FDA approved treatment can be used.    -Patient received 16 cycles of Keytruda from January 10, 2018 until November 21, 2018.  -CT scan of chest, abdomen and pelvis with contrast done on December 7, 2018 after cycle 16 of Keytruda showed worsening fullness in left upper lobe along with worsening rib destruction as well as new nodule in left lower lobe concerning for progression of metastatic disease.    -PET/CT done on December 22, 2018 shows progression of disease with extrapleural extension as well as mediastinal adenopathy.  Result of CT scan were discussed with patient and his family.  -She has received 3 cycles of Taxotere from January 4, 2019 until February 15, 2019.  -CT of chest, abdomen and pelvis with contrast done on February 28, 2019 after cycle 3 of Taxotere shows progression of disease with involvement of mediastinum, enlargement of left upper lobe lung mass with worsening extension into her chest wall consistent with progression of disease.  Results of CT scan were discussed with patient and his family.  -Recommend changing treatment to cisplatin and Gemzar in view of progression of disease.  -Chemotherapy side effects  Including but not limited to risk of low blood counts, hearing loss, risk of infection, need for blood transfusion,risk of bleeding, risk of kidney failure, diarrhea, nausea,vomitting  ,risk of neuropathy and risk of allergic reaction which can be life-threatening were discussed with patient and family. We will also provide them some information today to read about the chemotherapy.  -We will start chemotherapy next week once approved with insurance company.  -Patient had a foundation one testing done in January 2018 that shows PTEN loss, there is no FDA approved treatment but there are some clinical trials.  We will see if there is any clinical trial available at our facility for this kind of mutation alteration.  -Patient Does understand that his cancer is progressing which is potentially life-threatening.      2.  Left rib involvement secondary to local recurrence and progression: Treatment with Xgeva was discussed with patient side effect of right jaw including osteonecrosis of jaw, hypocalcemia, mouth sores were discussed.  Patient does need full mouth extraction, he has not done it yet.  We will hold off Xgeva until he gets dental work done.  Importance of seeing dentist and getting dental work done was discussed with patient again today.    3.  Pain management:   - He is using Percocet 10/325 every 4 hours with moderate pain relief.  He is currently using morphine 30 mg twice daily.  Complains of worsening pain and swelling of left upper extremity:  -We will increase morphine sulfate to 60 mg twice daily from  February 15, 2019.  Will continue with Percocet 10/325 every 4 hours for now.  He was again counseled about bowel regimen.    4.  Left upper extremity DVT:  -Patient had a Doppler ultrasound done of left upper extremity today on February 15, 2019 that showed DVT involving internal jugular vein, subclavian vein and brachial vein.  Results of Doppler were discussed with patient.  -Treatment option consisting of Lovenox with Coumadin or Xarelto were discussed with the patient.  -He prefers to take Xarelto.  -Potential side effect of Xarelto including bleeding which could be  life-threatening, not having availability of antidote was discussed with patient.  -Prescription for Xarelto 15 mg twice daily for 3 weeks has been sent to pharmacy today on February 15, 2019  -Prescription for Xarelto 20 mg daily has been sent to his pharmacy today on March 8, 2019.  -He was instructed to come to the emergency room if he starts having any bleeding problems.    5.  Anemia: Most likely anemia of chronic disease secondary to underlying malignancy.  Patient's hemoglobin is 10.0 today.    -Patient is currently on ferrous sulfate 1 tablet by mouth daily.  We will also start him on vitamin B12 thousand micrograms by mouth daily.     6.  Hypertension:Being followed by Dr. Stone .    7.Pulmonary aspergillosis: Patient had a fungus ball consistent with Aspergillus on a cavitary lesion of lung status postsurgical excision.     8.Leukocytosis: mostly reactive.Monitor with cbc for now.    9.  Thrombocytosis:  -Patient's platelet count is elevated at 548,000.  Most likely reactive due to underlying malignancy .  We will monitored with CBC for now.    10.  Abnormal CT scan of the gallbladder showing  possible cholecystitis:  -Recent CT scan done for restaging purpose shows thickening of the gallbladder.  Subsequently patient had ultrasound of gallbladder and liver done earlier today on March 8, 2019 that showed thickening of gallbladder consistent with either acute or chronic cholecystitis.  -Results of ultrasound were discussed with patient today.  Will refer him to Dr. Salas to get evaluated prior to starting chemotherapy next week.     11. Health maintenance: Patient smokes intermittently, he was counseled about smoking cessation.  About 5 minutes were spent for smoking cessation counseling.       12.BMI:Patient's Body mass index is 20.35 kg/m². BMI is in reference range.  Patient was counseled about small frequent meals to prevent further weight loss.    13.  Advance care planning: For now patient  remains full code and is able to make all his decision.  Patient has health care surrogate mentioned on chart.              Gualberto Dowd MD  3/8/2019  6:27 PM        EMR Dragon/Transcription disclaimer:   Much of this encounter note is an electronic transcription/translation of spoken language to printed text. The electronic translation of spoken language may permit erroneous, or at times, nonsensical words or phrases to be inadvertently transcribed; Although I have reviewed the note for such errors, some may still exist.

## 2019-03-08 NOTE — PATIENT INSTRUCTIONS
Cisplatin injection  What is this medicine?  CISPLATIN (SIS lucrecia tin) is a chemotherapy drug. It targets fast dividing cells, like cancer cells, and causes these cells to die. This medicine is used to treat many types of cancer like bladder, ovarian, and testicular cancers.  This medicine may be used for other purposes; ask your health care provider or pharmacist if you have questions.  COMMON BRAND NAME(S): Platinol, Platinol -AQ  What should I tell my health care provider before I take this medicine?  They need to know if you have any of these conditions:  -blood disorders  -hearing problems  -kidney disease  -recent or ongoing radiation therapy  -an unusual or allergic reaction to cisplatin, carboplatin, other chemotherapy, other medicines, foods, dyes, or preservatives  -pregnant or trying to get pregnant  -breast-feeding  How should I use this medicine?  This drug is given as an infusion into a vein. It is administered in a hospital or clinic by a specially trained health care professional.  Talk to your pediatrician regarding the use of this medicine in children. Special care may be needed.  Overdosage: If you think you have taken too much of this medicine contact a poison control center or emergency room at once.  NOTE: This medicine is only for you. Do not share this medicine with others.  What if I miss a dose?  It is important not to miss a dose. Call your doctor or health care professional if you are unable to keep an appointment.  What may interact with this medicine?  -dofetilide  -foscarnet  -medicines for seizures  -medicines to increase blood counts like filgrastim, pegfilgrastim, sargramostim  -probenecid  -pyridoxine used with altretamine  -rituximab  -some antibiotics like amikacin, gentamicin, neomycin, polymyxin B, streptomycin, tobramycin  -sulfinpyrazone  -vaccines  -zalcitabine  Talk to your doctor or health care professional before taking any of these  medicines:  -acetaminophen  -aspirin  -ibuprofen  -ketoprofen  -naproxen  This list may not describe all possible interactions. Give your health care provider a list of all the medicines, herbs, non-prescription drugs, or dietary supplements you use. Also tell them if you smoke, drink alcohol, or use illegal drugs. Some items may interact with your medicine.  What should I watch for while using this medicine?  Your condition will be monitored carefully while you are receiving this medicine. You will need important blood work done while you are taking this medicine.  This drug may make you feel generally unwell. This is not uncommon, as chemotherapy can affect healthy cells as well as cancer cells. Report any side effects. Continue your course of treatment even though you feel ill unless your doctor tells you to stop.  In some cases, you may be given additional medicines to help with side effects. Follow all directions for their use.  Call your doctor or health care professional for advice if you get a fever, chills or sore throat, or other symptoms of a cold or flu. Do not treat yourself. This drug decreases your body's ability to fight infections. Try to avoid being around people who are sick.  This medicine may increase your risk to bruise or bleed. Call your doctor or health care professional if you notice any unusual bleeding.  Be careful brushing and flossing your teeth or using a toothpick because you may get an infection or bleed more easily. If you have any dental work done, tell your dentist you are receiving this medicine.  Avoid taking products that contain aspirin, acetaminophen, ibuprofen, naproxen, or ketoprofen unless instructed by your doctor. These medicines may hide a fever.  Do not become pregnant while taking this medicine. Women should inform their doctor if they wish to become pregnant or think they might be pregnant. There is a potential for serious side effects to an unborn child. Talk to  your health care professional or pharmacist for more information. Do not breast-feed an infant while taking this medicine.  Drink fluids as directed while you are taking this medicine. This will help protect your kidneys.  Call your doctor or health care professional if you get diarrhea. Do not treat yourself.  What side effects may I notice from receiving this medicine?  Side effects that you should report to your doctor or health care professional as soon as possible:  -allergic reactions like skin rash, itching or hives, swelling of the face, lips, or tongue  -signs of infection - fever or chills, cough, sore throat, pain or difficulty passing urine  -signs of decreased platelets or bleeding - bruising, pinpoint red spots on the skin, black, tarry stools, nosebleeds  -signs of decreased red blood cells - unusually weak or tired, fainting spells, lightheadedness  -breathing problems  -changes in hearing  -gout pain  -low blood counts - This drug may decrease the number of white blood cells, red blood cells and platelets. You may be at increased risk for infections and bleeding.  -nausea and vomiting  -pain, swelling, redness or irritation at the injection site  -pain, tingling, numbness in the hands or feet  -problems with balance, movement  -trouble passing urine or change in the amount of urine  Side effects that usually do not require medical attention (report to your doctor or health care professional if they continue or are bothersome):  -changes in vision  -loss of appetite  -metallic taste in the mouth or changes in taste  This list may not describe all possible side effects. Call your doctor for medical advice about side effects. You may report side effects to FDA at 0-593-FDA-6460.  Where should I keep my medicine?  This drug is given in a hospital or clinic and will not be stored at home.  NOTE: This sheet is a summary. It may not cover all possible information. If you have questions about this medicine,  talk to your doctor, pharmacist, or health care provider.  © 2018 [employer] (2009-03-24 14:40:54)  Gemcitabine injection  What is this medicine?  GEMCITABINE (lidia SIT a been) is a chemotherapy drug. This medicine is used to treat many types of cancer like breast cancer, lung cancer, pancreatic cancer, and ovarian cancer.  This medicine may be used for other purposes; ask your health care provider or pharmacist if you have questions.  COMMON BRAND NAME(S): Jessica  What should I tell my health care provider before I take this medicine?  They need to know if you have any of these conditions:  -blood disorders  -infection  -kidney disease  -liver disease  -recent or ongoing radiation therapy  -an unusual or allergic reaction to gemcitabine, other chemotherapy, other medicines, foods, dyes, or preservatives  -pregnant or trying to get pregnant  -breast-feeding  How should I use this medicine?  This drug is given as an infusion into a vein. It is administered in a hospital or clinic by a specially trained health care professional.  Talk to your pediatrician regarding the use of this medicine in children. Special care may be needed.  Overdosage: If you think you have taken too much of this medicine contact a poison control center or emergency room at once.  NOTE: This medicine is only for you. Do not share this medicine with others.  What if I miss a dose?  It is important not to miss your dose. Call your doctor or health care professional if you are unable to keep an appointment.  What may interact with this medicine?  -medicines to increase blood counts like filgrastim, pegfilgrastim, sargramostim  -some other chemotherapy drugs like cisplatin  -vaccines  Talk to your doctor or health care professional before taking any of these medicines:  -acetaminophen  -aspirin  -ibuprofen  -ketoprofen  -naproxen  This list may not describe all possible interactions. Give your health care provider a list of all the  medicines, herbs, non-prescription drugs, or dietary supplements you use. Also tell them if you smoke, drink alcohol, or use illegal drugs. Some items may interact with your medicine.  What should I watch for while using this medicine?  Visit your doctor for checks on your progress. This drug may make you feel generally unwell. This is not uncommon, as chemotherapy can affect healthy cells as well as cancer cells. Report any side effects. Continue your course of treatment even though you feel ill unless your doctor tells you to stop.  In some cases, you may be given additional medicines to help with side effects. Follow all directions for their use.  Call your doctor or health care professional for advice if you get a fever, chills or sore throat, or other symptoms of a cold or flu. Do not treat yourself. This drug decreases your body's ability to fight infections. Try to avoid being around people who are sick.  This medicine may increase your risk to bruise or bleed. Call your doctor or health care professional if you notice any unusual bleeding.  Be careful brushing and flossing your teeth or using a toothpick because you may get an infection or bleed more easily. If you have any dental work done, tell your dentist you are receiving this medicine.  Avoid taking products that contain aspirin, acetaminophen, ibuprofen, naproxen, or ketoprofen unless instructed by your doctor. These medicines may hide a fever.  Women should inform their doctor if they wish to become pregnant or think they might be pregnant. There is a potential for serious side effects to an unborn child. Talk to your health care professional or pharmacist for more information. Do not breast-feed an infant while taking this medicine.  What side effects may I notice from receiving this medicine?  Side effects that you should report to your doctor or health care professional as soon as possible:  -allergic reactions like skin rash, itching or hives,  swelling of the face, lips, or tongue  -low blood counts - this medicine may decrease the number of white blood cells, red blood cells and platelets. You may be at increased risk for infections and bleeding.  -signs of infection - fever or chills, cough, sore throat, pain or difficulty passing urine  -signs of decreased platelets or bleeding - bruising, pinpoint red spots on the skin, black, tarry stools, blood in the urine  -signs of decreased red blood cells - unusually weak or tired, fainting spells, lightheadedness  -breathing problems  -chest pain  -mouth sores  -nausea and vomiting  -pain, swelling, redness at site where injected  -pain, tingling, numbness in the hands or feet  -stomach pain  -swelling of ankles, feet, hands  -unusual bleeding  Side effects that usually do not require medical attention (report to your doctor or health care professional if they continue or are bothersome):  -constipation  -diarrhea  -hair loss  -loss of appetite  -stomach upset  This list may not describe all possible side effects. Call your doctor for medical advice about side effects. You may report side effects to FDA at 4-820-FDA-6673.  Where should I keep my medicine?  This drug is given in a hospital or clinic and will not be stored at home.  NOTE: This sheet is a summary. It may not cover all possible information. If you have questions about this medicine, talk to your doctor, pharmacist, or health care provider.  © 2018 [employer] (2009-04-28 18:45:54)

## 2019-03-15 NOTE — PATIENT INSTRUCTIONS
Chemotherapy  Chemotherapy is the use of medicines to stop or slow the growth of cancer cells. Depending on the type and stage of your cancer, you may have chemotherapy to:  · Cure your cancer.  · Slow the progression of your cancer.  · Ease your cancer symptoms.  · Improve the benefits of radiation treatment.  · Shrink a tumor before surgery.  · Rid the body of cancer cells that remain after a tumor is surgically removed.    How is chemotherapy given?  Chemotherapy may be given:  · By mouth in liquid or pill form.  · Through a thin tube that is inserted into a vein or artery.  · By getting a shot.  · By rubbing a cream or ointment on your skin.  · Through liquids that are placed directly into various areas of the body, such as the abdomen, chest, or bladder.    How often is chemotherapy given?  Chemotherapy may be given continuously over time, or it may be given in cycles. For example, you may take the medicine for one week out of every month.  For how long will I need chemotherapy treatments?  The length of treatment depends on many factors, including:  · The type of cancer.  · Whether the cancer has spread.  · How you respond to the chemotherapy.  · Whether you develop side effects.    Some types of chemotherapy medicine are given only one time. Others are given for months, years, or for life.  What safety precautions must I take while on chemotherapy?  Chemotherapy medicines are very strong. They will be in all of your bodily fluids, including your urine, stool, saliva, sweat, tears, vaginal secretions, and semen. You must carefully follow some safety precautions to prevent harm to others while you are using these medicines. Here are some recommended precautions:  · Make sure that people who help care for you wear disposable gloves if they are going to come into contact with any of your bodily fluids. Women who are pregnant or breastfeeding should not handle any of your bodily fluids.  · Wash any clothes,  towels, and linens that may have your bodily fluids on them twice in a washing machine using very hot water.  · Dispose of adult diapers, tampons, and sanitary napkins by first sealing them in a plastic bag.  · Use a condom when having sex for at least 2 weeks after receiving your chemotherapy.  · Do not share beverages or food.  · Keep your chemotherapy medicines in their original bottles. Keep them in a high, safe location, away from children. Do not expose them to heat or moisture. Do not put them in containers with other types of medicines.  · Dispose of all wrappers for your chemotherapy medicines by sealing them in a separate plastic bag.  · Do not throw away extra medicine, and do not flush it down the toilet. Take medicine that you are not going to use to your health care provider's office where it can be disposed of properly.  · Follow your health care provider’s directions for the proper disposal of needles, IV tubing, and other medical supplies that have come into contact with your chemotherapy medicines.  · If you are issued a hazardous waste container, make sure you understand the directions for using it.  · Wash your hands thoroughly with warm water and soap after using the bathroom. Dry your hands with disposable paper towels.  · When using the toilet:  ? Flush it twice after each use, including after vomiting.  ? Close the lid of the toilet prior to flushing. This helps to avoid splashing.  ? Both men and women should sit to use the toilet. This helps avoid splashing.    What are the side effects of chemotherapy?  Side effects depend on a variety of factors, including:  · The specific type of chemotherapy medicine used.  · The dosage.  · How long the medicine is used for.  · Your overall health.    Some of the side effects you may experience include:  · Fatigue and decreased energy.  · Decreased appetite.  · Changes in your sense of smell or taste.  · Nausea.  · Vomiting.  · Constipation or  diarrhea.  · Hair loss.  · Increased susceptibility to infection.  · Easy bleeding.  · Mouth sores.  · Burning or tingling in the hands or feet.  · Memory problems.    This information is not intended to replace advice given to you by your health care provider. Make sure you discuss any questions you have with your health care provider.  Document Released: 10/14/2008 Document Revised: 07/07/2017 Document Reviewed: 05/26/2015  [email or web address] Interactive Patient Education © 2018 [employer]  Gemcitabine injection  What is this medicine?  GEMCITABINE (lidia SYE ta been) is a chemotherapy drug. This medicine is used to treat many types of cancer like breast cancer, lung cancer, pancreatic cancer, and ovarian cancer.  This medicine may be used for other purposes; ask your health care provider or pharmacist if you have questions.  COMMON BRAND NAME(S): Gemzar  What should I tell my health care provider before I take this medicine?  They need to know if you have any of these conditions:  -blood disorders  -infection  -kidney disease  -liver disease  -lung or breathing disease, like asthma  -recent or ongoing radiation therapy  -an unusual or allergic reaction to gemcitabine, other chemotherapy, other medicines, foods, dyes, or preservatives  -pregnant or trying to get pregnant  -breast-feeding  How should I use this medicine?  This drug is given as an infusion into a vein. It is administered in a hospital or clinic by a specially trained health care professional.  Talk to your pediatrician regarding the use of this medicine in children. Special care may be needed.  Overdosage: If you think you have taken too much of this medicine contact a poison control center or emergency room at once.  NOTE: This medicine is only for you. Do not share this medicine with others.  What if I miss a dose?  It is important not to miss your dose. Call your doctor or health care professional if you are unable to keep an appointment.  What may  interact with this medicine?  -medicines to increase blood counts like filgrastim, pegfilgrastim, sargramostim  -some other chemotherapy drugs like cisplatin  -vaccines  Talk to your doctor or health care professional before taking any of these medicines:  -acetaminophen  -aspirin  -ibuprofen  -ketoprofen  -naproxen  This list may not describe all possible interactions. Give your health care provider a list of all the medicines, herbs, non-prescription drugs, or dietary supplements you use. Also tell them if you smoke, drink alcohol, or use illegal drugs. Some items may interact with your medicine.  What should I watch for while using this medicine?  Visit your doctor for checks on your progress. This drug may make you feel generally unwell. This is not uncommon, as chemotherapy can affect healthy cells as well as cancer cells. Report any side effects. Continue your course of treatment even though you feel ill unless your doctor tells you to stop.  In some cases, you may be given additional medicines to help with side effects. Follow all directions for their use.  Call your doctor or health care professional for advice if you get a fever, chills or sore throat, or other symptoms of a cold or flu. Do not treat yourself. This drug decreases your body's ability to fight infections. Try to avoid being around people who are sick.  This medicine may increase your risk to bruise or bleed. Call your doctor or health care professional if you notice any unusual bleeding.  Be careful brushing and flossing your teeth or using a toothpick because you may get an infection or bleed more easily. If you have any dental work done, tell your dentist you are receiving this medicine.  Avoid taking products that contain aspirin, acetaminophen, ibuprofen, naproxen, or ketoprofen unless instructed by your doctor. These medicines may hide a fever.  Do not become pregnant while taking this medicine or for 6 months after stopping it. Women  should inform their doctor if they wish to become pregnant or think they might be pregnant. Men should not father a child while taking this medicine and for 3 months after stopping it. There is a potential for serious side effects to an unborn child. Talk to your health care professional or pharmacist for more information. Do not breast-feed an infant while taking this medicine or for at least 1 week after stopping it.  Men should inform their doctors if they wish to father a child. This medicine may lower sperm counts. Talk with your doctor or health care professional if you are concerned about your fertility.  What side effects may I notice from receiving this medicine?  Side effects that you should report to your doctor or health care professional as soon as possible:  -allergic reactions like skin rash, itching or hives, swelling of the face, lips, or tongue  -breathing problems  -pain, redness, or irritation at site where injected  -signs and symptoms of a dangerous change in heartbeat or heart rhythm like chest pain; dizziness; fast or irregular heartbeat; palpitations; feeling faint or lightheaded, falls; breathing problems  -signs of decreased platelets or bleeding - bruising, pinpoint red spots on the skin, black, tarry stools, blood in the urine  -signs of decreased red blood cells - unusually weak or tired, feeling faint or lightheaded, falls  -signs of infection - fever or chills, cough, sore throat, pain or difficulty passing urine  -signs and symptoms of kidney injury like trouble passing urine or change in the amount of urine  -signs and symptoms of liver injury like dark yellow or brown urine; general ill feeling or flu-like symptoms; light-colored stools; loss of appetite; nausea; right upper belly pain; unusually weak or tired; yellowing of the eyes or skin  -swelling of ankles, feet, hands  Side effects that usually do not require medical attention (report to your doctor or health care  professional if they continue or are bothersome):  -constipation  -diarrhea  -hair loss  -loss of appetite  -nausea  -rash  -vomiting  This list may not describe all possible side effects. Call your doctor for medical advice about side effects. You may report side effects to FDA at 0-779-FDA-1371.  Where should I keep my medicine?  This drug is given in a hospital or clinic and will not be stored at home.  NOTE: This sheet is a summary. It may not cover all possible information. If you have questions about this medicine, talk to your doctor, pharmacist, or health care provider.  © 2019 [employer] (2018-07-26 14:15:17)  Cisplatin injection  What is this medicine?  CISPLATIN (SIS lucrecia tin) is a chemotherapy drug. It targets fast dividing cells, like cancer cells, and causes these cells to die. This medicine is used to treat many types of cancer like bladder, ovarian, and testicular cancers.  This medicine may be used for other purposes; ask your health care provider or pharmacist if you have questions.  COMMON BRAND NAME(S): Platinol, Platinol -AQ  What should I tell my health care provider before I take this medicine?  They need to know if you have any of these conditions:  -blood disorders  -hearing problems  -kidney disease  -recent or ongoing radiation therapy  -an unusual or allergic reaction to cisplatin, carboplatin, other chemotherapy, other medicines, foods, dyes, or preservatives  -pregnant or trying to get pregnant  -breast-feeding  How should I use this medicine?  This drug is given as an infusion into a vein. It is administered in a hospital or clinic by a specially trained health care professional.  Talk to your pediatrician regarding the use of this medicine in children. Special care may be needed.  Overdosage: If you think you have taken too much of this medicine contact a poison control center or emergency room at once.  NOTE: This medicine is only for you. Do not share this medicine with  others.  What if I miss a dose?  It is important not to miss a dose. Call your doctor or health care professional if you are unable to keep an appointment.  What may interact with this medicine?  -dofetilide  -foscarnet  -medicines for seizures  -medicines to increase blood counts like filgrastim, pegfilgrastim, sargramostim  -probenecid  -pyridoxine used with altretamine  -rituximab  -some antibiotics like amikacin, gentamicin, neomycin, polymyxin B, streptomycin, tobramycin  -sulfinpyrazone  -vaccines  -zalcitabine  Talk to your doctor or health care professional before taking any of these medicines:  -acetaminophen  -aspirin  -ibuprofen  -ketoprofen  -naproxen  This list may not describe all possible interactions. Give your health care provider a list of all the medicines, herbs, non-prescription drugs, or dietary supplements you use. Also tell them if you smoke, drink alcohol, or use illegal drugs. Some items may interact with your medicine.  What should I watch for while using this medicine?  Your condition will be monitored carefully while you are receiving this medicine. You will need important blood work done while you are taking this medicine.  This drug may make you feel generally unwell. This is not uncommon, as chemotherapy can affect healthy cells as well as cancer cells. Report any side effects. Continue your course of treatment even though you feel ill unless your doctor tells you to stop.  In some cases, you may be given additional medicines to help with side effects. Follow all directions for their use.  Call your doctor or health care professional for advice if you get a fever, chills or sore throat, or other symptoms of a cold or flu. Do not treat yourself. This drug decreases your body's ability to fight infections. Try to avoid being around people who are sick.  This medicine may increase your risk to bruise or bleed. Call your doctor or health care professional if you notice any unusual  bleeding.  Be careful brushing and flossing your teeth or using a toothpick because you may get an infection or bleed more easily. If you have any dental work done, tell your dentist you are receiving this medicine.  Avoid taking products that contain aspirin, acetaminophen, ibuprofen, naproxen, or ketoprofen unless instructed by your doctor. These medicines may hide a fever.  Do not become pregnant while taking this medicine. Women should inform their doctor if they wish to become pregnant or think they might be pregnant. There is a potential for serious side effects to an unborn child. Talk to your health care professional or pharmacist for more information. Do not breast-feed an infant while taking this medicine.  Drink fluids as directed while you are taking this medicine. This will help protect your kidneys.  Call your doctor or health care professional if you get diarrhea. Do not treat yourself.  What side effects may I notice from receiving this medicine?  Side effects that you should report to your doctor or health care professional as soon as possible:  -allergic reactions like skin rash, itching or hives, swelling of the face, lips, or tongue  -signs of infection - fever or chills, cough, sore throat, pain or difficulty passing urine  -signs of decreased platelets or bleeding - bruising, pinpoint red spots on the skin, black, tarry stools, nosebleeds  -signs of decreased red blood cells - unusually weak or tired, fainting spells, lightheadedness  -breathing problems  -changes in hearing  -gout pain  -low blood counts - This drug may decrease the number of white blood cells, red blood cells and platelets. You may be at increased risk for infections and bleeding.  -nausea and vomiting  -pain, swelling, redness or irritation at the injection site  -pain, tingling, numbness in the hands or feet  -problems with balance, movement  -trouble passing urine or change in the amount of urine  Side effects that usually  do not require medical attention (report to your doctor or health care professional if they continue or are bothersome):  -changes in vision  -loss of appetite  -metallic taste in the mouth or changes in taste  This list may not describe all possible side effects. Call your doctor for medical advice about side effects. You may report side effects to FDA at 5-778-EBV-9894.  Where should I keep my medicine?  This drug is given in a hospital or clinic and will not be stored at home.  NOTE: This sheet is a summary. It may not cover all possible information. If you have questions about this medicine, talk to your doctor, pharmacist, or health care provider.  © 2019 [employer] (2009-03-24 14:40:54)

## 2019-03-15 NOTE — PROGRESS NOTES
Adult Outpatient Nutrition  Assessment    Patient Name:  Tino Peres  YOB: 1960  MRN: [id number]    Assessment Date:  3/15/2019    Comments:  Brief follow-up to check nutrition. Chemo regimen changing to cisplatin & gemzar today. Wt 133 9 lb--up/down (overall stable). Pt stated it is time for it to go up. Reinforced needs for calories/protein/fluid. Urged supplementation.                        Electronically signed by:  Kayla Lees RD  03/15/19 12:49 PM

## 2019-03-22 NOTE — PROGRESS NOTES
DATE OF VISIT: 3/22/2019      REASON FOR VISIT:  Squamous cell cancer of left lung, stage IV , Anemia ,thrombocytosis, Left upper extremity DVT, abnormal CT scan of gallbladder showing possible cholecystitis      HISTORY OF PRESENT ILLNESS:    59-year-old male with a past medical history significant for duodenal ulcer, extensive nicotine addiction with more than 45-pack-year smoking history, history of alcohol abuse was seen in consultation on May 3, 2017 for newly diagnosed stage IIB squamous cell cancer of Of left lung.  Patient finished concurrent chemoradiation on July 17, 2017.  Patient had a CT of chest done on December 1, 2017 showed recurrence involving left chest wall region making it stage IV.  In view of PDL 1 showing positivity about 20%, patient was started on Keytruda January 10, 2018. Patient had a CT scan of chest, abdomen and pelvis with contrast done on December 7, 2018, after cycle 16 of Keytruda that shows increasing fullness in left upper lobe.  Subsequently patient underwent PET CT on December 22, 2018 which showed progression of disease.  Subsequently treatment was changed to Taxotere on January 4, 2019.    Patient had restaging CT of chest, abdomen and pelvis with contrast done on February 20, 2019 after cycle 3 of Taxotere which showed worsening of metastatic disease.  Subsequently patient has been started on carboplatin and gemcitabine on March 15, 2019.  Patient is here to get day 8 of cycle 1 of carboplatin and gemcitabine today.  Complains of no appetite and has lost 14 pounds in last 2 weeks.  States swelling of left upper extremity is improved.  Denies any bleeding.  Complains of severe fatigue since starting chemotherapy. Denies any diarrhea or any new skin lesion.      PAST MEDICAL HISTORY:    Past Medical History:   Diagnosis Date   • Anemia    • Aortic stenosis, mild    • Arthritis    • Chronic bronchitis (CMS/HCC)    • Duodenal ulcer disease    • Inguinal hernia, left    • Lung  cancer (CMS/HCC)    • Mitral valve regurgitation        SOCIAL HISTORY:    Social History     Tobacco Use   • Smoking status: Current Every Day Smoker     Packs/day: 1.00     Years: 40.00     Pack years: 40.00     Types: Cigarettes   • Smokeless tobacco: Former User   • Tobacco comment: Reduced at half a pack per day and weanning off   Substance Use Topics   • Alcohol use: Yes     Alcohol/week: 8.4 oz     Types: 14 Cans of beer per week     Comment: 6 pk/dly   • Drug use: No       Surgical History :  Past Surgical History:   Procedure Laterality Date   • APPENDECTOMY      open   • BRONCHOSCOPY N/A 3/8/2017    Procedure: BRONCHOSCOPY with possible bronchoalveolar lavage, +/- brush, +/- needle biopsy, +/- endobronchial biopsy;  Surgeon: Candis Lr MD;  Location: [facility] ENDOSCOPY;  Service:    • EXPLORATORY LAPAROTOMY  11/17/2014    Exploratory laparotomy, repair of duodenal ulcer, modified Carlos patch   • INGUINAL HERNIA REPAIR  09/13/2013    Open repair of an indirect left inguinal hernia. Left inguinal hernia.   • OK INSJ TUNNELED CVC W/O SUBQ PORT/ AGE 6 YR/> N/A 5/10/2017    Procedure: TUNNELED CENTRAL VENOUS CATHETER  WITH SUBCUTANEOUS  PORT,FLUOROSCOPIC GUIDANCE ;  Surgeon: Angel Hernandez MD;  Location: [facility] OR;  Service: Cardiothoracic   • THORACOTOMY Left 4/19/2017    Procedure: THORACOTOMY LEFT WITH PULMONARY RESECTION AND BRONCHOSCOPY WITH ENDO ;  Surgeon: Angel Hernandez MD;  Location: [facility] OR;  Service:        ALLERGIES:    No Known Allergies    FAMILY HISTORY:  Family History   Problem Relation Age of Onset   • Asthma Father    • Cancer Father    • Diabetes Father    • Hypertension Father    • Alcohol abuse Mother    • Heart failure Brother          REVIEW OF SYSTEMS:      CONSTITUTIONAL:  Positive for worsening fatigue.  Complains of poor appetite.  Has lost 14 pounds since last clinic visit.Denies any fever, chills .      HEENT: No epistaxis, mouth sores or difficulty  "swallowing.     RESPIRATORY: Denies any shortness of breath or cough today.     CARDIOVASCULAR: States pain on left chest wall is somewhat improved.  No palpitation.     GASTROINTESTINAL: No abdominal pain nausea, vomiting or blood in the stool.     GENITOURINARY: No Dysuria or Hematuria.     MUSCULOSKELETAL: Complains of intermittent chest wall discomfort secondary to work.  States swelling of left upper extremity significantly improved.  No new back pain or arthralgia..     LYMPHATICS: Denies any abnormal swollen glands anywhere in the body.     NEUROLOGICAL : No tingling or numbness. No headache or dizziness. No seizures or balance problems.     SKIN: Complains of discoloration of left chest wall.           PHYSICAL EXAMINATION:      VITAL SIGNS:  /83   Pulse (!) 121   Temp 97.6 °F (36.4 °C) (Temporal)   Resp 18   Ht 172.7 cm (67.99\")   Wt 54.1 kg (119 lb 3.2 oz)   SpO2 96%   BMI 18.13 kg/m²      ECOG performance status : 1    GENERAL:  Not in any distress.    HEENT:  Normocephalic, Atraumatic.Mild Conjunctival pallor. No icterus. Extraocular Movements Intact. No Facial Asymmetry noted.    NECK:  No adenopathy. No JVD.    RESPIRATORY:  Fair air entry bilateral. No rhonchi or wheezing.  Purplish discoloration of left chest wall.    CARDIOVASCULAR:  S1, S2. Regular rate and rhythm. No murmur or gallop appreciated.    ABDOMEN:  Soft,  nontender. Bowel sounds present in all four quadrants.  No hepatosplenomegaly appreciated.    MUSCULOSKELTAL:  Left upper extremity swelling significantly improved since last clinic visit.  There is palpable fullness and mass and left axillary area.No Calf Tenderness.Decreased range of motion.    NEUROLOGIC:  Alert, awake and oriented ×3.  No  Motor or sensory deficit appreciated. Cranial Nerves 2-12 grossly intact.    SKIN: Purplish discoloration of left chest wall.    LYMPHATICS: No new enlarged lymph node in neck or supra-clavicular area.            DIAGNOSTIC DATA:  "   Glucose   Date Value Ref Range Status   03/15/2019 125 (H) 60 - 100 mg/dL Final     Glucose, Arterial   Date Value Ref Range Status   04/19/2017 176 mmol/L Final     Sodium   Date Value Ref Range Status   03/15/2019 135 (L) 137 - 145 mmol/L Final     Potassium   Date Value Ref Range Status   03/15/2019 4.2 3.5 - 5.1 mmol/L Final     CO2   Date Value Ref Range Status   03/15/2019 29.0 22.0 - 31.0 mmol/L Final     Chloride   Date Value Ref Range Status   03/15/2019 98 95 - 110 mmol/L Final     Anion Gap   Date Value Ref Range Status   03/15/2019 8.0 5.0 - 15.0 mmol/L Final     Creatinine   Date Value Ref Range Status   03/15/2019 0.48 (L) 0.70 - 1.30 mg/dL Final     BUN   Date Value Ref Range Status   03/15/2019 14 7 - 21 mg/dL Final     BUN/Creatinine Ratio   Date Value Ref Range Status   03/15/2019 29.2 (H) 7.0 - 25.0 Final     Calcium   Date Value Ref Range Status   03/15/2019 9.6 8.4 - 10.2 mg/dL Final     eGFR Non  Amer   Date Value Ref Range Status   03/15/2019 180 (H) 56 - 130 mL/min/1.73 Final     Alkaline Phosphatase   Date Value Ref Range Status   03/15/2019 108 38 - 126 U/L Final     Total Protein   Date Value Ref Range Status   03/15/2019 7.3 6.3 - 8.6 g/dL Final     ALT (SGPT)   Date Value Ref Range Status   03/15/2019 8 (L) 21 - 72 U/L Final     AST (SGOT)   Date Value Ref Range Status   03/15/2019 23 17 - 59 U/L Final     Total Bilirubin   Date Value Ref Range Status   03/15/2019 0.2 0.2 - 1.3 mg/dL Final     Albumin   Date Value Ref Range Status   03/15/2019 4.00 3.40 - 4.80 g/dL Final     Globulin   Date Value Ref Range Status   03/15/2019 3.3 2.3 - 3.5 gm/dL Final     Lab Results   Component Value Date    WBC 8.36 03/22/2019    HGB 11.2 (L) 03/22/2019    HCT 34.2 (L) 03/22/2019    MCV 85.5 03/22/2019     03/22/2019     Lab Results   Component Value Date    NEUTROABS 6.85 03/22/2019    IRON 24 (L) 01/25/2019    TIBC 248 (L) 01/25/2019    LABIRON 10 (L) 01/25/2019    FERRITIN 157.00  01/25/2019    [id number] 604 01/25/2019    FOLATE >20.00 01/25/2019     Component      Latest Ref Rng & Units 3/14/2018 5/16/2018 7/18/2018 9/19/2018   Free T4      0.78 - 2.19 ng/dL 0.83 0.78 0.67 (L) 0.79           PATHOLOGY:  Foundation 1 testing done on 26 December 2017 showed:  Genomic alteration identified includes:   PTEN loss  BCORL1 loos exon 8-12  CDKN@A/B loss  KMT@C(MLL3)G638  PRKC! Amplification  STAT4 truncation intron 9  TERC amplification  TP53 [id number]    No mutation identified in EGFR, KRAS, RET, ALK,MET, ERBB2, BRAF, ROS1          PDL 1 testing on tumor shows 20% positivity.          Pathology :  Pathology Report from April 19, 2017 showed:       SPECIMEN   Specimen   Lobe(s) of lung   Lobes of Lung   Upper lobe   Procedure   Lobectomy   Specimen Laterality   Left   TUMOR   Primary Tumor Site   Upper lobe   Histologic Type   Squamous cell carcinoma   Histologic Grade   G2: Moderately differentiated   Tumor Size   Greatest dimension (cm): 8 cm   Additional Dimension (cm)   7 cm       5 cm   Tumor Focality   Unifocal   Visceral Pleura Invasion   Present   Tumor Extension   Parietal pleura   Lymph-Vascular Invasion   Not identified   MARGINS   Bronchial Margin   Uninvolved by invasive carcinoma and carcinoma in situ   Vascular Margin   Uninvolved by invasive carcinoma   Parenchymal Margin   Involved by invasive carcinoma   LYMPH NODES   Regional Lymph Nodes       Number of Lymph Nodes Examined   Specify number: 6   Gene Stations Examined   5: Subaortic/ aortopulmonary (AP) / AP window       11L: Interlobar   Lymph Node Involvement   No nodes involved   STAGE (pTNM)   Primary Tumor (pT)   pT3: Tumor greater than 7 cm in greatest dimension; or Tumor of any size that directly invades any of the following: parietal pleural chest wall (including superior sulcus tumors), diaphragm, phrenic nerve, mediastinal pleura, parietal pericardium; or Tumor of any size in the main bronchus less than 2 cm distal to the  petar but without involvement of the petar; or Tumor of any size associated with atelectasis or obstructive pneumonitis of the entire lung; or Tumors of any size with separate tmor nodule(s) in same lobe   Regional Lymph Nodes (pN)   pN0: No regional lymph node metastasis   ADDITIONAL FINDINGS   Additional Pathologic Finding(s)   Other (specify): Fungal hyphae consistent with Aspergillus species in the cavity contents   .          RADIOLOGY DATA :  CT of chest, abdomen and pelvis with contrast done on February 20, 2019 was reviewed, discussed with patient, it showed:  IMPRESSION:  1.Imaging of the mediastinal structures reveals left jugular vein  region of nonenhancement which may represent nonenhanced blood  flow versus thrombus. This could be further evaluate with  ultrasound if felt be clinically warranted.  2.Mild enlargement of shotty precarinal lymph node which measures  7.6 mm in greatest short axis diameter. Cannot exclude  microscopic metastatic disease.  3. Prevascular space multiple soft tissue foci suspicious for  invasion of left upper lung primary lung parenchymal neoplasm  into the mediastinum versus mediastinal metastatic disease  4. Unfavorable change with enlargement of left upper lobe poorly  enhancing lung parenchymal mass lesion which is highly suspicious  for primary lung carcinoma which is invading into and involving  the left superior lateral chest wall as well as posterior left  pectineus muscle. This suggests progression of neoplasm  involvement.  5. Stable bony destruction of the left first through 6 ribs  secondary to primary lung neoplasm with invasion of the chest  wall and/or metastatic disease.   6. Left lung base small focus of discoid atelectasis.   7. Mild centrilobular emphysematous changes.  8. Abnormal appearing gallbladder with thickened walls and  hyperdense material within the gallbladder. The hyperdense  material within the gallbladder may represent sludge  and/or  hemorrhage. With associated thickened walls cannot exclude acute  or chronic cholecystitis. Recommend clinical correlation.  9.Chronic L5 bilateral spondylolysis with associated grade 1  spondylolisthesis.         Doppler ultrasound of left upper extremity done on February 15, 2019 showed:  IMPRESSION:  CONCLUSION:   DVT visualized in the left internal jugular, subclavian,  axillary, and brachial veins.        PET/CT done on December 22, 2018 was reviewed, discussed with patient and his family, it showed:  HEAD and NECK:           - Scintigraphic:  physiologic distribution of radiotracer              - Anatomic:    no discernable pathologic findings        THORAX:           - Scintigraphic:   -  Soft tissue mass in the superior left hemithorax, SUV max, 8.7       -Anterior mediastinal soft tissue density, SUV max 4.7        - Anatomic:   - Soft tissue mass in the superior left hemithorax, measuring  approximately 8.7 x 6.4 x 4.4 cm displaying direct chest wall  invasion, rib destruction, and extrapleural extension.    - Anterior mediastinal soft tissue density measuring  approximately 2.6 cm.                               ABDOMEN:             - Scintigraphic:  physiologic distribution of radiotracer              - Anatomic:    no discernable pathologic findings                                PELVIS:            - Scintigraphic:  physiologic distribution of radiotracer          - Anatomic:    no discernable pathologic findings                                           OSSEOUS / MISC:                                          - Scintigraphic:  physiologic distribution of radiotracer           - Anatomic:    no discernable pathologic findings                             IMPRESSION:  CONCLUSION:            1.  Scintigraphic/anatomic findings in the left hemithorax  consistent with disease recurrence with active mediastinal  adenopathy.                                      CT of chest, abdomen and pelvis with  contrast done on December 7, 2018 was reviewed, discussed with patient, it showed:  IMPRESSION:  CONCLUSION:     1. Increased size of left upper lobe and apical soft tissue mass  density with destruction of portions of left lateral first  through sixth ribs, increased in severity. Likely tumor  recurrence and/or extension of tumor into the bony structures.  PET CT suggested for further evaluation if indicated.  2. Stable extension of soft tissue mass noted through the left  lateral chest wall into the left subpectoral area.  3. Ill-defined oval nodular density left lower lobe posterior  laterally with differential including scarring as well as  metastasis.  4.  Other changes as above.            PET CT done on December 29, 2017 was reviewed, discussed with patient, it showed:  IMPRESSION:  CONCLUSION:  1.  Mildly enlarging left lung apex pleural base mass lesion with  abnormal FDG activity suspicious for Pancoast tumor which is  invading into the anterior chest wall and partially eroding  through the anterior left second third and fourth ribs. No  current evidence of distant metastatic disease.         PET/CT done on May 22, 2017 showed:  IMPRESSION:  CONCLUSION:  1. Large Pancoast tumor involving left apex with significant  involvement of the periphery, pleura, portions of the chest wall  and contiguous involvement, destruction of at least one of the  anterior left upper ribs.     PET/CT imaging study is otherwise unremarkable. No evidence of  any distant metastases.          ASSESSMENT AND PLAN:      1.  Squamous cell cancer of left upper lobe, stage IIB, T3 N0 M0: Patient is status post surgical excision with positive pleural margin.  As per NCCN guidelines patient received concurrent chemoradiation with weekly carboplatin and Taxol.  Patient received concurrent chemoradiation with  weekly carboplatin and Taxol with radiation  From June 05,2017 until 07/17/2017.  CT scan done on December 1, 2017 shows enlarged  left-sided precarinal lymph node as well as thickening of the left pleura eroding into the left rib cage worrisome for recurrence of cancer.  CT done on December 29, 2017 shows left chest wall recurrence with involvement of pleura, left treat this without any evidence of distance metastasis.  His cancer is stage IV because of the involvement of chest wall and pleura and ribs. PDl 1 testing was done which showed small positivity of 20%.  Foundation 1 testing showed positivity for PTEN loss which Everolimus can be used.  All other mutation that shows positivity are currently , There is no other targeted FDA approved treatment can be used.    -Patient received 16 cycles of Keytruda from January 10, 2018 until November 21, 2018.  -CT scan of chest, abdomen and pelvis with contrast done on December 7, 2018 after cycle 16 of Keytruda showed worsening fullness in left upper lobe along with worsening rib destruction as well as new nodule in left lower lobe concerning for progression of metastatic disease.    -PET/CT done on December 22, 2018 shows progression of disease with extrapleural extension as well as mediastinal adenopathy.  Result of CT scan were discussed with patient and his family.  -She has received 3 cycles of Taxotere from January 4, 2019 until February 15, 2019.  -CT of chest, abdomen and pelvis with contrast done on February 28, 2019 after cycle 3 of Taxotere shows progression of disease with involvement of mediastinum, enlargement of left upper lobe lung mass with worsening extension into her chest wall consistent with progression of disease.  Results of CT scan were discussed with patient and his family.  -Recommend changing treatment to cisplatin and Gemzar in view of progression of disease.  -Patient was started on carboplatin and gemcitabine on March 15, 2019.  Patient is complaining of severe fatigue and loss of appetite since chemotherapy.  -We will go ahead with day 8 of cycle 1 of carboplatin and  gemcitabine today.  -We will ask patient to return to clinic in 2 weeks with cycle 2.  -Plan is to do restaging CT of chest, abdomen and pelvis with contrast after cycle 3 of carboplatin and gemcitabine.    2.  Left rib involvement secondary to local recurrence and progression: Treatment with Xgeva was discussed with patient side effect of right jaw including osteonecrosis of jaw, hypocalcemia, mouth sores were discussed.  Patient does need full mouth extraction, he has not done it yet.  We will hold off Xgeva until he gets dental work done.  Importance of seeing dentist and getting dental work done was discussed with patient again today.    3.  Pain management:   - He is using Percocet 10/325 every 4 hours with moderate pain relief.  He is currently using morphine 60 mg twice daily since February 15, 2019.  Complains of worsening pain and swelling of left upper extremity:  -  Will continue with Percocet 10/325 every 4 hours for now.  He was again counseled about bowel regimen.    4.  Left upper extremity DVT:  -Patient had a Doppler ultrasound done of left upper extremity today on February 15, 2019 that showed DVT involving internal jugular vein, subclavian vein and brachial vein.  Results of Doppler were discussed with patient.  -Treatment option consisting of Lovenox with Coumadin or Xarelto were discussed with the patient.  -He prefers to take Xarelto.  -Potential side effect of Xarelto including bleeding which could be life-threatening, not having availability of antidote was discussed with patient.  -Prescription for Xarelto 15 mg twice daily for 3 weeks has been sent to pharmacy today on February 15, 2019  -Prescription for Xarelto 20 mg daily has been sent to his pharmacy  on March 8, 2019.  -He was instructed to come to the emergency room if he starts having any bleeding problems.    5.  Anemia: Most likely anemia of chronic disease secondary to underlying malignancy.  Patient's hemoglobin is 11.2 today.     -Patient is currently on ferrous sulfate 1 tablet by mouth daily.  We will also start him on vitamin B12 thousand micrograms by mouth daily.     6.  Hypertension:Being followed by Dr. Stone .    7.Pulmonary aspergillosis: Patient had a fungus ball consistent with Aspergillus on a cavitary lesion of lung status postsurgical excision.     8.Leukocytosis: mostly reactive.Monitor with cbc for now.    9.  Thrombocytosis:  -Resolved.  Platelet count is 408,000 today.  Most likely reactive due to underlying malignancy .  We will monitored with CBC for now.    10.  Abnormal CT scan of the gallbladder showing  possible cholecystitis:  -Recent CT scan done for restaging purpose shows thickening of the gallbladder.  Subsequently patient had ultrasound of gallbladder and liver done earlier today on March 8, 2019 that showed thickening of gallbladder consistent with either acute or chronic cholecystitis.  -Results of ultrasound were discussed with patient today.  She was referred to Dr. Salas, he did not keep that appointment.  Denies any abdominal pain.  -He was instructed to come to the emergency room if he starts having any abdominal pain.     11. Health maintenance: Patient smokes intermittently, he was counseled about smoking cessation.  About 5 minutes were spent for smoking cessation counseling.       12.BMI:Patient's Body mass index is 18.13 kg/m². BMI is lower than reference range.  In view of recent weight loss of appetite and 14 pound weight loss in 2 weeks.  We will start him on Marinol 5 mg twice daily.  Prescription has been sent to his pharmacy on March 22, 2019.  If patient cannot afford Marinol, will start him on Megace.  This was discussed with patient today.    13.  Advance care planning: For now patient remains full code and is able to make all his decision.  Patient has health care surrogate mentioned on chart.              Gualberto Dowd MD  3/22/2019  8:39 AM        EMR Dragon/Transcription  disclaimer:   Much of this encounter note is an electronic transcription/translation of spoken language to printed text. The electronic translation of spoken language may permit erroneous, or at times, nonsensical words or phrases to be inadvertently transcribed; Although I have reviewed the note for such errors, some may still exist.

## 2019-03-25 NOTE — PROGRESS NOTES
Adult Outpatient Nutrition  Assessment    Patient Name:  Tino Peres  YOB: 1960  MRN: [id number]    Assessment Date:  Entry from 3/22/19 visit    Comments: Wt down to 119 lb--11% in 2 weeks. Pt states appetite/intake very poor. States hasn't been able to get liquids supplements down either. MD ordered Marinol--in attempt to stimulate appetite.                       Electronically signed by:  Kayla Lees RD  03/25/19 4:34 PM

## 2019-04-05 NOTE — PROGRESS NOTES
"Adult Outpatient Nutrition  Assessment    Patient Name:  Tino Prees  YOB: 1960  MRN: [id number]    Assessment Date:  4/5/2019    Comments: Wt 122.9 lb--up almost 4 lb/2 weeks. Praised pt for placing emphasis on nutrition        Anthropometrics     Row Name 04/05/19 0903          Anthropometrics    Height  172.7 cm (67.99\")     Weight  55.7 kg (122 lb 12.8 oz)        Ideal Body Weight (IBW)    Ideal Body Weight (IBW) (kg)  70.87     % Ideal Body Weight  78.6        Body Mass Index (BMI)    BMI (kg/m2)  18.72        IBW Adjustment, Para/Tetraplegia    5% Adjustment, Para (IBW)  67.33     10% Adjustment, Para (IBW)  63.78     10% Adjustment, Tetra (IBW)  63.78     15% Adjustment, Tetra (IBW)  60.24             Estimated/Assessed Needs     Row Name 04/05/19 0903          Calculation Measurements    Height  172.7 cm (67.99\")         Evaluation of Prescribed Nutrient/Fluid Intake     Row Name 04/05/19 0903          Calculation Measurements    Height  172.7 cm (67.99\")               Electronically signed by:  Kayla Lees RD  04/05/19 3:53 PM   "

## 2019-04-05 NOTE — PROGRESS NOTES
DATE OF VISIT: 4/5/2019      REASON FOR VISIT:  Squamous cell cancer of left lung, stage IV , Anemia ,thrombocytosis, Left upper extremity DVT, abnormal CT scan of gallbladder showing possible cholecystitis      HISTORY OF PRESENT ILLNESS:    59-year-old male with a past medical history significant for duodenal ulcer, extensive nicotine addiction with more than 45-pack-year smoking history, history of alcohol abuse was seen in consultation on May 3, 2017 for newly diagnosed stage IIB squamous cell cancer of Of left lung.  Patient finished concurrent chemoradiation on July 17, 2017.  Patient had a CT of chest done on December 1, 2017 showed recurrence involving left chest wall region making it stage IV.  In view of PDL 1 showing positivity about 20%, patient was started on Keytruda January 10, 2018. Patient had a CT scan of chest, abdomen and pelvis with contrast done on December 7, 2018, after cycle 16 of Keytruda that shows increasing fullness in left upper lobe.  Subsequently patient underwent PET CT on December 22, 2018 which showed progression of disease.  Subsequently treatment was changed to Taxotere on January 4, 2019.    Patient had restaging CT of chest, abdomen and pelvis with contrast done on February 20, 2019 after cycle 3 of Taxotere which showed worsening of metastatic disease.  Subsequently patient has been started on carboplatin and gemcitabine on March 15, 2019.  Patient is here to get day 1 of cycle 2 of carboplatin and gemcitabine today.  States his appetite is somewhat improved with start of Marinol.  States swelling of left upper extremity has got worse since last clinic visit.  Denies any bleeding.  Complains of severe fatigue since starting chemotherapy. Denies any diarrhea or any new skin lesion.      PAST MEDICAL HISTORY:    Past Medical History:   Diagnosis Date   • Anemia    • Aortic stenosis, mild    • Arthritis    • Chronic bronchitis (CMS/HCC)    • Duodenal ulcer disease    • Inguinal  hernia, left    • Lung cancer (CMS/HCC)    • Mitral valve regurgitation        SOCIAL HISTORY:    Social History     Tobacco Use   • Smoking status: Current Every Day Smoker     Packs/day: 1.00     Years: 40.00     Pack years: 40.00     Types: Cigarettes   • Smokeless tobacco: Former User   • Tobacco comment: Reduced at half a pack per day and weanning off   Substance Use Topics   • Alcohol use: Yes     Alcohol/week: 8.4 oz     Types: 14 Cans of beer per week     Comment: 6 pk/dly   • Drug use: No       Surgical History :  Past Surgical History:   Procedure Laterality Date   • APPENDECTOMY      open   • BRONCHOSCOPY N/A 3/8/2017    Procedure: BRONCHOSCOPY with possible bronchoalveolar lavage, +/- brush, +/- needle biopsy, +/- endobronchial biopsy;  Surgeon: Candis Lr MD;  Location: [facility] ENDOSCOPY;  Service:    • EXPLORATORY LAPAROTOMY  11/17/2014    Exploratory laparotomy, repair of duodenal ulcer, modified Carlos patch   • INGUINAL HERNIA REPAIR  09/13/2013    Open repair of an indirect left inguinal hernia. Left inguinal hernia.   • ND INSJ TUNNELED CVC W/O SUBQ PORT/ AGE 6 YR/> N/A 5/10/2017    Procedure: TUNNELED CENTRAL VENOUS CATHETER  WITH SUBCUTANEOUS  PORT,FLUOROSCOPIC GUIDANCE ;  Surgeon: Angel Hernandez MD;  Location: [facility] OR;  Service: Cardiothoracic   • THORACOTOMY Left 4/19/2017    Procedure: THORACOTOMY LEFT WITH PULMONARY RESECTION AND BRONCHOSCOPY WITH ENDO ;  Surgeon: Angel Hernandez MD;  Location: [facility] OR;  Service:        ALLERGIES:    No Known Allergies    FAMILY HISTORY:  Family History   Problem Relation Age of Onset   • Asthma Father    • Cancer Father    • Diabetes Father    • Hypertension Father    • Alcohol abuse Mother    • Heart failure Brother          REVIEW OF SYSTEMS:      CONSTITUTIONAL:  Positive for worsening fatigue.  Complains of poor appetite.  Has gained 3 pounds since last clinic visit after starting marinol.Denies any fever, chills .      HEENT:  "No epistaxis, mouth sores or difficulty swallowing.     RESPIRATORY: Denies any shortness of breath or cough today.     CARDIOVASCULAR: States pain on left chest wall is somewhat improved.  No palpitation.     GASTROINTESTINAL: No abdominal pain nausea, vomiting or blood in the stool.     GENITOURINARY: No Dysuria or Hematuria.     MUSCULOSKELETAL: Complains of intermittent chest wall discomfort secondary to work.  Complains of swelling of left upper extremity.  No new back pain or arthralgia..     LYMPHATICS: Denies any abnormal swollen glands anywhere in the body.     NEUROLOGICAL : No tingling or numbness. No headache or dizziness. No seizures or balance problems.     SKIN: Complains of discoloration of left chest wall.           PHYSICAL EXAMINATION:      VITAL SIGNS:  /87   Pulse 88   Temp 98.8 °F (37.1 °C) (Temporal)   Resp 18   Ht 172.7 cm (67.99\")   Wt 55.7 kg (122 lb 12.8 oz)   SpO2 94%   BMI 18.68 kg/m²      ECOG performance status : 1    GENERAL:  Not in any distress.    HEENT:  Normocephalic, Atraumatic.Mild Conjunctival pallor. No icterus. Extraocular Movements Intact. No Facial Asymmetry noted.    NECK:  No adenopathy. No JVD.    RESPIRATORY:  Fair air entry bilateral. No rhonchi or wheezing.  Purplish discoloration of left chest wall.    CARDIOVASCULAR:  S1, S2. Regular rate and rhythm. No murmur or gallop appreciated.    ABDOMEN:  Soft,  nontender. Bowel sounds present in all four quadrants.  No hepatosplenomegaly appreciated.    MUSCULOSKELTAL:  Left upper extremity swelling has worsened since last clinic visit.  There is palpable fullness and mass and left axillary area.No Calf Tenderness.Decreased range of motion.    NEUROLOGIC:  Alert, awake and oriented ×3.  No  Motor or sensory deficit appreciated. Cranial Nerves 2-12 grossly intact.    SKIN: Purplish discoloration of left chest wall.    LYMPHATICS: No new enlarged lymph node in neck or supra-clavicular area.            DIAGNOSTIC " DATA:    Glucose   Date Value Ref Range Status   04/05/2019 155 (H) 65 - 99 mg/dL Final     Glucose, Arterial   Date Value Ref Range Status   04/19/2017 176 mmol/L Final     Sodium   Date Value Ref Range Status   04/05/2019 137 136 - 145 mmol/L Final     Potassium   Date Value Ref Range Status   04/05/2019 3.8 3.5 - 5.2 mmol/L Final     CO2   Date Value Ref Range Status   04/05/2019 29.0 22.0 - 29.0 mmol/L Final     Chloride   Date Value Ref Range Status   04/05/2019 98 98 - 107 mmol/L Final     Anion Gap   Date Value Ref Range Status   04/05/2019 10.0 mmol/L Final     Creatinine   Date Value Ref Range Status   04/05/2019 0.61 (L) 0.76 - 1.27 mg/dL Final     BUN   Date Value Ref Range Status   04/05/2019 7 6 - 20 mg/dL Final     BUN/Creatinine Ratio   Date Value Ref Range Status   04/05/2019 11.5 7.0 - 25.0 Final     Calcium   Date Value Ref Range Status   04/05/2019 9.0 8.6 - 10.5 mg/dL Final     eGFR Non  Amer   Date Value Ref Range Status   04/05/2019 136 >60 mL/min/1.73 Final     Alkaline Phosphatase   Date Value Ref Range Status   04/05/2019 114 39 - 117 U/L Final     Total Protein   Date Value Ref Range Status   04/05/2019 6.6 6.0 - 8.5 g/dL Final     ALT (SGPT)   Date Value Ref Range Status   04/05/2019 12 1 - 41 U/L Final     AST (SGOT)   Date Value Ref Range Status   04/05/2019 16 1 - 40 U/L Final     Total Bilirubin   Date Value Ref Range Status   04/05/2019 0.2 0.2 - 1.2 mg/dL Final     Albumin   Date Value Ref Range Status   04/05/2019 3.40 (L) 3.50 - 5.20 g/dL Final     Globulin   Date Value Ref Range Status   04/05/2019 3.2 gm/dL Final     Lab Results   Component Value Date    WBC 8.55 04/05/2019    HGB 9.3 (L) 04/05/2019    HCT 29.5 (L) 04/05/2019    MCV 87.0 04/05/2019     (H) 04/05/2019     Lab Results   Component Value Date    NEUTROABS 6.75 04/05/2019    IRON 29 (L) 04/05/2019    TIBC 240 (L) 04/05/2019    LABIRON 12 (L) 04/05/2019    FERRITIN 402.80 (H) 04/05/2019    [id number] 793  04/05/2019    FOLATE 17.50 04/05/2019     Component      Latest Ref Rng & Units 3/14/2018 5/16/2018 7/18/2018 9/19/2018   Free T4      0.78 - 2.19 ng/dL 0.83 0.78 0.67 (L) 0.79           PATHOLOGY:  Foundation 1 testing done on 26 December 2017 showed:  Genomic alteration identified includes:   PTEN loss  BCORL1 loos exon 8-12  CDKN@A/B loss  KMT@C(MLL3)G638  PRKC! Amplification  STAT4 truncation intron 9  TERC amplification  TP53 [id number]    No mutation identified in EGFR, KRAS, RET, ALK,MET, ERBB2, BRAF, ROS1          PDL 1 testing on tumor shows 20% positivity.          Pathology :  Pathology Report from April 19, 2017 showed:       SPECIMEN   Specimen   Lobe(s) of lung   Lobes of Lung   Upper lobe   Procedure   Lobectomy   Specimen Laterality   Left   TUMOR   Primary Tumor Site   Upper lobe   Histologic Type   Squamous cell carcinoma   Histologic Grade   G2: Moderately differentiated   Tumor Size   Greatest dimension (cm): 8 cm   Additional Dimension (cm)   7 cm       5 cm   Tumor Focality   Unifocal   Visceral Pleura Invasion   Present   Tumor Extension   Parietal pleura   Lymph-Vascular Invasion   Not identified   MARGINS   Bronchial Margin   Uninvolved by invasive carcinoma and carcinoma in situ   Vascular Margin   Uninvolved by invasive carcinoma   Parenchymal Margin   Involved by invasive carcinoma   LYMPH NODES   Regional Lymph Nodes       Number of Lymph Nodes Examined   Specify number: 6   Gene Stations Examined   5: Subaortic/ aortopulmonary (AP) / AP window       11L: Interlobar   Lymph Node Involvement   No nodes involved   STAGE (pTNM)   Primary Tumor (pT)   pT3: Tumor greater than 7 cm in greatest dimension; or Tumor of any size that directly invades any of the following: parietal pleural chest wall (including superior sulcus tumors), diaphragm, phrenic nerve, mediastinal pleura, parietal pericardium; or Tumor of any size in the main bronchus less than 2 cm distal to the petar but without involvement  of the petar; or Tumor of any size associated with atelectasis or obstructive pneumonitis of the entire lung; or Tumors of any size with separate tmor nodule(s) in same lobe   Regional Lymph Nodes (pN)   pN0: No regional lymph node metastasis   ADDITIONAL FINDINGS   Additional Pathologic Finding(s)   Other (specify): Fungal hyphae consistent with Aspergillus species in the cavity contents   .          RADIOLOGY DATA :  CT of chest, abdomen and pelvis with contrast done on February 20, 2019 was reviewed, discussed with patient, it showed:  IMPRESSION:  1.Imaging of the mediastinal structures reveals left jugular vein  region of nonenhancement which may represent nonenhanced blood  flow versus thrombus. This could be further evaluate with  ultrasound if felt be clinically warranted.  2.Mild enlargement of shotty precarinal lymph node which measures  7.6 mm in greatest short axis diameter. Cannot exclude  microscopic metastatic disease.  3. Prevascular space multiple soft tissue foci suspicious for  invasion of left upper lung primary lung parenchymal neoplasm  into the mediastinum versus mediastinal metastatic disease  4. Unfavorable change with enlargement of left upper lobe poorly  enhancing lung parenchymal mass lesion which is highly suspicious  for primary lung carcinoma which is invading into and involving  the left superior lateral chest wall as well as posterior left  pectineus muscle. This suggests progression of neoplasm  involvement.  5. Stable bony destruction of the left first through 6 ribs  secondary to primary lung neoplasm with invasion of the chest  wall and/or metastatic disease.   6. Left lung base small focus of discoid atelectasis.   7. Mild centrilobular emphysematous changes.  8. Abnormal appearing gallbladder with thickened walls and  hyperdense material within the gallbladder. The hyperdense  material within the gallbladder may represent sludge and/or  hemorrhage. With associated thickened  walls cannot exclude acute  or chronic cholecystitis. Recommend clinical correlation.  9.Chronic L5 bilateral spondylolysis with associated grade 1  spondylolisthesis.         Doppler ultrasound of left upper extremity done on February 15, 2019 showed:  IMPRESSION:  CONCLUSION:   DVT visualized in the left internal jugular, subclavian,  axillary, and brachial veins.        PET/CT done on December 22, 2018 was reviewed, discussed with patient and his family, it showed:  HEAD and NECK:           - Scintigraphic:  physiologic distribution of radiotracer              - Anatomic:    no discernable pathologic findings        THORAX:           - Scintigraphic:   -  Soft tissue mass in the superior left hemithorax, SUV max, 8.7       -Anterior mediastinal soft tissue density, SUV max 4.7        - Anatomic:   - Soft tissue mass in the superior left hemithorax, measuring  approximately 8.7 x 6.4 x 4.4 cm displaying direct chest wall  invasion, rib destruction, and extrapleural extension.    - Anterior mediastinal soft tissue density measuring  approximately 2.6 cm.                               ABDOMEN:             - Scintigraphic:  physiologic distribution of radiotracer              - Anatomic:    no discernable pathologic findings                                PELVIS:            - Scintigraphic:  physiologic distribution of radiotracer          - Anatomic:    no discernable pathologic findings                                           OSSEOUS / MISC:                                          - Scintigraphic:  physiologic distribution of radiotracer           - Anatomic:    no discernable pathologic findings                             IMPRESSION:  CONCLUSION:            1.  Scintigraphic/anatomic findings in the left hemithorax  consistent with disease recurrence with active mediastinal  adenopathy.                                      CT of chest, abdomen and pelvis with contrast done on December 7, 2018 was reviewed,  discussed with patient, it showed:  IMPRESSION:  CONCLUSION:     1. Increased size of left upper lobe and apical soft tissue mass  density with destruction of portions of left lateral first  through sixth ribs, increased in severity. Likely tumor  recurrence and/or extension of tumor into the bony structures.  PET CT suggested for further evaluation if indicated.  2. Stable extension of soft tissue mass noted through the left  lateral chest wall into the left subpectoral area.  3. Ill-defined oval nodular density left lower lobe posterior  laterally with differential including scarring as well as  metastasis.  4.  Other changes as above.            PET CT done on December 29, 2017 was reviewed, discussed with patient, it showed:  IMPRESSION:  CONCLUSION:  1.  Mildly enlarging left lung apex pleural base mass lesion with  abnormal FDG activity suspicious for Pancoast tumor which is  invading into the anterior chest wall and partially eroding  through the anterior left second third and fourth ribs. No  current evidence of distant metastatic disease.         PET/CT done on May 22, 2017 showed:  IMPRESSION:  CONCLUSION:  1. Large Pancoast tumor involving left apex with significant  involvement of the periphery, pleura, portions of the chest wall  and contiguous involvement, destruction of at least one of the  anterior left upper ribs.     PET/CT imaging study is otherwise unremarkable. No evidence of  any distant metastases.          ASSESSMENT AND PLAN:      1.  Squamous cell cancer of left upper lobe, stage IIB, T3 N0 M0: Patient is status post surgical excision with positive pleural margin.  As per NCCN guidelines patient received concurrent chemoradiation with weekly carboplatin and Taxol.  Patient received concurrent chemoradiation with  weekly carboplatin and Taxol with radiation  From June 05,2017 until 07/17/2017.  CT scan done on December 1, 2017 shows enlarged left-sided precarinal lymph node as well as  thickening of the left pleura eroding into the left rib cage worrisome for recurrence of cancer.  CT done on December 29, 2017 shows left chest wall recurrence with involvement of pleura, left treat this without any evidence of distance metastasis.  His cancer is stage IV because of the involvement of chest wall and pleura and ribs. PDl 1 testing was done which showed small positivity of 20%.  Foundation 1 testing showed positivity for PTEN loss which Everolimus can be used.  All other mutation that shows positivity are currently , There is no other targeted FDA approved treatment can be used.    -Patient received 16 cycles of Keytruda from January 10, 2018 until November 21, 2018.  -CT scan of chest, abdomen and pelvis with contrast done on December 7, 2018 after cycle 16 of Keytruda showed worsening fullness in left upper lobe along with worsening rib destruction as well as new nodule in left lower lobe concerning for progression of metastatic disease.    -PET/CT done on December 22, 2018 shows progression of disease with extrapleural extension as well as mediastinal adenopathy.  Result of CT scan were discussed with patient and his family.  -She has received 3 cycles of Taxotere from January 4, 2019 until February 15, 2019.  -CT of chest, abdomen and pelvis with contrast done on February 28, 2019 after cycle 3 of Taxotere shows progression of disease with involvement of mediastinum, enlargement of left upper lobe lung mass with worsening extension into her chest wall consistent with progression of disease.  Results of CT scan were discussed with patient and his family.  -Recommend changing treatment to cisplatin and Gemzar in view of progression of disease.  -Patient was started on carboplatin and gemcitabine on March 15, 2019.  Patient is complaining of severe fatigue and loss of appetite since chemotherapy.  -We will go ahead with day 1 of cycle 2 of carboplatin and gemcitabine today.  -We will ask patient to  return to clinic in 3 weeks with cycle 2.  -Plan is to do restaging CT of chest, abdomen and pelvis with contrast after cycle 3 of carboplatin and gemcitabine.    2.  Left rib involvement secondary to local recurrence and progression: Treatment with Xgeva was discussed with patient side effect of right jaw including osteonecrosis of jaw, hypocalcemia, mouth sores were discussed.  Patient does need full mouth extraction, he has not done it yet.  We will hold off Xgeva until he gets dental work done.  Importance of seeing dentist and getting dental work done was discussed with patient again today.    3.  Pain management:   - He is using Percocet 10/325 every 4 hours with moderate pain relief.  He is currently using morphine 60 mg twice daily since February 15, 2019.  Complains of worsening pain and swelling of left upper extremity:  -  Will continue with Percocet 10/325 every 4 hours for now.  He was again counseled about bowel regimen.    4.  Left upper extremity DVT:  -Patient had a Doppler ultrasound done of left upper extremity today on February 15, 2019 that showed DVT involving internal jugular vein, subclavian vein and brachial vein.  Results of Doppler were discussed with patient.  -Treatment option consisting of Lovenox with Coumadin or Xarelto were discussed with the patient.  -He prefers to take Xarelto.  -Potential side effect of Xarelto including bleeding which could be life-threatening, not having availability of antidote was discussed with patient.  -Prescription for Xarelto 15 mg twice daily for 3 weeks has been sent to pharmacy today on February 15, 2019  -Prescription for Xarelto 20 mg daily has been sent to his pharmacy  on March 8, 2019.  -He was instructed to come to the emergency room if he starts having any bleeding problems.    5.  Anemia: Most likely anemia of chronic disease secondary to underlying malignancy.  Patient's hemoglobin is decreased to 9.3 today.    -Patient is currently on  ferrous sulfate 1 tablet by mouth daily.  We will also start him on vitamin B12 thousand micrograms by mouth daily.   -Will repeat a workup today on April 5, 2019.    6.  Hypertension:Being followed by Dr. Stone .    7.Pulmonary aspergillosis: Patient had a fungus ball consistent with Aspergillus on a cavitary lesion of lung status postsurgical excision.     8.Leukocytosis: mostly reactive.Monitor with cbc for now.    9.  Thrombocytosis:  -  Platelet count is 833,000 today.  Most likely reactive due to underlying malignancy .  We will monitored with CBC for now.    10.  Abnormal CT scan of the gallbladder showing  possible cholecystitis:  -Recent CT scan done for restaging purpose shows thickening of the gallbladder.  Subsequently patient had ultrasound of gallbladder and liver done earlier today on March 8, 2019 that showed thickening of gallbladder consistent with either acute or chronic cholecystitis.  -Results of ultrasound were discussed with patient today.  She was referred to Dr. Salas, he did not keep that appointment.  Denies any abdominal pain.  -He was instructed to come to the emergency room if he starts having any abdominal pain.     11. Health maintenance: Patient smokes intermittently, he was counseled about smoking cessation.  About 5 minutes were spent for smoking cessation counseling.       12.BMI:Patient's Body mass index is 18.68 kg/m². BMI is lower than reference range.  In view of recent weight loss of appetite and 14 pound weight loss in 2 weeks.  We will start him on Marinol 5 mg twice daily.  Prescription has been sent to his pharmacy on March 22, 2019.  If patient cannot afford Marinol, will start him on Megace.  This was discussed with patient today.    13.  Advance care planning: For now patient remains full code and is able to make all his decision.  Patient has health care surrogate mentioned on chart.              Gualberto Dowd MD  4/5/2019  7:26 PM        EMR  Dragon/Transcription disclaimer:   Much of this encounter note is an electronic transcription/translation of spoken language to printed text. The electronic translation of spoken language may permit erroneous, or at times, nonsensical words or phrases to be inadvertently transcribed; Although I have reviewed the note for such errors, some may still exist.

## 2019-04-26 ENCOUNTER — APPOINTMENT (OUTPATIENT)
Dept: ONCOLOGY | Facility: HOSPITAL | Age: 59
End: 2019-04-26

## 2022-04-26 NOTE — H&P
"HISTORY AND PHYSICAL  NAME: Tino Peres  : 1960  MRN: [id number]    DATE OF ADMISSION: 17    DATE & TIME SEEN: 17 4:44 PM    PCP: No Known Provider    CODE STATUS: No Order    CHIEF COMPLAINT   Chief Complaint   Patient presents with   • Anemia   • Fatigue   • Weakness - Generalized       HPI:  Tino Peres is a 56 y.o. male who presents with a 3-4 month history of generalized fatigue, unexpected weight loss, and dyspnea on exertion. Patient reports that he's been \"feeling like shit\". He reports a weight loss of 20-30lbs during this time but believes his lack of appetite and strenuous job may have contributed to his weight loss. His symptoms have gotten progressively worse and approximately 3 weeks ago, the patient reports an incidence of hemoptysis. He reports coughing up about a cup of blood. He never sought medical attention for his hemoptysis and does not report any other incidents of hemoptysis.     Patient is a smoker, smokes 1PPD, and reports a possible fungal infection 10 years ago. Patient presented to the pulmonologist office at the request of an UC and was found to have a hemoglobin of 4.4. Patient was directed to the ED for an emergent blood transfusion.     CONCURRENT MEDICAL HISTORY:  Past Medical History   Diagnosis Date   • Chronic bronchitis        PAST SURGICAL HISTORY:  Past Surgical History   Procedure Laterality Date   • Hernia repair         FAMILY HISTORY:  Family History   Problem Relation Age of Onset   • Asthma Father    • Cancer Father    • Diabetes Father    • Hypertension Father         SOCIAL HISTORY:  Social History     Social History   • Marital status: Single     Spouse name: N/A   • Number of children: N/A   • Years of education: N/A     Occupational History   • Not on file.     Social History Main Topics   • Smoking status: Current Every Day Smoker     Packs/day: 1.00     Types: Cigarettes   • Smokeless tobacco: Current User   • Alcohol use No   • Drug use: " Impression: Dermatochalasis of left upper eyelid: H02.834. Plan: Dermatochalasis - Patient is not symptomatic. Explained how it may become visually significant. The patient is advised to contact us for any change or obstruction of vision. No   • Sexual activity: Defer     Other Topics Concern   • Not on file     Social History Narrative   • No narrative on file       HOME MEDICATIONS:  No current facility-administered medications on file prior to encounter.      No current outpatient prescriptions on file prior to encounter.     ALLERGIES:  Review of patient's allergies indicates no known allergies.    REVIEW OF SYSTEMS  Review of Systems   Constitutional: Positive for appetite change (decreased appetitite), fatigue and unexpected weight change (20-30lbs unintentional weight loss). Negative for chills, diaphoresis and fever.   HENT: Negative for congestion, ear pain, nosebleeds, rhinorrhea and trouble swallowing.    Respiratory: Positive for cough (Patient attributes it to his smoking. Reports coughing up blood 3 weeks ago.) and shortness of breath.    Cardiovascular: Negative for chest pain, palpitations and leg swelling.   Gastrointestinal: Negative for abdominal pain, anal bleeding, blood in stool, constipation, diarrhea, nausea and vomiting.   Genitourinary: Negative for difficulty urinating, dysuria and hematuria.   Musculoskeletal: Positive for arthralgias (Chronic).   Skin: Positive for pallor. Negative for rash.   Neurological: Positive for weakness. Negative for seizures, syncope, numbness and headaches.       PHYSICAL EXAM:  Temp:  [97.5 °F (36.4 °C)-98.9 °F (37.2 °C)] 98.9 °F (37.2 °C)  Heart Rate:  [] 93  Resp:  [18-20] 18  BP: (120-145)/(58-68) 131/61  Body mass index is 20.22 kg/(m^2).     Physical Exam   Constitutional: He is oriented to person, place, and time. Vital signs are normal. He appears well-developed. He appears cachectic. He is cooperative.   HENT:   Head: Normocephalic and atraumatic.   Nose: Nose normal.   Mouth/Throat: Oropharynx is clear and moist.   Eyes: EOM and lids are normal. Pupils are equal, round, and reactive to light.   Neck: Normal range of motion. Neck supple. No tracheal deviation present. No  thyromegaly present.   Cardiovascular: Normal rate, regular rhythm, normal heart sounds and intact distal pulses.    Pulmonary/Chest: Effort normal and breath sounds normal. He has no wheezes. He has no rales.   Abdominal: Soft. Bowel sounds are normal. There is no tenderness.   Musculoskeletal: Normal range of motion. He exhibits no edema.   Lymphadenopathy:     He has no cervical adenopathy.   Neurological: He is alert and oriented to person, place, and time. No cranial nerve deficit.   Skin: Skin is warm and dry. No erythema. There is pallor.   Psychiatric: He has a normal mood and affect. His behavior is normal. Judgment and thought content normal.   Nursing note and vitals reviewed.         DIAGNOSTIC DATA:   Lab Results (last 24 hours)     Procedure Component Value Units Date/Time    [place] Draw [[id number]] Collected:  03/07/17 1528    Specimen:  Blood Updated:  03/07/17 1532    Narrative:       The following orders were created for panel order [place] [facility].  Procedure                               Abnormality         Status                     ---------                               -----------         ------                     Light Blue Top[[id number]]                                    In process                 Green Top (Gel)[[id number]]                                   In process                 Lavender Top[[id number]]                                      In process                 Gold Top - SST[[id number]]                                    In process                   Please view results for these tests on the individual orders.    Light Blue Top [[id number]] Collected:  03/07/17 1528    Specimen:  Blood Updated:  03/07/17 1532    Green Top (Gel) [[id number]] Collected:  03/07/17 1528    Specimen:  Blood Updated:  03/07/17 1532    Gold Top - SST [[id number]] Collected:  03/07/17 1528    Specimen:  Blood Updated:  03/07/17 1532    Lavender Top [[id number]] Collected:  03/07/17 1528    Specimen:  Blood Updated:   03/07/17 1532    Blood Culture [[id number]] Collected:  03/07/17 1716    Specimen:  Blood from Arm, Right Updated:  03/07/17 1728    Blood Culture [[id number]] Collected:  03/07/17 1758    Specimen:  Blood from Arm, Left Updated:  03/07/17 1803           Imaging Results (last 24 hours)     Procedure Component Value Units Date/Time    CT Chest With Contrast [[id number]] Collected:  03/07/17 1609     Updated:  03/07/17 1638    Narrative:       [facility] [place]    Patient Name: ELENA RICE    ORDERING: IRVIN GALVEZ    ATTENDING: IRVIN GALVEZ     REFERRING: IRVIN GALVEZ    -----------------------    PROCEDURE: CT SCAN OF THE CHEST WITH INTRAVENOUS CONTRAST.    CLINICAL INFORMATION:  abnormal lung mass     Dose length product 152.6  This exam was performed using radiation doses that are as low as  reasonably achievable (ALARA).    COMPARISON: Chest x-ray March 7, 2017, CT abdomen November 7, 2014    CONTRAST: 75 cc intravenous Isovue 300    TECHNIQUE: Axial images were obtained and multiplanar  reconstructions were made.      FINDINGS:    LUNGS/PLEURA: There is complete consolidation/opacification of  the left upper lobe with air bronchograms present. In the apical  segment of the left upper lobe, there is a 3.7 x 5 x 8 cm cavity  which contains a spongelike lobulated mass measuring 2.7 x 3.3 x  5 cm. There is a crescent of air surrounding the internal more  solid appearing mass. This is concerning for a cavity from  previous tuberculosis with an internal fungus ball.   The remaining portions of the lungs appear clear except for  centrilobular emphysema.  TRACHEA AND BRONCHI:  The trachea and bronchi are patent.  MEDIASTINUM, DOUGLAS AND LYMPH NODES: There are slightly enlarged  lymph nodes in the aorticopulmonary window region measuring 1.1  cm in short axis, and one in the right paratracheal region  measuring 1.4 cm in short axis.  HEART AND PERICARDIUM: The heart and pericardium are  normal.  VASCULAR: Unremarkable  UPPER ABDOMEN: Unremarkable  OSSEOUS STRUCTURES: There is partial destruction of the second  through sixth ribs in the area of left upper lobe consolidation.  There is a healing transverse fracture of the lateral left sixth  rib.      Impression:       CONCLUSION:  1.  Complete consolidation of the left upper lobe, containing a  cavitary mass within internal fungus ball and adjacent rib  destruction.  2.  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  3.  Chest wall invasion and mycetomas can also be seen with  actinomycosis infection.  4.  Healing left lateral sixth rib fracture, likely pathologic.     Findings and recommendations  discussed with IRVIN GALVEZ on  3/7/2017 4:34 PM CST    Electronically signed by:  Deep Mcpherson MD  3/7/2017 4:36 PM CST  Workstation: [id number]          I reviewed the patient's new clinical results.  I reviewed the patient's new imaging results and agree with the interpretation.  I reviewed the patient's other test results and agree with the interpretation    ASSESSMENT AND PLAN: This is a 56 y.o. male with:    Active Hospital Problems (** Indicates Principal Problem)    Diagnosis Date Noted   • **Anemia [D64.9] 03/07/2017     -Type & cross; discussed risks and benefit of blood transfusion. Patient is agreeable to transfusion.   -Transfuse PRBCs with lasix between units  -Will obtain H&H every 4 hours  -Iron studies ordered     • Cavitary lesion of lung [J98.4] 03/07/2017     -Pulmonology consulted; appreciate recommendations  -Will obtain 3 acid fast stains  -Will start RIPE therapy pending bronchoscopy cultures  -Will obtain aspergillus antibodies and aspergillus galactomannan antigen for possible aspergillus superimposed infection  -Will obtain aPTT and INR  -Will start patient on voriconazole  (superior to amphotericin B)   -Will obtain HIV & hepatitis panel to evaluate for immunocompromise       • Alcohol use disorder [F10.99] 03/07/2017     -IV banana bag  -CIWA protocol  -Will obtain B6 levels      • Tobacco use disorder [F17.200] 03/07/2017   • CAP (community acquired pneumonia) [J18.9] 03/07/2017     -Will start levaquin and zosyn for concerns of aspiration pneumonia with coverage for pseudomonas.         Resolved Hospital Problems    Diagnosis Date Noted Date Resolved   No resolved problems to display.     We will continue to monitor the patient's course and adjust our care accordingly.     DVT prophylaxis: SCDs/TEDs  Code status is No Order   DEVONTE#  [id number], reviewed and consistent with patient reported medications.      I discussed the patients findings and my recommendations with patient, primary care team and consulting provider.     Dr. Fidelia Keller is the attending on record at time of admission, she is aware of the patient's status and agrees with the above history and physical.          This document has been electronically signed by Corie Camejo MD on March 7, 2017 4:45 PM

## 2023-12-19 NOTE — PLAN OF CARE
Problem: Patient Care Overview (Adult)  Goal: Plan of Care Review  Outcome: Ongoing (interventions implemented as appropriate)    04/23/17 5180   Coping/Psychosocial Response Interventions   Plan Of Care Reviewed With patient   Patient Care Overview   Progress progress towards functional goals is fair   Outcome Evaluation   Outcome Summary/Follow up Plan CXR no change, increasing activity and placed back on CT -20 sx.         Problem: Perioperative Period (Adult)  Goal: Signs and Symptoms of Listed Potential Problems Will be Absent or Manageable (Perioperative Period)  Outcome: Outcome(s) achieved Date Met:  04/23/17    Problem: Lung Surgery (via Thoracotomy) (Adult)  Goal: Signs and Symptoms of Listed Potential Problems Will be Absent or Manageable (Lung Surgery)  Outcome: Ongoing (interventions implemented as appropriate)    Problem: Pain, Acute (Adult)  Goal: Acceptable Pain Control/Comfort Level  Outcome: Ongoing (interventions implemented as appropriate)       [FreeTextEntry1] : Naomi Matson is a 60-year-old male with Hypertension and Hypercholesterolemia comes for follow up visit. Denies any chest pain or shortness of breath. No palpitations. Compliant to medications and diet. Physically active.

## (undated) DEVICE — GLV SURG TRIUMPH LT PF LTX 7 STRL

## (undated) DEVICE — SPNG DRN AMD EXCILON 6PLY 4X4IN PK/2

## (undated) DEVICE — DRSNG TELFA PAD NONADH STR 1S 3X4IN

## (undated) DEVICE — SUT MONOCRYL 4/0 PS2 27IN Y426H ETY426H

## (undated) DEVICE — SHEET,DRAPE,53X77,STERILE: Brand: MEDLINE

## (undated) DEVICE — PK MAJ PROC LF 60

## (undated) DEVICE — INTENDED FOR TISSUE SEPARATION, AND OTHER PROCEDURES THAT REQUIRE A SHARP SURGICAL BLADE TO PUNCTURE OR CUT.: Brand: BARD-PARKER ® CARBON RIB-BACK BLADES

## (undated) DEVICE — 3M™ IOBAN™ 2 ANTIMICROBIAL INCISE DRAPE 6651EZ: Brand: IOBAN™ 2

## (undated) DEVICE — TRAP,MUCUS SPECIMEN,40CC: Brand: MEDLINE

## (undated) DEVICE — SYS SKIN CLS DERMABOND PRINEO W/22CM MESH TP

## (undated) DEVICE — 28 FR STRAIGHT – SILICONE CATHETER: Brand: SILICONE THORACIC CATHETERS

## (undated) DEVICE — SYR LL 3CC

## (undated) DEVICE — STERILE POLYISOPRENE POWDER-FREE SURGICAL GLOVES WITH EMOLLIENT COATING: Brand: PROTEXIS

## (undated) DEVICE — DRSNG TELFA PAD NONADH STR 1S 3X8IN

## (undated) DEVICE — SPNG LAP 18X18IN LF STRL PK/5

## (undated) DEVICE — STERILE SLEEVE: Brand: CONVERTORS

## (undated) DEVICE — SUT PROLN 3/0 SH D/A 36IN 8522H

## (undated) DEVICE — GOWN,PREVENTION PLUS,XLARGE,STERILE: Brand: MEDLINE

## (undated) DEVICE — TRY IRR

## (undated) DEVICE — TP SXN YANKR BLB TIP W/TBG 10F LF STRL

## (undated) DEVICE — ANTI-FOG SOLUTION W/FOAM PAD: Brand: CARDINAL HEALTH

## (undated) DEVICE — SUT VICRYL 3-0 SH-1 PO 18IN J772D

## (undated) DEVICE — SUT PROLN 3/0 RB1 D/A 36IN 8558H

## (undated) DEVICE — CVR FLUOROSCOPE C/ARM W/TP 36X28IN

## (undated) DEVICE — SUT VIC 2 TP 1MS/4 27IN DYED J649G

## (undated) DEVICE — SYS SKIN CLS DERMABOND PRINEO W/60CM MESH TP

## (undated) DEVICE — SUT VIC 2/0 SH 27IN

## (undated) DEVICE — NDL HYPO PRECISIONGLIDE/REG 18G 1IN PNK

## (undated) DEVICE — ELECTRD BLD EXT EDGE/INSUL 6IN

## (undated) DEVICE — SUT PDS 0 CT-1 Z340H

## (undated) DEVICE — WOUND RETRACTOR AND PROTECTOR: Brand: ALEXIS O WOUND PROTECTOR-RETRACTOR

## (undated) DEVICE — DECANT BG O JET

## (undated) DEVICE — DRSNG WND BORDR/ADHS NONADHR/GZ LF 4X10IN STRL

## (undated) DEVICE — CONTAINER,SPECIMEN,OR STERILE,4OZ: Brand: MEDLINE

## (undated) DEVICE — SUT PROLN 4/0 RB1 D/A 36IN 8557H

## (undated) DEVICE — GOWN,AURORA,NOREINF,RAGLAN,XL,STERILE: Brand: MEDLINE

## (undated) DEVICE — SOL IRR NACL 0.9PCT BT 1000ML

## (undated) DEVICE — GLV SURG TRIUMPH LT PF LTX 7.5 STRL

## (undated) DEVICE — SUT PDS 2 0 CT1 27IN CLR Z259H

## (undated) DEVICE — OASIS DRAIN, SINGLE, INLINE & ATS COMPATIBLE: Brand: OASIS

## (undated) DEVICE — GLV SURG SENSICARE GREEN W/ALOE PF LF 6 STRL

## (undated) DEVICE — COVER,TABLE,EXT,80"X84",STRL: Brand: MEDLINE

## (undated) DEVICE — SPNG DISSCT SECTO KTTNER PK/5

## (undated) DEVICE — DRSNG WND STRATASORB ADHS ILND 4X10IN

## (undated) DEVICE — KT INTRO MINISTICK MAX W/GW PALLADIUM ECHO 4F 21G 7CM

## (undated) DEVICE — RED RUBBER URETHRAL CATHETER: Brand: DOVER

## (undated) DEVICE — DRSNG SURESITE WNDW 4X4.5

## (undated) DEVICE — CONNECT Y INTERSEPT W/LL 3/8 X 3/8 X 3/8IN

## (undated) DEVICE — BITEBLOCK ENDO W/STRAP 60F A/ LF DISP

## (undated) DEVICE — BARD® PTFE FELT, 1.3 CM X 10.2 CM: Brand: BARD® PTFE FELT

## (undated) DEVICE — 3M™ WARMING BLANKET, LOWER BODY, 10 PER CASE, 42568: Brand: BAIR HUGGER™

## (undated) DEVICE — ELECTRD BLD EZ CLN MOD 2.5IN

## (undated) DEVICE — STPLR SKIN VISISTAT WD 35CT

## (undated) DEVICE — ECHELON FLEX  POWERED VASCULAR STAPLER WITH ADVANCED PLACEMENT TIP, 35MM: Brand: ECHELON FLEX

## (undated) DEVICE — STERILE POLYISOPRENE POWDER-FREE SURGICAL GLOVES: Brand: PROTEXIS

## (undated) DEVICE — SUT SILK 0 FSL 18IN 678G

## (undated) DEVICE — ON-Q* CATHETERS-SOAKER*: Brand: ON-Q*

## (undated) DEVICE — SYR LL TP 10ML STRL